# Patient Record
Sex: MALE | Race: WHITE | NOT HISPANIC OR LATINO | Employment: OTHER | URBAN - METROPOLITAN AREA
[De-identification: names, ages, dates, MRNs, and addresses within clinical notes are randomized per-mention and may not be internally consistent; named-entity substitution may affect disease eponyms.]

---

## 2017-07-05 RX ORDER — METOPROLOL TARTRATE 50 MG/1
TABLET, FILM COATED ORAL
Qty: 180 TABLET | Refills: 3 | Status: SHIPPED | OUTPATIENT
Start: 2017-07-05 | End: 2017-08-15

## 2017-08-09 ENCOUNTER — LAB (OUTPATIENT)
Dept: LAB | Facility: HOSPITAL | Age: 67
End: 2017-08-09
Attending: INTERNAL MEDICINE

## 2017-08-09 ENCOUNTER — TRANSCRIBE ORDERS (OUTPATIENT)
Dept: ADMINISTRATIVE | Facility: HOSPITAL | Age: 67
End: 2017-08-09

## 2017-08-09 ENCOUNTER — OFFICE VISIT (OUTPATIENT)
Dept: CARDIOLOGY | Facility: CLINIC | Age: 67
End: 2017-08-09

## 2017-08-09 VITALS
OXYGEN SATURATION: 100 % | SYSTOLIC BLOOD PRESSURE: 160 MMHG | WEIGHT: 223 LBS | HEART RATE: 71 BPM | BODY MASS INDEX: 30.2 KG/M2 | DIASTOLIC BLOOD PRESSURE: 92 MMHG | HEIGHT: 72 IN

## 2017-08-09 DIAGNOSIS — Z01.810 PRE-OPERATIVE CARDIOVASCULAR EXAMINATION: Primary | ICD-10-CM

## 2017-08-09 DIAGNOSIS — Z01.810 PRE-OPERATIVE CARDIOVASCULAR EXAMINATION: ICD-10-CM

## 2017-08-09 DIAGNOSIS — Z13.6 SCREENING FOR ISCHEMIC HEART DISEASE: ICD-10-CM

## 2017-08-09 DIAGNOSIS — Z95.5 HISTORY OF CORONARY ARTERY STENT PLACEMENT: ICD-10-CM

## 2017-08-09 DIAGNOSIS — I20.9 ISCHEMIC CHEST PAIN (HCC): ICD-10-CM

## 2017-08-09 DIAGNOSIS — I25.10 CORONARY ARTERY DISEASE INVOLVING NATIVE CORONARY ARTERY, ANGINA PRESENCE UNSPECIFIED, UNSPECIFIED WHETHER NATIVE OR TRANSPLANTED HEART: Primary | ICD-10-CM

## 2017-08-09 PROBLEM — E11.9 DIABETES: Status: ACTIVE | Noted: 2017-08-09

## 2017-08-09 PROBLEM — I10 ESSENTIAL (PRIMARY) HYPERTENSION: Status: ACTIVE | Noted: 2017-08-09

## 2017-08-09 PROBLEM — E78.5 HLD (HYPERLIPIDEMIA): Status: ACTIVE | Noted: 2017-08-09

## 2017-08-09 PROBLEM — I49.3 PREMATURE COMPLEX, VENTRICULAR: Status: ACTIVE | Noted: 2017-08-09

## 2017-08-09 PROBLEM — I20.0 ACCELERATING ANGINA (HCC): Status: ACTIVE | Noted: 2017-08-09

## 2017-08-09 LAB
ANION GAP SERPL CALCULATED.3IONS-SCNC: 12.9 MMOL/L
BASOPHILS # BLD AUTO: 0.14 10*3/MM3 (ref 0–0.2)
BASOPHILS NFR BLD AUTO: 1.9 % (ref 0–1.5)
BUN BLD-MCNC: 13 MG/DL (ref 8–23)
BUN/CREAT SERPL: 15.7 (ref 7–25)
CALCIUM SPEC-SCNC: 10.6 MG/DL (ref 8.6–10.5)
CHLORIDE SERPL-SCNC: 100 MMOL/L (ref 98–107)
CO2 SERPL-SCNC: 25.1 MMOL/L (ref 22–29)
CREAT BLD-MCNC: 0.83 MG/DL (ref 0.76–1.27)
DEPRECATED RDW RBC AUTO: 46.7 FL (ref 37–54)
EOSINOPHIL # BLD AUTO: 0.45 10*3/MM3 (ref 0–0.7)
EOSINOPHIL NFR BLD AUTO: 6.2 % (ref 0.3–6.2)
ERYTHROCYTE [DISTWIDTH] IN BLOOD BY AUTOMATED COUNT: 14.3 % (ref 11.5–14.5)
GFR SERPL CREATININE-BSD FRML MDRD: 93 ML/MIN/1.73
GLUCOSE BLD-MCNC: 261 MG/DL (ref 65–99)
HCT VFR BLD AUTO: 43.4 % (ref 40.4–52.2)
HGB BLD-MCNC: 14.1 G/DL (ref 13.7–17.6)
IMM GRANULOCYTES # BLD: 0.1 10*3/MM3 (ref 0–0.03)
IMM GRANULOCYTES NFR BLD: 1.4 % (ref 0–0.5)
LYMPHOCYTES # BLD AUTO: 1.91 10*3/MM3 (ref 0.9–4.8)
LYMPHOCYTES NFR BLD AUTO: 26.4 % (ref 19.6–45.3)
MCH RBC QN AUTO: 29.2 PG (ref 27–32.7)
MCHC RBC AUTO-ENTMCNC: 32.5 G/DL (ref 32.6–36.4)
MCV RBC AUTO: 89.9 FL (ref 79.8–96.2)
MONOCYTES # BLD AUTO: 0.76 10*3/MM3 (ref 0.2–1.2)
MONOCYTES NFR BLD AUTO: 10.5 % (ref 5–12)
NEUTROPHILS # BLD AUTO: 3.88 10*3/MM3 (ref 1.9–8.1)
NEUTROPHILS NFR BLD AUTO: 53.6 % (ref 42.7–76)
PLATELET # BLD AUTO: 247 10*3/MM3 (ref 140–500)
PMV BLD AUTO: 10.6 FL (ref 6–12)
POTASSIUM BLD-SCNC: 4.5 MMOL/L (ref 3.5–5.2)
RBC # BLD AUTO: 4.83 10*6/MM3 (ref 4.6–6)
SODIUM BLD-SCNC: 138 MMOL/L (ref 136–145)
WBC NRBC COR # BLD: 7.24 10*3/MM3 (ref 4.5–10.7)

## 2017-08-09 PROCEDURE — 93000 ELECTROCARDIOGRAM COMPLETE: CPT | Performed by: PHYSICIAN ASSISTANT

## 2017-08-09 PROCEDURE — 85025 COMPLETE CBC W/AUTO DIFF WBC: CPT

## 2017-08-09 PROCEDURE — 36415 COLL VENOUS BLD VENIPUNCTURE: CPT

## 2017-08-09 PROCEDURE — 99215 OFFICE O/P EST HI 40 MIN: CPT | Performed by: PHYSICIAN ASSISTANT

## 2017-08-09 PROCEDURE — 80048 BASIC METABOLIC PNL TOTAL CA: CPT

## 2017-08-09 RX ORDER — ISOSORBIDE MONONITRATE 30 MG/1
30 TABLET, EXTENDED RELEASE ORAL DAILY
Qty: 30 TABLET | Refills: 11 | Status: SHIPPED | OUTPATIENT
Start: 2017-08-09 | End: 2017-08-15

## 2017-08-09 NOTE — PROGRESS NOTES
Date of Office Visit: 2017  Encounter Provider: SHAINA Mays  Place of Service: [facility] CARDIOLOGY  Patient Name: Alex Geiger  :1950    Chief Complaint   Patient presents with   • Chest Pain     6 month follow up   :     HPI: Alex Geiger is a 66 y.o. male, new to me, who presents today for follow-up.  Old records have been obtained and reviewed by me.  He is a patient of Dr. Gill with a past medical history significant for hypertension, hyperlipidemia, PVCs, and coronary artery disease.  In 2014 he had unstable angina and was taken for cardiac catheterization.  This showed a normal left main, 30% proximal LAD, 90% mid circumflex, 70% proximal RCA, 80% mid RCA, and 40% JENNIFER.  He underwent successful drug-eluting stent placement to the circumflex and RCA.  His last echocardiogram was in 2014 and showed normal LV function with an EF of 52% and no significant valvular abnormalities.  He was last in our office to see Dr. Deutsch on 8/15/2016.  At that visit, he was doing well from a cardiac standpoint.  He has been intolerant to statins, but was asymptomatic and without complaints of angina or heart failure.  No changes were made to his medical regimen, and he is here today for yearly follow-up.   Over the past month he has been having chest pain.  It starts in the center of his chest and radiates to both of his arms.  It happens randomly, most of the time at rest.  He describes it as a heaviness.  It usually last about 2 or 3 minutes, and he rates it a 2 out of 10.  It associated with shortness of breath, dizziness, and weakness.  He did take a sublingual nitroglycerin for 1 episode of it, and this did help.  He is also noticed increasing fatigue for the past month as well.  On whether the episodes, the pain radiated to his jaw.  The last time he had a his stent placed, he had terrible jaw pain.  His last hemoglobin A1c was 8.0.  His last  cholesterol total was almost 300.  He has been intolerant to statins.      Past Medical History:   Diagnosis Date   • CAD (coronary artery disease)    • Diabetes mellitus    • Hyperlipidemia    • Hypertension    • Renal injury    • Unstable angina    • Ventricular ectopy    • VPC (ventricular premature complex)        Past Surgical History:   Procedure Laterality Date   • CARDIAC CATHETERIZATION     • CORONARY ANGIOPLASTY WITH STENT PLACEMENT     • KIDNEY STONE SURGERY         Social History     Social History   • Marital status:      Spouse name: N/A   • Number of children: N/A   • Years of education: N/A     Occupational History   • Not on file.     Social History Main Topics   • Smoking status: Former Smoker   • Smokeless tobacco: Not on file   • Alcohol use 0.6 oz/week     1 Shots of liquor per week      Comment: 2 drinks a year   • Drug use: No   • Sexual activity: Defer     Other Topics Concern   • Not on file     Social History Narrative       Family History   Problem Relation Age of Onset   • Diabetes Mother    • Alzheimer's disease Father    • Kidney disease Father    • Diabetes Father    • No Known Problems Maternal Grandmother    • No Known Problems Maternal Grandfather    • No Known Problems Paternal Grandmother    • Heart attack Paternal Grandfather    • Heart disease Paternal Grandfather    • Diabetes Paternal Grandfather        Review of Systems   Constitution: Positive for malaise/fatigue. Negative for chills, fever, weight gain and weight loss.   HENT: Negative for ear pain, headaches, hearing loss, nosebleeds and sore throat.    Eyes: Negative for double vision, pain and visual disturbance.   Cardiovascular: Positive for chest pain and dyspnea on exertion. Negative for irregular heartbeat, leg swelling, near-syncope, orthopnea, palpitations, paroxysmal nocturnal dyspnea and syncope.   Respiratory: Negative for cough, shortness of breath, sleep disturbances due to breathing, snoring and  "wheezing.    Endocrine: Negative for cold intolerance, heat intolerance and polyuria.   Skin: Negative for itching and rash.   Musculoskeletal: Negative for joint pain, joint swelling and myalgias.   Gastrointestinal: Negative for abdominal pain, diarrhea, melena, nausea and vomiting.   Genitourinary: Negative for frequency, hematuria and hesitancy.   Neurological: Negative for excessive daytime sleepiness, light-headedness, numbness, paresthesias and seizures.   Psychiatric/Behavioral: Negative for altered mental status and depression.   Allergic/Immunologic: Negative.    All other systems reviewed and are negative.      Allergies   Allergen Reactions   • Acetic Acid Anaphylaxis     \"has trouble breathing\"   • Oxycodone-Acetaminophen Itching   • Percocet [Oxycodone-Acetaminophen]    • Shellfish-Derived Products Hives and Itching   • Statins    • Adhesive Tape Rash   • Latex Rash         Current Outpatient Prescriptions:   •  aspirin 81 MG chewable tablet, Chew 81 mg daily., Disp: , Rfl:   •  diclofenac (VOLTAREN) 1 % gel gel, Apply pea sized amount or 1/2 inch to affected joint up to 4 times daily as needed for arthritis pain, Disp: 100 g, Rfl: 2  •  fexofenadine (ALLEGRA) 180 MG tablet, Take 180 mg by mouth daily., Disp: , Rfl:   •  fluconazole (DIFLUCAN) 100 MG tablet, Take 1 tablet by mouth Daily. For yeast infection, Disp: 7 tablet, Rfl: 3  •  flunisolide (NASALIDE) 25 MCG/ACT (0.025%) solution nasal spray, Inhale 2 sprays every 12 (twelve) hours., Disp: , Rfl:   •  irbesartan (AVAPRO) 150 MG tablet, Take 150 mg by mouth every night., Disp: , Rfl:   •  Magnesium 250 MG tablet, Take  by mouth., Disp: , Rfl:   •  metFORMIN (GLUCOPHAGE) 500 MG tablet, Take 500 mg by mouth 2 (two) times a day with meals., Disp: , Rfl:   •  metoprolol tartrate (LOPRESSOR) 50 MG tablet, TAKE (1) TABLET TWICE A DAY, Disp: 180 tablet, Rfl: 3  •  nitroglycerin (NITROSTAT) 0.4 MG SL tablet, Place 0.4 mg under the tongue every 5 (five) " "minutes as needed for chest pain. Take no more than 3 doses in 15 minutes., Disp: , Rfl:   •  sitaGLIPtin (JANUVIA) 100 MG tablet, Take 100 mg by mouth daily., Disp: , Rfl:   •  tamsulosin (FLOMAX) 0.4 MG capsule 24 hr capsule, Take 1 capsule by mouth every night., Disp: , Rfl:   •  Testosterone (FORTESTA) 10 MG/ACT (2%) gel, Place  on the skin., Disp: , Rfl:      Objective:     Vitals:    08/09/17 0930 08/09/17 0942   BP: 152/92 160/92   BP Location: Right arm Left arm   Pulse: 71    SpO2: 100%    Weight: 223 lb (101 kg)    Height: 72\" (182.9 cm)      Body mass index is 30.24 kg/(m^2).    PHYSICAL EXAM:    Physical Exam   Constitutional: He is oriented to person, place, and time. He appears well-developed and well-nourished. No distress.   HENT:   Head: Normocephalic and atraumatic.   Eyes: Pupils are equal, round, and reactive to light.   Neck: No JVD present. No thyromegaly present.   Cardiovascular: Normal rate, regular rhythm, normal heart sounds and intact distal pulses.    No murmur heard.  Pulmonary/Chest: Effort normal and breath sounds normal. No respiratory distress.   Abdominal: Soft. Bowel sounds are normal. He exhibits no distension. There is no splenomegaly or hepatomegaly. There is no tenderness.   Musculoskeletal: Normal range of motion. He exhibits no edema.   Neurological: He is alert and oriented to person, place, and time.   Skin: Skin is warm and dry. He is not diaphoretic. No erythema.   Psychiatric: He has a normal mood and affect. His behavior is normal. Judgment normal.         ECG 12 Lead  Date/Time: 8/9/2017 9:49 AM  Performed by: SADA HILTON.  Authorized by: SADA HILTON   Comparison: compared with previous ECG from 8/15/2016  Similar to previous ECG  Rhythm: sinus rhythm  BPM: 71  T depression: II, III and aVF  Q waves: II, III and aVF  Clinical impression: abnormal ECG  Comments: Indication: Coronary artery disease, status post stent placement.    When compared to the ECG on " 8/15/2016, there are new T-wave inversions in leads II and aVF.  There is deepening of the T-wave inversion in lead III, and new Q waves and aVF.              Assessment:       Diagnosis Plan   1. Coronary artery disease involving native coronary artery, angina presence unspecified, unspecified whether native or transplanted heart  ECG 12 Lead    Case Request Cath Lab: Coronary angiography   2. Ischemic chest pain  ECG 12 Lead    Case Request Cath Lab: Coronary angiography   3. History of coronary artery stent placement  ECG 12 Lead    Case Request Cath Lab: Coronary angiography     Orders Placed This Encounter   Procedures   • ECG 12 Lead     This order was created via procedure documentation          Plan:       1.  Coronary Artery Disease  Assessment  • There is a new diagnosis of stable angina in the past 12 months  • The patient is having symptoms consistent with unstable angina     Plan  • The patient is being referred to interventional cardiology    Subjective - Objective  • There has been a previous stent procedure using QUIANA  • Current antiplatelet therapy includes aspirin 81 mg  • This is a man who has a history of coronary artery disease and received multiple stents in 2014.  He has been intolerant to statins, and according to the patient his cholesterol total is close to 300.  He also has uncontrolled diabetes with a hemoglobin A1c of about 8.0 as of 2 or 3 months ago.  He is having unstable angina that has been occurring for the past month as well.  He states it feels similar to the way he felt prior to his stent placement in 2014.  I'm going to refer him for cardiac catheterization.  I did discuss with him that between now and when he has his cardiac catheterization, if he develops any chest pain that is worse or does not go away he needs to go to the emergency room.  He indicated that he understood.  I'm also going to start him on Imdur 30 mg daily.  Hopefully this will keep him chest pain free until  we can do his cardiac catheterization.  I did discuss this plan of care with Dr. Deutsch, who was in agreement.      As always, it has been a pleasure to participate in your patient's care.      Sincerely,         Eliana Shaw PA-C

## 2017-08-15 RX ORDER — METOPROLOL TARTRATE 50 MG/1
50 TABLET, FILM COATED ORAL 2 TIMES DAILY
COMMUNITY
End: 2018-07-05 | Stop reason: SDUPTHER

## 2017-08-15 RX ORDER — ISOSORBIDE MONONITRATE 30 MG/1
30 TABLET, EXTENDED RELEASE ORAL DAILY
COMMUNITY
End: 2017-08-16 | Stop reason: HOSPADM

## 2017-08-16 ENCOUNTER — HOSPITAL ENCOUNTER (OUTPATIENT)
Facility: HOSPITAL | Age: 67
Setting detail: HOSPITAL OUTPATIENT SURGERY
Discharge: HOME | End: 2017-08-16
Attending: INTERNAL MEDICINE | Admitting: INTERNAL MEDICINE

## 2017-08-16 VITALS
WEIGHT: 219 LBS | BODY MASS INDEX: 43 KG/M2 | TEMPERATURE: 97.8 F | SYSTOLIC BLOOD PRESSURE: 153 MMHG | HEIGHT: 60 IN | DIASTOLIC BLOOD PRESSURE: 74 MMHG | HEART RATE: 79 BPM | OXYGEN SATURATION: 94 % | RESPIRATION RATE: 16 BRPM

## 2017-08-16 DIAGNOSIS — Z95.5 HISTORY OF CORONARY ARTERY STENT PLACEMENT: ICD-10-CM

## 2017-08-16 DIAGNOSIS — I20.9 ISCHEMIC CHEST PAIN (HCC): ICD-10-CM

## 2017-08-16 DIAGNOSIS — I25.10 CORONARY ARTERY DISEASE INVOLVING NATIVE CORONARY ARTERY, ANGINA PRESENCE UNSPECIFIED, UNSPECIFIED WHETHER NATIVE OR TRANSPLANTED HEART: ICD-10-CM

## 2017-08-16 LAB
ACT BLD: 246 SECONDS (ref 82–152)
GLUCOSE BLDC GLUCOMTR-MCNC: 265 MG/DL (ref 70–130)

## 2017-08-16 PROCEDURE — 85347 COAGULATION TIME ACTIVATED: CPT

## 2017-08-16 PROCEDURE — C1887 CATHETER, GUIDING: HCPCS | Performed by: INTERNAL MEDICINE

## 2017-08-16 PROCEDURE — 99153 MOD SED SAME PHYS/QHP EA: CPT | Performed by: INTERNAL MEDICINE

## 2017-08-16 PROCEDURE — 25010000002 HEPARIN (PORCINE) PER 1000 UNITS: Performed by: INTERNAL MEDICINE

## 2017-08-16 PROCEDURE — C1894 INTRO/SHEATH, NON-LASER: HCPCS | Performed by: INTERNAL MEDICINE

## 2017-08-16 PROCEDURE — 93458 L HRT ARTERY/VENTRICLE ANGIO: CPT | Performed by: INTERNAL MEDICINE

## 2017-08-16 PROCEDURE — C1874 STENT, COATED/COV W/DEL SYS: HCPCS | Performed by: INTERNAL MEDICINE

## 2017-08-16 PROCEDURE — 25010000002 FENTANYL CITRATE (PF) 100 MCG/2ML SOLUTION: Performed by: INTERNAL MEDICINE

## 2017-08-16 PROCEDURE — C9600 PERC DRUG-EL COR STENT SING: HCPCS | Performed by: INTERNAL MEDICINE

## 2017-08-16 PROCEDURE — C1769 GUIDE WIRE: HCPCS | Performed by: INTERNAL MEDICINE

## 2017-08-16 PROCEDURE — 92928 PRQ TCAT PLMT NTRAC ST 1 LES: CPT | Performed by: INTERNAL MEDICINE

## 2017-08-16 PROCEDURE — 93005 ELECTROCARDIOGRAM TRACING: CPT | Performed by: INTERNAL MEDICINE

## 2017-08-16 PROCEDURE — C1725 CATH, TRANSLUMIN NON-LASER: HCPCS | Performed by: INTERNAL MEDICINE

## 2017-08-16 PROCEDURE — 99152 MOD SED SAME PHYS/QHP 5/>YRS: CPT | Performed by: INTERNAL MEDICINE

## 2017-08-16 PROCEDURE — 93010 ELECTROCARDIOGRAM REPORT: CPT | Performed by: INTERNAL MEDICINE

## 2017-08-16 PROCEDURE — 0 IOPAMIDOL PER 1 ML: Performed by: INTERNAL MEDICINE

## 2017-08-16 PROCEDURE — 82962 GLUCOSE BLOOD TEST: CPT

## 2017-08-16 PROCEDURE — 25010000002 MIDAZOLAM PER 1 MG: Performed by: INTERNAL MEDICINE

## 2017-08-16 PROCEDURE — 25010000002 BH (CUPID ONLY) ADENOSINE 6 MG/100ML MIXTURE: Performed by: INTERNAL MEDICINE

## 2017-08-16 DEVICE — XIENCE ALPINE EVEROLIMUS ELUTING CORONARY STENT SYSTEM 3.50 MM X 18 MM / RAPID-EXCHANGE
Type: IMPLANTABLE DEVICE | Status: FUNCTIONAL
Brand: XIENCE ALPINE

## 2017-08-16 RX ORDER — PRASUGREL 10 MG/1
10 TABLET, FILM COATED ORAL DAILY
Qty: 90 TABLET | Refills: 3 | Status: SHIPPED | OUTPATIENT
Start: 2017-08-16 | End: 2018-08-08 | Stop reason: SDUPTHER

## 2017-08-16 RX ORDER — HEPARIN SODIUM 1000 [USP'U]/ML
INJECTION, SOLUTION INTRAVENOUS; SUBCUTANEOUS AS NEEDED
Status: DISCONTINUED | OUTPATIENT
Start: 2017-08-16 | End: 2017-08-16 | Stop reason: HOSPADM

## 2017-08-16 RX ORDER — SODIUM CHLORIDE 9 MG/ML
100 INJECTION, SOLUTION INTRAVENOUS CONTINUOUS
Status: DISCONTINUED | OUTPATIENT
Start: 2017-08-16 | End: 2017-08-16 | Stop reason: HOSPADM

## 2017-08-16 RX ORDER — PRASUGREL 5 MG/1
TABLET, FILM COATED ORAL AS NEEDED
Status: DISCONTINUED | OUTPATIENT
Start: 2017-08-16 | End: 2017-08-16 | Stop reason: HOSPADM

## 2017-08-16 RX ORDER — SODIUM CHLORIDE 9 MG/ML
125 INJECTION, SOLUTION INTRAVENOUS CONTINUOUS
Status: DISCONTINUED | OUTPATIENT
Start: 2017-08-16 | End: 2017-08-16 | Stop reason: HOSPADM

## 2017-08-16 RX ORDER — LIDOCAINE HYDROCHLORIDE 10 MG/ML
0.1 INJECTION, SOLUTION EPIDURAL; INFILTRATION; INTRACAUDAL; PERINEURAL ONCE AS NEEDED
Status: DISCONTINUED | OUTPATIENT
Start: 2017-08-16 | End: 2017-08-16 | Stop reason: HOSPADM

## 2017-08-16 RX ORDER — SODIUM CHLORIDE 0.9 % (FLUSH) 0.9 %
1-10 SYRINGE (ML) INJECTION AS NEEDED
Status: DISCONTINUED | OUTPATIENT
Start: 2017-08-16 | End: 2017-08-16 | Stop reason: HOSPADM

## 2017-08-16 RX ORDER — VERAPAMIL HYDROCHLORIDE 2.5 MG/ML
INJECTION, SOLUTION INTRAVENOUS AS NEEDED
Status: DISCONTINUED | OUTPATIENT
Start: 2017-08-16 | End: 2017-08-16 | Stop reason: HOSPADM

## 2017-08-16 RX ORDER — PRASUGREL 10 MG/1
10 TABLET, FILM COATED ORAL DAILY
Status: DISCONTINUED | OUTPATIENT
Start: 2017-08-17 | End: 2017-08-16 | Stop reason: HOSPADM

## 2017-08-16 RX ORDER — TESTOSTERONE 12.5 MG/1.25G
25 GEL TOPICAL DAILY
COMMUNITY
End: 2021-03-26 | Stop reason: HOSPADM

## 2017-08-16 RX ORDER — FENTANYL CITRATE 50 UG/ML
INJECTION, SOLUTION INTRAMUSCULAR; INTRAVENOUS AS NEEDED
Status: DISCONTINUED | OUTPATIENT
Start: 2017-08-16 | End: 2017-08-16 | Stop reason: HOSPADM

## 2017-08-16 RX ORDER — MIDAZOLAM HYDROCHLORIDE 1 MG/ML
INJECTION INTRAMUSCULAR; INTRAVENOUS AS NEEDED
Status: DISCONTINUED | OUTPATIENT
Start: 2017-08-16 | End: 2017-08-16 | Stop reason: HOSPADM

## 2017-08-16 RX ORDER — METOPROLOL TARTRATE 5 MG/5ML
INJECTION INTRAVENOUS AS NEEDED
Status: DISCONTINUED | OUTPATIENT
Start: 2017-08-16 | End: 2017-08-16 | Stop reason: HOSPADM

## 2017-08-16 RX ORDER — ASPIRIN 325 MG
TABLET ORAL AS NEEDED
Status: DISCONTINUED | OUTPATIENT
Start: 2017-08-16 | End: 2017-08-16 | Stop reason: HOSPADM

## 2017-08-16 RX ORDER — LIDOCAINE HYDROCHLORIDE 20 MG/ML
INJECTION, SOLUTION INFILTRATION; PERINEURAL AS NEEDED
Status: DISCONTINUED | OUTPATIENT
Start: 2017-08-16 | End: 2017-08-16 | Stop reason: HOSPADM

## 2017-08-16 RX ORDER — NITROGLYCERIN 5 MG/ML
INJECTION, SOLUTION INTRAVENOUS AS NEEDED
Status: DISCONTINUED | OUTPATIENT
Start: 2017-08-16 | End: 2017-08-16 | Stop reason: HOSPADM

## 2017-08-16 RX ORDER — ASPIRIN 81 MG/1
81 TABLET, CHEWABLE ORAL DAILY
Status: DISCONTINUED | OUTPATIENT
Start: 2017-08-16 | End: 2017-08-16 | Stop reason: HOSPADM

## 2017-08-16 RX ADMIN — SODIUM CHLORIDE 125 ML/HR: 9 INJECTION, SOLUTION INTRAVENOUS at 07:52

## 2017-08-16 NOTE — PERIOPERATIVE NURSING NOTE
JARON GUTIERREZ AT BEDSIDE DISCUSSING PROCEDURE AND FOLLOW UP WITH PATIENT AND FAMILY.  VOICED UNDERSTANDING.

## 2017-08-16 NOTE — INTERVAL H&P NOTE
H&P reviewed. The patient was examined and there are no changes to the H&P. I have explained the risks and benefits of the procedure to the patient.  The patient understands and agrees to proceed

## 2017-08-16 NOTE — DISCHARGE INSTRUCTIONS
Moderate Conscious Sedation, Adult, Care After  Refer to this sheet in the next few weeks. These instructions provide you with information on caring for yourself after your procedure. Your health care provider may also give you more specific instructions. Your treatment has been planned according to current medical practices, but problems sometimes occur. Call your health care provider if you have any problems or questions after your procedure.  WHAT TO EXPECT AFTER THE PROCEDURE   After your procedure:  · You may feel sleepy, clumsy, and have poor balance for several hours.  · Vomiting may occur if you eat too soon after the procedure.  HOME CARE INSTRUCTIONS  · Do not participate in any activities where you could become injured for at least 24 hours. Do not:    Drive.    Swim.    Ride a bicycle.    Operate heavy machinery.    Cook.    Use power tools.    Climb ladders.    Work from a high place.  · Do not make important decisions or sign legal documents until you are improved.  · If you vomit, drink water, juice, or soup when you can drink without vomiting. Make sure you have little or no nausea before eating solid foods.  · Only take over-the-counter or prescription medicines for pain, discomfort, or fever as directed by your health care provider.  · Make sure you and your family fully understand everything about the medicines given to you, including what side effects may occur.  · You should not drink alcohol, take sleeping pills, or take medicines that cause drowsiness for at least 24 hours.  · If you smoke, do not smoke without supervision.  · If you are feeling better, you may resume normal activities 24 hours after you were sedated.  · Keep all appointments with your health care provider.  · You should have a responsible adult stay with you for the first 24 hours post procedure.  SEEK MEDICAL CARE IF:  · Your skin is pale or bluish in color.  · You continue to feel nauseous or vomit.  · Your pain is getting  worse and is not helped by medicine.  · You have bleeding or swelling.  · You are still sleepy or feeling clumsy after 24 hours.  SEEK IMMEDIATE MEDICAL CARE IF:  · You develop a rash.  · You have difficulty breathing.  · You develop any type of allergic problem.  · You have a fever.  MAKE SURE YOU:  · Understand these instructions.  · Will watch your condition.  · Will get help right away if you are not doing well or get worse.     This information is not intended to replace advice given to you by your health care provider. Make sure you discuss any questions you have with your health care provider.     Document Released: 10/08/2014 Document Revised: 01/08/2016 Document Reviewed: 10/08/2014  [employer] Patient Education ©2016 [employer].    [facility]  [address] [place] [address]    Coronary Angiogram with Stent (Radial Approach) After Care    Refer to this sheet in the next few weeks. These instructions provide you with information on caring for yourself after your procedure. Your health care provider may also give you more specific instructions. Your treatment has been planned according to current medical practices, but problems sometimes occur. Call your health care provider if you have any problems or questions after your procedure.       Home Care Instructions:  · You may shower the day after the procedure. Remove the bandage (dressing) and gently wash the site with plain soap and water. Gently pat the site dry. You may apply a band aid daily for 2 days if desired.    · Do not apply powder or lotion to the site.  · Do not submerge the affected site in water for 3 to 5 days or until the site is completely healed.   · Do not flex or bend the affected arm for 24 hours.  · Do not lift, push or pull anything over 10 pounds for 2 days after your procedure.  · Do not operate machinery or power tools for 24 hours.  · Inspect the site at least twice daily. You may notice some  bruising at the site and it may be tender for 1 to 2 weeks.     · Increase your fluid intake for the next 2 days.    · Keep arm elevated for 24 hours.  For the remainder of the day, keep your arm in the “Pledge of Allegiance” position when up and about.    · Limit your activity for the next 48 hours and avoid strenuous activity as directed by your physician.   · Cardiac Rehab may or may not be ordered.  Please consult with your physician  · You may drive 24 hours after the procedure unless otherwise instructed by your caregiver.  · A responsible adult should be with you for the first 24 hours after you arrive home.   · Do not make any important legal decisions or sign legal papers for 24 hours.    · Take medicines only as directed by your health care provider.  Blood thinners may be prescribed after your procedure to improve blood flow through the stent.    · Eat a heart-healthy diet. This should include plenty of fresh fruits and vegetables. Meat should be lean cuts. Avoid the following types of food:    ¨ Food that is high in salt.    ¨ Canned or highly processed food.    ¨ Food that is high in saturated fat or sugar.    ¨ Fried food.    · Make any other lifestyle changes recommended by your health care provider. This may include:    ¨ Not using any tobacco products including cigarettes, chewing tobacco, or electronic cigarettes.   ¨ Managing your weight.    ¨ Getting regular exercise.    ¨ Managing your blood pressure.    ¨ Limiting your alcohol intake.    ¨ Managing other health problems, such as diabetes.    · If you need an MRI after your heart stent was placed, be sure to tell the health care provider who orders the MRI that you have a heart stent.    · Keep all follow-up visits as directed by your health care provider.    ·   Call Your Doctor If:  · You have unusual pain at the radial/ulnar (wrist) site.  · You have redness, warmth, swelling, or pain at the radial/ulnar (wrist) site.  · You have drainage  (other than a small amount of blood on the dressing).  · `You have chills or a fever > 101.  · Your arm becomes pale or dark, cool, tingly, or numb.  · You develop chest pain, shortness of breath, feel faint or pass out.    · You have heavy bleeding from the site, hold pressure on the site for 20 minutes.  If the bleeding stops, apply a fresh bandage and call your cardiologist.  However, if you continue to have bleeding, call 911.        Make Sure You:   · Understand these instructions.  · Will watch your condition.  · Will get help right away if you are not doing well or get worse.

## 2017-08-16 NOTE — PLAN OF CARE
Problem: Patient Care Overview (Adult)  Goal: Plan of Care Review  Outcome: Outcome(s) achieved Date Met:  08/16/17 08/16/17 1534   Coping/Psychosocial Response Interventions   Plan Of Care Reviewed With patient;spouse   Patient Care Overview   Progress improving   Outcome Evaluation   Outcome Summary/Follow up Plan ready for discharge       Goal: Adult Individualization and Mutuality  Outcome: Outcome(s) achieved Date Met:  08/16/17  Goal: Discharge Needs Assessment  Outcome: Outcome(s) achieved Date Met:  08/16/17    Problem: Cardiac Catheterization with/without PCI (Adult)  Goal: Signs and Symptoms of Listed Potential Problems Will be Absent or Manageable (Cardiac Catheterization with/without PCI)  Outcome: Outcome(s) achieved Date Met:  08/16/17 08/16/17 1534   Cardiac Catheterization with/without PCI   Problems Assessed (Cardiac Catheterization) pain;cardiopulmonary complications;situational response;access site complications   Problems Present (Cardiac Catheterization) none

## 2017-08-16 NOTE — NURSING NOTE
"Pt ready for discharge.  Pt in bathroom.  Met with wife.  Explained benefits of outpatient cardiac rehab.  Closest program is at  [facility].  I provided that contact number for them.  Also provided education booklet, \"Understanding Cardiac Rehabilitation\". Wife says she's not sure he will agree to attend.  "

## 2017-08-30 ENCOUNTER — OFFICE VISIT (OUTPATIENT)
Dept: CARDIOLOGY | Facility: CLINIC | Age: 67
End: 2017-08-30

## 2017-08-30 VITALS
OXYGEN SATURATION: 97 % | HEIGHT: 72 IN | DIASTOLIC BLOOD PRESSURE: 88 MMHG | WEIGHT: 220 LBS | BODY MASS INDEX: 29.8 KG/M2 | SYSTOLIC BLOOD PRESSURE: 134 MMHG | HEART RATE: 76 BPM

## 2017-08-30 DIAGNOSIS — I25.10 CORONARY ARTERY DISEASE INVOLVING NATIVE CORONARY ARTERY OF NATIVE HEART WITHOUT ANGINA PECTORIS: Primary | ICD-10-CM

## 2017-08-30 DIAGNOSIS — Z95.5 HISTORY OF CORONARY ARTERY STENT PLACEMENT: ICD-10-CM

## 2017-08-30 PROCEDURE — 93000 ELECTROCARDIOGRAM COMPLETE: CPT | Performed by: PHYSICIAN ASSISTANT

## 2017-08-30 PROCEDURE — 99213 OFFICE O/P EST LOW 20 MIN: CPT | Performed by: PHYSICIAN ASSISTANT

## 2017-08-30 NOTE — PROGRESS NOTES
Date of Office Visit: 2017  Encounter Provider: SHAINA Mays  Place of Service: [facility] CARDIOLOGY  Patient Name: Alex Geiger  :1950    Chief Complaint   Patient presents with   • Coronary Artery Disease     1 week hospital follow up   :     HPI: Alex Geiger is a 67 y.o. male who presents today for Follow-up.  Old records have been obtained and reviewed by me.  He is a patient of Dr. Deutsch's with a past medical history significant for hypertension, hyperlipidemia, PVCs, and coronary artery disease.  In 2014 he had unstable angina and was taken for cardiac catheterization.  This showed a normal left main, 30% proximal LAD, 90% mid circumflex, 70% proximal RCA, 80% mid RCA, and 40% JENNIFER.  He underwent successful drug-eluting stent placement to the circumflex and RCA.  His last echocardiogram was in 2014 and showed normal LV function with an EF of 52% and no significant valvular abnormalities.    I saw him for a yearly appointment on 2017.  At that visit, he was having what I felt like was unstable angina.  I referred him for cardiac catheterization, which happened on 2017.  This showed moderately reduced global LV function with an EF in the range of 40%, normal left main, 90% mid LAD, 90% small second diagonal (not amenable to PCI), widely patent mid circumflex stent, 20% distal circumflex, widely patent proximal RCA stent, and 40-50% mid RCA stenosis.  He underwent successful drug-eluting stent placement to the mid LAD lesion.  He was discharged home the same day and is here today for follow-up.   Since his procedure he's been doing pretty well.  He still has some chest tightness and shortness of breath on exertion, as well as some fatigue.  However all 3 of these complaints are improving.  He no longer has the pain in his left arm like he used to.  He is very active and working full-time.  He states that he walks about 4 miles a  day at work and climbs 2 or 3 flights of stairs several times a day.  However he does not get exercise for the sake of exercise.  He does have a full gym and a treadmill and his basement.      Past Medical History:   Diagnosis Date   • CAD (coronary artery disease)    • Diabetes mellitus    • Hyperlipidemia    • Hypertension    • Renal injury    • Unstable angina    • Ventricular ectopy    • VPC (ventricular premature complex)        Past Surgical History:   Procedure Laterality Date   • CARDIAC CATHETERIZATION     • CARDIAC CATHETERIZATION N/A 8/16/2017    Procedure: Coronary angiography;  Surgeon: Rell Deutsch MD;  Location: [facility] CATH INVASIVE LOCATION;  Service:    • CARDIAC CATHETERIZATION N/A 8/16/2017    Procedure: Stent QUIANA coronary;  Surgeon: Rell Deutsch MD;  Location: [facility] CATH INVASIVE LOCATION;  Service:    • CARDIAC CATHETERIZATION N/A 8/16/2017    Procedure: Left Heart Cath;  Surgeon: Rell Deutsch MD;  Location: [facility] CATH INVASIVE LOCATION;  Service:    • CARDIAC CATHETERIZATION N/A 8/16/2017    Procedure: Left ventriculography;  Surgeon: Rell Deutsch MD;  Location: [facility] CATH INVASIVE LOCATION;  Service:    • CORONARY ANGIOPLASTY WITH STENT PLACEMENT     • KIDNEY STONE SURGERY     • PARATHYROIDECTOMY         Social History     Social History   • Marital status:      Spouse name: N/A   • Number of children: N/A   • Years of education: N/A     Occupational History   • Not on file.     Social History Main Topics   • Smoking status: Former Smoker   • Smokeless tobacco: Not on file   • Alcohol use 0.6 oz/week     1 Shots of liquor per week      Comment: 2 drinks a year   • Drug use: No   • Sexual activity: Defer     Other Topics Concern   • Not on file     Social History Narrative       Family History   Problem Relation Age of Onset   • Diabetes Mother    • Alzheimer's disease Father    • Kidney disease Father    • Diabetes Father    • No Known Problems Maternal Grandmother    •  "No Known Problems Maternal Grandfather    • No Known Problems Paternal Grandmother    • Heart attack Paternal Grandfather    • Heart disease Paternal Grandfather    • Diabetes Paternal Grandfather        Review of Systems   Constitution: Positive for malaise/fatigue. Negative for chills, fever, weight gain and weight loss.   HENT: Negative for ear pain, headaches, hearing loss, nosebleeds and sore throat.    Eyes: Negative for double vision, pain and visual disturbance.   Cardiovascular: Positive for chest pain and dyspnea on exertion. Negative for irregular heartbeat, leg swelling, near-syncope, orthopnea, palpitations, paroxysmal nocturnal dyspnea and syncope.   Respiratory: Negative for cough, shortness of breath, sleep disturbances due to breathing, snoring and wheezing.    Endocrine: Negative for cold intolerance, heat intolerance and polyuria.   Skin: Negative for itching and rash.   Musculoskeletal: Negative for joint pain, joint swelling and myalgias.   Gastrointestinal: Negative for abdominal pain, diarrhea, melena, nausea and vomiting.   Genitourinary: Negative for frequency, hematuria and hesitancy.   Neurological: Negative for excessive daytime sleepiness, light-headedness, numbness, paresthesias and seizures.   Psychiatric/Behavioral: Negative for altered mental status and depression.   Allergic/Immunologic: Negative.    All other systems reviewed and are negative.      Allergies   Allergen Reactions   • Acetic Acid Anaphylaxis     \"has trouble breathing\"   • Oxycodone-Acetaminophen Itching   • Percocet [Oxycodone-Acetaminophen]    • Shellfish-Derived Products Hives and Itching   • Statins    • Adhesive Tape Rash   • Latex Rash         Current Outpatient Prescriptions:   •  aspirin 81 MG chewable tablet, Chew 81 mg daily., Disp: , Rfl:   •  fexofenadine (ALLEGRA) 180 MG tablet, Take 180 mg by mouth daily., Disp: , Rfl:   •  flunisolide (NASALIDE) 25 MCG/ACT (0.025%) solution nasal spray, Inhale 2 sprays " "every 12 (twelve) hours., Disp: , Rfl:   •  irbesartan (AVAPRO) 150 MG tablet, Take 150 mg by mouth every night., Disp: , Rfl:   •  Magnesium 250 MG tablet, Take 1 tablet by mouth Daily., Disp: , Rfl:   •  metFORMIN (GLUCOPHAGE) 500 MG tablet, Take 2 tablets by mouth 2 (Two) Times a Day With Meals., Disp: , Rfl:   •  metoprolol tartrate (LOPRESSOR) 50 MG tablet, Take 50 mg by mouth 2 (Two) Times a Day., Disp: , Rfl:   •  nitroglycerin (NITROSTAT) 0.4 MG SL tablet, Place 0.4 mg under the tongue every 5 (five) minutes as needed for chest pain. Take no more than 3 doses in 15 minutes., Disp: , Rfl:   •  prasugrel (EFFIENT) 10 MG tablet, Take 1 tablet by mouth Daily for 30 days., Disp: 90 tablet, Rfl: 3  •  sitaGLIPtin (JANUVIA) 100 MG tablet, Take 100 mg by mouth daily., Disp: , Rfl:   •  tamsulosin (FLOMAX) 0.4 MG capsule 24 hr capsule, Take 1 capsule by mouth every night., Disp: , Rfl:   •  testosterone (ANDROGEL) 25 MG/2.5GM (1%) gel gel, Place 25 mg on the skin Daily., Disp: , Rfl:      Objective:     Vitals:    08/30/17 1032 08/30/17 1040   BP: 130/86 134/88   BP Location: Right arm Left arm   Pulse: 76    SpO2: 97%    Weight: 220 lb (99.8 kg)    Height: 72\" (182.9 cm)      Body mass index is 29.84 kg/(m^2).    PHYSICAL EXAM:    Physical Exam   Constitutional: He is oriented to person, place, and time. He appears well-developed and well-nourished. No distress.   HENT:   Head: Normocephalic and atraumatic.   Eyes: Pupils are equal, round, and reactive to light.   Neck: No JVD present. No thyromegaly present.   Cardiovascular: Normal rate, regular rhythm, normal heart sounds and intact distal pulses.    No murmur heard.  Right radial cath site well healed without erythema or echymosis, palpable proximal and distal pulses, good capillary refill   Pulmonary/Chest: Effort normal and breath sounds normal. No respiratory distress.   Abdominal: Soft. Bowel sounds are normal. He exhibits no distension. There is no " splenomegaly or hepatomegaly. There is no tenderness.   Musculoskeletal: Normal range of motion. He exhibits no edema.   Neurological: He is alert and oriented to person, place, and time.   Skin: Skin is warm and dry. He is not diaphoretic. No erythema.   Psychiatric: He has a normal mood and affect. His behavior is normal. Judgment normal.         ECG 12 Lead  Date/Time: 8/30/2017 10:47 AM  Performed by: SADA HILTON.  Authorized by: SADA HILTON   Comparison: compared with previous ECG from 8/16/2017  Rhythm: sinus rhythm  BPM: 76  T depression: II, III and aVF  Q waves: III  Clinical impression: abnormal ECG  Comments: Indication: Coronary artery disease, status post stent placement.              Assessment:       Diagnosis Plan   1. Coronary artery disease involving native coronary artery of native heart without angina pectoris  ECG 12 Lead   2. History of coronary artery stent placement  ECG 12 Lead     Orders Placed This Encounter   Procedures   • ECG 12 Lead     This order was created via procedure documentation          Plan:       1.  Coronary Artery Disease  Assessment  • The patient has CCS class I - angina only during strenuous or prolonged physical activity  • There is a new diagnosis of stable angina in the past 12 months  • The patient is having symptoms consistent with unstable angina     Plan  • Lifestyle modifications discussed include medication compliance and regular exercise    Subjective - Objective  • There has been a previous stent procedure using QUIANA  • Current antiplatelet therapy includes aspirin 81 mg and prasugrel 10 mg  • The patient qualifies for cardiac rehabilitation, but has not been referred for system reasons  • Overall he's doing well.  His symptoms are starting to improve.  He states that it took a long time after his last procedure for him to feel back to normal.  He did have a little bit of a reduced ejection fraction during his cardiac catheterization, and this  certainly could explain some of the fatigue and shortness of breath.  I think that at some point in the future we may want to check an echocardiogram, I will defer to Dr. Deutsch for that.  We did talk about cardiac rehabilitation, however there is no way that he can participate in cardiac rehabilitation with his current job.  I've encouraged him to start exercising at home on his treadmill, he indicated that he understood.  He will follow-up with Dr. Deutsch on 9/18/2017.      As always, it has been a pleasure to participate in your patient's care.      Sincerely,         Eliana Shaw PA-C

## 2017-09-18 ENCOUNTER — OFFICE VISIT (OUTPATIENT)
Dept: CARDIOLOGY | Facility: CLINIC | Age: 67
End: 2017-09-18

## 2017-09-18 VITALS
DIASTOLIC BLOOD PRESSURE: 92 MMHG | HEIGHT: 72 IN | SYSTOLIC BLOOD PRESSURE: 178 MMHG | HEART RATE: 55 BPM | WEIGHT: 223.4 LBS | BODY MASS INDEX: 30.26 KG/M2

## 2017-09-18 DIAGNOSIS — I25.10 CORONARY ARTERY DISEASE INVOLVING NATIVE CORONARY ARTERY OF NATIVE HEART WITHOUT ANGINA PECTORIS: ICD-10-CM

## 2017-09-18 DIAGNOSIS — Z95.5 HISTORY OF CORONARY ARTERY STENT PLACEMENT: ICD-10-CM

## 2017-09-18 DIAGNOSIS — I10 ESSENTIAL (PRIMARY) HYPERTENSION: ICD-10-CM

## 2017-09-18 DIAGNOSIS — E78.49 OTHER HYPERLIPIDEMIA: Primary | ICD-10-CM

## 2017-09-18 PROCEDURE — 93000 ELECTROCARDIOGRAM COMPLETE: CPT | Performed by: INTERNAL MEDICINE

## 2017-09-18 PROCEDURE — 99214 OFFICE O/P EST MOD 30 MIN: CPT | Performed by: INTERNAL MEDICINE

## 2017-09-18 RX ORDER — PRASUGREL 10 MG/1
10 TABLET, FILM COATED ORAL DAILY
COMMUNITY
End: 2018-09-25

## 2017-09-18 NOTE — PROGRESS NOTES
Date of Office Visit: 2017  Encounter Provider: Rell Deutsch MD  Place of Service: [facility] CARDIOLOGY  Patient Name: Alex Geiger  :1950  [id number]    Chief Complaint   Patient presents with   • Coronary Artery Disease   • Chest Pain   :     HPI: Alex Geiger is a 67 y.o. male  is here for follow-up we stented his mid LAD a month ago he had some 50% mid to distal RCA disease a little bit of 30% disease in his distal circumflex he had prior stents put in  to his mid RCA and his mid circumflex and those looked good his LV functions normal he says this time he's just not quite getting better as fast as he did the last time he gets lightheaded when he bends over and stands up he can do whatever he wants to do he's had a little bit of chest pressure occasionally    Past Medical History:   Diagnosis Date   • CAD (coronary artery disease)    • Diabetes mellitus    • Hyperlipidemia    • Hypertension    • Renal injury    • Unstable angina    • Ventricular ectopy    • VPC (ventricular premature complex)        Past Surgical History:   Procedure Laterality Date   • CARDIAC CATHETERIZATION     • CARDIAC CATHETERIZATION N/A 2017    Procedure: Coronary angiography;  Surgeon: Rell Deutsch MD;  Location: [facility] INVASIVE LOCATION;  Service:    • CARDIAC CATHETERIZATION N/A 2017    Procedure: Stent QUIANA coronary;  Surgeon: Rell Deutsch MD;  Location: [facility] CATH INVASIVE LOCATION;  Service:    • CARDIAC CATHETERIZATION N/A 2017    Procedure: Left Heart Cath;  Surgeon: Rell Deutsch MD;  Location: [facility] CATH INVASIVE LOCATION;  Service:    • CARDIAC CATHETERIZATION N/A 2017    Procedure: Left ventriculography;  Surgeon: Rell Deutsch MD;  Location: [facility] CATH INVASIVE LOCATION;  Service:    • CORONARY ANGIOPLASTY WITH STENT PLACEMENT     • KIDNEY STONE SURGERY     • PARATHYROIDECTOMY         Social History     Social History   • Marital  "status:      Spouse name: N/A   • Number of children: N/A   • Years of education: N/A     Occupational History   • Not on file.     Social History Main Topics   • Smoking status: Former Smoker   • Smokeless tobacco: Not on file   • Alcohol use 0.6 oz/week     1 Shots of liquor per week      Comment: 2 drinks a year   • Drug use: No   • Sexual activity: Defer     Other Topics Concern   • Not on file     Social History Narrative       Family History   Problem Relation Age of Onset   • Diabetes Mother    • Alzheimer's disease Father    • Kidney disease Father    • Diabetes Father    • No Known Problems Maternal Grandmother    • No Known Problems Maternal Grandfather    • No Known Problems Paternal Grandmother    • Heart attack Paternal Grandfather    • Heart disease Paternal Grandfather    • Diabetes Paternal Grandfather        Review of Systems   Constitution: Negative for decreased appetite, fever, malaise/fatigue and weight loss.   HENT: Negative for nosebleeds.    Eyes: Negative for double vision.   Cardiovascular: Negative for chest pain, claudication, cyanosis, dyspnea on exertion, irregular heartbeat, leg swelling, near-syncope, orthopnea, palpitations, paroxysmal nocturnal dyspnea and syncope.   Respiratory: Negative for cough, hemoptysis and shortness of breath.    Hematologic/Lymphatic: Negative for bleeding problem.   Skin: Negative for rash.   Musculoskeletal: Negative for falls and myalgias.   Gastrointestinal: Negative for hematochezia, jaundice, melena, nausea and vomiting.   Genitourinary: Negative for hematuria.   Neurological: Negative for dizziness and seizures.   Psychiatric/Behavioral: Negative for altered mental status and memory loss.       Allergies   Allergen Reactions   • Acetic Acid Anaphylaxis     \"has trouble breathing\"   • Oxycodone-Acetaminophen Itching   • Percocet [Oxycodone-Acetaminophen]    • Shellfish-Derived Products Hives and Itching   • Statins    • Adhesive Tape Rash   • " "Latex Rash         Current Outpatient Prescriptions:   •  aspirin 81 MG chewable tablet, Chew 81 mg daily., Disp: , Rfl:   •  fexofenadine (ALLEGRA) 180 MG tablet, Take 180 mg by mouth daily., Disp: , Rfl:   •  flunisolide (NASALIDE) 25 MCG/ACT (0.025%) solution nasal spray, Inhale 2 sprays every 12 (twelve) hours., Disp: , Rfl:   •  irbesartan (AVAPRO) 150 MG tablet, Take 150 mg by mouth every night., Disp: , Rfl:   •  Magnesium 250 MG tablet, Take 1 tablet by mouth Daily., Disp: , Rfl:   •  metFORMIN (GLUCOPHAGE) 500 MG tablet, Take 2 tablets by mouth 2 (Two) Times a Day With Meals., Disp: , Rfl:   •  metoprolol tartrate (LOPRESSOR) 50 MG tablet, Take 50 mg by mouth 2 (Two) Times a Day., Disp: , Rfl:   •  nitroglycerin (NITROSTAT) 0.4 MG SL tablet, Place 0.4 mg under the tongue every 5 (five) minutes as needed for chest pain. Take no more than 3 doses in 15 minutes., Disp: , Rfl:   •  prasugrel (EFFIENT) 10 MG tablet, Take 10 mg by mouth Daily., Disp: , Rfl:   •  sitaGLIPtin (JANUVIA) 100 MG tablet, Take 100 mg by mouth daily., Disp: , Rfl:   •  tamsulosin (FLOMAX) 0.4 MG capsule 24 hr capsule, Take 1 capsule by mouth every night., Disp: , Rfl:   •  testosterone (ANDROGEL) 25 MG/2.5GM (1%) gel gel, Place 25 mg on the skin Daily., Disp: , Rfl:      Objective:     Vitals:    09/18/17 1413   BP: 178/92   Pulse: 55   Weight: 223 lb 6.4 oz (101 kg)   Height: 72\" (182.9 cm)     Body mass index is 30.3 kg/(m^2).    Physical Exam   Constitutional: He is oriented to person, place, and time. He appears well-developed and well-nourished.   HENT:   Head: Normocephalic.   Eyes: No scleral icterus.   Neck: No JVD present. No thyromegaly present.   Cardiovascular: Normal rate, regular rhythm and normal heart sounds.  Exam reveals no gallop and no friction rub.    No murmur heard.  Pulmonary/Chest: Effort normal and breath sounds normal. He has no wheezes. He has no rales.   Abdominal: Soft. There is no hepatosplenomegaly. There " is no tenderness.   Musculoskeletal: Normal range of motion. He exhibits no edema.   Lymphadenopathy:     He has no cervical adenopathy.   Neurological: He is alert and oriented to person, place, and time.   Skin: Skin is warm and dry. No rash noted.   Psychiatric: He has a normal mood and affect.         ECG 12 Lead  Date/Time: 9/18/2017 3:24 PM  Performed by: STAN DEUTSCH  Authorized by: STAN DEUTSCH   Comparison: compared with previous ECG   Similar to previous ECG  Rhythm: sinus rhythm  Clinical impression: abnormal ECG  Comments: Inferior ST-T change no not any more pronounced             Assessment:       Diagnosis Plan   1. Other hyperlipidemia     2. Coronary artery disease involving native coronary artery of native heart without angina pectoris     3. History of coronary artery stent placement     4. Essential (primary) hypertension            Plan:       I think he's probably doing okay his got a moderate lesion in his RCA and I did not FFR its I think we would have a low threshold to go back and look at did offer him a stress test at some point but he doesn't want to do that at all so I think we see only gets along and I'll see him back in 6 months    As always, it has been a pleasure to participate in your patient's care.      Sincerely,       Stan Deutsch MD

## 2017-10-03 RX ORDER — NITROGLYCERIN 0.4 MG/1
TABLET SUBLINGUAL
Qty: 25 TABLET | Refills: 3 | Status: SHIPPED | OUTPATIENT
Start: 2017-10-03 | End: 2018-12-11 | Stop reason: SDUPTHER

## 2018-01-10 RX ORDER — METRONIDAZOLE 500 MG/1
500 TABLET ORAL 3 TIMES DAILY
Qty: 21 TABLET | Refills: 0 | Status: SHIPPED | OUTPATIENT
Start: 2018-01-10 | End: 2018-01-17

## 2018-03-21 ENCOUNTER — OFFICE VISIT (OUTPATIENT)
Dept: CARDIOLOGY | Facility: CLINIC | Age: 68
End: 2018-03-21

## 2018-03-21 VITALS
HEIGHT: 72 IN | SYSTOLIC BLOOD PRESSURE: 160 MMHG | OXYGEN SATURATION: 99 % | HEART RATE: 80 BPM | WEIGHT: 221 LBS | BODY MASS INDEX: 29.93 KG/M2 | DIASTOLIC BLOOD PRESSURE: 90 MMHG

## 2018-03-21 DIAGNOSIS — Z95.5 HISTORY OF CORONARY ARTERY STENT PLACEMENT: ICD-10-CM

## 2018-03-21 DIAGNOSIS — I25.10 CORONARY ARTERY DISEASE INVOLVING NATIVE CORONARY ARTERY OF NATIVE HEART WITHOUT ANGINA PECTORIS: Primary | ICD-10-CM

## 2018-03-21 PROCEDURE — 99213 OFFICE O/P EST LOW 20 MIN: CPT | Performed by: PHYSICIAN ASSISTANT

## 2018-03-21 PROCEDURE — 93000 ELECTROCARDIOGRAM COMPLETE: CPT | Performed by: PHYSICIAN ASSISTANT

## 2018-03-21 NOTE — PROGRESS NOTES
Date of Office Visit: 2018  Encounter Provider: SHAINA Mays  Place of Service: [facility] CARDIOLOGY  Patient Name: Alex Geiger  :1950    Chief Complaint   Patient presents with   • Coronary Artery Disease     6 month follow up   :     HPI: Alex Geiger is a 67 y.o. male who presents today for Follow-up.  Old records have been obtained and reviewed by me.  He is a patient of Dr. Deutsch's with a past medical history significant for hypertension, hyperlipidemia, PVCs, and coronary artery disease.  In 2014 he had unstable angina and was taken for cardiac catheterization.  This showed a normal left main, 30% proximal LAD, 90% mid circumflex, 70% proximal RCA, 80% mid RCA, and 40% JENNIFER.  He underwent successful drug-eluting stent placement to the circumflex and RCA.  His last echocardiogram was in 2014 and showed normal LV function with an EF of 52% and no significant valvular abnormalities.               I saw him for a yearly appointment on 2017.  At that visit, he was having what I felt like was unstable angina.  I referred him for cardiac catheterization, which happened on 2017.  This showed moderately reduced global LV function with an EF in the range of 40%, normal left main, 90% mid LAD, 90% small second diagonal (not amenable to PCI), widely patent mid circumflex stent, 20% distal circumflex, widely patent proximal RCA stent, and 40-50% mid RCA stenosis.  He underwent successful drug-eluting stent placement to the mid LAD lesion.   He was last in our office to see Dr. Deutsch on 2017 after his most recent cardiac catheterization.  At that visit, he did not feel like he was recovering as quickly as he had the first time around.  He was still having a little bit of chest pain.  Dr. Deutsch wanted to wait and see if things got better, however he did offer him a stress test and the patient declined.  He is here today for 6 month  follow-up.   Over the past 6 months she's been doing well.  He denies any chest pain, shortness of breath, palpitations, edema, dizziness, or syncope.  He eats a relatively healthy diet, however he is not exercising as much as he knows that he should.  He does climb stairs at work at least 10-15 times a day and can do so without difficulty.  He still works 4 days a week as a .  His blood pressure in the office today is a little elevated, however he states that he checks it at home and it is normally running in the 130s systolic.    Past Medical History:   Diagnosis Date   • CAD (coronary artery disease)    • Diabetes mellitus    • Hyperlipidemia    • Hypertension    • Renal injury    • Unstable angina    • Ventricular ectopy    • VPC (ventricular premature complex)        Past Surgical History:   Procedure Laterality Date   • CARDIAC CATHETERIZATION     • CARDIAC CATHETERIZATION N/A 8/16/2017    Procedure: Coronary angiography;  Surgeon: Rell Deutsch MD;  Location: [facility] INVASIVE LOCATION;  Service:    • CARDIAC CATHETERIZATION N/A 8/16/2017    Procedure: Stent QUIANA coronary;  Surgeon: Rell Deutsch MD;  Location: [facility] CATH INVASIVE LOCATION;  Service:    • CARDIAC CATHETERIZATION N/A 8/16/2017    Procedure: Left Heart Cath;  Surgeon: Rell Deutsch MD;  Location: [facility] CATH INVASIVE LOCATION;  Service:    • CARDIAC CATHETERIZATION N/A 8/16/2017    Procedure: Left ventriculography;  Surgeon: Rell Deutsch MD;  Location: [facility] CATH INVASIVE LOCATION;  Service:    • CORONARY ANGIOPLASTY WITH STENT PLACEMENT     • KIDNEY STONE SURGERY     • PARATHYROIDECTOMY         Social History     Social History   • Marital status:      Spouse name: N/A   • Number of children: N/A   • Years of education: N/A     Occupational History   • Not on file.     Social History Main Topics   • Smoking status: Former Smoker   • Smokeless tobacco: Never Used   • Alcohol use 0.6 oz/week     1 Shots of  "liquor per week      Comment: 2 drinks a year   • Drug use: No   • Sexual activity: Defer     Other Topics Concern   • Not on file     Social History Narrative   • No narrative on file       Family History   Problem Relation Age of Onset   • Diabetes Mother    • Alzheimer's disease Father    • Kidney disease Father    • Diabetes Father    • No Known Problems Maternal Grandmother    • No Known Problems Maternal Grandfather    • No Known Problems Paternal Grandmother    • Heart attack Paternal Grandfather    • Heart disease Paternal Grandfather    • Diabetes Paternal Grandfather        Review of Systems   Constitution: Negative for chills, fever and malaise/fatigue.   Cardiovascular: Negative for chest pain, dyspnea on exertion, leg swelling, near-syncope, orthopnea, palpitations, paroxysmal nocturnal dyspnea and syncope.   Respiratory: Negative for cough and shortness of breath.    Musculoskeletal: Negative for joint pain, joint swelling and myalgias.   Gastrointestinal: Negative for abdominal pain, diarrhea, melena, nausea and vomiting.   Genitourinary: Negative for frequency and hematuria.   Neurological: Negative for light-headedness, numbness, paresthesias and seizures.   Allergic/Immunologic: Negative.    All other systems reviewed and are negative.      Allergies   Allergen Reactions   • Acetic Acid Anaphylaxis     \"has trouble breathing\"   • Oxycodone-Acetaminophen Itching   • Percocet [Oxycodone-Acetaminophen]    • Shellfish-Derived Products Hives and Itching   • Statins    • Adhesive Tape Rash   • Latex Rash         Current Outpatient Prescriptions:   •  aspirin 81 MG chewable tablet, Chew 81 mg daily., Disp: , Rfl:   •  fexofenadine (ALLEGRA) 180 MG tablet, Take 180 mg by mouth daily., Disp: , Rfl:   •  flunisolide (NASALIDE) 25 MCG/ACT (0.025%) solution nasal spray, Inhale 2 sprays every 12 (twelve) hours., Disp: , Rfl:   •  irbesartan (AVAPRO) 150 MG tablet, Take 150 mg by mouth every night., Disp: , Rfl: " "  •  Magnesium 250 MG tablet, Take 1 tablet by mouth As Needed., Disp: , Rfl:   •  metFORMIN (GLUCOPHAGE) 500 MG tablet, Take 2 tablets by mouth 2 (Two) Times a Day With Meals., Disp: , Rfl:   •  metoprolol tartrate (LOPRESSOR) 50 MG tablet, Take 50 mg by mouth 2 (Two) Times a Day., Disp: , Rfl:   •  NITROSTAT 0.4 MG SL tablet, PLACE 1 TABLET UNDER THE TONGUE EVERY 5 MINUTES UP TO 3 DOSES AS NEEDED FOR CHEST PAIN, Disp: 25 tablet, Rfl: 3  •  prasugrel (EFFIENT) 10 MG tablet, Take 10 mg by mouth Daily., Disp: , Rfl:   •  sitaGLIPtin (JANUVIA) 100 MG tablet, Take 100 mg by mouth daily., Disp: , Rfl:   •  tamsulosin (FLOMAX) 0.4 MG capsule 24 hr capsule, Take 1 capsule by mouth every night., Disp: , Rfl:   •  testosterone (ANDROGEL) 25 MG/2.5GM (1%) gel gel, Place 25 mg on the skin Daily., Disp: , Rfl:       Objective:     Vitals:    03/21/18 1313 03/21/18 1327   BP: 160/88 160/90   BP Location: Right arm Left arm   Pulse: 80    SpO2: 99%    Weight: 100 kg (221 lb)    Height: 182.9 cm (72\")      Body mass index is 29.97 kg/m².    PHYSICAL EXAM:    Physical Exam   Constitutional: He is oriented to person, place, and time. He appears well-developed and well-nourished. No distress.   HENT:   Head: Normocephalic and atraumatic.   Eyes: Pupils are equal, round, and reactive to light.   Neck: No JVD present. No thyromegaly present.   Cardiovascular: Normal rate, regular rhythm, normal heart sounds and intact distal pulses.    No murmur heard.  Pulmonary/Chest: Effort normal and breath sounds normal. No respiratory distress.   Abdominal: Soft. Bowel sounds are normal. He exhibits no distension. There is no splenomegaly or hepatomegaly. There is no tenderness.   Musculoskeletal: Normal range of motion. He exhibits no edema.   Neurological: He is alert and oriented to person, place, and time.   Skin: Skin is warm and dry. He is not diaphoretic. No erythema.   Psychiatric: He has a normal mood and affect. His behavior is normal. " Judgment normal.         ECG 12 Lead  Date/Time: 3/21/2018 1:34 PM  Performed by: SADA HILTON.  Authorized by: SADA HILTON.   Comparison: compared with previous ECG from 9/18/2017  Similar to previous ECG  Rhythm: sinus rhythm  BPM: 80  T depression: II, III and aVF  T flattening: V5 and V6  Clinical impression: abnormal ECG  Comments: Indication: Coronary artery disease, status post stent placement.              Assessment:       Diagnosis Plan   1. Coronary artery disease involving native coronary artery of native heart without angina pectoris  ECG 12 Lead   2. History of coronary artery stent placement  ECG 12 Lead     Orders Placed This Encounter   Procedures   • ECG 12 Lead     This order was created via procedure documentation          Plan:       1.  Coronary Artery Disease  Assessment  • The patient has no angina  • There is a new diagnosis of stable angina in the past 12 months  • The patient is having symptoms consistent with unstable angina     Plan  • Lifestyle modifications discussed include adhering to a heart healthy diet, medication compliance, regular exercise and regular monitoring of cholesterol and blood pressure    Subjective - Objective  • There has been a previous stent procedure using QUIANA  • Current antiplatelet therapy includes aspirin 81 mg and prasugrel 10 mg  • Overall he's stable and doing well.  I think that probably in August we can discuss discontinuing the Effient.  I'm going to have him come back and see Dr. Deutsch in 6 months, which will be around the 1 year anniversary of his most recent stent.  I've asked him to try to get some more exercise.  He is a very regimented person who use to exercise 3 hours a day every day.  I've encouraged him just to try to get and a 45 minute walks 5 times a week.  He indicated that he understood.  I'm going to make any changes to his medical regimen, he will follow-up with Dr. Deutsch in 6 months or sooner if needed.      As always, it has  been a pleasure to participate in your patient's care.      Sincerely,         Eliana Shaw PA-C

## 2018-07-05 RX ORDER — METOPROLOL TARTRATE 50 MG/1
TABLET, FILM COATED ORAL
Qty: 180 TABLET | Refills: 0 | Status: SHIPPED | OUTPATIENT
Start: 2018-07-05 | End: 2018-10-08 | Stop reason: SDUPTHER

## 2018-07-14 RX ORDER — FLUCONAZOLE 100 MG/1
100 TABLET ORAL DAILY
Qty: 10 TABLET | Refills: 1 | Status: SHIPPED | OUTPATIENT
Start: 2018-07-14 | End: 2018-09-25

## 2018-07-25 ENCOUNTER — TELEPHONE (OUTPATIENT)
Dept: CARDIOLOGY | Facility: CLINIC | Age: 68
End: 2018-07-25

## 2018-07-25 NOTE — TELEPHONE ENCOUNTER
Please stop the Effient for 7 days beforehand if they can continue on the aspirin it would be nice he's had an acceptable risk for his proposed surgery

## 2018-07-25 NOTE — TELEPHONE ENCOUNTER
Dionne with Dr. Eduar Horowitz's office called for cardiac clearance. They are wanting to do bladder surgery on 8/8/18 and would like him to hold his Aspirin and Effient for 7 days prior to his procedure.  Dionne SUAZO#[phone]  F#[phone]    Thanks,  Ashley

## 2018-08-09 RX ORDER — PRASUGREL 10 MG/1
TABLET, FILM COATED ORAL
Qty: 90 TABLET | Refills: 0 | Status: SHIPPED | OUTPATIENT
Start: 2018-08-09 | End: 2018-09-25

## 2018-09-25 ENCOUNTER — OFFICE VISIT (OUTPATIENT)
Dept: CARDIOLOGY | Facility: CLINIC | Age: 68
End: 2018-09-25

## 2018-09-25 VITALS
SYSTOLIC BLOOD PRESSURE: 160 MMHG | HEIGHT: 72 IN | DIASTOLIC BLOOD PRESSURE: 98 MMHG | BODY MASS INDEX: 30.69 KG/M2 | HEART RATE: 85 BPM | WEIGHT: 226.6 LBS

## 2018-09-25 DIAGNOSIS — I25.10 CORONARY ARTERY DISEASE INVOLVING NATIVE CORONARY ARTERY OF NATIVE HEART WITHOUT ANGINA PECTORIS: Primary | ICD-10-CM

## 2018-09-25 DIAGNOSIS — I10 ESSENTIAL (PRIMARY) HYPERTENSION: ICD-10-CM

## 2018-09-25 DIAGNOSIS — Z95.5 STATUS POST INSERTION OF DRUG-ELUTING STENT INTO RIGHT CORONARY ARTERY FOR CORONARY ARTERY DISEASE: ICD-10-CM

## 2018-09-25 DIAGNOSIS — E78.49 OTHER HYPERLIPIDEMIA: ICD-10-CM

## 2018-09-25 PROCEDURE — 93000 ELECTROCARDIOGRAM COMPLETE: CPT | Performed by: INTERNAL MEDICINE

## 2018-09-25 PROCEDURE — 99214 OFFICE O/P EST MOD 30 MIN: CPT | Performed by: INTERNAL MEDICINE

## 2018-09-25 RX ORDER — IRBESARTAN 300 MG/1
300 TABLET ORAL NIGHTLY
Qty: 90 TABLET | Refills: 3 | COMMUNITY
Start: 2018-09-25 | End: 2021-03-26 | Stop reason: HOSPADM

## 2018-09-25 NOTE — PROGRESS NOTES
Date of Office Visit: 2017  Encounter Provider: Rell Deutsch MD  Place of Service: [facility] CARDIOLOGY  Patient Name: Alex Geiger  :1950  [id number]    Chief Complaint   Patient presents with   • Coronary Artery Disease   :     HPI: Alex Geiger is a 68 y.o. male  He is here for follow-up.  He had stents put in his mid-left anterior descending by me in .  At that time, he had 40% disease in his right coronary artery and 30% in his circumflex.  He has had prior drug-eluting stents to his right coronary artery and circumflex in .  He had normal left ventricular function.  He is here for follow-up.  He is doing well with no chest pain, shortness of breath, paroxysmal nocturnal dyspnea, orthopnea, or edema.  He had an episode of urosepsis and was pretty sick from that but now he is doing well.        Past Medical History:   Diagnosis Date   • CAD (coronary artery disease)    • Diabetes mellitus (CMS/[facility])    • Hyperlipidemia    • Hypertension    • Renal injury    • Unstable angina (CMS/[facility])    • Ventricular ectopy    • VPC (ventricular premature complex)        Past Surgical History:   Procedure Laterality Date   • CARDIAC CATHETERIZATION     • CARDIAC CATHETERIZATION N/A 2017    Procedure: Coronary angiography;  Surgeon: Rell Deutsch MD;  Location: [facility] INVASIVE LOCATION;  Service:    • CARDIAC CATHETERIZATION N/A 2017    Procedure: Stent QUIANA coronary;  Surgeon: Rell Deutsch MD;  Location: [facility] CATH INVASIVE LOCATION;  Service:    • CARDIAC CATHETERIZATION N/A 2017    Procedure: Left Heart Cath;  Surgeon: Rell Deutsch MD;  Location: [facility] CATH INVASIVE LOCATION;  Service:    • CARDIAC CATHETERIZATION N/A 2017    Procedure: Left ventriculography;  Surgeon: Rell Deutsch MD;  Location: [facility] INVASIVE LOCATION;  Service:    • CORONARY ANGIOPLASTY WITH STENT PLACEMENT     • KIDNEY STONE SURGERY     •  "PARATHYROIDECTOMY         Social History     Social History   • Marital status:      Spouse name: N/A   • Number of children: N/A   • Years of education: N/A     Occupational History   • Not on file.     Social History Main Topics   • Smoking status: Former Smoker   • Smokeless tobacco: Never Used   • Alcohol use 0.6 oz/week     1 Shots of liquor per week      Comment: 2 drinks a year   • Drug use: No   • Sexual activity: Defer     Other Topics Concern   • Not on file     Social History Narrative   • No narrative on file       Family History   Problem Relation Age of Onset   • Diabetes Mother    • Alzheimer's disease Father    • Kidney disease Father    • Diabetes Father    • No Known Problems Maternal Grandmother    • No Known Problems Maternal Grandfather    • No Known Problems Paternal Grandmother    • Heart attack Paternal Grandfather    • Heart disease Paternal Grandfather    • Diabetes Paternal Grandfather        Review of Systems   Constitution: Negative for decreased appetite, fever, malaise/fatigue and weight loss.   HENT: Negative for nosebleeds.    Eyes: Negative for double vision.   Cardiovascular: Negative for chest pain, claudication, cyanosis, dyspnea on exertion, irregular heartbeat, leg swelling, near-syncope, orthopnea, palpitations, paroxysmal nocturnal dyspnea and syncope.   Respiratory: Negative for cough, hemoptysis and shortness of breath.    Hematologic/Lymphatic: Negative for bleeding problem.   Skin: Negative for rash.   Musculoskeletal: Negative for falls and myalgias.   Gastrointestinal: Negative for hematochezia, jaundice, melena, nausea and vomiting.   Genitourinary: Negative for hematuria.   Neurological: Negative for dizziness and seizures.   Psychiatric/Behavioral: Negative for altered mental status and memory loss.       Allergies   Allergen Reactions   • Acetic Acid Anaphylaxis     \"has trouble breathing\"   • Oxycodone-Acetaminophen Itching   • Percocet " "[Oxycodone-Acetaminophen]    • Shellfish-Derived Products Hives and Itching   • Statins    • Adhesive Tape Rash   • Latex Rash         Current Outpatient Prescriptions:   •  aspirin 81 MG chewable tablet, Chew 81 mg daily., Disp: , Rfl:   •  fexofenadine (ALLEGRA) 180 MG tablet, Take 180 mg by mouth daily., Disp: , Rfl:   •  flunisolide (NASALIDE) 25 MCG/ACT (0.025%) solution nasal spray, Inhale 2 sprays every 12 (twelve) hours., Disp: , Rfl:   •  irbesartan (AVAPRO) 150 MG tablet, Take 150 mg by mouth every night., Disp: , Rfl:   •  metFORMIN (GLUCOPHAGE) 500 MG tablet, Take 2 tablets by mouth 2 (Two) Times a Day With Meals. (Patient taking differently: Take 1,000 mg by mouth Daily With Breakfast.), Disp: , Rfl:   •  metoprolol tartrate (LOPRESSOR) 50 MG tablet, TAKE (1) TABLET TWICE A DAY, Disp: 180 tablet, Rfl: 0  •  NITROSTAT 0.4 MG SL tablet, PLACE 1 TABLET UNDER THE TONGUE EVERY 5 MINUTES UP TO 3 DOSES AS NEEDED FOR CHEST PAIN, Disp: 25 tablet, Rfl: 3  •  prasugrel (EFFIENT) 10 MG tablet, Take 10 mg by mouth Daily., Disp: , Rfl:   •  tamsulosin (FLOMAX) 0.4 MG capsule 24 hr capsule, Take 1 capsule by mouth every night., Disp: , Rfl:   •  testosterone (ANDROGEL) 25 MG/2.5GM (1%) gel gel, Place 25 mg on the skin Daily., Disp: , Rfl:      Objective:     Vitals:    09/25/18 1333   BP: 160/98   Pulse: 85   Weight: 103 kg (226 lb 9.6 oz)   Height: 182.9 cm (72\")     Body mass index is 30.73 kg/m².    Physical Exam   Constitutional: He is oriented to person, place, and time. He appears well-developed and well-nourished.   HENT:   Head: Normocephalic.   Eyes: No scleral icterus.   Neck: No JVD present. No thyromegaly present.   Cardiovascular: Normal rate, regular rhythm and normal heart sounds.  Exam reveals no gallop and no friction rub.    No murmur heard.  Pulmonary/Chest: Effort normal and breath sounds normal. He has no wheezes. He has no rales.   Abdominal: Soft. There is no hepatosplenomegaly. There is no " tenderness.   Musculoskeletal: Normal range of motion. He exhibits no edema.   Lymphadenopathy:     He has no cervical adenopathy.   Neurological: He is alert and oriented to person, place, and time.   Skin: Skin is warm and dry. No rash noted.   Psychiatric: He has a normal mood and affect.         ECG 12 Lead  Date/Time: 9/25/2018 1:51 PM  Performed by: RELL DEUTSCH  Authorized by: RELL DEUTSCH   Comparison: compared with previous ECG   Similar to previous ECG  Rhythm: sinus rhythm  Clinical impression: abnormal ECG  Comments: Inf MI AI             Assessment:       Diagnosis Plan   1. Coronary artery disease involving native coronary artery of native heart without angina pectoris     2. Status post insertion of drug-eluting stent into right coronary artery for coronary artery disease     3. Essential (primary) hypertension     4. Other hyperlipidemia            Plan:       I think, at this point, he is doing well.  He can come off his Effient and stay on the baby aspirin.  His blood pressure is always a little high.  I would like to have him take the Avapro up to 300 mg a day.  He can take two of them until they are gone and then he can go to 300 mg.  I will have come and see SHAINA Staples in a year and see me in two years.    Coronary Artery Disease  Assessment  • The patient has no angina    Plan  • Lifestyle modifications discussed include adhering to a heart healthy diet, maintenance of a healthy weight, medication compliance, regular exercise and regular monitoring of cholesterol and blood pressure    Subjective - Objective  • There has been a previous stent procedure using QUIANA  • Current antiplatelet therapy includes aspirin 81 mg      As always, it has been a pleasure to participate in your patient's care.      Sincerely,       Rell Deutsch MD

## 2018-10-08 RX ORDER — METOPROLOL TARTRATE 50 MG/1
TABLET, FILM COATED ORAL
Qty: 180 TABLET | Refills: 0 | Status: SHIPPED | OUTPATIENT
Start: 2018-10-08 | End: 2019-01-10 | Stop reason: SDUPTHER

## 2018-12-11 RX ORDER — NITROGLYCERIN 0.4 MG/1
TABLET SUBLINGUAL
Qty: 25 TABLET | Refills: 3 | Status: SHIPPED | OUTPATIENT
Start: 2018-12-11 | End: 2020-04-20

## 2019-01-10 RX ORDER — METOPROLOL TARTRATE 50 MG/1
TABLET, FILM COATED ORAL
Qty: 180 TABLET | Refills: 2 | Status: SHIPPED | OUTPATIENT
Start: 2019-01-10 | End: 2019-10-18 | Stop reason: SDUPTHER

## 2019-07-22 ENCOUNTER — TELEPHONE (OUTPATIENT)
Dept: CARDIOLOGY | Facility: CLINIC | Age: 69
End: 2019-07-22

## 2019-07-22 NOTE — TELEPHONE ENCOUNTER
"07/22/19  2:16 PM  Alex Geiger  1950  Home Phone [phone]   Mobile [phone]       Alex Geiger is a patient of Dr Deutsch.  He is calling in this afternoon with c/o chest tightness, that is radiating to arm and jaw.  \"feels like it did when he was stented in the past\".  He said this has been going on for the last 3 days.    He has taken nitro and has gotten very little relief. He is currently out of town, I instructed him to go to the closest ER, he is insisting on trying to drive back to [place] and go to [facility] ER.  Stressed to him if his symptoms worsen in any way he should pull over all call 911.  He verbalized understanding.  Thanks  Shirley Pinto RN  Triage nurse    "

## 2019-07-24 ENCOUNTER — TRANSCRIBE ORDERS (OUTPATIENT)
Dept: CARDIOLOGY | Facility: CLINIC | Age: 69
End: 2019-07-24

## 2019-07-24 ENCOUNTER — OFFICE VISIT (OUTPATIENT)
Dept: CARDIOLOGY | Facility: CLINIC | Age: 69
End: 2019-07-24

## 2019-07-24 ENCOUNTER — LAB (OUTPATIENT)
Dept: LAB | Facility: HOSPITAL | Age: 69
End: 2019-07-24

## 2019-07-24 VITALS
WEIGHT: 229 LBS | DIASTOLIC BLOOD PRESSURE: 86 MMHG | HEART RATE: 93 BPM | HEIGHT: 72 IN | SYSTOLIC BLOOD PRESSURE: 132 MMHG | OXYGEN SATURATION: 93 % | BODY MASS INDEX: 31.02 KG/M2

## 2019-07-24 DIAGNOSIS — Z13.6 SCREENING FOR ISCHEMIC HEART DISEASE: ICD-10-CM

## 2019-07-24 DIAGNOSIS — I20.0 UNSTABLE ANGINA (HCC): ICD-10-CM

## 2019-07-24 DIAGNOSIS — I25.10 CORONARY ARTERY DISEASE INVOLVING NATIVE CORONARY ARTERY OF NATIVE HEART WITHOUT ANGINA PECTORIS: Primary | ICD-10-CM

## 2019-07-24 DIAGNOSIS — I25.10 DISEASE OF CARDIOVASCULAR SYSTEM: ICD-10-CM

## 2019-07-24 DIAGNOSIS — Z01.810 PRE-OPERATIVE CARDIOVASCULAR EXAMINATION: ICD-10-CM

## 2019-07-24 DIAGNOSIS — Z01.810 PRE-OPERATIVE CARDIOVASCULAR EXAMINATION: Primary | ICD-10-CM

## 2019-07-24 LAB
ANION GAP SERPL CALCULATED.3IONS-SCNC: 13.5 MMOL/L (ref 5–15)
BASOPHILS # BLD AUTO: 0.14 10*3/MM3 (ref 0–0.2)
BASOPHILS NFR BLD AUTO: 1.8 % (ref 0–1.5)
BUN BLD-MCNC: 13 MG/DL (ref 8–23)
BUN/CREAT SERPL: 14.4 (ref 7–25)
CALCIUM SPEC-SCNC: 10 MG/DL (ref 8.6–10.5)
CHLORIDE SERPL-SCNC: 98 MMOL/L (ref 98–107)
CO2 SERPL-SCNC: 23.5 MMOL/L (ref 22–29)
CREAT BLD-MCNC: 0.9 MG/DL (ref 0.76–1.27)
DEPRECATED RDW RBC AUTO: 41.7 FL (ref 37–54)
EOSINOPHIL # BLD AUTO: 0.25 10*3/MM3 (ref 0–0.4)
EOSINOPHIL NFR BLD AUTO: 3.2 % (ref 0.3–6.2)
ERYTHROCYTE [DISTWIDTH] IN BLOOD BY AUTOMATED COUNT: 13.2 % (ref 12.3–15.4)
GFR SERPL CREATININE-BSD FRML MDRD: 84 ML/MIN/1.73
GLUCOSE BLD-MCNC: 263 MG/DL (ref 65–99)
HCT VFR BLD AUTO: 47 % (ref 37.5–51)
HGB BLD-MCNC: 15.7 G/DL (ref 13–17.7)
IMM GRANULOCYTES # BLD AUTO: 0.15 10*3/MM3 (ref 0–0.05)
IMM GRANULOCYTES NFR BLD AUTO: 1.9 % (ref 0–0.5)
LYMPHOCYTES # BLD AUTO: 1.96 10*3/MM3 (ref 0.7–3.1)
LYMPHOCYTES NFR BLD AUTO: 24.7 % (ref 19.6–45.3)
MCH RBC QN AUTO: 29.4 PG (ref 26.6–33)
MCHC RBC AUTO-ENTMCNC: 33.4 G/DL (ref 31.5–35.7)
MCV RBC AUTO: 88 FL (ref 79–97)
MONOCYTES # BLD AUTO: 0.69 10*3/MM3 (ref 0.1–0.9)
MONOCYTES NFR BLD AUTO: 8.7 % (ref 5–12)
NEUTROPHILS # BLD AUTO: 4.73 10*3/MM3 (ref 1.7–7)
NEUTROPHILS NFR BLD AUTO: 59.7 % (ref 42.7–76)
NRBC BLD AUTO-RTO: 0 /100 WBC (ref 0–0.2)
PLATELET # BLD AUTO: 273 10*3/MM3 (ref 140–450)
PMV BLD AUTO: 10.6 FL (ref 6–12)
POTASSIUM BLD-SCNC: 4.8 MMOL/L (ref 3.5–5.2)
RBC # BLD AUTO: 5.34 10*6/MM3 (ref 4.14–5.8)
SODIUM BLD-SCNC: 135 MMOL/L (ref 136–145)
WBC NRBC COR # BLD: 7.92 10*3/MM3 (ref 3.4–10.8)

## 2019-07-24 PROCEDURE — 80048 BASIC METABOLIC PNL TOTAL CA: CPT

## 2019-07-24 PROCEDURE — 93000 ELECTROCARDIOGRAM COMPLETE: CPT | Performed by: PHYSICIAN ASSISTANT

## 2019-07-24 PROCEDURE — 85025 COMPLETE CBC W/AUTO DIFF WBC: CPT

## 2019-07-24 PROCEDURE — 36415 COLL VENOUS BLD VENIPUNCTURE: CPT

## 2019-07-24 PROCEDURE — 99215 OFFICE O/P EST HI 40 MIN: CPT | Performed by: PHYSICIAN ASSISTANT

## 2019-07-24 RX ORDER — ISOSORBIDE MONONITRATE 30 MG/1
30 TABLET, EXTENDED RELEASE ORAL DAILY
Qty: 30 TABLET | Refills: 11 | Status: SHIPPED | OUTPATIENT
Start: 2019-07-24 | End: 2019-07-25 | Stop reason: HOSPADM

## 2019-07-24 RX ORDER — INSULIN DEGLUDEC 200 U/ML
65 INJECTION, SOLUTION SUBCUTANEOUS DAILY
COMMUNITY
Start: 2019-07-05

## 2019-07-24 NOTE — PROGRESS NOTES
Date of Office Visit: 2019  Encounter Provider: SHAINA Heath  Place of Service: [facility] CARDIOLOGY  Patient Name: Alex Geiger  :1950    Chief Complaint   Patient presents with   • Coronary Artery Disease     1 year follow up   :     HPI: Alex Geiger is a 68 y.o. male who presents today for follow-up.  Old records have been obtained and reviewed by me.  He is a patient of Dr. Ibanez with a past cardiac history significant for coronary artery disease.  He has prior drug-eluting stents in his RCA and circumflex, this was in .  In 2017 he was having chest pain and I ordered a cardiac catheterization.  This showed moderately reduced global LV function with an EF of around 40%, normal left main, 90% mid LAD, small second diagonal with a 90% ostial lesion, widely patent circumflex stent with 20% distal disease, and a widely patent RCA stent with 40 to 50% mid RCA stenosis.  He underwent successful drug-eluting stent placement to the LAD lesion.  He was last in our office to see Dr. Deutsch on 2018.  At that visit he was doing well without complaints of angina or heart failure.  Dr. Deutsch recommended that he discontinue the Effient to stay on a baby aspirin.  His blood pressure was elevated and he increased his Avapro to 300 mg daily.  Recommendations were to follow-up in 1 year.  I am seeing him today sooner than that appointment with complaints of chest pain.   About a week ago he started having his classic angina.  It started in the center of the chest and felt like a tightness across the entire front of his chest.  It radiated down both arms and up into his neck.  It is the exact same pain that he had prior to his previous 2 interventions.  It was associated with significant shortness of breath.  He did not have any diaphoresis per se, but he did have a feeling of being extremely hot.  It was relieved after 3 sublingual nitroglycerin.  It  first started when he was walking to his office.  It is also happening at rest.  It does not happen at night but he does wake up short of breath sometimes at night.  Prior to the first onset of this about a week ago, he had been doing fine.  He also states that prior to his previous to interventions, he would also get pain in his teeth.  He has not had that kind of pain this time around, but he did feel a little bit of an aching sensation in his teeth a few days ago with 1 of these episodes.      Past Medical History:   Diagnosis Date   • CAD (coronary artery disease)    • Diabetes mellitus (CMS/[facility])    • Hyperlipidemia    • Hypertension    • Renal injury    • Unstable angina (CMS/[facility])    • Ventricular ectopy    • VPC (ventricular premature complex)        Past Surgical History:   Procedure Laterality Date   • CARDIAC CATHETERIZATION     • CARDIAC CATHETERIZATION N/A 8/16/2017    Procedure: Coronary angiography;  Surgeon: Rell Deutsch MD;  Location: [facility] CATH INVASIVE LOCATION;  Service:    • CARDIAC CATHETERIZATION N/A 8/16/2017    Procedure: Stent QUIANA coronary;  Surgeon: Rell Deutsch MD;  Location: [facility] CATH INVASIVE LOCATION;  Service:    • CARDIAC CATHETERIZATION N/A 8/16/2017    Procedure: Left Heart Cath;  Surgeon: Rell Deutsch MD;  Location: [facility] CATH INVASIVE LOCATION;  Service:    • CARDIAC CATHETERIZATION N/A 8/16/2017    Procedure: Left ventriculography;  Surgeon: Rell Deutsch MD;  Location: [facility] CATH INVASIVE LOCATION;  Service:    • CORONARY ANGIOPLASTY WITH STENT PLACEMENT     • KIDNEY STONE SURGERY     • PARATHYROIDECTOMY         Social History     Socioeconomic History   • Marital status:      Spouse name: Not on file   • Number of children: Not on file   • Years of education: Not on file   • Highest education level: Not on file   Tobacco Use   • Smoking status: Former Smoker   • Smokeless tobacco: Never Used   Substance and Sexual Activity   • Alcohol use: Yes      "Alcohol/week: 0.6 oz     Types: 1 Shots of liquor per week     Comment: 2 drinks a year   • Drug use: No   • Sexual activity: Defer       Family History   Problem Relation Age of Onset   • Diabetes Mother    • Alzheimer's disease Father    • Kidney disease Father    • Diabetes Father    • No Known Problems Maternal Grandmother    • No Known Problems Maternal Grandfather    • No Known Problems Paternal Grandmother    • Heart attack Paternal Grandfather    • Heart disease Paternal Grandfather    • Diabetes Paternal Grandfather        Review of Systems   Constitution: Negative for chills, fever and malaise/fatigue.   Cardiovascular: Positive for chest pain. Negative for dyspnea on exertion, leg swelling, near-syncope, orthopnea, palpitations, paroxysmal nocturnal dyspnea and syncope.   Respiratory: Negative for cough and shortness of breath.    Musculoskeletal: Negative for joint pain, joint swelling and myalgias.   Gastrointestinal: Negative for abdominal pain, diarrhea, melena, nausea and vomiting.   Genitourinary: Negative for frequency and hematuria.   Neurological: Negative for light-headedness, numbness, paresthesias and seizures.   Allergic/Immunologic: Negative.    All other systems reviewed and are negative.      Allergies   Allergen Reactions   • Acetic Acid Anaphylaxis     \"has trouble breathing\"   • Oxycodone-Acetaminophen Itching   • Percocet [Oxycodone-Acetaminophen]    • Shellfish-Derived Products Hives and Itching   • Statins    • Adhesive Tape Rash   • Latex Rash         Current Outpatient Medications:   •  aspirin 81 MG chewable tablet, Chew 81 mg daily., Disp: , Rfl:   •  fexofenadine (ALLEGRA) 180 MG tablet, Take 180 mg by mouth daily., Disp: , Rfl:   •  flunisolide (NASALIDE) 25 MCG/ACT (0.025%) solution nasal spray, Inhale 2 sprays every 12 (twelve) hours., Disp: , Rfl:   •  irbesartan (AVAPRO) 300 MG tablet, Take 1 tablet by mouth Every Night., Disp: 90 tablet, Rfl: 3  •  metFORMIN (GLUCOPHAGE) " "500 MG tablet, Take 2 tablets by mouth 2 (Two) Times a Day With Meals. (Patient taking differently: Take 1,000 mg by mouth Daily With Breakfast.), Disp: , Rfl:   •  metoprolol tartrate (LOPRESSOR) 50 MG tablet, TAKE (1) TABLET TWICE A DAY, Disp: 180 tablet, Rfl: 2  •  nitroglycerin (NITROSTAT) 0.4 MG SL tablet, PLACE 1 TABLET UNDER THE TONGUE EVERY 5 MINUTES UP TO 3 DOSES AS NEEDED FOR CHEST PAIN, Disp: 25 tablet, Rfl: 3  •  tamsulosin (FLOMAX) 0.4 MG capsule 24 hr capsule, Take 1 capsule by mouth every night., Disp: , Rfl:   •  testosterone (ANDROGEL) 25 MG/2.5GM (1%) gel gel, Place 25 mg on the skin Daily., Disp: , Rfl:   •  TRESIBA FLEXTOUCH 200 UNIT/ML solution pen-injector, Inject 46 Units as directed Daily., Disp: , Rfl:       Objective:     Vitals:    07/24/19 1409 07/24/19 1421   BP: 132/84 132/86   BP Location: Right arm Left arm   Pulse: 93    SpO2: 93%    Weight: 104 kg (229 lb)    Height: 182.9 cm (72\")      Body mass index is 31.06 kg/m².    PHYSICAL EXAM:    Physical Exam   Constitutional: He is oriented to person, place, and time. He appears well-developed and well-nourished. No distress.   HENT:   Head: Normocephalic and atraumatic.   Eyes: Pupils are equal, round, and reactive to light.   Neck: No JVD present. No thyromegaly present.   Cardiovascular: Normal rate, regular rhythm, normal heart sounds and intact distal pulses.   No murmur heard.  Pulmonary/Chest: Effort normal and breath sounds normal. No respiratory distress.   Abdominal: Soft. Bowel sounds are normal. He exhibits no distension. There is no splenomegaly or hepatomegaly. There is no tenderness.   Musculoskeletal: Normal range of motion. He exhibits no edema.   Neurological: He is alert and oriented to person, place, and time.   Skin: Skin is warm and dry. He is not diaphoretic. No erythema.   Psychiatric: He has a normal mood and affect. His behavior is normal. Judgment normal.         ECG 12 Lead  Date/Time: 7/24/2019 2:28 " PM  Performed by: Eliana Ayala PA  Authorized by: Eliana Ayala PA   Comparison: compared with previous ECG from 9/25/2018  Similar to previous ECG  Rhythm: sinus rhythm  BPM: 82    Clinical impression: normal ECG  Comments: Indication: Coronary disease              Assessment:       Diagnosis Plan   1. Coronary artery disease involving native coronary artery of native heart without angina pectoris  ECG 12 Lead     Orders Placed This Encounter   Procedures   • ECG 12 Lead     This order was created via procedure documentation          Plan:       He is having his classic angina symptoms.  The last time this happened I sent him for cardiac catheterization and he had a 90% LAD lesion.  This was 2 years ago.  At that time he also had a 40% RCA lesion that was not treated because it was felt to be insignificant.  I do worry about worsening disease in him.  Fortunately there are no abnormalities on his ECG today.  I am going to set him up for cardiac catheterization tomorrow with Dr. Deutsch.  I am also going to start him on isosorbide 30 mg and I have instructed him to go home,  the prescription, and taken isosorbide this evening.  I did advise him that if he takes a sublingual nitroglycerin with isosorbide, he could experience low blood pressure.  He indicated that he understood.  Also asked him to go to the emergency room if he had chest pain that was not relieved.  He indicated that he understood.      Coronary Artery Disease  Assessment  • The patient has CCS class III - angina with activities of daily living  • There is a new diagnosis of stable angina in the past 12 months  • The patient is having symptoms consistent with unstable angina     Plan  • The patient is being referred to interventional cardiology    Subjective - Objective  • There has been a previous stent procedure using QUIANA  • Current antiplatelet therapy includes aspirin 81 mg        As always, it has been a pleasure to participate in  your patient's care.      Sincerely,         Eliana Ayala PA-C

## 2019-07-25 ENCOUNTER — HOSPITAL ENCOUNTER (OUTPATIENT)
Facility: HOSPITAL | Age: 69
Setting detail: HOSPITAL OUTPATIENT SURGERY
Discharge: HOME | End: 2019-07-25
Attending: INTERNAL MEDICINE | Admitting: INTERNAL MEDICINE

## 2019-07-25 VITALS
BODY MASS INDEX: 30.48 KG/M2 | RESPIRATION RATE: 18 BRPM | HEART RATE: 88 BPM | HEIGHT: 72 IN | DIASTOLIC BLOOD PRESSURE: 68 MMHG | TEMPERATURE: 97.6 F | SYSTOLIC BLOOD PRESSURE: 142 MMHG | OXYGEN SATURATION: 95 % | WEIGHT: 225 LBS

## 2019-07-25 DIAGNOSIS — I25.10 CORONARY ARTERY DISEASE INVOLVING NATIVE CORONARY ARTERY OF NATIVE HEART WITHOUT ANGINA PECTORIS: ICD-10-CM

## 2019-07-25 DIAGNOSIS — I20.0 UNSTABLE ANGINA (HCC): ICD-10-CM

## 2019-07-25 LAB
ACT BLD: 252 SECONDS (ref 82–152)
GLUCOSE BLDC GLUCOMTR-MCNC: 240 MG/DL (ref 70–130)

## 2019-07-25 PROCEDURE — 92928 PRQ TCAT PLMT NTRAC ST 1 LES: CPT | Performed by: INTERNAL MEDICINE

## 2019-07-25 PROCEDURE — 25010000002 MIDAZOLAM PER 1 MG: Performed by: INTERNAL MEDICINE

## 2019-07-25 PROCEDURE — 99153 MOD SED SAME PHYS/QHP EA: CPT | Performed by: INTERNAL MEDICINE

## 2019-07-25 PROCEDURE — 99152 MOD SED SAME PHYS/QHP 5/>YRS: CPT | Performed by: INTERNAL MEDICINE

## 2019-07-25 PROCEDURE — C1769 GUIDE WIRE: HCPCS | Performed by: INTERNAL MEDICINE

## 2019-07-25 PROCEDURE — 25010000002 FENTANYL CITRATE (PF) 100 MCG/2ML SOLUTION: Performed by: INTERNAL MEDICINE

## 2019-07-25 PROCEDURE — C1894 INTRO/SHEATH, NON-LASER: HCPCS | Performed by: INTERNAL MEDICINE

## 2019-07-25 PROCEDURE — C1874 STENT, COATED/COV W/DEL SYS: HCPCS | Performed by: INTERNAL MEDICINE

## 2019-07-25 PROCEDURE — C1887 CATHETER, GUIDING: HCPCS | Performed by: INTERNAL MEDICINE

## 2019-07-25 PROCEDURE — C1725 CATH, TRANSLUMIN NON-LASER: HCPCS | Performed by: INTERNAL MEDICINE

## 2019-07-25 PROCEDURE — 25010000002 HEPARIN (PORCINE) PER 1000 UNITS: Performed by: INTERNAL MEDICINE

## 2019-07-25 PROCEDURE — 93458 L HRT ARTERY/VENTRICLE ANGIO: CPT | Performed by: INTERNAL MEDICINE

## 2019-07-25 PROCEDURE — 85347 COAGULATION TIME ACTIVATED: CPT

## 2019-07-25 PROCEDURE — 0 IOPAMIDOL PER 1 ML: Performed by: INTERNAL MEDICINE

## 2019-07-25 PROCEDURE — 82962 GLUCOSE BLOOD TEST: CPT

## 2019-07-25 PROCEDURE — C9600 PERC DRUG-EL COR STENT SING: HCPCS | Performed by: INTERNAL MEDICINE

## 2019-07-25 DEVICE — XIENCE SIERRA™ EVEROLIMUS ELUTING CORONARY STENT SYSTEM 2.75 MM X 18 MM / RAPID-EXCHANGE
Type: IMPLANTABLE DEVICE | Status: FUNCTIONAL
Brand: XIENCE SIERRA™

## 2019-07-25 RX ORDER — ACETAMINOPHEN 325 MG/1
650 TABLET ORAL EVERY 6 HOURS PRN
Status: DISCONTINUED | OUTPATIENT
Start: 2019-07-25 | End: 2019-07-25 | Stop reason: HOSPADM

## 2019-07-25 RX ORDER — SODIUM CHLORIDE 0.9 % (FLUSH) 0.9 %
3 SYRINGE (ML) INJECTION EVERY 12 HOURS SCHEDULED
Status: DISCONTINUED | OUTPATIENT
Start: 2019-07-25 | End: 2019-07-25 | Stop reason: HOSPADM

## 2019-07-25 RX ORDER — LIDOCAINE HYDROCHLORIDE 10 MG/ML
0.1 INJECTION, SOLUTION EPIDURAL; INFILTRATION; INTRACAUDAL; PERINEURAL ONCE AS NEEDED
Status: DISCONTINUED | OUTPATIENT
Start: 2019-07-25 | End: 2019-07-25 | Stop reason: HOSPADM

## 2019-07-25 RX ORDER — SODIUM CHLORIDE 0.9 % (FLUSH) 0.9 %
3-10 SYRINGE (ML) INJECTION AS NEEDED
Status: DISCONTINUED | OUTPATIENT
Start: 2019-07-25 | End: 2019-07-25 | Stop reason: HOSPADM

## 2019-07-25 RX ORDER — MIDAZOLAM HYDROCHLORIDE 1 MG/ML
INJECTION INTRAMUSCULAR; INTRAVENOUS AS NEEDED
Status: DISCONTINUED | OUTPATIENT
Start: 2019-07-25 | End: 2019-07-25 | Stop reason: HOSPADM

## 2019-07-25 RX ORDER — HEPARIN SODIUM 1000 [USP'U]/ML
INJECTION, SOLUTION INTRAVENOUS; SUBCUTANEOUS AS NEEDED
Status: DISCONTINUED | OUTPATIENT
Start: 2019-07-25 | End: 2019-07-25 | Stop reason: HOSPADM

## 2019-07-25 RX ORDER — METOPROLOL TARTRATE 50 MG/1
50 TABLET, FILM COATED ORAL ONCE
Status: COMPLETED | OUTPATIENT
Start: 2019-07-25 | End: 2019-07-25

## 2019-07-25 RX ORDER — CLOPIDOGREL BISULFATE 75 MG/1
75 TABLET ORAL DAILY
Qty: 90 TABLET | Refills: 3 | Status: SHIPPED | OUTPATIENT
Start: 2019-07-25 | End: 2020-07-16

## 2019-07-25 RX ORDER — ASPIRIN 81 MG/1
81 TABLET, CHEWABLE ORAL DAILY
Refills: 11
Start: 2019-07-25 | End: 2019-08-28

## 2019-07-25 RX ORDER — CLOPIDOGREL BISULFATE 75 MG/1
TABLET ORAL AS NEEDED
Status: DISCONTINUED | OUTPATIENT
Start: 2019-07-25 | End: 2019-07-25 | Stop reason: HOSPADM

## 2019-07-25 RX ORDER — HEPARIN SODIUM 1000 [USP'U]/ML
4000 INJECTION, SOLUTION INTRAVENOUS; SUBCUTANEOUS ONCE
Status: COMPLETED | OUTPATIENT
Start: 2019-07-25 | End: 2019-07-25

## 2019-07-25 RX ORDER — SODIUM CHLORIDE 9 MG/ML
100 INJECTION, SOLUTION INTRAVENOUS CONTINUOUS
Status: DISCONTINUED | OUTPATIENT
Start: 2019-07-25 | End: 2019-07-25 | Stop reason: HOSPADM

## 2019-07-25 RX ORDER — SODIUM CHLORIDE 9 MG/ML
125 INJECTION, SOLUTION INTRAVENOUS CONTINUOUS
Status: DISCONTINUED | OUTPATIENT
Start: 2019-07-25 | End: 2019-07-25 | Stop reason: HOSPADM

## 2019-07-25 RX ORDER — LIDOCAINE HYDROCHLORIDE 20 MG/ML
INJECTION, SOLUTION INFILTRATION; PERINEURAL AS NEEDED
Status: DISCONTINUED | OUTPATIENT
Start: 2019-07-25 | End: 2019-07-25 | Stop reason: HOSPADM

## 2019-07-25 RX ORDER — FENTANYL CITRATE 50 UG/ML
INJECTION, SOLUTION INTRAMUSCULAR; INTRAVENOUS AS NEEDED
Status: DISCONTINUED | OUTPATIENT
Start: 2019-07-25 | End: 2019-07-25 | Stop reason: HOSPADM

## 2019-07-25 RX ORDER — ASPIRIN 81 MG/1
TABLET, CHEWABLE ORAL AS NEEDED
Status: DISCONTINUED | OUTPATIENT
Start: 2019-07-25 | End: 2019-07-25 | Stop reason: HOSPADM

## 2019-07-25 RX ADMIN — METOPROLOL TARTRATE 50 MG: 50 TABLET, FILM COATED ORAL at 19:07

## 2019-07-25 RX ADMIN — ACETAMINOPHEN 650 MG: 325 TABLET, FILM COATED ORAL at 17:56

## 2019-07-25 RX ADMIN — HEPARIN SODIUM 4000 UNITS: 1000 INJECTION INTRAVENOUS; SUBCUTANEOUS at 14:40

## 2019-08-02 ENCOUNTER — OFFICE VISIT (OUTPATIENT)
Dept: CARDIOLOGY | Facility: CLINIC | Age: 69
End: 2019-08-02

## 2019-08-02 VITALS
BODY MASS INDEX: 31.1 KG/M2 | HEART RATE: 83 BPM | HEIGHT: 72 IN | SYSTOLIC BLOOD PRESSURE: 142 MMHG | WEIGHT: 229.6 LBS | DIASTOLIC BLOOD PRESSURE: 82 MMHG | OXYGEN SATURATION: 99 %

## 2019-08-02 DIAGNOSIS — I25.10 CORONARY ARTERY DISEASE INVOLVING NATIVE CORONARY ARTERY OF NATIVE HEART WITHOUT ANGINA PECTORIS: Primary | ICD-10-CM

## 2019-08-02 DIAGNOSIS — E78.49 OTHER HYPERLIPIDEMIA: ICD-10-CM

## 2019-08-02 DIAGNOSIS — Z95.5 STATUS POST INSERTION OF DRUG-ELUTING STENT INTO RIGHT CORONARY ARTERY FOR CORONARY ARTERY DISEASE: ICD-10-CM

## 2019-08-02 DIAGNOSIS — E11.59 TYPE 2 DIABETES MELLITUS WITH OTHER CIRCULATORY COMPLICATION, UNSPECIFIED WHETHER LONG TERM INSULIN USE (HCC): ICD-10-CM

## 2019-08-02 DIAGNOSIS — I10 ESSENTIAL (PRIMARY) HYPERTENSION: ICD-10-CM

## 2019-08-02 DIAGNOSIS — I20.0 ACCELERATING ANGINA (HCC): ICD-10-CM

## 2019-08-02 DIAGNOSIS — Z95.5 HISTORY OF CORONARY ARTERY STENT PLACEMENT: ICD-10-CM

## 2019-08-02 PROCEDURE — 93000 ELECTROCARDIOGRAM COMPLETE: CPT | Performed by: NURSE PRACTITIONER

## 2019-08-02 PROCEDURE — 99214 OFFICE O/P EST MOD 30 MIN: CPT | Performed by: NURSE PRACTITIONER

## 2019-08-02 NOTE — PROGRESS NOTES
Date of Office Visit: 2019  Encounter Provider: MATIAS Garcia  Place of Service: [facility] CARDIOLOGY  Patient Name: Alex Geiger  :1950      Subjective:     Chief Complaint:  Hospital follow-up for unstable angina and QUIANA to distal RCA    History of Present Illness:  Alex Geiger is a pleasant 68 y.o. male who is new to me.  Outside records have been obtained and reviewed by me.     This is a patient of Dr. Deutsch with a history of coronary artery disease status post drug-eluting stent placement to RCA and circumflex in , drug-eluting stent placement to mid LAD 2017, diabetes, hypertension, hyperlipidemia intolerant to statin therapy.    2017 he started having chest pain and cath was ordered this showed moderately reduced global LV function with an EF around 40%, normal left main, 90% mid LAD, small second diagonal with 90% ostial lesion, widely patent circumflex stent with 20% distal disease, widely patent RCA stent with 40 to 50% mid RCA stenosis.  He underwent successful drug-eluting stent placement to the LAD,    2018 he saw Dr. Deutsch in the office and was doing well.  It was recommended that he discontinue Effient and stay on baby aspirin.  His blood pressure was elevated so Avapro was increased to 300 mg daily.  He was advised to follow-up in a year.    2019 he presented for earlier follow-up for symptoms similar to his previous 2 inteventions and consistent with unstable angina.  He did not have any EKG changes.  Plan was to set him up for cardiac catheterization and Imdur was started.    2019 he went for cardiac catheterization and found to have normal left main, widely patent LAD stent, circumflex stent was normal, 10 to 20% restenosis mid RCA stent, 70 to 80% lesion of the distal RCA at the bifurcation which was treated with successful drug-eluting stent placement. LV gram showed very small area of mid  anterior hypokinesis with normal global LV systolic function.  Dual antiplatelet therapy was reinitiated with clopidogrel.       He is presenting today for hospital follow-up following PCI.  Reviewed office note from PCP Dr. Hudson 7/11/2019.  7/12/2019 labs showed normal CBC, normal TSH, normal creatinine and BUN electrolytes and LFTs, elevated glucose 204.  Globin A1c was uncontrolled at 10.1%.  Last lipid panel I see was April 2018 and showed total cholesterol 249, triglycerides 789, HDL 33, no LDL calculated.  At today's visit he reports he has some slight dizziness for a few minutes after he takes his Plavix but is otherwise not experience any side effects or significant bleeding issues aside from easy bruising.  He does admit to not following a good diet and has a lot of stress at work.  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  Denies any significant recurrence of chest pain and does have occasional shortness of breath when he wakes up if he lays in a certain position.  He does report that he snores but is very adamant that he does not want to talk about sleep apnea work-up and is very adamant that he does not have sleep apnea though he is never had a formal sleep study.  He does have occasional swelling in his ankles with prolonged driving but none current.  He denies any syncope, near syncope.  He does have some fatigue.  Blood pressure today is 142/82.  At home it typically runs in the 130s/60s.  Highest readings he typically will he gets are 140/70.  He reports cardiac rehab may be hard at his work schedule but he will contact them and see if something can be worked out.  He also states that at home but would be willing to start exercising 30 minutes a day on his treadmill at his home gym.  He is going to make an appointment soon with Dr. Hudson to discuss his diabetes management and possibly request referral to endocrinology.  He states that he is tried all statins in the past and  developed severe muscle aches and bone pain.  He was evaluated at the lipid center and had muscle testing and was told he should never take statin drugs again.  He would be willing to try an injectable therapy if the cost was not too expensive.        Past Medical History:   Diagnosis Date   • CAD (coronary artery disease)    • Diabetes mellitus (CMS/[facility])    • Hyperlipidemia    • Hypertension    • Renal injury    • Unstable angina (CMS/[facility])    • Ventricular ectopy    • VPC (ventricular premature complex)      Past Surgical History:   Procedure Laterality Date   • CARDIAC CATHETERIZATION     • CARDIAC CATHETERIZATION N/A 8/16/2017    Procedure: Coronary angiography;  Surgeon: Rell Deutsch MD;  Location: [facility] CATH INVASIVE LOCATION;  Service:    • CARDIAC CATHETERIZATION N/A 8/16/2017    Procedure: Stent QUIANA coronary;  Surgeon: Rell Deutsch MD;  Location: [facility] CATH INVASIVE LOCATION;  Service:    • CARDIAC CATHETERIZATION N/A 8/16/2017    Procedure: Left Heart Cath;  Surgeon: Rell Deutsch MD;  Location: [facility] CATH INVASIVE LOCATION;  Service:    • CARDIAC CATHETERIZATION N/A 8/16/2017    Procedure: Left ventriculography;  Surgeon: Rell Deutsch MD;  Location: [facility] CATH INVASIVE LOCATION;  Service:    • CARDIAC CATHETERIZATION N/A 7/25/2019    Procedure: Left Heart Cath;  Surgeon: Rell Deutsch MD;  Location: [facility] CATH INVASIVE LOCATION;  Service: Cardiology   • CARDIAC CATHETERIZATION N/A 7/25/2019    Procedure: Stent QUIANA coronary;  Surgeon: Rell Deutsch MD;  Location: [facility] CATH INVASIVE LOCATION;  Service: Cardiology   • CARDIAC CATHETERIZATION N/A 7/25/2019    Procedure: Left ventriculography;  Surgeon: Rell Deutsch MD;  Location: [facility] INVASIVE LOCATION;  Service: Cardiology   • CARDIAC CATHETERIZATION N/A 7/25/2019    Procedure: Coronary angiography;  Surgeon: Rell Deutsch MD;  Location: [facility] CATH INVASIVE LOCATION;  Service: Cardiology   • CORONARY ANGIOPLASTY WITH  STENT PLACEMENT     • KIDNEY STONE SURGERY     • PARATHYROIDECTOMY       Outpatient Medications Prior to Visit   Medication Sig Dispense Refill   • aspirin 81 MG chewable tablet Chew 81 mg daily.     • aspirin 81 MG chewable tablet Chew 1 tablet Daily.  11   • clopidogrel (PLAVIX) 75 MG tablet Take 1 tablet by mouth Daily. 90 tablet 3   • fexofenadine (ALLEGRA) 180 MG tablet Take 180 mg by mouth daily.     • flunisolide (NASALIDE) 25 MCG/ACT (0.025%) solution nasal spray Inhale 2 sprays every 12 (twelve) hours.     • irbesartan (AVAPRO) 300 MG tablet Take 1 tablet by mouth Every Night. 90 tablet 3   • metFORMIN (GLUCOPHAGE) 500 MG tablet Take 2 tablets by mouth 2 (Two) Times a Day With Meals. (Patient taking differently: Take 1,000 mg by mouth Daily With Breakfast.)     • metoprolol tartrate (LOPRESSOR) 50 MG tablet TAKE (1) TABLET TWICE A  tablet 2   • nitroglycerin (NITROSTAT) 0.4 MG SL tablet PLACE 1 TABLET UNDER THE TONGUE EVERY 5 MINUTES UP TO 3 DOSES AS NEEDED FOR CHEST PAIN 25 tablet 3   • tamsulosin (FLOMAX) 0.4 MG capsule 24 hr capsule Take 1 capsule by mouth every night.     • testosterone (ANDROGEL) 25 MG/2.5GM (1%) gel gel Place 25 mg on the skin Daily.     • TRESIBA FLEXTOUCH 200 UNIT/ML solution pen-injector Inject 46 Units as directed Daily.       No facility-administered medications prior to visit.        Allergies as of 08/02/2019 - Reviewed 08/02/2019   Allergen Reaction Noted   • Oxycodone-acetaminophen Itching 11/11/2014   • Percocet [oxycodone-acetaminophen]  08/12/2016   • Shellfish-derived products Hives and Itching 01/23/2015   • Statins  08/15/2016   • Adhesive tape Rash 01/23/2015   • Latex Rash 02/04/2016     Social History     Socioeconomic History   • Marital status:      Spouse name: Not on file   • Number of children: Not on file   • Years of education: Not on file   • Highest education level: Not on file   Tobacco Use   • Smoking status: Former Smoker     Packs/day: 1.00      Years: 25.00     Pack years: 25.00     Types: Cigarettes   • Smokeless tobacco: Never Used   Substance and Sexual Activity   • Alcohol use: Yes     Alcohol/week: 0.6 oz     Types: 1 Shots of liquor per week     Comment: 2 drinks a year   • Drug use: No   • Sexual activity: Defer     Family History   Problem Relation Age of Onset   • Diabetes Mother    • Alzheimer's disease Father    • Kidney disease Father    • Diabetes Father    • No Known Problems Maternal Grandmother    • No Known Problems Maternal Grandfather    • No Known Problems Paternal Grandmother    • Heart attack Paternal Grandfather    • Heart disease Paternal Grandfather    • Diabetes Paternal Grandfather      Review of Systems   Constitution: Positive for malaise/fatigue. Negative for chills, fever and weight gain.   HENT: Positive for hearing loss. Negative for ear pain, nosebleeds and sore throat.    Eyes: Positive for blurred vision. Negative for double vision, pain, vision loss in left eye and vision loss in right eye.   Cardiovascular: Positive for dyspnea on exertion and leg swelling. Negative for chest pain, claudication, irregular heartbeat, near-syncope, orthopnea, palpitations, paroxysmal nocturnal dyspnea and syncope.   Respiratory: Positive for shortness of breath and snoring. Negative for cough and wheezing.    Endocrine: Negative for cold intolerance and heat intolerance.   Hematologic/Lymphatic: Negative for bleeding problem.   Skin: Negative for color change, itching, rash and unusual hair distribution.   Musculoskeletal: Positive for joint pain and myalgias. Negative for joint swelling.   Gastrointestinal: Negative for abdominal pain, diarrhea, hematochezia, melena, nausea and vomiting.   Genitourinary: Negative for decreased libido, frequency, hematuria, hesitancy and incomplete emptying.   Neurological: Negative for excessive daytime sleepiness, dizziness, headaches, light-headedness, loss of balance, numbness, paresthesias and  "seizures.   Psychiatric/Behavioral: Negative for depression.          Objective:     Vitals:    08/02/19 1059   BP: 142/82   BP Location: Left arm   Patient Position: Sitting   Pulse: 66   Weight: 104 kg (229 lb 9.6 oz)   Height: 182.9 cm (72\")     Body mass index is 31.14 kg/m².    PHYSICAL EXAM:  Physical Exam   Constitutional: He is oriented to person, place, and time. He appears well-developed and well-nourished. No distress.   Obese   HENT:   Head: Normocephalic and atraumatic.   Eyes: Conjunctivae and EOM are normal.   Neck: No JVD present. Carotid bruit is not present.   Cardiovascular: Normal rate, regular rhythm, normal heart sounds and intact distal pulses.   No murmur heard.  Pulses:       Carotid pulses are 2+ on the right side, and 2+ on the left side.       Radial pulses are 2+ on the right side, and 2+ on the left side.        Dorsalis pedis pulses are 2+ on the right side, and 2+ on the left side.        Posterior tibial pulses are 2+ on the right side, and 2+ on the left side.   Right radial cath site well healed without erythema or ecchymosis, palpable proximal and distal pulses, good capillary refill, good motor function of hand, no thrill  detected, site is soft and non-tender with no hematoma, no sensory deficits noted.     Pulmonary/Chest: Effort normal and breath sounds normal. No accessory muscle usage. No tachypnea. No respiratory distress. He has no decreased breath sounds. He has no wheezes. He has no rhonchi. He has no rales.   Abdominal: Soft. Bowel sounds are normal. He exhibits no distension. There is no tenderness.   Musculoskeletal: Normal range of motion. He exhibits no edema.   Neurological: He is alert and oriented to person, place, and time.   Skin: Skin is warm, dry and intact. Bruising (mild scattered bruising along forearms) noted. He is not diaphoretic. No erythema.   Psychiatric: He has a normal mood and affect. His speech is normal and behavior is normal. Judgment and " thought content normal. Cognition and memory are normal.   Nursing note and vitals reviewed.        ECG 12 Lead  Date/Time: 8/2/2019 11:01 AM  Performed by: Liz Mao APRN  Authorized by: Liz Mao APRN   Comparison: compared with previous ECG from 7/24/2019  Rhythm: sinus rhythm  Rate: normal  BPM: 66  Q waves: III      Clinical impression: normal ECG  Comments: Indication: CAD s/p QUIANA            Assessment:       Diagnosis Plan   1. Coronary artery disease involving native coronary artery of native heart without angina pectoris     2. Accelerating angina (CMS/[facility])     3. Status post insertion of drug-eluting stent into right coronary artery for coronary artery disease     4. History of coronary artery stent placement     5. Type 2 diabetes mellitus with other circulatory complication, unspecified whether long term insulin use (CMS/[facility])     6. Other hyperlipidemia     7. Essential (primary) hypertension         Plan:     1.  Coronary Artery Disease  Assessment  • The patient has no angina    Plan  • Lifestyle modifications discussed include adhering to a heart healthy diet, avoidance of tobacco products, maintenance of a healthy weight, medication compliance, regular exercise and regular monitoring of cholesterol and blood pressure    Subjective - Objective  • There has been a previous stent procedure using QUIANA  on or around 7/25/2019 QUIANA to mid RCA, circumflex- 2014  MARI to LAD- 2017  QUIANA to distal RCA 7/25/19  • Current antiplatelet therapy includes aspirin 81 mg and clopidogrel 75 mg  • The patient qualifies for cardiac rehabilitation, and has been referred to cardiac rehab      Mild dizziness lasting for a few minutes after taking Plavix otherwise tolerating well.  He will talk with cardiac rehab to see if he can attend with his work schedule.  I highly recommended.  If he cannot I recommended that he exercise doing some brisk walking 30 minutes a day.     2. Hypertension: Blood pressure is  142/82.  Reports it is better controlled and that at home typically running in the 130s/60s.    3. Hyperlipidemia/hypertriglyceridemia: Reported intolerance to statins.  Last lipid panel I see was April 2018 and showed total cholesterol 249, triglycerides 789, HDL 33, no LDL calculated. Given his need for recurrent intervention for progressive coronary artery disease we should try to get him on some type of therapy.  He states he had recent lipid panel with HDL and LDL but I do not see these results from his PCP.  I suggest trying PCSK9 inhibitor and will place order.      4. Diabetes: Poorly controlled. Hgba1c 10.1% July 2019. Managed by PCP. He is going to try to get back in with his PCP Dr. Hudson to discuss further treatment and possible referral to endocrinology.  So admits to eating a poor diet which contributes.  I strongly urged that he cut back his carbohydrate intake and focus on heart healthy diet.     5.  Affected obstructive sleep apnea: He does have snoring and some fatigue.  He was very adamant that he did not want to discuss evaluation for sleep apnea further.    Patient needs much better risk factor modification.  I advised on the importance of good blood pressure, blood sugar and cholesterol control, as well as regular exercise, weight loss and avoidance of tobacco for cardiovascular disease risk factor modification.       Follow up with Dr. Deutsch on 8/28/19, unless otherwise needed sooner.  I advised the patient to contact our office with any questions or concerns.       It has been a pleasure to participate in this patient's care. Please feel free to contact me with any questions or concerns.     Liz Mao, MATIAS  08/02/2019            Your medication list           Accurate as of 8/2/19 11:26 AM. If you have any questions, ask your nurse or doctor.               CHANGE how you take these medications      Instructions Last Dose Given Next Dose Due   metFORMIN 500 MG tablet  Commonly known as:   GLUCOPHAGE  What changed:  when to take this      Take 2 tablets by mouth 2 (Two) Times a Day With Meals.          CONTINUE taking these medications      Instructions Last Dose Given Next Dose Due   aspirin 81 MG chewable tablet      Chew 81 mg daily.       aspirin 81 MG chewable tablet      Chew 1 tablet Daily.       clopidogrel 75 MG tablet  Commonly known as:  PLAVIX      Take 1 tablet by mouth Daily.       fexofenadine 180 MG tablet  Commonly known as:  ALLEGRA      Take 180 mg by mouth daily.       flunisolide 25 MCG/ACT (0.025%) solution nasal spray  Commonly known as:  NASALIDE      Inhale 2 sprays every 12 (twelve) hours.       irbesartan 300 MG tablet  Commonly known as:  AVAPRO      Take 1 tablet by mouth Every Night.       metoprolol tartrate 50 MG tablet  Commonly known as:  LOPRESSOR      TAKE (1) TABLET TWICE A DAY       nitroglycerin 0.4 MG SL tablet  Commonly known as:  NITROSTAT      PLACE 1 TABLET UNDER THE TONGUE EVERY 5 MINUTES UP TO 3 DOSES AS NEEDED FOR CHEST PAIN       tamsulosin 0.4 MG capsule 24 hr capsule  Commonly known as:  FLOMAX      Take 1 capsule by mouth every night.       testosterone 25 MG/2.5GM (1%) gel gel  Commonly known as:  ANDROGEL      Place 25 mg on the skin Daily.       TRESIBA FLEXTOUCH 200 UNIT/ML solution pen-injector  Generic drug:  Insulin Degludec      Inject 46 Units as directed Daily.              The above medication changes may not have been made by this provider.  Medication list was updated to reflect medications patient is currently taking including medication changes and discontinuations made by other healthcare providers.     Dictated utilizing Dragon Dictation System.

## 2019-08-12 ENCOUNTER — TELEPHONE (OUTPATIENT)
Dept: CARDIAC REHAB | Facility: HOSPITAL | Age: 69
End: 2019-08-12

## 2019-08-12 NOTE — TELEPHONE ENCOUNTER
"Called pt secondary to referral for cardiac rehab.  Pt says he works everyday out of town and has to drive to [place], [place].  He has a 4 hour round trip drive.  He doesn't think he would be able to attend our program secondary to job schedule.  He is currently walking 45 minutes daily and has \"a whole gym\" at home.    "

## 2019-08-28 ENCOUNTER — OFFICE VISIT (OUTPATIENT)
Dept: CARDIOLOGY | Facility: CLINIC | Age: 69
End: 2019-08-28

## 2019-08-28 VITALS
BODY MASS INDEX: 30.88 KG/M2 | HEART RATE: 80 BPM | DIASTOLIC BLOOD PRESSURE: 72 MMHG | WEIGHT: 228 LBS | SYSTOLIC BLOOD PRESSURE: 154 MMHG | HEIGHT: 72 IN

## 2019-08-28 DIAGNOSIS — I25.10 CORONARY ARTERY DISEASE INVOLVING NATIVE CORONARY ARTERY OF NATIVE HEART WITHOUT ANGINA PECTORIS: Primary | ICD-10-CM

## 2019-08-28 DIAGNOSIS — E78.49 OTHER HYPERLIPIDEMIA: ICD-10-CM

## 2019-08-28 DIAGNOSIS — I10 ESSENTIAL (PRIMARY) HYPERTENSION: ICD-10-CM

## 2019-08-28 PROCEDURE — 99214 OFFICE O/P EST MOD 30 MIN: CPT | Performed by: INTERNAL MEDICINE

## 2019-08-28 PROCEDURE — 93000 ELECTROCARDIOGRAM COMPLETE: CPT | Performed by: INTERNAL MEDICINE

## 2019-08-28 RX ORDER — FINASTERIDE 5 MG/1
5 TABLET, FILM COATED ORAL DAILY
COMMUNITY

## 2019-08-28 NOTE — PROGRESS NOTES
Date of Office Visit: 19  Encounter Provider: Rell Deutsch MD  Place of Service: [facility] CARDIOLOGY  Patient Name: Alex Geiger  :1950  [id number]    Chief Complaint   Patient presents with   • Coronary Artery Disease   :     HPI: Alex Geiger is a 69 y.o. male  He is here for follow-up.  He had stents put in his mid-left anterior descending by me in .  At that time, he had 40% disease in his right coronary artery and 30% in his circumflex.  He has had prior drug-eluting stents to his right coronary artery and circumflex in .  He had normal left ventricular function.  Last month he had worsening angina was recast now is developed severe disease in his distal RCA placed a stent and not feels 110% better he has been intolerant of statins in the past and developed rhabdomyolysis with it and had to stop them otherwise is doing well    Past Medical History:   Diagnosis Date   • CAD (coronary artery disease)    • Diabetes mellitus (CMS/[facility])    • Hyperlipidemia    • Hypertension    • Renal injury    • Unstable angina (CMS/[facility])    • Ventricular ectopy    • VPC (ventricular premature complex)        Past Surgical History:   Procedure Laterality Date   • CARDIAC CATHETERIZATION     • CARDIAC CATHETERIZATION N/A 2017    Procedure: Coronary angiography;  Surgeon: Rell Deutsch MD;  Location: [facility] INVASIVE LOCATION;  Service:    • CARDIAC CATHETERIZATION N/A 2017    Procedure: Stent QUIANA coronary;  Surgeon: Rell Deutsch MD;  Location: [facility] CATH INVASIVE LOCATION;  Service:    • CARDIAC CATHETERIZATION N/A 2017    Procedure: Left Heart Cath;  Surgeon: Rell Deutsch MD;  Location: [facility] CATH INVASIVE LOCATION;  Service:    • CARDIAC CATHETERIZATION N/A 2017    Procedure: Left ventriculography;  Surgeon: Rell Deutsch MD;  Location: [facility] CATH INVASIVE LOCATION;  Service:    • CARDIAC CATHETERIZATION N/A 2019    Procedure:  Left Heart Cath;  Surgeon: Rell Deutsch MD;  Location: [facility] CATH INVASIVE LOCATION;  Service: Cardiology   • CARDIAC CATHETERIZATION N/A 7/25/2019    Procedure: Stent QUIANA coronary;  Surgeon: Rell Deutsch MD;  [facility];  Service: Cardiology   • CARDIAC CATHETERIZATION N/A 7/25/2019    Procedure: Left ventriculography;  Surgeon: Rell Deutsch MD;  [facility];  Service: Cardiology   • CARDIAC CATHETERIZATION N/A 7/25/2019    Procedure: Coronary angiography;  Surgeon: Rell Deutsch MD;  [facility];  Service: Cardiology   • CORONARY ANGIOPLASTY WITH STENT PLACEMENT     • KIDNEY STONE SURGERY     • PARATHYROIDECTOMY         Social History     Socioeconomic History   • Marital status:      Spouse name: Not on file   • Number of children: Not on file   • Years of education: Not on file   • Highest education level: Not on file   Tobacco Use   • Smoking status: Former Smoker     Packs/day: 1.00     Years: 25.00     Pack years: 25.00     Types: Cigarettes   • Smokeless tobacco: Never Used   Substance and Sexual Activity   • Alcohol use: Yes     Alcohol/week: 0.6 oz     Types: 1 Shots of liquor per week     Comment: 2 drinks a year   • Drug use: No   • Sexual activity: Defer       Family History   Problem Relation Age of Onset   • Diabetes Mother    • Alzheimer's disease Father    • Kidney disease Father    • Diabetes Father    • No Known Problems Maternal Grandmother    • No Known Problems Maternal Grandfather    • No Known Problems Paternal Grandmother    • Heart attack Paternal Grandfather    • Heart disease Paternal Grandfather    • Diabetes Paternal Grandfather        Review of Systems   Constitution: Negative for decreased appetite, fever, malaise/fatigue and weight loss.   HENT: Negative for nosebleeds.    Eyes: Negative for double vision.   Cardiovascular: Negative for chest pain, claudication, cyanosis,  dyspnea on exertion, irregular heartbeat, leg swelling, near-syncope, orthopnea, palpitations, paroxysmal nocturnal dyspnea and syncope.   Respiratory: Negative for cough, hemoptysis and shortness of breath.    Hematologic/Lymphatic: Negative for bleeding problem.   Skin: Negative for rash.   Musculoskeletal: Negative for falls and myalgias.   Gastrointestinal: Negative for hematochezia, jaundice, melena, nausea and vomiting.   Genitourinary: Negative for hematuria.   Neurological: Negative for dizziness and seizures.   Psychiatric/Behavioral: Negative for altered mental status and memory loss.       Allergies   Allergen Reactions   • Oxycodone-Acetaminophen Itching   • Percocet [Oxycodone-Acetaminophen]    • Shellfish-Derived Products Hives and Itching   • Statins    • Adhesive Tape Rash   • Latex Rash         Current Outpatient Medications:   •  Alirocumab (PRALUENT) 75 MG/ML solution pen-injector, Inject 1 syringe under the skin into the appropriate area as directed Every 14 (Fourteen) Days., Disp: 2 pen, Rfl: 3  •  clopidogrel (PLAVIX) 75 MG tablet, Take 1 tablet by mouth Daily., Disp: 90 tablet, Rfl: 3  •  fexofenadine (ALLEGRA) 180 MG tablet, Take 180 mg by mouth daily., Disp: , Rfl:   •  finasteride (PROSCAR) 5 MG tablet, Take 5 mg by mouth Daily., Disp: , Rfl:   •  flunisolide (NASALIDE) 25 MCG/ACT (0.025%) solution nasal spray, Inhale 2 sprays every 12 (twelve) hours., Disp: , Rfl:   •  irbesartan (AVAPRO) 300 MG tablet, Take 1 tablet by mouth Every Night., Disp: 90 tablet, Rfl: 3  •  metFORMIN (GLUCOPHAGE) 500 MG tablet, Take 2 tablets by mouth 2 (Two) Times a Day With Meals. (Patient taking differently: Take 1,000 mg by mouth Daily With Breakfast.), Disp: , Rfl:   •  metoprolol tartrate (LOPRESSOR) 50 MG tablet, TAKE (1) TABLET TWICE A DAY, Disp: 180 tablet, Rfl: 2  •  nitroglycerin (NITROSTAT) 0.4 MG SL tablet, PLACE 1 TABLET UNDER THE TONGUE EVERY 5 MINUTES UP TO 3 DOSES AS NEEDED FOR CHEST PAIN, Disp:  "25 tablet, Rfl: 3  •  tamsulosin (FLOMAX) 0.4 MG capsule 24 hr capsule, Take 1 capsule by mouth every night., Disp: , Rfl:   •  testosterone (ANDROGEL) 25 MG/2.5GM (1%) gel gel, Place 25 mg on the skin Daily., Disp: , Rfl:   •  TRESIBA FLEXTOUCH 200 UNIT/ML solution pen-injector, Inject 46 Units as directed Daily., Disp: , Rfl:     Current Facility-Administered Medications:   •  Evolocumab solution prefilled syringe 140 mg, 140 mg, Subcutaneous, Q14 Days, Rell Deutsch MD     Objective:     Vitals:    08/28/19 1352   BP: 154/72   Pulse: 80   Weight: 103 kg (228 lb)   Height: 182.9 cm (72\")     Body mass index is 30.92 kg/m².    Physical Exam   Constitutional: He is oriented to person, place, and time. He appears well-developed and well-nourished.   HENT:   Head: Normocephalic.   Eyes: No scleral icterus.   Neck: No JVD present. No thyromegaly present.   Cardiovascular: Normal rate, regular rhythm and normal heart sounds. Exam reveals no gallop and no friction rub.   No murmur heard.  Pulmonary/Chest: Effort normal and breath sounds normal. He has no wheezes. He has no rales.   Abdominal: Soft. There is no hepatosplenomegaly. There is no tenderness.   Musculoskeletal: Normal range of motion. He exhibits no edema.   Lymphadenopathy:     He has no cervical adenopathy.   Neurological: He is alert and oriented to person, place, and time.   Skin: Skin is warm and dry. No rash noted.   Psychiatric: He has a normal mood and affect.         ECG 12 Lead  Date/Time: 8/28/2019 2:33 PM  Performed by: Rell Deutsch MD  Authorized by: Rell Deutsch MD   Comparison: compared with previous ECG   Similar to previous ECG  Rhythm: sinus rhythm  Ectopy: unifocal PVCs  Other findings: non-specific ST-T wave changes    Clinical impression: abnormal EKG             Assessment:       Diagnosis Plan   1. Coronary artery disease involving native coronary artery of native heart without angina pectoris  Evolocumab solution prefilled " syringe 140 mg   2. Essential (primary) hypertension  Evolocumab solution prefilled syringe 140 mg   3. Other hyperlipidemia  Evolocumab solution prefilled syringe 140 mg          Plan:       He is doing well but weak had a stop his coronary disease he just keeps marching on developing blockages we will try him on a PSK 9 inhibitor and see if he can get it approved and can take it.  I meant to have him stop his aspirin today and just keep him on long-term clopidogrel have him come back and see us in a year sooner if he has trouble    Coronary Artery Disease  Assessment  • The patient has no angina    Plan  • Lifestyle modifications discussed include adhering to a heart healthy diet, maintenance of a healthy weight, medication compliance, regular exercise and regular monitoring of cholesterol and blood pressure    Subjective - Objective  • There has been a previous stent procedure using QUIANA  • Current antiplatelet therapy includes aspirin 81 mg      As always, it has been a pleasure to participate in your patient's care.      Sincerely,       Rell Deutsch MD

## 2019-10-18 RX ORDER — METOPROLOL TARTRATE 50 MG/1
TABLET, FILM COATED ORAL
Qty: 180 TABLET | Refills: 0 | Status: SHIPPED | OUTPATIENT
Start: 2019-10-18 | End: 2020-01-21

## 2020-01-21 RX ORDER — METOPROLOL TARTRATE 50 MG/1
TABLET, FILM COATED ORAL
Qty: 180 TABLET | Refills: 2 | Status: SHIPPED | OUTPATIENT
Start: 2020-01-21 | End: 2020-10-15

## 2020-01-26 RX ORDER — EMOLLIENT COMBINATION NO.47
1 CREAM (GRAM) TOPICAL AS NEEDED
Qty: 180 G | Refills: 11 | Status: SHIPPED | OUTPATIENT
Start: 2020-01-26 | End: 2021-03-26 | Stop reason: HOSPADM

## 2020-04-20 RX ORDER — NITROGLYCERIN 0.4 MG/1
TABLET SUBLINGUAL
Qty: 25 TABLET | Refills: 0 | Status: SHIPPED | OUTPATIENT
Start: 2020-04-20 | End: 2021-03-26 | Stop reason: HOSPADM

## 2020-05-07 RX ORDER — FLUCONAZOLE 100 MG/1
100 TABLET ORAL DAILY
Qty: 7 TABLET | Refills: 1 | Status: ON HOLD | OUTPATIENT
Start: 2020-05-07 | End: 2021-03-15

## 2020-07-16 RX ORDER — CLOPIDOGREL BISULFATE 75 MG/1
TABLET ORAL
Qty: 90 TABLET | Refills: 2 | Status: SHIPPED | OUTPATIENT
Start: 2020-07-16 | End: 2021-04-07

## 2020-08-31 ENCOUNTER — OFFICE VISIT (OUTPATIENT)
Dept: CARDIOLOGY | Facility: CLINIC | Age: 70
End: 2020-08-31

## 2020-08-31 VITALS
DIASTOLIC BLOOD PRESSURE: 86 MMHG | BODY MASS INDEX: 28.74 KG/M2 | OXYGEN SATURATION: 98 % | HEIGHT: 72 IN | WEIGHT: 212.2 LBS | RESPIRATION RATE: 18 BRPM | SYSTOLIC BLOOD PRESSURE: 148 MMHG | HEART RATE: 78 BPM

## 2020-08-31 DIAGNOSIS — I10 ESSENTIAL (PRIMARY) HYPERTENSION: ICD-10-CM

## 2020-08-31 DIAGNOSIS — E78.49 OTHER HYPERLIPIDEMIA: ICD-10-CM

## 2020-08-31 DIAGNOSIS — I25.10 CORONARY ARTERY DISEASE INVOLVING NATIVE CORONARY ARTERY OF NATIVE HEART WITHOUT ANGINA PECTORIS: Primary | ICD-10-CM

## 2020-08-31 PROCEDURE — 93000 ELECTROCARDIOGRAM COMPLETE: CPT | Performed by: NURSE PRACTITIONER

## 2020-08-31 PROCEDURE — 99214 OFFICE O/P EST MOD 30 MIN: CPT | Performed by: NURSE PRACTITIONER

## 2020-08-31 NOTE — PROGRESS NOTES
Date of Office Visit: 2020  Encounter Provider: MATIAS Joe  Place of Service: [facility] CARDIOLOGY  Patient Name: Alex Geiger  :1950    Chief Complaint   Patient presents with   • Coronary Artery Disease     1 YR FOLLOW UP   :     HPI: Alex Geiger is a 70 y.o. male, new to me, who presents today for follow-up.  Old records have been obtained and reviewed by me.  He is a patient of Dr. Deutsch's with a past cardiac history significant for hypertension, hyperlipidemia, PVCs, and coronary artery disease.  In 2019, he presented with complaints consistent with angina.  He was referred for cardiac catheterization which revealed a 70 to 80% lesion in the distal RCA for which he underwent drug-eluting stent placement.  At that time,he had mild luminal irregularities in the proximal circumflex, a patent stent in the mid circumflex, a patent stent in the proximal LAD, and a normal left main.     He was last seen in the office by Dr. Deutsch on 2019 at which time he was doing well with no complaints of angina or heart failure.  Due to a statin intolerance, Dr. Deutsch started him on a PCSK9 inhibitor.  Additionally, he stopped his aspirin with plans to keep him on long-term Plavix.  He was advised to follow-up in 1 year.   Over the past year, he has been doing well.  He denies any chest pain, shortness of breath, palpitations, edema, dizziness, or syncope.  He denies any bleeding difficulties or melena.  He has made a lot of significant changes to his diet and has subsequently lost about 20 pounds.  He is a retired state .  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  However, he is still helping out some.  He stays pretty busy.  He never started the Praluent because he states he never received the prescription.  He reports that his cholesterol numbers were pretty good the last time they were checked.  His blood pressure  at home runs in the 110s pretty consistently.    Past Medical History:   Diagnosis Date   • CAD (coronary artery disease)    • Diabetes mellitus (CMS/[facility])    • Hyperlipidemia    • Hypertension    • Renal injury    • Unstable angina (CMS/[facility])    • Ventricular ectopy    • VPC (ventricular premature complex)        Past Surgical History:   Procedure Laterality Date   • CARDIAC CATHETERIZATION     • CARDIAC CATHETERIZATION N/A 8/16/2017    Procedure: Coronary angiography;  Surgeon: Rell Deutsch MD;  [facility];  Service:    • CARDIAC CATHETERIZATION N/A 8/16/2017    Procedure: Stent QUIANA coronary;  Surgeon: Rell Deutsch MD;  [facility];  Service:    • CARDIAC CATHETERIZATION N/A 8/16/2017    Procedure: Left Heart Cath;  Surgeon: Rell Deutsch MD;  [facility];  Service:    • CARDIAC CATHETERIZATION N/A 8/16/2017    Procedure: Left ventriculography;  Surgeon: Rell Deutsch MD;  Location: [facility] CATH INVASIVE LOCATION;  Service:    • CARDIAC CATHETERIZATION N/A 7/25/2019    Procedure: Left Heart Cath;  Surgeon: Rell Deutsch MD;  [facility];  Service: Cardiology   • CARDIAC CATHETERIZATION N/A 7/25/2019    Procedure: Stent QUIANA coronary;  Surgeon: Rell Deutsch MD;  [facility];  Service: Cardiology   • CARDIAC CATHETERIZATION N/A 7/25/2019    Procedure: Left ventriculography;  Surgeon: Rell Deutsch MD;  [facility];  Service: Cardiology   • CARDIAC CATHETERIZATION N/A 7/25/2019    Procedure: Coronary angiography;  Surgeon: Rell Deutsch MD;  Location: [facility] CATH INVASIVE LOCATION;  Service: Cardiology   • CORONARY ANGIOPLASTY WITH STENT PLACEMENT     • KIDNEY STONE SURGERY     • PARATHYROIDECTOMY         Social History     Socioeconomic History   • Marital status:      Spouse name: Not on file   • Number of children: Not on  file   • Years of education: Not on file   • Highest education level: Not on file   Tobacco Use   • Smoking status: Former Smoker     Packs/day: 1.00     Years: 25.00     Pack years: 25.00     Types: Cigarettes   • Smokeless tobacco: Never Used   • Tobacco comment: NO CAFFEINE USE   Substance and Sexual Activity   • Alcohol use: Yes     Alcohol/week: 1.0 standard drinks     Types: 1 Shots of liquor per week     Comment: 2 drinks a year   • Drug use: No   • Sexual activity: Defer       Family History   Problem Relation Age of Onset   • Diabetes Mother    • Alzheimer's disease Father    • Kidney disease Father    • Diabetes Father    • No Known Problems Maternal Grandmother    • No Known Problems Maternal Grandfather    • No Known Problems Paternal Grandmother    • Heart attack Paternal Grandfather    • Heart disease Paternal Grandfather    • Diabetes Paternal Grandfather        Review of Systems   Constitution: Negative for chills, fever and malaise/fatigue.   Cardiovascular: Negative for chest pain, dyspnea on exertion, leg swelling, near-syncope, orthopnea, palpitations, paroxysmal nocturnal dyspnea and syncope.   Respiratory: Negative for cough and shortness of breath.    Musculoskeletal: Negative for joint pain, joint swelling and myalgias.   Gastrointestinal: Negative for abdominal pain, diarrhea, melena, nausea and vomiting.   Genitourinary: Negative for frequency and hematuria.   Neurological: Negative for light-headedness, numbness, paresthesias and seizures.   Allergic/Immunologic: Negative.    All other systems reviewed and are negative.      Allergies   Allergen Reactions   • Oxycodone-Acetaminophen Itching   • Percocet [Oxycodone-Acetaminophen]    • Shellfish-Derived Products Hives and Itching   • Statins    • Adhesive Tape Rash   • Latex Rash         Current Outpatient Medications:   •  clopidogrel (PLAVIX) 75 MG tablet, TAKE (1) TABLET DAILY, Disp: 90 tablet, Rfl: 2  •  Dermatological Products, Misc.  "(NEOSALUS) cream topical cream, Apply 1 application topically to the appropriate area as directed As Needed (skin irritation)., Disp: 180 g, Rfl: 11  •  fexofenadine (ALLEGRA) 180 MG tablet, Take 180 mg by mouth daily., Disp: , Rfl:   •  finasteride (PROSCAR) 5 MG tablet, Take 5 mg by mouth Daily., Disp: , Rfl:   •  fluconazole (Diflucan) 100 MG tablet, Take 1 tablet by mouth Daily. For yeast, Disp: 7 tablet, Rfl: 1  •  flunisolide (NASALIDE) 25 MCG/ACT (0.025%) solution nasal spray, Inhale 2 sprays every 12 (twelve) hours., Disp: , Rfl:   •  irbesartan (AVAPRO) 300 MG tablet, Take 1 tablet by mouth Every Night., Disp: 90 tablet, Rfl: 3  •  metFORMIN (GLUCOPHAGE) 500 MG tablet, Take 2 tablets by mouth 2 (Two) Times a Day With Meals. (Patient taking differently: Take 1,000 mg by mouth Daily With Breakfast.), Disp: , Rfl:   •  metoprolol tartrate (LOPRESSOR) 50 MG tablet, TAKE (1) TABLET TWICE A DAY, Disp: 180 tablet, Rfl: 2  •  nitroglycerin (NITROSTAT) 0.4 MG SL tablet, DISSOLVE 1 TABLET UNDER THE TONGUE EVERY 5 MINUTES UP TO 3 DOSES AS NEEDED FOR CHEST PAIN, Disp: 25 tablet, Rfl: 0  •  tamsulosin (FLOMAX) 0.4 MG capsule 24 hr capsule, Take 1 capsule by mouth every night., Disp: , Rfl:   •  testosterone (ANDROGEL) 25 MG/2.5GM (1%) gel gel, Place 25 mg on the skin Daily., Disp: , Rfl:   •  TRESIBA FLEXTOUCH 200 UNIT/ML solution pen-injector, Inject 46 Units as directed Daily., Disp: , Rfl:   •  Alirocumab 75 MG/ML solution auto-injector, Inject 1 pen under the skin into the appropriate area as directed Every 14 (Fourteen) Days., Disp: 2 pen, Rfl: 11  No current facility-administered medications for this visit.       Objective:     Vitals:    08/31/20 1304 08/31/20 1306   BP: 144/86 148/86   BP Location: Right arm Left arm   Patient Position: Sitting Sitting   Pulse: 78    Resp: 18    SpO2: 98%    Weight: 96.3 kg (212 lb 3.2 oz)    Height: 182.9 cm (72\")      Body mass index is 28.78 kg/m².    PHYSICAL " EXAM:    Physical Exam   Constitutional: He is oriented to person, place, and time. He appears well-developed and well-nourished. No distress.   HENT:   Head: Normocephalic and atraumatic.   Eyes: Pupils are equal, round, and reactive to light.   Neck: No JVD present. No thyromegaly present.   Cardiovascular: Normal rate, regular rhythm, normal heart sounds and intact distal pulses.   No murmur heard.  Pulmonary/Chest: Effort normal and breath sounds normal. No respiratory distress.   Abdominal: Soft. Bowel sounds are normal. He exhibits no distension. There is no splenomegaly or hepatomegaly. There is no tenderness.   Musculoskeletal: Normal range of motion. He exhibits no edema.   Neurological: He is alert and oriented to person, place, and time.   Skin: Skin is warm and dry. He is not diaphoretic. No erythema.   Psychiatric: He has a normal mood and affect. His behavior is normal. Judgment normal.         ECG 12 Lead  Date/Time: 8/31/2020 1:13 PM  Performed by: Sigrid Ng APRN  Authorized by: Sigrid Ng APRN   Comparison: compared with previous ECG from 8/28/2019  Similar to previous ECG  Rhythm: sinus rhythm  Rate: normal  BPM: 69  T inversion: III    Clinical impression: normal ECG  Comments: Indication: CAD              Assessment:       Diagnosis Plan   1. Coronary artery disease involving native coronary artery of native heart without angina pectoris  ECG 12 Lead   2. Essential (primary) hypertension     3. Other hyperlipidemia       Orders Placed This Encounter   Procedures   • ECG 12 Lead     This order was created via procedure documentation          Plan:       1.  Coronary artery disease.  History of stenting to the mid circumflex, proximal LAD, and most recently the distal RCA (in July 2019).  He denies any symptoms of angina.  He is on good medical therapy.      2.  Hypertension. His blood pressure is reasonable today.  He states at home they are typically in the 110s systolic.   Continue current regimen.      3.  Hyperlipidemia.  He has a history of statin intolerance and was started on a PCSK9 inhibitor last year.  However, he never received this prescription and asked that she never started it.  I will request the most recent lab work from his PCP.  I am going to reorder Praluent.  I have explained to him the benefits and he agrees to proceed.  I encouraged him to call the office if he has not heard anything in the next 2 weeks or so.      I think he is stable and doing well.  He denies any symptoms of angina or heart failure.  He has good risk factor modification.  He will follow-up with Dr. Deutsch in 1 year or sooner if needed.      As always, it has been a pleasure to participate in your patient's care.      Sincerely,         MATIAS Hannah

## 2020-09-01 ENCOUNTER — TELEPHONE (OUTPATIENT)
Dept: CARDIOLOGY | Facility: CLINIC | Age: 70
End: 2020-09-01

## 2020-09-01 NOTE — TELEPHONE ENCOUNTER
----- Message from MATIAS Grove sent at 8/31/2020  1:28 PM EDT -----  Please request the most recent lab work from his PCP.  I am specifically looking for the lipid panel.

## 2020-10-15 RX ORDER — METOPROLOL TARTRATE 50 MG/1
TABLET, FILM COATED ORAL
Qty: 180 TABLET | Refills: 1 | Status: SHIPPED | OUTPATIENT
Start: 2020-10-15 | End: 2021-03-26 | Stop reason: HOSPADM

## 2021-03-15 ENCOUNTER — BULK ORDERING (OUTPATIENT)
Dept: CASE MANAGEMENT | Facility: OTHER | Age: 71
End: 2021-03-15

## 2021-03-15 ENCOUNTER — APPOINTMENT (OUTPATIENT)
Dept: GENERAL RADIOLOGY | Facility: HOSPITAL | Age: 71
End: 2021-03-15

## 2021-03-15 ENCOUNTER — HOSPITAL ENCOUNTER (INPATIENT)
Facility: HOSPITAL | Age: 71
LOS: 11 days | Discharge: HOME | End: 2021-03-26
Attending: EMERGENCY MEDICINE | Admitting: HOSPITALIST

## 2021-03-15 DIAGNOSIS — R09.02 HYPOXIA: ICD-10-CM

## 2021-03-15 DIAGNOSIS — U07.1 PNEUMONIA DUE TO COVID-19 VIRUS: Primary | ICD-10-CM

## 2021-03-15 DIAGNOSIS — Z23 IMMUNIZATION DUE: ICD-10-CM

## 2021-03-15 DIAGNOSIS — J12.82 PNEUMONIA DUE TO COVID-19 VIRUS: Primary | ICD-10-CM

## 2021-03-15 LAB
ALBUMIN SERPL-MCNC: 3.6 G/DL (ref 3.5–5.2)
ALBUMIN/GLOB SERPL: 1 G/DL
ALP SERPL-CCNC: 57 U/L (ref 39–117)
ALT SERPL W P-5'-P-CCNC: 17 U/L (ref 1–41)
ANION GAP SERPL CALCULATED.3IONS-SCNC: 12.2 MMOL/L (ref 5–15)
AST SERPL-CCNC: 28 U/L (ref 1–40)
B PARAPERT DNA SPEC QL NAA+PROBE: NOT DETECTED
B PERT DNA SPEC QL NAA+PROBE: NOT DETECTED
BASOPHILS # BLD AUTO: 0.01 10*3/MM3 (ref 0–0.2)
BASOPHILS NFR BLD AUTO: 0.1 % (ref 0–1.5)
BILIRUB SERPL-MCNC: 0.6 MG/DL (ref 0–1.2)
BUN SERPL-MCNC: 19 MG/DL (ref 8–23)
BUN/CREAT SERPL: 18.1 (ref 7–25)
C PNEUM DNA NPH QL NAA+NON-PROBE: NOT DETECTED
CALCIUM SPEC-SCNC: 9.6 MG/DL (ref 8.6–10.5)
CHLORIDE SERPL-SCNC: 99 MMOL/L (ref 98–107)
CO2 SERPL-SCNC: 24.8 MMOL/L (ref 22–29)
CREAT SERPL-MCNC: 1.05 MG/DL (ref 0.76–1.27)
D-LACTATE SERPL-SCNC: 1.3 MMOL/L (ref 0.5–2)
D-LACTATE SERPL-SCNC: 2.1 MMOL/L (ref 0.5–2)
DEPRECATED RDW RBC AUTO: 40.5 FL (ref 37–54)
EOSINOPHIL # BLD AUTO: 0 10*3/MM3 (ref 0–0.4)
EOSINOPHIL NFR BLD AUTO: 0 % (ref 0.3–6.2)
ERYTHROCYTE [DISTWIDTH] IN BLOOD BY AUTOMATED COUNT: 13 % (ref 12.3–15.4)
FLUAV SUBTYP SPEC NAA+PROBE: NOT DETECTED
FLUBV RNA ISLT QL NAA+PROBE: NOT DETECTED
GFR SERPL CREATININE-BSD FRML MDRD: 70 ML/MIN/1.73
GLOBULIN UR ELPH-MCNC: 3.6 GM/DL
GLUCOSE BLDC GLUCOMTR-MCNC: 252 MG/DL (ref 70–130)
GLUCOSE SERPL-MCNC: 238 MG/DL (ref 65–99)
HADV DNA SPEC NAA+PROBE: NOT DETECTED
HCOV 229E RNA SPEC QL NAA+PROBE: NOT DETECTED
HCOV HKU1 RNA SPEC QL NAA+PROBE: NOT DETECTED
HCOV NL63 RNA SPEC QL NAA+PROBE: NOT DETECTED
HCOV OC43 RNA SPEC QL NAA+PROBE: NOT DETECTED
HCT VFR BLD AUTO: 39.6 % (ref 37.5–51)
HGB BLD-MCNC: 13.5 G/DL (ref 13–17.7)
HMPV RNA NPH QL NAA+NON-PROBE: NOT DETECTED
HPIV1 RNA SPEC QL NAA+PROBE: NOT DETECTED
HPIV2 RNA SPEC QL NAA+PROBE: NOT DETECTED
HPIV3 RNA NPH QL NAA+PROBE: NOT DETECTED
HPIV4 P GENE NPH QL NAA+PROBE: NOT DETECTED
IMM GRANULOCYTES # BLD AUTO: 0.03 10*3/MM3 (ref 0–0.05)
IMM GRANULOCYTES NFR BLD AUTO: 0.4 % (ref 0–0.5)
LYMPHOCYTES # BLD AUTO: 0.61 10*3/MM3 (ref 0.7–3.1)
LYMPHOCYTES NFR BLD AUTO: 7.5 % (ref 19.6–45.3)
M PNEUMO IGG SER IA-ACNC: NOT DETECTED
MCH RBC QN AUTO: 29.7 PG (ref 26.6–33)
MCHC RBC AUTO-ENTMCNC: 34.1 G/DL (ref 31.5–35.7)
MCV RBC AUTO: 87.2 FL (ref 79–97)
MONOCYTES # BLD AUTO: 0.43 10*3/MM3 (ref 0.1–0.9)
MONOCYTES NFR BLD AUTO: 5.3 % (ref 5–12)
NEUTROPHILS NFR BLD AUTO: 7.09 10*3/MM3 (ref 1.7–7)
NEUTROPHILS NFR BLD AUTO: 86.7 % (ref 42.7–76)
NRBC BLD AUTO-RTO: 0 /100 WBC (ref 0–0.2)
PLATELET # BLD AUTO: 208 10*3/MM3 (ref 140–450)
PMV BLD AUTO: 10.9 FL (ref 6–12)
POTASSIUM SERPL-SCNC: 4.3 MMOL/L (ref 3.5–5.2)
PROCALCITONIN SERPL-MCNC: 0.89 NG/ML (ref 0–0.25)
PROT SERPL-MCNC: 7.2 G/DL (ref 6–8.5)
RBC # BLD AUTO: 4.54 10*6/MM3 (ref 4.14–5.8)
RHINOVIRUS RNA SPEC NAA+PROBE: NOT DETECTED
RSV RNA NPH QL NAA+NON-PROBE: NOT DETECTED
SARS-COV-2 RNA NPH QL NAA+NON-PROBE: DETECTED
SODIUM SERPL-SCNC: 136 MMOL/L (ref 136–145)
WBC # BLD AUTO: 8.17 10*3/MM3 (ref 3.4–10.8)

## 2021-03-15 PROCEDURE — 25010000002 DEXAMETHASONE SODIUM PHOSPHATE 10 MG/ML SOLUTION: Performed by: HOSPITALIST

## 2021-03-15 PROCEDURE — 25010000002 ONDANSETRON PER 1 MG: Performed by: NURSE PRACTITIONER

## 2021-03-15 PROCEDURE — 36415 COLL VENOUS BLD VENIPUNCTURE: CPT | Performed by: NURSE PRACTITIONER

## 2021-03-15 PROCEDURE — 25010000002 ENOXAPARIN PER 10 MG: Performed by: HOSPITALIST

## 2021-03-15 PROCEDURE — 25010000002 KETOROLAC TROMETHAMINE PER 15 MG: Performed by: NURSE PRACTITIONER

## 2021-03-15 PROCEDURE — 87040 BLOOD CULTURE FOR BACTERIA: CPT | Performed by: NURSE PRACTITIONER

## 2021-03-15 PROCEDURE — 0202U NFCT DS 22 TRGT SARS-COV-2: CPT | Performed by: NURSE PRACTITIONER

## 2021-03-15 PROCEDURE — G0378 HOSPITAL OBSERVATION PER HR: HCPCS

## 2021-03-15 PROCEDURE — 63710000001 INSULIN LISPRO (HUMAN) PER 5 UNITS: Performed by: HOSPITALIST

## 2021-03-15 PROCEDURE — 99285 EMERGENCY DEPT VISIT HI MDM: CPT

## 2021-03-15 PROCEDURE — 25010000002 CEFTRIAXONE PER 250 MG: Performed by: NURSE PRACTITIONER

## 2021-03-15 PROCEDURE — 71045 X-RAY EXAM CHEST 1 VIEW: CPT

## 2021-03-15 PROCEDURE — 25010000002 DEXAMETHASONE SODIUM PHOSPHATE 10 MG/ML SOLUTION: Performed by: NURSE PRACTITIONER

## 2021-03-15 PROCEDURE — 82962 GLUCOSE BLOOD TEST: CPT

## 2021-03-15 PROCEDURE — 84145 PROCALCITONIN (PCT): CPT | Performed by: NURSE PRACTITIONER

## 2021-03-15 PROCEDURE — 85025 COMPLETE CBC W/AUTO DIFF WBC: CPT | Performed by: NURSE PRACTITIONER

## 2021-03-15 PROCEDURE — 83605 ASSAY OF LACTIC ACID: CPT | Performed by: NURSE PRACTITIONER

## 2021-03-15 PROCEDURE — 80053 COMPREHEN METABOLIC PANEL: CPT | Performed by: NURSE PRACTITIONER

## 2021-03-15 RX ORDER — DEXTROSE MONOHYDRATE 25 G/50ML
25 INJECTION, SOLUTION INTRAVENOUS
Status: DISCONTINUED | OUTPATIENT
Start: 2021-03-15 | End: 2021-03-16

## 2021-03-15 RX ORDER — AZITHROMYCIN 250 MG/1
500 TABLET, FILM COATED ORAL ONCE
Status: COMPLETED | OUTPATIENT
Start: 2021-03-15 | End: 2021-03-15

## 2021-03-15 RX ORDER — NICOTINE POLACRILEX 4 MG
15 LOZENGE BUCCAL
Status: DISCONTINUED | OUTPATIENT
Start: 2021-03-15 | End: 2021-03-16

## 2021-03-15 RX ORDER — INSULIN LISPRO 100 [IU]/ML
0-7 INJECTION, SOLUTION INTRAVENOUS; SUBCUTANEOUS
Status: DISCONTINUED | OUTPATIENT
Start: 2021-03-15 | End: 2021-03-26 | Stop reason: HOSPADM

## 2021-03-15 RX ORDER — KETOROLAC TROMETHAMINE 15 MG/ML
10 INJECTION, SOLUTION INTRAMUSCULAR; INTRAVENOUS ONCE
Status: COMPLETED | OUTPATIENT
Start: 2021-03-15 | End: 2021-03-15

## 2021-03-15 RX ORDER — CEFTRIAXONE SODIUM 1 G/50ML
1 INJECTION, SOLUTION INTRAVENOUS ONCE
Status: COMPLETED | OUTPATIENT
Start: 2021-03-15 | End: 2021-03-15

## 2021-03-15 RX ORDER — ONDANSETRON 2 MG/ML
4 INJECTION INTRAMUSCULAR; INTRAVENOUS ONCE
Status: CANCELLED | OUTPATIENT
Start: 2021-03-15 | End: 2021-03-15

## 2021-03-15 RX ORDER — ONDANSETRON 2 MG/ML
4 INJECTION INTRAMUSCULAR; INTRAVENOUS ONCE
Status: COMPLETED | OUTPATIENT
Start: 2021-03-15 | End: 2021-03-15

## 2021-03-15 RX ORDER — DEXAMETHASONE SODIUM PHOSPHATE 10 MG/ML
6 INJECTION, SOLUTION INTRAMUSCULAR; INTRAVENOUS ONCE
Status: COMPLETED | OUTPATIENT
Start: 2021-03-15 | End: 2021-03-15

## 2021-03-15 RX ORDER — INSULIN LISPRO 100 [IU]/ML
0-7 INJECTION, SOLUTION INTRAVENOUS; SUBCUTANEOUS
Status: DISCONTINUED | OUTPATIENT
Start: 2021-03-16 | End: 2021-03-15

## 2021-03-15 RX ORDER — SODIUM CHLORIDE 0.9 % (FLUSH) 0.9 %
10 SYRINGE (ML) INJECTION AS NEEDED
Status: CANCELLED | OUTPATIENT
Start: 2021-03-15

## 2021-03-15 RX ORDER — KETOROLAC TROMETHAMINE 15 MG/ML
10 INJECTION, SOLUTION INTRAMUSCULAR; INTRAVENOUS ONCE
Status: CANCELLED | OUTPATIENT
Start: 2021-03-15 | End: 2021-03-20

## 2021-03-15 RX ORDER — DEXAMETHASONE SODIUM PHOSPHATE 10 MG/ML
6 INJECTION, SOLUTION INTRAMUSCULAR; INTRAVENOUS 2 TIMES DAILY
Status: DISCONTINUED | OUTPATIENT
Start: 2021-03-15 | End: 2021-03-22

## 2021-03-15 RX ADMIN — INSULIN LISPRO 4 UNITS: 100 INJECTION, SOLUTION INTRAVENOUS; SUBCUTANEOUS at 23:02

## 2021-03-15 RX ADMIN — DEXAMETHASONE SODIUM PHOSPHATE 6 MG: 10 INJECTION, SOLUTION INTRAMUSCULAR; INTRAVENOUS at 14:34

## 2021-03-15 RX ADMIN — ENOXAPARIN SODIUM 40 MG: 40 INJECTION SUBCUTANEOUS at 21:08

## 2021-03-15 RX ADMIN — CEFTRIAXONE SODIUM 1 G: 1 INJECTION, SOLUTION INTRAVENOUS at 17:42

## 2021-03-15 RX ADMIN — AZITHROMYCIN DIHYDRATE 500 MG: 250 TABLET, FILM COATED ORAL at 17:44

## 2021-03-15 RX ADMIN — ONDANSETRON 4 MG: 2 INJECTION INTRAMUSCULAR; INTRAVENOUS at 14:33

## 2021-03-15 RX ADMIN — DEXAMETHASONE SODIUM PHOSPHATE 6 MG: 10 INJECTION, SOLUTION INTRAMUSCULAR; INTRAVENOUS at 21:12

## 2021-03-15 RX ADMIN — SODIUM CHLORIDE 1000 ML: 9 INJECTION, SOLUTION INTRAVENOUS at 14:34

## 2021-03-15 RX ADMIN — KETOROLAC TROMETHAMINE 10 MG: 15 INJECTION, SOLUTION INTRAMUSCULAR; INTRAVENOUS at 14:34

## 2021-03-15 NOTE — CONSULTS
"CONSULT NOTE    Infectious Diseases - Roberto Fermin MD  [facility]       Patient Identification:  Name: Alex Geiger  Age: 70 y.o.  Sex: male  :  1950  MRN: [id number]             Date of Consultation: 3/15/2021      Primary Care Physician: Real Hudson MD                               Requesting Physician: Dr. Hayes  Reason for Consultation: COVID-19 infection    Impression: 70-year-old male with past medical history as noted below was exposed to COVID-19 at work and was tested positive on 3/9/2021.  He was asymptomatic at that time.  His wife was also tested and she was negative.  Earlier to affect his positive test despite having\" Covid\" symptoms.  Patient is very vague about his symptoms and upon pressing states that he got \"little bit of everything\".  He was having fatigue weakness nausea shortness of breath decreased appetite chills.  He eventually reached a point that he decided that he needed to seek help.  In the emergency room patient was found to have elevated procalcitonin and lactic acid of 2.1.  Chest x-ray showed mild bilateral opacities concerning for underlying COVID-19 he was noted to have room air oxygen saturation of 93%.  Blood cultures were drawn and patient was very appropriately empirically started on dexamethasone and antibiotic treatment.  His CRP and procalcitonin and lactic acid level were elevated  This presentation is consistent with:  1-systemic COVID-19 infection with  2-COVID-19 pneumonia with possible evolving hypoxia and possible  3-superimposed bacterial pneumonia  4-diabetes mellitus  5-hypertension  6-other diagnosis per primary team.    Recommendations/Discussions:  At this juncture I agree with your care plan consisting of dexamethasone and IV antibiotics with close monitoring of his need for oxygen supplementation.  If he shows increasing oxygen more than any other indicated while empirically treating him for superimposed secondary bacterial " "infection with 5 days of antibiotics.  Closely monitor renal function, liver function and inflammation markers.  Thank you Dr. Santiago for letting me be the part of your patient care please see above impression and recommendations      History of Present Illness:   70-year-old male with past medical history as noted below was exposed to COVID-19 at work and was tested positive on 3/9/2021.  He was asymptomatic at that time.  His wife was also tested and she was negative.  Earlier to affect his positive test despite having\" Covid\" symptoms.  Patient is very vague about his symptoms and upon pressing states that he got \"little bit of everything\".  He was having fatigue weakness nausea shortness of breath decreased appetite chills.  He eventually reached a point that he decided that he needed to seek help.  In the emergency room patient was found to have elevated procalcitonin and lactic acid of 2.1.  Chest x-ray showed mild bilateral opacities concerning for underlying COVID-19 he was noted to have room air oxygen saturation of 93%.  Blood cultures were drawn and patient was very appropriately empirically started on dexamethasone and antibiotic treatment.  His CRP and procalcitonin and lactic acid level were elevated      Past Medical History:  Past Medical History:   Diagnosis Date   • CAD (coronary artery disease)    • COVID-19    • Diabetes mellitus (CMS/HCC)    • Hyperlipidemia    • Hypertension    • Renal injury    • Unstable angina (CMS/HCC)    • Ventricular ectopy    • VPC (ventricular premature complex)      Past Surgical History:  Past Surgical History:   Procedure Laterality Date   • CARDIAC CATHETERIZATION     • CARDIAC CATHETERIZATION N/A 8/16/2017    Procedure: Coronary angiography;  Surgeon: Rell Deutsch MD;  Location: [facility] INVASIVE LOCATION;  Service:    • CARDIAC CATHETERIZATION N/A 8/16/2017    Procedure: Stent QUIANA coronary;  Surgeon: Rell Deutsch MD;  Location: [facility] INVASIVE " LOCATION;  Service:    • CARDIAC CATHETERIZATION N/A 8/16/2017    Procedure: Left Heart Cath;  Surgeon: Rell Deutsch MD;  Location:  [facility] CATH INVASIVE LOCATION;  Service:    • CARDIAC CATHETERIZATION N/A 8/16/2017    Procedure: Left ventriculography;  Surgeon: Rell Deutsch MD;  Location:  [facility] C[facility] INVASIVE LOCATION;  Service:    • CARDIAC CATHETERIZATION N/A 7/25/2019    Procedure: Left Heart Cath;  Surgeon: Rell Deutsch MD;  Location:  [facility] C[facility] INVAS[facility] LOC[facility];  Service: Cardiology   • CARDIAC CATHETERIZATION N/A 7/25/2019    Procedure: Stent QUIANA coronary;  Surgeon: Rell Deutsch MD;  Location:  [facility] C[facility]VAS[facility] LOC[facility];  Service: Cardiology   • CARDIAC CATHETERIZATION N/A 7/25/2019    Procedure: Left ventriculography;  Surgeon: Rell Deutsch MD;  Location:  [facility] C[facility]VAS[facility] LOC[facility];  Service: Cardiology   • CARDIAC CATHETERIZATION N/A 7/25/2019    Procedure: Coronary angiography;  Surgeon: Rell Deutsch MD;  [facility];  Service: Cardiology   • CORONARY ANGIOPLASTY WITH STENT PLACEMENT     • KIDNEY STONE SURGERY     • PARATHYROIDECTOMY        Home Meds:  Medications Prior to Admission   Medication Sig Dispense Refill Last Dose   • clopidogrel (PLAVIX) 75 MG tablet TAKE (1) TABLET DAILY 90 tablet 2 3/14/2021 at Unknown time   • Dermatological Products, Misc. (NEOSALUS) cream topical cream Apply 1 application topically to the appropriate area as directed As Needed (skin irritation). 180 g 11 3/14/2021 at Unknown time   • fexofenadine (ALLEGRA) 180 MG tablet Take 180 mg by mouth daily.   3/14/2021 at Unknown time   • finasteride (PROSCAR) 5 MG tablet Take 5 mg by mouth Daily.   3/14/2021 at Unknown time   • flunisolide (NASALIDE) 25 MCG/ACT (0.025%) solution nasal spray Inhale 2 sprays every 12 (twelve) hours.   3/14/2021 at Unknown time   • irbesartan (AVAPRO) 300 MG tablet Take 1 tablet by mouth Every Night. 90 tablet 3 3/14/2021 at Unknown time   •  metFORMIN (GLUCOPHAGE) 500 MG tablet Take 2 tablets by mouth 2 (Two) Times a Day With Meals.   3/14/2021 at Unknown time   • metoprolol tartrate (LOPRESSOR) 50 MG tablet TAKE (1) TABLET TWICE A  tablet 1 3/14/2021 at Unknown time   • tamsulosin (FLOMAX) 0.4 MG capsule 24 hr capsule Take 1 capsule by mouth every night.   3/14/2021   • testosterone (ANDROGEL) 25 MG/2.5GM (1%) gel gel Place 25 mg on the skin Daily.   3/14/2021 at Unknown time   • TRESIBA FLEXTOUCH 200 UNIT/ML solution pen-injector Inject 60 Units as directed Daily.   3/14/2021 at Unknown time   • Alirocumab 75 MG/ML solution auto-injector Inject 1 pen under the skin into the appropriate area as directed Every 14 (Fourteen) Days. 2 pen 11 Unknown at Unknown time   • nitroglycerin (NITROSTAT) 0.4 MG SL tablet DISSOLVE 1 TABLET UNDER THE TONGUE EVERY 5 MINUTES UP TO 3 DOSES AS NEEDED FOR CHEST PAIN 25 tablet 0 Unknown at Unknown time     Current Meds:     Current Facility-Administered Medications:   •  dexamethasone sodium phosphate injection 6 mg, 6 mg, Intravenous, BID, Kevin Hayes MD  •  dextrose (D50W) 25 g/ 50mL Intravenous Solution 25 g, 25 g, Intravenous, Q15 Min PRN, Kevin Hayes MD  •  dextrose (GLUTOSE) oral gel 15 g, 15 g, Oral, Q15 Min PRN, Kevin Hayes MD  •  enoxaparin (LOVENOX) syringe 40 mg, 40 mg, Subcutaneous, Q24H, Kevin Hayes MD  •  glucagon (human recombinant) (GLUCAGEN DIAGNOSTIC) injection 1 mg, 1 mg, Subcutaneous, Q15 Min PRN, Kevin Hayes MD  •  [START ON 3/16/2021] insulin lispro (ADMELOG) injection 0-7 Units, 0-7 Units, Subcutaneous, TID AC, Kevin Hayes MD  Allergies:  Allergies   Allergen Reactions   • Oxycodone-Acetaminophen Itching   • Percocet [Oxycodone-Acetaminophen]    • Shellfish-Derived Products Hives and Itching   • Statins    • Adhesive Tape Rash   • Latex Rash     Social History:   Social History     Tobacco Use   • Smoking status: Former Smoker     Packs/day: 1.00     Years: 25.00     Pack years:  "25.00     Types: Cigarettes   • Smokeless tobacco: Never Used   • Tobacco comment: NO CAFFEINE USE   Substance Use Topics   • Alcohol use: Yes     Alcohol/week: 1.0 standard drinks     Types: 1 Shots of liquor per week     Comment: 2 drinks a year      Family History:  Family History   Problem Relation Age of Onset   • Diabetes Mother    • Alzheimer's disease Father    • Kidney disease Father    • Diabetes Father    • No Known Problems Maternal Grandmother    • No Known Problems Maternal Grandfather    • No Known Problems Paternal Grandmother    • Heart attack Paternal Grandfather    • Heart disease Paternal Grandfather    • Diabetes Paternal Grandfather           Review of Systems  See history of present illness and past medical history.   Constitutional: Remarkable fever chills fatigue and weakness  Cardiovascular: Remarkable for shortness of breath but no chest pain  Respiratory: Remarkable for mild cough congestion dyspnea on exertion and fatigue  GI: Remarkable for nausea and decreased appetite  : Remarkable for no burning urination frequency or urgency  Musculoskeletal: Remarkable for no new joint aches and pains  Neurological: Remarkable for no loss of consciousness or continence.    Remainder of ROS is negative.      Vitals:   /66 (BP Location: Right arm, Patient Position: Sitting)   Pulse 67   Temp 98.2 °F (36.8 °C) (Oral)   Resp 18   Ht 182.9 cm (72\")   Wt 96.1 kg (211 lb 13.8 oz)   SpO2 94%   BMI 28.73 kg/m²   I/O: No intake or output data in the 24 hours ending 03/15/21 1952  Exam:  Patient is examined using the personal protective equipment as per guidelines from infection control for this particular patient as enacted.  Hand washing was performed before and after patient interaction.  General Appearance:   Awake interactive ill-appearing male who does not appear to be in any acute distress   Head:    Normocephalic, without obvious abnormality, atraumatic   Eyes:    PERRL, " conjunctivae/corneas clear, EOM's intact, both eyes   Ears:    Normal external ear canals, both ears   Nose:   Nares normal, septum midline, mucosa normal, no drainage    or sinus tenderness   Throat:   Lips, tongue, gums normal; oral mucosa pink and moist   Neck:   Supple, symmetrical, trachea midline, no adenopathy;     thyroid:  no enlargement/tenderness/nodules; no carotid    bruit or JVD   Back:     Symmetric, no curvature, ROM normal, no CVA tenderness   Lungs:    Decreased breath sounds bilaterally   Chest Wall:    No tenderness or deformity    Heart:    Regular rate and rhythm, S1 and S2 normal, no murmur, rub   or gallop   Abdomen:     Soft, non-tender, bowel sounds active all four quadrants,     no masses, no hepatomegaly, no splenomegaly   Extremities:   Extremities normal, atraumatic, no cyanosis or edema   Pulses:   Pulses palpable in all extremities; symmetric all extremities   Skin:   Skin color normal, Skin is warm and dry,  no rashes or palpable lesions   Neurologic:  Alert and oriented and grossly nonfocal examination       Data Review:    I reviewed the patient's new clinical results.  Results from last 7 days   Lab Units 03/15/21  1447   WBC 10*3/mm3 8.17   HEMOGLOBIN g/dL 13.5   PLATELETS 10*3/mm3 208     Results from last 7 days   Lab Units 03/15/21  1447   SODIUM mmol/L 136   POTASSIUM mmol/L 4.3   CHLORIDE mmol/L 99   CO2 mmol/L 24.8   BUN mg/dL 19   CREATININE mg/dL 1.05   CALCIUM mg/dL 9.6   GLUCOSE mg/dL 238*     No radiology results for the last 30 days.  Microbiology Results (last 10 days)     Procedure Component Value - Date/Time    Respiratory Panel PCR w/COVID-19(SARS-CoV-2) [facility] In-House, NP Swab in [facility], 3-4 HR TAT - Swab, Nasopharynx [[id number]]  (Abnormal) Collected: 03/15/21 1547    Lab Status: Final result Specimen: Swab from Nasopharynx Updated: 03/15/21 2029     ADENOVIRUS, PCR Not Detected     Coronavirus 229E Not Detected     Coronavirus HKU1 Not  Detected     Coronavirus NL63 Not Detected     Coronavirus OC43 Not Detected     COVID19 Detected     Human Metapneumovirus Not Detected     Human Rhinovirus/Enterovirus Not Detected     Influenza A PCR Not Detected     Influenza B PCR Not Detected     Parainfluenza Virus 1 Not Detected     Parainfluenza Virus 2 Not Detected     Parainfluenza Virus 3 Not Detected     Parainfluenza Virus 4 Not Detected     RSV, PCR Not Detected     Bordetella pertussis pcr Not Detected     Bordetella parapertussis PCR Not Detected     Chlamydophila pneumoniae PCR Not Detected     Mycoplasma pneumo by PCR Not Detected    Narrative:      Fact sheet for providers: https://docs.[employer]/wp-content/uploads/WMQ9763-3931-JY1.1-EUA-Provider-Fact-Sheet-3.pdf    Fact sheet for patients: https://docs.[employer]/wp-content/uploads/NXP8747-1402-ZS4.1-EUA-Patient-Fact-Sheet-1.pdf    Test performed by PCR.            Assessment:  Active Hospital Problems    Diagnosis  POA   • Pneumonia due to COVID-19 virus [U07.1, J12.82]  Yes      Resolved Hospital Problems   No resolved problems to display.         Plan:  See above  Roberto Jensen MD   3/15/2021  19:52 EDT    Much of this encounter note is an electronic transcription/translation of spoken language to printed text. The electronic translation of spoken language may permit erroneous, or at times, nonsensical words or phrases to be inadvertently transcribed; Although I have reviewed the note for such errors, some may still exist

## 2021-03-15 NOTE — ED PROVIDER NOTES
MD ATTESTATION NOTE    The NACHO and I have discussed this patient's history, physical exam, and treatment plan.  I have reviewed the documentation and personally had a face to face interaction with the patient. I affirm the documentation and agree with the treatment and plan.  The attached note describes my personal findings.      Alex Geiger is a 70 y.o. male who presents to the ED c/o shortness of breath, aches, chills, fever, vomiting, and diarrhea.  He states that on 3/3/2021 he received his first dose of COVID-19 vaccination.  However, few days later he tested positive for COVID-19.  He is here today because of worsening symptoms.      On exam:  Bibasilar crackles  Satting 94% on 2 L of nasal cannula oxygen  Regular rate and rhythm    Labs  Recent Results (from the past 24 hour(s))   Hepatic Function Panel    Collection Time: 03/16/21 12:15 PM    Specimen: Blood   Result Value Ref Range    Total Protein 6.6 6.0 - 8.5 g/dL    Albumin 3.20 (L) 3.50 - 5.20 g/dL    ALT (SGPT) 14 1 - 41 U/L    AST (SGOT) 23 1 - 40 U/L    Alkaline Phosphatase 48 39 - 117 U/L    Total Bilirubin 0.4 0.0 - 1.2 mg/dL    Bilirubin, Direct <0.2 0.0 - 0.3 mg/dL    Bilirubin, Indirect     Creatinine, Serum    Collection Time: 03/16/21 12:15 PM    Specimen: Blood   Result Value Ref Range    Creatinine 0.96 0.76 - 1.27 mg/dL    eGFR Non African Amer 77 >60 mL/min/1.73   Protime-INR    Collection Time: 03/16/21 12:15 PM    Specimen: Blood   Result Value Ref Range    Protime 15.5 (H) 11.7 - 14.2 Seconds    INR 1.25 (H) 0.90 - 1.10   D-dimer, Quantitative    Collection Time: 03/16/21 12:15 PM    Specimen: Blood   Result Value Ref Range    D-Dimer, Quantitative 0.93 (H) 0.00 - 0.49 MCGFEU/mL   POC Glucose Once    Collection Time: 03/16/21  4:22 PM    Specimen: Blood   Result Value Ref Range    Glucose 244 (H) 70 - 130 mg/dL   POC Glucose Once    Collection Time: 03/16/21  8:20 PM    Specimen: Blood   Result Value Ref Range    Glucose 161 (H)  70 - 130 mg/dL   Comprehensive Metabolic Panel    Collection Time: 03/17/21  4:37 AM    Specimen: Arm, Right; Blood   Result Value Ref Range    Glucose 151 (H) 65 - 99 mg/dL    BUN 29 (H) 8 - 23 mg/dL    Creatinine 1.01 0.76 - 1.27 mg/dL    Sodium 140 136 - 145 mmol/L    Potassium 4.6 3.5 - 5.2 mmol/L    Chloride 106 98 - 107 mmol/L    CO2 24.1 22.0 - 29.0 mmol/L    Calcium 9.1 8.6 - 10.5 mg/dL    Total Protein 6.4 6.0 - 8.5 g/dL    Albumin 3.30 (L) 3.50 - 5.20 g/dL    ALT (SGPT) 17 1 - 41 U/L    AST (SGOT) 26 1 - 40 U/L    Alkaline Phosphatase 48 39 - 117 U/L    Total Bilirubin 0.4 0.0 - 1.2 mg/dL    eGFR Non African Amer 73 >60 mL/min/1.73    Globulin 3.1 gm/dL    A/G Ratio 1.1 g/dL    BUN/Creatinine Ratio 28.7 (H) 7.0 - 25.0    Anion Gap 9.9 5.0 - 15.0 mmol/L   BNP    Collection Time: 03/17/21  4:37 AM    Specimen: Arm, Right; Blood   Result Value Ref Range    proBNP 645.2 0.0 - 900.0 pg/mL   TSH    Collection Time: 03/17/21  4:37 AM    Specimen: Arm, Right; Blood   Result Value Ref Range    TSH 0.206 (L) 0.270 - 4.200 uIU/mL   Hemoglobin A1c    Collection Time: 03/17/21  4:37 AM    Specimen: Arm, Right; Blood   Result Value Ref Range    Hemoglobin A1C 7.50 (H) 4.80 - 5.60 %   C-reactive Protein    Collection Time: 03/17/21  4:37 AM    Specimen: Arm, Right; Blood   Result Value Ref Range    C-Reactive Protein 12.37 (H) 0.00 - 0.50 mg/dL   Ferritin    Collection Time: 03/17/21  4:37 AM    Specimen: Arm, Right; Blood   Result Value Ref Range    Ferritin 1,212.00 (H) 30.00 - 400.00 ng/mL   CBC Auto Differential    Collection Time: 03/17/21  4:37 AM    Specimen: Arm, Right; Blood   Result Value Ref Range    WBC 9.83 3.40 - 10.80 10*3/mm3    RBC 4.06 (L) 4.14 - 5.80 10*6/mm3    Hemoglobin 11.8 (L) 13.0 - 17.7 g/dL    Hematocrit 34.3 (L) 37.5 - 51.0 %    MCV 84.5 79.0 - 97.0 fL    MCH 29.1 26.6 - 33.0 pg    MCHC 34.4 31.5 - 35.7 g/dL    RDW 13.0 12.3 - 15.4 %    RDW-SD 40.0 37.0 - 54.0 fl    MPV 11.2 6.0 - 12.0 fL     Platelets 253 140 - 450 10*3/mm3    Neutrophil % 86.8 (H) 42.7 - 76.0 %    Lymphocyte % 6.0 (L) 19.6 - 45.3 %    Monocyte % 5.9 5.0 - 12.0 %    Eosinophil % 0.0 (L) 0.3 - 6.2 %    Basophil % 0.1 0.0 - 1.5 %    Immature Grans % 1.2 (H) 0.0 - 0.5 %    Neutrophils, Absolute 8.53 (H) 1.70 - 7.00 10*3/mm3    Lymphocytes, Absolute 0.59 (L) 0.70 - 3.10 10*3/mm3    Monocytes, Absolute 0.58 0.10 - 0.90 10*3/mm3    Eosinophils, Absolute 0.00 0.00 - 0.40 10*3/mm3    Basophils, Absolute 0.01 0.00 - 0.20 10*3/mm3    Immature Grans, Absolute 0.12 (H) 0.00 - 0.05 10*3/mm3    nRBC 0.0 0.0 - 0.2 /100 WBC   Bilirubin, Direct    Collection Time: 03/17/21  4:37 AM    Specimen: Arm, Right; Blood   Result Value Ref Range    Bilirubin, Direct <0.2 0.0 - 0.3 mg/dL   POC Glucose Once    Collection Time: 03/17/21  6:25 AM    Specimen: Blood   Result Value Ref Range    Glucose 163 (H) 70 - 130 mg/dL   POC Glucose Once    Collection Time: 03/17/21 11:08 AM    Specimen: Blood   Result Value Ref Range    Glucose 279 (H) 70 - 130 mg/dL       Radiology  No Radiology Exams Resulted Within Past 24 Hours      Medical Decision Making:  ED Course as of Mar 17 1201   Mon Mar 15, 2021   1524 I viewed chest x-ray on PACS.  My interpretation is mild bilateral peripheral opacities which could be early Covid pneumonia.  Given this and the fact the patient was a little bit hypoxic when he got here and has not improved and has diabetes I will admit him to the hospital.    [EW]   1530 I discussed admission with patient and he agrees to stay in hospital.  Discussed with TATO Curiel who agrees to admit patient to hospital.     [EW]   1558 Procalcitonin(!): 0.89 [EW]   1558 I will start some pneumonia antibiotics.    Lactate(!!): 2.1 [EW]      ED Course User Index  [EW] Allie Mehta APRN           PPE: Both the patient and I wore a surgical mask throughout the entire patient encounter. I wore protective goggles.     Diagnosis  Final diagnoses:    Pneumonia due to COVID-19 virus   Hypoxia        Tushar Smith II, MD  03/17/21 [id number]

## 2021-03-15 NOTE — ED TRIAGE NOTES
DX with COVID 1 week ago, here today for worseining body aches and fatigue, unable to eat    Mask placed on patient in triage. Triage staff wore appropriate PPE during interaction with patient.

## 2021-03-15 NOTE — ED PROVIDER NOTES
EMERGENCY DEPARTMENT ENCOUNTER    Room Number:  15/15  Date seen:  3/15/2021  Time seen: 14:10 EDT  PCP: Real Hudson MD  Historian: Patient, EMS    HPI:  Chief complaint: Worsening of Covid symptoms  A complete HPI/ROS/PMH/PSH/SH/FH are unobtainable due to: Applicable  Context:Alex Geiger is a 70 y.o. male who presents to the ED with c/o mild shortness of breath, moderate body aches and chills and just generally not getting better after Covid diagnosis 1 week ago.  He has been taking some Tylenol at home with minimal relief.  He is made worse by being up and about in the house.  He denies any documented fever, chest pain, chest pressure or chest tightness.  He has no urinary symptoms or abdominal pain.  He denies vomiting or diarrhea.  States he and 3 other coworkers at work all turned up Covid positive and he was actually formally diagnosed last Monday.  He does not have any underlying pulmonary disease that he knows of but he does have diabetes and history of coronary artery disease.    Patient was placed in face mask in first look. Patient was wearing facemask when I entered the room and throughout our encounter. I wore full protective equipment throughout this patient encounter including a N95 face mask, eye shield and gloves. Hand hygiene/washing of hands was performed before donning protective equipment and after removal when leaving the room.    MEDICAL RECORD REVIEW    ALLERGIES  Oxycodone-acetaminophen, Percocet [oxycodone-acetaminophen], Shellfish-derived products, Statins, Adhesive tape, and Latex    PAST MEDICAL HISTORY  Active Ambulatory Problems     Diagnosis Date Noted   • Coronary artery disease involving native coronary artery 08/09/2017   • Status post insertion of drug-eluting stent into right coronary artery for coronary artery disease 08/09/2017   • Diabetes (CMS/HCC) 08/09/2017   • Essential (primary) hypertension 08/09/2017   • HLD (hyperlipidemia) 08/09/2017   • Accelerating angina  (CMS/[facility]) 08/09/2017   • Ureteral stone 01/23/2015   • Ureteral stone with hydronephrosis 02/06/2015   • Premature complex, ventricular 08/09/2017   • History of coronary artery stent placement 08/09/2017   • Ischemic chest pain (CMS/[facility]) 08/09/2017   • Coronary artery disease involving native coronary artery of native heart without angina pectoris 07/24/2019     Resolved Ambulatory Problems     Diagnosis Date Noted   • No Resolved Ambulatory Problems     Past Medical History:   Diagnosis Date   • CAD (coronary artery disease)    • COVID-19    • Diabetes mellitus (CMS/[facility])    • Hyperlipidemia    • Hypertension    • Renal injury    • Unstable angina (CMS/[facility])    • Ventricular ectopy    • VPC (ventricular premature complex)        PAST SURGICAL HISTORY  Past Surgical History:   Procedure Laterality Date   • CARDIAC CATHETERIZATION     • CARDIAC CATHETERIZATION N/A 8/16/2017    Procedure: Coronary angiography;  Surgeon: Rell Deutsch MD;  Location: [facility] CATH INVASIVE LOCATION;  Service:    • CARDIAC CATHETERIZATION N/A 8/16/2017    Procedure: Stent QUIANA coronary;  Surgeon: Rell Deutsch MD;  Location: [facility] CATH INVASIVE LOCATION;  Service:    • CARDIAC CATHETERIZATION N/A 8/16/2017    Procedure: Left Heart Cath;  Surgeon: Rell Deutsch MD;  Location: [facility] CATH INVASIVE LOCATION;  Service:    • CARDIAC CATHETERIZATION N/A 8/16/2017    Procedure: Left ventriculography;  Surgeon: Rell Deutsch MD;  Location: [facility] CATH INVASIVE LOCATION;  Service:    • CARDIAC CATHETERIZATION N/A 7/25/2019    Procedure: Left Heart Cath;  Surgeon: Rell Deutsch MD;  Location: [facility] CATH INVASIVE LOCATION;  Service: Cardiology   • CARDIAC CATHETERIZATION N/A 7/25/2019    Procedure: Stent QUINAA coronary;  Surgeon: Rell Deutsch MD;  Location: [facility] CATH INVASIVE LOCATION;  Service: Cardiology   • CARDIAC CATHETERIZATION N/A 7/25/2019    Procedure: Left ventriculography;  Surgeon: Rell Deutsch MD;  Location: [facility]  CATH INVASIVE LOCATION;  Service: Cardiology   • CARDIAC CATHETERIZATION N/A 7/25/2019    Procedure: Coronary angiography;  Surgeon: Rell Deutsch MD;  Location: [facility] CATH INVASIVE LOCATION;  Service: Cardiology   • CORONARY ANGIOPLASTY WITH STENT PLACEMENT     • KIDNEY STONE SURGERY     • PARATHYROIDECTOMY         FAMILY HISTORY  Family History   Problem Relation Age of Onset   • Diabetes Mother    • Alzheimer's disease Father    • Kidney disease Father    • Diabetes Father    • No Known Problems Maternal Grandmother    • No Known Problems Maternal Grandfather    • No Known Problems Paternal Grandmother    • Heart attack Paternal Grandfather    • Heart disease Paternal Grandfather    • Diabetes Paternal Grandfather        SOCIAL HISTORY  Social History     Socioeconomic History   • Marital status:      Spouse name: Not on file   • Number of children: Not on file   • Years of education: Not on file   • Highest education level: Not on file   Tobacco Use   • Smoking status: Former Smoker     Packs/day: 1.00     Years: 25.00     Pack years: 25.00     Types: Cigarettes   • Smokeless tobacco: Never Used   • Tobacco comment: NO CAFFEINE USE   Vaping Use   • Vaping Use: Never used   Substance and Sexual Activity   • Alcohol use: Yes     Alcohol/week: 1.0 standard drinks     Types: 1 Shots of liquor per week     Comment: 2 drinks a year   • Drug use: No   • Sexual activity: Defer       REVIEW OF SYSTEMS  Review of Systems    All systems reviewed and negative except for those discussed in HPI.     PHYSICAL EXAM    ED Triage Vitals [03/15/21 1405]   Temp Heart Rate Resp BP SpO2   98.9 °F (37.2 °C) 100 18 177/78 93 %      Temp src Heart Rate Source Patient Position BP Location FiO2 (%)   Tympanic -- -- -- --     Physical Exam    I have reviewed the triage vital signs and nursing notes.      GENERAL: not distressed, appears ill but not toxic  HENT: nares patent  EYES: no scleral icterus  NECK: no ROM  limitations  CV: regular rhythm, regular rate, no murmur, no rubs and no gallops   RESPIRATORY: Mild increased work of breathing, breath sounds are clear to auscultate bilaterally patient is able to speak in full sentences  ABDOMEN: soft, rounded, no focal tenderness  : deferred  MUSCULOSKELETAL: no deformity  NEURO: alert, moves all extremities, follows commands  SKIN: warm, dry    LAB RESULTS  Recent Results (from the past 24 hour(s))   Procalcitonin    Collection Time: 03/15/21  2:47 PM    Specimen: Blood   Result Value Ref Range    Procalcitonin 0.89 (H) 0.00 - 0.25 ng/mL   Lactic Acid, Plasma    Collection Time: 03/15/21  2:47 PM    Specimen: Blood   Result Value Ref Range    Lactate 2.1 (C) 0.5 - 2.0 mmol/L   Comprehensive Metabolic Panel    Collection Time: 03/15/21  2:47 PM    Specimen: Blood   Result Value Ref Range    Glucose 238 (H) 65 - 99 mg/dL    BUN 19 8 - 23 mg/dL    Creatinine 1.05 0.76 - 1.27 mg/dL    Sodium 136 136 - 145 mmol/L    Potassium 4.3 3.5 - 5.2 mmol/L    Chloride 99 98 - 107 mmol/L    CO2 24.8 22.0 - 29.0 mmol/L    Calcium 9.6 8.6 - 10.5 mg/dL    Total Protein 7.2 6.0 - 8.5 g/dL    Albumin 3.60 3.50 - 5.20 g/dL    ALT (SGPT) 17 1 - 41 U/L    AST (SGOT) 28 1 - 40 U/L    Alkaline Phosphatase 57 39 - 117 U/L    Total Bilirubin 0.6 0.0 - 1.2 mg/dL    eGFR Non African Amer 70 >60 mL/min/1.73    Globulin 3.6 gm/dL    A/G Ratio 1.0 g/dL    BUN/Creatinine Ratio 18.1 7.0 - 25.0    Anion Gap 12.2 5.0 - 15.0 mmol/L   CBC Auto Differential    Collection Time: 03/15/21  2:47 PM    Specimen: Blood   Result Value Ref Range    WBC 8.17 3.40 - 10.80 10*3/mm3    RBC 4.54 4.14 - 5.80 10*6/mm3    Hemoglobin 13.5 13.0 - 17.7 g/dL    Hematocrit 39.6 37.5 - 51.0 %    MCV 87.2 79.0 - 97.0 fL    MCH 29.7 26.6 - 33.0 pg    MCHC 34.1 31.5 - 35.7 g/dL    RDW 13.0 12.3 - 15.4 %    RDW-SD 40.5 37.0 - 54.0 fl    MPV 10.9 6.0 - 12.0 fL    Platelets 208 140 - 450 10*3/mm3    Neutrophil % 86.7 (H) 42.7 - 76.0 %     Lymphocyte % 7.5 (L) 19.6 - 45.3 %    Monocyte % 5.3 5.0 - 12.0 %    Eosinophil % 0.0 (L) 0.3 - 6.2 %    Basophil % 0.1 0.0 - 1.5 %    Immature Grans % 0.4 0.0 - 0.5 %    Neutrophils, Absolute 7.09 (H) 1.70 - 7.00 10*3/mm3    Lymphocytes, Absolute 0.61 (L) 0.70 - 3.10 10*3/mm3    Monocytes, Absolute 0.43 0.10 - 0.90 10*3/mm3    Eosinophils, Absolute 0.00 0.00 - 0.40 10*3/mm3    Basophils, Absolute 0.01 0.00 - 0.20 10*3/mm3    Immature Grans, Absolute 0.03 0.00 - 0.05 10*3/mm3    nRBC 0.0 0.0 - 0.2 /100 WBC         RADIOLOGY RESULTS  XR Chest 1 View               PROGRESS, DATA ANALYSIS, CONSULTS AND MEDICAL DECISION MAKING  All labs have been independently reviewed by me.  All radiology studies have been reviewed by me and discussed with radiologist dictating the report.  EKG's independently viewed and interpreted by me unless stated otherwise. Discussion below represents my analysis of pertinent findings related to patient's condition, differential diagnosis, treatment plan and final disposition.     ED Course as of Mar 15 1602   Mon Mar 15, 2021   1524 I viewed chest x-ray on PACS.  My interpretation is mild bilateral peripheral opacities which could be early Covid pneumonia.  Given this and the fact the patient was a little bit hypoxic when he got here and has not improved and has diabetes I will admit him to the hospital.    [EW]   1530 I discussed admission with patient and he agrees to stay in hospital.  Discussed with TATO Curiel who agrees to admit patient to hospital.     [EW]   1558 Procalcitonin(!): 0.89 [EW]   1558 I will start some pneumonia antibiotics.    Lactate(!!): 2.1 [EW]      ED Course User Index  [EW] Allie Mehta, APRN     DDX: persistent covid symptoms, Differential diagnosis includes but is not limited to CHF, acute coronary syndrome, pulmonary embolism, pneumothorax, pneumonia, asthma/COPD, deconditioning, anemia, anxiety.     MDM:Pt does have evidence of some covid typo pneumonia  "noted on imaging as well as elevated procalcitonin and Lactic acid.  I did start some respiratory pneumonia antibiotics.  He also received a dose of dexamethasone.  His feeling of shortness of breath was eased with supplemental oxygen.  He did not appear toxic ever.     Reviewed pt's history and workup with Dr. Smith.  After a bedside evaluation, Dr. Smith agrees with the plan of care.    Based on the patient's lab findings and presenting symptoms, the doctor and I feel it is appropriate to admit the patient for further management, evaluation, and treatment.  I have discussed this with the admitting team.  I have also discussed this with the patient/family.  They are in agreement with admission.          Disposition vitals:  /72   Pulse 85   Temp 98.9 °F (37.2 °C) (Tympanic)   Resp 18   Ht 182.9 cm (72\")   Wt 99.8 kg (220 lb)   SpO2 93%   BMI 29.84 kg/m²       DIAGNOSIS  Final diagnoses:   Pneumonia due to COVID-19 virus   Hypoxia       Admission     Allie Mehta, APRN  03/15/21 1603    "

## 2021-03-16 LAB
ALBUMIN SERPL-MCNC: 3.2 G/DL (ref 3.5–5.2)
ALP SERPL-CCNC: 48 U/L (ref 39–117)
ALT SERPL W P-5'-P-CCNC: 14 U/L (ref 1–41)
ANION GAP SERPL CALCULATED.3IONS-SCNC: 11.3 MMOL/L (ref 5–15)
AST SERPL-CCNC: 23 U/L (ref 1–40)
BILIRUB CONJ SERPL-MCNC: <0.2 MG/DL (ref 0–0.3)
BILIRUB INDIRECT SERPL-MCNC: ABNORMAL MG/DL
BILIRUB SERPL-MCNC: 0.4 MG/DL (ref 0–1.2)
BUN SERPL-MCNC: 24 MG/DL (ref 8–23)
BUN/CREAT SERPL: 27.6 (ref 7–25)
CALCIUM SPEC-SCNC: 9 MG/DL (ref 8.6–10.5)
CHLORIDE SERPL-SCNC: 103 MMOL/L (ref 98–107)
CO2 SERPL-SCNC: 22.7 MMOL/L (ref 22–29)
CREAT SERPL-MCNC: 0.87 MG/DL (ref 0.76–1.27)
CREAT SERPL-MCNC: 0.96 MG/DL (ref 0.76–1.27)
D DIMER PPP FEU-MCNC: 0.93 MCGFEU/ML (ref 0–0.49)
DEPRECATED RDW RBC AUTO: 39.6 FL (ref 37–54)
ERYTHROCYTE [DISTWIDTH] IN BLOOD BY AUTOMATED COUNT: 13 % (ref 12.3–15.4)
GFR SERPL CREATININE-BSD FRML MDRD: 77 ML/MIN/1.73
GFR SERPL CREATININE-BSD FRML MDRD: 87 ML/MIN/1.73
GLUCOSE BLDC GLUCOMTR-MCNC: 161 MG/DL (ref 70–130)
GLUCOSE BLDC GLUCOMTR-MCNC: 244 MG/DL (ref 70–130)
GLUCOSE BLDC GLUCOMTR-MCNC: 256 MG/DL (ref 70–130)
GLUCOSE BLDC GLUCOMTR-MCNC: 294 MG/DL (ref 70–130)
GLUCOSE SERPL-MCNC: 236 MG/DL (ref 65–99)
HCT VFR BLD AUTO: 34.7 % (ref 37.5–51)
HGB BLD-MCNC: 12.3 G/DL (ref 13–17.7)
INR PPP: 1.25 (ref 0.9–1.1)
MCH RBC QN AUTO: 30.1 PG (ref 26.6–33)
MCHC RBC AUTO-ENTMCNC: 35.4 G/DL (ref 31.5–35.7)
MCV RBC AUTO: 85 FL (ref 79–97)
PLATELET # BLD AUTO: 216 10*3/MM3 (ref 140–450)
PMV BLD AUTO: 11.1 FL (ref 6–12)
POTASSIUM SERPL-SCNC: 4.6 MMOL/L (ref 3.5–5.2)
PROT SERPL-MCNC: 6.6 G/DL (ref 6–8.5)
PROTHROMBIN TIME: 15.5 SECONDS (ref 11.7–14.2)
RBC # BLD AUTO: 4.08 10*6/MM3 (ref 4.14–5.8)
SODIUM SERPL-SCNC: 137 MMOL/L (ref 136–145)
WBC # BLD AUTO: 10.37 10*3/MM3 (ref 3.4–10.8)

## 2021-03-16 PROCEDURE — 25010000002 ENOXAPARIN PER 10 MG: Performed by: INTERNAL MEDICINE

## 2021-03-16 PROCEDURE — 82962 GLUCOSE BLOOD TEST: CPT

## 2021-03-16 PROCEDURE — 94799 UNLISTED PULMONARY SVC/PX: CPT

## 2021-03-16 PROCEDURE — 82565 ASSAY OF CREATININE: CPT | Performed by: INTERNAL MEDICINE

## 2021-03-16 PROCEDURE — XW033E5 INTRODUCTION OF REMDESIVIR ANTI-INFECTIVE INTO PERIPHERAL VEIN, PERCUTANEOUS APPROACH, NEW TECHNOLOGY GROUP 5: ICD-10-PCS | Performed by: HOSPITALIST

## 2021-03-16 PROCEDURE — 85610 PROTHROMBIN TIME: CPT | Performed by: INTERNAL MEDICINE

## 2021-03-16 PROCEDURE — 25010000002 DEXAMETHASONE SODIUM PHOSPHATE 10 MG/ML SOLUTION: Performed by: HOSPITALIST

## 2021-03-16 PROCEDURE — 63710000001 INSULIN GLARGINE PER 5 UNITS: Performed by: HOSPITALIST

## 2021-03-16 PROCEDURE — 80048 BASIC METABOLIC PNL TOTAL CA: CPT | Performed by: HOSPITALIST

## 2021-03-16 PROCEDURE — 80076 HEPATIC FUNCTION PANEL: CPT | Performed by: INTERNAL MEDICINE

## 2021-03-16 PROCEDURE — 63710000001 INSULIN LISPRO (HUMAN) PER 5 UNITS: Performed by: HOSPITALIST

## 2021-03-16 PROCEDURE — 85379 FIBRIN DEGRADATION QUANT: CPT | Performed by: HOSPITALIST

## 2021-03-16 PROCEDURE — 85027 COMPLETE CBC AUTOMATED: CPT | Performed by: HOSPITALIST

## 2021-03-16 RX ORDER — FAMOTIDINE 20 MG/1
20 TABLET, FILM COATED ORAL 2 TIMES DAILY
Status: DISCONTINUED | OUTPATIENT
Start: 2021-03-16 | End: 2021-03-26 | Stop reason: HOSPADM

## 2021-03-16 RX ORDER — METOPROLOL TARTRATE 50 MG/1
50 TABLET, FILM COATED ORAL EVERY 12 HOURS SCHEDULED
Status: DISCONTINUED | OUTPATIENT
Start: 2021-03-16 | End: 2021-03-18

## 2021-03-16 RX ORDER — LOSARTAN POTASSIUM 100 MG/1
100 TABLET ORAL
Status: DISCONTINUED | OUTPATIENT
Start: 2021-03-16 | End: 2021-03-26 | Stop reason: HOSPADM

## 2021-03-16 RX ORDER — BUDESONIDE AND FORMOTEROL FUMARATE DIHYDRATE 160; 4.5 UG/1; UG/1
2 AEROSOL RESPIRATORY (INHALATION)
Status: DISCONTINUED | OUTPATIENT
Start: 2021-03-16 | End: 2021-03-17

## 2021-03-16 RX ORDER — ONDANSETRON 2 MG/ML
4 INJECTION INTRAMUSCULAR; INTRAVENOUS EVERY 6 HOURS PRN
Status: DISCONTINUED | OUTPATIENT
Start: 2021-03-16 | End: 2021-03-26 | Stop reason: HOSPADM

## 2021-03-16 RX ORDER — INSULIN LISPRO 100 [IU]/ML
10 INJECTION, SOLUTION INTRAVENOUS; SUBCUTANEOUS
Status: DISCONTINUED | OUTPATIENT
Start: 2021-03-16 | End: 2021-03-17

## 2021-03-16 RX ORDER — DEXAMETHASONE SODIUM PHOSPHATE 10 MG/ML
6 INJECTION, SOLUTION INTRAMUSCULAR; INTRAVENOUS ONCE
Status: DISCONTINUED | OUTPATIENT
Start: 2021-03-16 | End: 2021-03-16

## 2021-03-16 RX ORDER — INSULIN LISPRO 100 [IU]/ML
19 INJECTION, SOLUTION INTRAVENOUS; SUBCUTANEOUS
Status: DISCONTINUED | OUTPATIENT
Start: 2021-03-16 | End: 2021-03-16

## 2021-03-16 RX ORDER — INSULIN GLARGINE 100 [IU]/ML
48 INJECTION, SOLUTION SUBCUTANEOUS EVERY 24 HOURS
Status: DISCONTINUED | OUTPATIENT
Start: 2021-03-16 | End: 2021-03-17

## 2021-03-16 RX ORDER — ZINC SULFATE 50(220)MG
220 CAPSULE ORAL DAILY
Status: DISCONTINUED | OUTPATIENT
Start: 2021-03-16 | End: 2021-03-18

## 2021-03-16 RX ORDER — MELATONIN
1000 DAILY
Status: DISCONTINUED | OUTPATIENT
Start: 2021-03-16 | End: 2021-03-26 | Stop reason: HOSPADM

## 2021-03-16 RX ORDER — FLUCONAZOLE 200 MG/1
200 TABLET ORAL ONCE
Status: COMPLETED | OUTPATIENT
Start: 2021-03-16 | End: 2021-03-16

## 2021-03-16 RX ORDER — TAMSULOSIN HYDROCHLORIDE 0.4 MG/1
0.4 CAPSULE ORAL NIGHTLY
Status: DISCONTINUED | OUTPATIENT
Start: 2021-03-16 | End: 2021-03-26 | Stop reason: HOSPADM

## 2021-03-16 RX ORDER — DOXYCYCLINE 100 MG/1
100 CAPSULE ORAL EVERY 12 HOURS SCHEDULED
Status: DISCONTINUED | OUTPATIENT
Start: 2021-03-16 | End: 2021-03-17

## 2021-03-16 RX ORDER — FLUCONAZOLE 100 MG/1
100 TABLET ORAL EVERY 24 HOURS
Status: DISCONTINUED | OUTPATIENT
Start: 2021-03-17 | End: 2021-03-26 | Stop reason: HOSPADM

## 2021-03-16 RX ORDER — ASCORBIC ACID 500 MG
500 TABLET ORAL DAILY
Status: DISCONTINUED | OUTPATIENT
Start: 2021-03-16 | End: 2021-03-18

## 2021-03-16 RX ORDER — FLUTICASONE PROPIONATE 50 MCG
1 SPRAY, SUSPENSION (ML) NASAL DAILY
Status: DISCONTINUED | OUTPATIENT
Start: 2021-03-16 | End: 2021-03-16

## 2021-03-16 RX ORDER — ALBUTEROL SULFATE 2.5 MG/3ML
2.5 SOLUTION RESPIRATORY (INHALATION) EVERY 6 HOURS PRN
Status: DISCONTINUED | OUTPATIENT
Start: 2021-03-16 | End: 2021-03-19

## 2021-03-16 RX ORDER — CEFTRIAXONE SODIUM 1 G/50ML
1 INJECTION, SOLUTION INTRAVENOUS ONCE
Status: DISCONTINUED | OUTPATIENT
Start: 2021-03-16 | End: 2021-03-16

## 2021-03-16 RX ORDER — KETOROLAC TROMETHAMINE 15 MG/ML
10 INJECTION, SOLUTION INTRAMUSCULAR; INTRAVENOUS ONCE
Status: DISCONTINUED | OUTPATIENT
Start: 2021-03-16 | End: 2021-03-16

## 2021-03-16 RX ORDER — AZITHROMYCIN 250 MG/1
500 TABLET, FILM COATED ORAL ONCE
Status: DISCONTINUED | OUTPATIENT
Start: 2021-03-16 | End: 2021-03-16

## 2021-03-16 RX ORDER — CETIRIZINE HYDROCHLORIDE 10 MG/1
10 TABLET ORAL DAILY
Status: DISCONTINUED | OUTPATIENT
Start: 2021-03-16 | End: 2021-03-20

## 2021-03-16 RX ORDER — GUAIFENESIN 600 MG/1
600 TABLET, EXTENDED RELEASE ORAL EVERY 12 HOURS SCHEDULED
Status: DISCONTINUED | OUTPATIENT
Start: 2021-03-16 | End: 2021-03-20

## 2021-03-16 RX ORDER — CLOPIDOGREL BISULFATE 75 MG/1
75 TABLET ORAL DAILY
Status: DISCONTINUED | OUTPATIENT
Start: 2021-03-16 | End: 2021-03-26 | Stop reason: HOSPADM

## 2021-03-16 RX ORDER — FINASTERIDE 5 MG/1
5 TABLET, FILM COATED ORAL DAILY
Status: DISCONTINUED | OUTPATIENT
Start: 2021-03-16 | End: 2021-03-26 | Stop reason: HOSPADM

## 2021-03-16 RX ADMIN — INSULIN GLARGINE 48 UNITS: 100 INJECTION, SOLUTION SUBCUTANEOUS at 12:24

## 2021-03-16 RX ADMIN — INSULIN LISPRO 4 UNITS: 100 INJECTION, SOLUTION INTRAVENOUS; SUBCUTANEOUS at 08:40

## 2021-03-16 RX ADMIN — OXYCODONE HYDROCHLORIDE AND ACETAMINOPHEN 500 MG: 500 TABLET ORAL at 15:16

## 2021-03-16 RX ADMIN — TAMSULOSIN HYDROCHLORIDE 0.4 MG: 0.4 CAPSULE ORAL at 20:52

## 2021-03-16 RX ADMIN — METOPROLOL TARTRATE 50 MG: 50 TABLET, FILM COATED ORAL at 15:29

## 2021-03-16 RX ADMIN — CLOPIDOGREL 75 MG: 75 TABLET, FILM COATED ORAL at 15:16

## 2021-03-16 RX ADMIN — DEXAMETHASONE SODIUM PHOSPHATE 6 MG: 10 INJECTION, SOLUTION INTRAMUSCULAR; INTRAVENOUS at 20:52

## 2021-03-16 RX ADMIN — INSULIN LISPRO 10 UNITS: 100 INJECTION, SOLUTION INTRAVENOUS; SUBCUTANEOUS at 17:20

## 2021-03-16 RX ADMIN — LOSARTAN POTASSIUM 100 MG: 100 TABLET, FILM COATED ORAL at 15:15

## 2021-03-16 RX ADMIN — FINASTERIDE 5 MG: 5 TABLET, FILM COATED ORAL at 15:29

## 2021-03-16 RX ADMIN — DOXYCYCLINE 100 MG: 100 CAPSULE ORAL at 20:52

## 2021-03-16 RX ADMIN — INSULIN LISPRO 4 UNITS: 100 INJECTION, SOLUTION INTRAVENOUS; SUBCUTANEOUS at 12:24

## 2021-03-16 RX ADMIN — DEXAMETHASONE SODIUM PHOSPHATE 6 MG: 10 INJECTION, SOLUTION INTRAMUSCULAR; INTRAVENOUS at 08:40

## 2021-03-16 RX ADMIN — BUDESONIDE AND FORMOTEROL FUMARATE DIHYDRATE 2 PUFF: 160; 4.5 AEROSOL RESPIRATORY (INHALATION) at 20:56

## 2021-03-16 RX ADMIN — ENOXAPARIN SODIUM 40 MG: 40 INJECTION SUBCUTANEOUS at 20:53

## 2021-03-16 RX ADMIN — GUAIFENESIN 600 MG: 600 TABLET, EXTENDED RELEASE ORAL at 15:15

## 2021-03-16 RX ADMIN — Medication 1000 UNITS: at 15:15

## 2021-03-16 RX ADMIN — METOPROLOL TARTRATE 50 MG: 50 TABLET, FILM COATED ORAL at 21:02

## 2021-03-16 RX ADMIN — DOXYCYCLINE 100 MG: 100 CAPSULE ORAL at 12:24

## 2021-03-16 RX ADMIN — REMDESIVIR 200 MG: 100 INJECTION, POWDER, LYOPHILIZED, FOR SOLUTION INTRAVENOUS at 15:18

## 2021-03-16 RX ADMIN — GUAIFENESIN 600 MG: 600 TABLET, EXTENDED RELEASE ORAL at 20:53

## 2021-03-16 RX ADMIN — ZINC SULFATE 220 MG (50 MG) CAPSULE 220 MG: CAPSULE at 15:29

## 2021-03-16 RX ADMIN — FLUCONAZOLE 200 MG: 200 TABLET ORAL at 20:53

## 2021-03-16 RX ADMIN — FAMOTIDINE 20 MG: 20 TABLET, FILM COATED ORAL at 20:52

## 2021-03-16 RX ADMIN — INSULIN LISPRO 2 UNITS: 100 INJECTION, SOLUTION INTRAVENOUS; SUBCUTANEOUS at 20:53

## 2021-03-16 RX ADMIN — INSULIN LISPRO 3 UNITS: 100 INJECTION, SOLUTION INTRAVENOUS; SUBCUTANEOUS at 17:20

## 2021-03-16 RX ADMIN — CETIRIZINE HYDROCHLORIDE ALLERGY 10 MG: 10 TABLET ORAL at 15:16

## 2021-03-16 NOTE — CONSULTS
Patient Care Team:  Real Hudson MD as PCP - General      Subjective     Patient is a 70 y.o. male.  Asked to see regarding respiratory failure.  This gentleman was admitted yesterday after presenting to the emergency department with shortness of breath body aches and chills not recovering from Covid he was diagnosed on 3 9/21.  He has a number of chronic medical problems principal of which include coronary artery disease with prior drug-eluting stent to the RCA type 2 diabetes, hypertension hyperlipidemia has a 25-pack-year smoking history.  When he presented yesterday his oxygen saturation 93% on room air but they have continued to fall and he is had to be titrated to 7 L high flow nasal cannula now to keep his saturations above 89%.  At presentation his procalcitonin was minimally elevated 0.89 with a normal creatinine his D-dimer was minimally elevated 0.93 he had a mildly elevated lactate of 2.1 which improved to 1.3.  Infectious disease saw him yesterday and he is on remdesivir, Decadron 6 mg twice daily and doxycycline.  Patient pretty much confirms the above he had 3 other coworkers that were infected as well.  He has had some muscle aches chills fevers and started getting short of breath he is not had a whole lot of cough and no sputum no chest pain or pressure no lower extremity pain or swelling.  He has no history of blood clots or bleeding problems no ulcers.  He is not aware of any lung disease while he did smoke 40-pack-year history he quit 25 years ago or more.  Has never been told he has lung problems he does not get short of breath with normal activities.  His biggest complaint is his mouth is very sore it hurts to swallow.  He says he thinks he has thrush he says he has had it once or twice before he says when he takes antibiotics or something it just comes up.    Review of Systems:    Headaches or visual changes recently no palpitations chest pain no polyuria polydipsia heat or cold  intolerance no real arthritis to speak.  No dysuria hematuria urgency or frequency recently no change in bowel habits no melena hematochezia no bad heartburn or indigestion.  He is not claustrophobic he thinks he could wear a mask well    History  Past Medical History:   Diagnosis Date   • CAD (coronary artery disease)    • COVID-19    • Diabetes mellitus (CMS/Formerly [facility])    • Hyperlipidemia    • Hypertension    • Renal injury    • Unstable angina (CMS/Formerly [facility])    • Ventricular ectopy    • VPC (ventricular premature complex)      Past Surgical History:   Procedure Laterality Date   • CARDIAC CATHETERIZATION     • CARDIAC CATHETERIZATION N/A 8/16/2017    Procedure: Coronary angiography;  Surgeon: Rell Deutsch MD;  [facility];  Service:    • CARDIAC CATHETERIZATION N/A 8/16/2017    Procedure: Stent QUIANA coronary;  Surgeon: Rell Deutsch MD;  [facility];  Service:    • CARDIAC CATHETERIZATION N/A 8/16/2017    Procedure: Left Heart Cath;  Surgeon: Rell Deutsch MD;  Location: [facility] CATH INVASIVE LOCATION;  Service:    • CARDIAC CATHETERIZATION N/A 8/16/2017    Procedure: Left ventriculography;  Surgeon: Rell Deutsch MD;  [facility];  Service:    • CARDIAC CATHETERIZATION N/A 7/25/2019    Procedure: Left Heart Cath;  Surgeon: Rell Deutsch MD;  [facility];  Service: Cardiology   • CARDIAC CATHETERIZATION N/A 7/25/2019    Procedure: Stent QUIANA coronary;  Surgeon: Rell Deutsch MD;  Location: [facility] CATH INVASIVE LOCATION;  Service: Cardiology   • CARDIAC CATHETERIZATION N/A 7/25/2019    Procedure: Left ventriculography;  Surgeon: Rell Deutsch MD;  Location: [facility] CATH INVASIVE LOCATION;  Service: Cardiology   • CARDIAC CATHETERIZATION N/A 7/25/2019    Procedure: Coronary angiography;  Surgeon: Rell Deutsch MD;  Location: [facility] CATH INVASIVE LOCATION;  Service: Cardiology   • CORONARY  ANGIOPLASTY WITH STENT PLACEMENT     • KIDNEY STONE SURGERY     • PARATHYROIDECTOMY       Social History     Socioeconomic History   • Marital status:      Spouse name: Not on file   • Number of children: Not on file   • Years of education: Not on file   • Highest education level: Not on file   Tobacco Use   • Smoking status: Former Smoker     Packs/day: 1.00     Years: 25.00     Pack years: 25.00     Types: Cigarettes   • Smokeless tobacco: Never Used   • Tobacco comment: NO CAFFEINE USE   Vaping Use   • Vaping Use: Never used   Substance and Sexual Activity   • Alcohol use: Yes     Alcohol/week: 1.0 standard drinks     Types: 1 Shots of liquor per week     Comment: 2 drinks a year   • Drug use: No   • Sexual activity: Defer     Family History   Problem Relation Age of Onset   • Diabetes Mother    • Alzheimer's disease Father    • Kidney disease Father    • Diabetes Father    • No Known Problems Maternal Grandmother    • No Known Problems Maternal Grandfather    • No Known Problems Paternal Grandmother    • Heart attack Paternal Grandfather    • Heart disease Paternal Grandfather    • Diabetes Paternal Grandfather          Allergies:  Oxycodone-acetaminophen, Percocet [oxycodone-acetaminophen], Shellfish-derived products, Statins, Adhesive tape, and Latex    Medications:  Prior to Admission medications    Medication Sig Start Date End Date Taking? Authorizing Provider   clopidogrel (PLAVIX) 75 MG tablet TAKE (1) TABLET DAILY 7/16/20  Yes Rell Deutsch MD   Dermatological Products, Misc. (NEOSALUS) cream topical cream Apply 1 application topically to the appropriate area as directed As Needed (skin irritation). 1/26/20  Yes Indio Coronado Jr., MD   fexofenadine (ALLEGRA) 180 MG tablet Take 180 mg by mouth daily.   Yes ProviderAdrian MD   finasteride (PROSCAR) 5 MG tablet Take 5 mg by mouth Daily.   Yes Adrian Willis MD   flunisolide (NASALIDE) 25 MCG/ACT (0.025%) solution nasal spray Inhale 2  sprays every 12 (twelve) hours.   Yes Adrian Willis MD   irbesartan (AVAPRO) 300 MG tablet Take 1 tablet by mouth Every Night. 9/25/18  Yes Rell Deutsch MD   metFORMIN (GLUCOPHAGE) 500 MG tablet Take 2 tablets by mouth 2 (Two) Times a Day With Meals. 8/18/17  Yes Rell Deutsch MD   metoprolol tartrate (LOPRESSOR) 50 MG tablet TAKE (1) TABLET TWICE A DAY 10/15/20  Yes Sigrid Ng APRN   tamsulosin (FLOMAX) 0.4 MG capsule 24 hr capsule Take 1 capsule by mouth every night.   Yes Adrian Willis MD   testosterone (ANDROGEL) 25 MG/2.5GM (1%) gel gel Place 25 mg on the skin Daily.   Yes Adrian Willis MD   TRESIBA FLEXTOUCH 200 UNIT/ML solution pen-injector Inject 60 Units as directed Daily. 7/5/19  Yes Adrian Willis MD   Alirocumab 75 MG/ML solution auto-injector Inject 1 pen under the skin into the appropriate area as directed Every 14 (Fourteen) Days. 8/31/20   Sigrid Ng APRN   nitroglycerin (NITROSTAT) 0.4 MG SL tablet DISSOLVE 1 TABLET UNDER THE TONGUE EVERY 5 MINUTES UP TO 3 DOSES AS NEEDED FOR CHEST PAIN 4/20/20   Rell Deutsch MD     vitamin C, 500 mg, Oral, Daily  budesonide-formoterol, 2 puff, Inhalation, BID - RT  cetirizine, 10 mg, Oral, Daily  cholecalciferol, 1,000 Units, Oral, Daily  clopidogrel, 75 mg, Oral, Daily  dexamethasone, 6 mg, Intravenous, BID  doxycycline, 100 mg, Oral, Q12H  enoxaparin, 40 mg, Subcutaneous, Q24H  famotidine, 20 mg, Oral, BID  finasteride, 5 mg, Oral, Daily  guaiFENesin, 600 mg, Oral, Q12H  insulin glargine, 48 Units, Subcutaneous, Q24H  insulin lispro, 0-7 Units, Subcutaneous, 4x Daily With Meals & Nightly  insulin lispro, 10 Units, Subcutaneous, TID With Meals  losartan, 100 mg, Oral, Q24H  metoprolol tartrate, 50 mg, Oral, Q12H  [START ON 3/17/2021] remdesivir, 100 mg, Intravenous, Q24H  tamsulosin, 0.4 mg, Oral, Nightly  zinc sulfate, 220 mg, Oral, Daily      Pharmacy Consult - Remdesivir,         Objective     Vital  "Signs  Vital Sign Min/Max for last 24 hours  Temp  Min: 97.3 °F (36.3 °C)  Max: 99.7 °F (37.6 °C)   BP  Min: 135/69  Max: 162/74   Pulse  Min: 63  Max: 75   Resp  Min: 14  Max: 20   SpO2  Min: 89 %  Max: 93 %   Flow (L/min)  Min: 2  Max: 7   No data recorded       Intake/Output Summary (Last 24 hours) at 3/16/2021 1857  Last data filed at 3/16/2021 1843  Gross per 24 hour   Intake 360 ml   Output 700 ml   Net -340 ml     I/O this shift:  In: 360 [P.O.:360]  Out: 700 [Urine:700]  Last Weight and Admission Weight        03/15/21  1847   Weight: 96.1 kg (211 lb 13.8 oz)     Flowsheet Rows      First Filed Value   Admission Height  182.9 cm (72\") Documented at 03/15/2021 1447   Admission Weight  99.8 kg (220 lb) Documented at 03/15/2021 1447          Body mass index is 28.73 kg/m².           Physical Exam:  General Appearance: Well-developed older white male he is resting in bed on 7 L nasal cannula O2 oxygen saturation 91 to 93% he does not look in acute distress  Eyes: Conjunctiva are clear and anicteric pupils are equal  ENT: Oral mucous membranes are little dry he has white patches with erythematous base all over his soft palate and uvula tongue has some coating the side of his tongue has coating his buccal mucosa has some white patches even with erythematous base exam looks like thrush.  Nasal septum midline  Neck: No adenopathy trachea midline no visible jugular venous distention  Lungs: He has crackles in the right mid and lower lung field the left is clear no dullness on percussion chest expansion is symmetric and he is not using accessory muscles at rest  Cardiac: Regular rate rhythm no murmur no gallop  Abdomen: Soft nontender no palpable hepatosplenomegaly or masses  : Not examined  Musculoskeletal: Grossly normal there is no calf tenderness or palpable cords  Skin: No jaundice no petechiae skin is warm and dry  Neuro: He is alert oriented pleasant cooperative following commands moving all extremities " grossly intact  Extremities/P Vascular: No clubbing no cyanosis no edema palpable radial and dorsalis pedis pulses bilaterally  MSE: He can tell he is really scared      Labs:  Results from last 7 days   Lab Units 03/16/21  1215 03/16/21  0811 03/15/21  1447   GLUCOSE mg/dL  --  236* 238*   SODIUM mmol/L  --  137 136   POTASSIUM mmol/L  --  4.6 4.3   CO2 mmol/L  --  22.7 24.8   CHLORIDE mmol/L  --  103 99   ANION GAP mmol/L  --  11.3 12.2   CREATININE mg/dL 0.96 0.87 1.05   BUN mg/dL  --  24* 19   BUN / CREAT RATIO   --  27.6* 18.1   CALCIUM mg/dL  --  9.0 9.6   EGFR IF NONAFRICN AM mL/min/1.73 77 87 70   ALK PHOS U/L 48  --  57   TOTAL PROTEIN g/dL 6.6  --  7.2   ALT (SGPT) U/L 14  --  17   AST (SGOT) U/L 23  --  28   BILIRUBIN mg/dL 0.4  --  0.6   ALBUMIN g/dL 3.20*  --  3.60   GLOBULIN gm/dL  --   --  3.6     Estimated Creatinine Clearance: 86.1 mL/min (by C-G formula based on SCr of 0.96 mg/dL).      Results from last 7 days   Lab Units 03/16/21  0811 03/15/21  1447   WBC 10*3/mm3 10.37 8.17   RBC 10*6/mm3 4.08* 4.54   HEMOGLOBIN g/dL 12.3* 13.5   HEMATOCRIT % 34.7* 39.6   MCV fL 85.0 87.2   MCH pg 30.1 29.7   MCHC g/dL 35.4 34.1   RDW % 13.0 13.0   RDW-SD fl 39.6 40.5   MPV fL 11.1 10.9   PLATELETS 10*3/mm3 216 208   NEUTROPHIL % %  --  86.7*   LYMPHOCYTE % %  --  7.5*   MONOCYTES % %  --  5.3   EOSINOPHIL % %  --  0.0*   BASOPHIL % %  --  0.1   IMM GRAN % %  --  0.4   NEUTROS ABS 10*3/mm3  --  7.09*   LYMPHS ABS 10*3/mm3  --  0.61*   MONOS ABS 10*3/mm3  --  0.43   EOS ABS 10*3/mm3  --  0.00   BASOS ABS 10*3/mm3  --  0.01   IMMATURE GRANS (ABS) 10*3/mm3  --  0.03   NRBC /100 WBC  --  0.0                     Results from last 7 days   Lab Units 03/15/21  1903 03/15/21  1447   LACTATE mmol/L 1.3 2.1*   PROCALCITONIN ng/mL  --  0.89*     Results from last 7 days   Lab Units 03/16/21  1215   INR  1.25*     Microbiology Results (last 10 days)     Procedure Component Value - Date/Time    Respiratory Panel PCR  w/COVID-19(SARS-CoV-2) [facility] In-House, NP Swab in UTM/VTM, 3-4 HR TAT - Swab, Nasopharynx [[id number]]  (Abnormal) Collected: 03/15/21 1547    Lab Status: Final result Specimen: Swab from Nasopharynx Updated: 03/15/21 0110     ADENOVIRUS, PCR Not Detected     Coronavirus 229E Not Detected     Coronavirus HKU1 Not Detected     Coronavirus NL63 Not Detected     Coronavirus OC43 Not Detected     COVID19 Detected     Human Metapneumovirus Not Detected     Human Rhinovirus/Enterovirus Not Detected     Influenza A PCR Not Detected     Influenza B PCR Not Detected     Parainfluenza Virus 1 Not Detected     Parainfluenza Virus 2 Not Detected     Parainfluenza Virus 3 Not Detected     Parainfluenza Virus 4 Not Detected     RSV, PCR Not Detected     Bordetella pertussis pcr Not Detected     Bordetella parapertussis PCR Not Detected     Chlamydophila pneumoniae PCR Not Detected     Mycoplasma pneumo by PCR Not Detected    Narrative:      Fact sheet for providers: https://docs.[employer]/wp-content/uploads/NXZ6827-2838-ZN4.1-EUA-Provider-Fact-Sheet-3.pdf    Fact sheet for patients: https://docs.[employer]/wp-content/uploads/ENY7851-7553-VT0.1-EUA-Patient-Fact-Sheet-1.pdf    Test performed by PCR.    Blood Culture - Blood, Arm, Left [[id number]] Collected: 03/15/21 1447    Lab Status: Preliminary result Specimen: Blood from Arm, Left Updated: 03/16/21 1500     Blood Culture No growth at 24 hours    Blood Culture - Blood, Arm, Right [[id number]] Collected: 03/15/21 1447    Lab Status: Preliminary result Specimen: Blood from Arm, Right Updated: 03/16/21 1500     Blood Culture No growth at 24 hours            Diagnostics:  XR Chest 1 View    Result Date: 3/15/2021  ONE VIEW PORTABLE CHEST  HISTORY: Shortness of breath and cough. Possible Covid 19 pneumonia.  FINDINGS: The lungs are moderately expanded with some vague haziness extending into the periphery of the left lung and at the right base highly  suspicious for early changes of Covid 19 pneumonia and continued follow-up evaluation is recommended. The heart is slightly enlarged.  This report was finalized on 3/15/2021 4:20 PM by Dr. Malcom Perez M.D.      Results for orders placed in visit on 04/08/14    SCANNED - ECHOCARDIOGRAM      Chest x-ray reviewed and there are hazy infiltrates in the left lung more prominent laterally and there is a fairly dense area of consolidation in the right base.    Assessment/Plan     1. COVID-19 infection and pneumonia he is on remdesivir and steroids question Actemra we will ask to get a CRP and would want infectious disease to weigh in on this.  2. Probable secondary bacterial pneumonia I agree with infectious disease that right base is more dense than most at least earlier Covid infections we see.  I would not be surprised if that is not the superimposed bacterial infiltrate.  Will defer antibiotics to infectious disease.  3. Acute hypoxemic respiratory failure he is worsening he is really on appropriate therapies at this point in time its support.  I think it is likely the pneumonia is he does have a minimally elevated D-dimer we will have to watch this but certainly his infiltrates are enough to explain his hypoxia.  Might consider increasing to high-dose DVT prophylaxis milligrams Lovenox every 12 hours.  The literature is all over the place on this but certainly a hypercoagulable state with his D-dimer minimally elevated I think he is at higher risk.  4. Thrush he really has quite a case he says he has this in the past several times he is on antibiotics and high-dose steroids and he is going to be on these for a while I think given the severity and his treatment probably go to treat him aggressively with Diflucan right off the start.  5. Diabetes mellitus type 2 recommend fairly tight glucose control trying to keep his blood sugars under 180 steroids are exacerbating  6. Coronary artery  disease  7. Hypertension  8. Hyperlipidemia      Antonio Serrano MD  03/16/21  18:57 EDT    Time:

## 2021-03-16 NOTE — PLAN OF CARE
Goal Outcome Evaluation:  Plan of Care Reviewed With: patient     Outcome Summary: Patient transfered from the ED tonight with positive Covid test, increased SOA, patient states that he had the 1st Covid vaccine on the 3/3/21, became increasingly weak and SOA over the last few days, also states that he has had no apetite for the last 2weeks.  Problem: Adult Inpatient Plan of Care  Goal: Absence of Hospital-Acquired Illness or Injury  Intervention: Prevent Skin Injury  Recent Flowsheet Documentation  Taken 3/15/2021 2121 by Sue Sher, RN  Body Position: position changed independently     Problem: Adult Inpatient Plan of Care  Goal: Readiness for Transition of Care  Intervention: Mutually Develop Transition Plan  Recent Flowsheet Documentation  Taken 3/15/2021 2309 by Sue Sher, RN  Equipment Currently Used at Home: none     Problem: Fall Injury Risk  Goal: Absence of Fall and Fall-Related Injury  Outcome: Ongoing, Not Progressing

## 2021-03-16 NOTE — PROGRESS NOTES
[facility]  Clinical Pharmacy Department     Remdesivir Review Note  Alex Geiger is a 70 y.o. male with confirmed COVID-19 infection on day 1 of hospitalization.     Consulting Provider:  Dr. Jensen  Date of Confirmed SARS-CoV-2: 3/15  Date of Symptom Onset: Presented to ED 3/15 with fatigue, weakness, nausea, shortness of breath, chills, and decreased appetite  Planned Duration of Therapy: 5 days  Other Antimicrobials: Azithromycin and Ceftriaxone x1 on 3/15, Doxycycline 100mg po q12h on order  Hydroxychloroquine or chloroquine prior to arrival: no    Allergies  Allergies as of 03/15/2021 - Reviewed 03/15/2021   Allergen Reaction Noted    Oxycodone-acetaminophen Itching 11/11/2014    Percocet [oxycodone-acetaminophen]  08/12/2016    Shellfish-derived products Hives and Itching 01/23/2015    Statins  08/15/2016    Adhesive tape Rash 01/23/2015    Latex Rash 02/04/2016     Microbiology:  Microbiology Results (last 10 days)       Procedure Component Value - Date/Time    Respiratory Panel PCR w/COVID-19(SARS-CoV-2) [facility] In-House, NP Swab in UTM/VTM, 3-4 HR TAT - Swab, Nasopharynx [[id number]]  (Abnormal) Collected: 03/15/21 1547    Lab Status: Final result Specimen: Swab from Nasopharynx Updated: 03/15/21 1740     ADENOVIRUS, PCR Not Detected     Coronavirus 229E Not Detected     Coronavirus HKU1 Not Detected     Coronavirus NL63 Not Detected     Coronavirus OC43 Not Detected     COVID19 Detected     Human Metapneumovirus Not Detected     Human Rhinovirus/Enterovirus Not Detected     Influenza A PCR Not Detected     Influenza B PCR Not Detected     Parainfluenza Virus 1 Not Detected     Parainfluenza Virus 2 Not Detected     Parainfluenza Virus 3 Not Detected     Parainfluenza Virus 4 Not Detected     RSV, PCR Not Detected     Bordetella pertussis pcr Not Detected     Bordetella parapertussis PCR Not Detected     Chlamydophila pneumoniae PCR Not Detected     Mycoplasma pneumo by PCR Not  Detected    Narrative:      Fact sheet for providers: https://docs.[employer]/wp-content/uploads/WXJ2094-0471-LO9.1-EUA-Provider-Fact-Sheet-3.pdf    Fact sheet for patients: https://docs.[employer]/wp-content/uploads/QGZ5885-4782-YQ8.1-EUA-Patient-Fact-Sheet-1.pdf    Test performed by PCR.          Radiology/Imaging:  3/15 CXR:  FINDINGS: The lungs are moderately expanded with some vague haziness   extending into the periphery of the left lung and at the right base   highly suspicious for early changes of Covid 19 pneumonia and continued   follow-up evaluation is recommended. The heart is slightly enlarged.     Vitals/Labs/I&O  Vital Signs (last 2 days)    Date/Time  Temp  Pulse  Resp  BP  Device (Oxygen Therapy)  Flow (L/min)  SpO2   03/16/21 0929  --  --  --  --  --  6  --   03/16/21 0806  99.7 (37.6)  75  14  141/71  --  --  89Abnormal    03/16/21 0303  97.3 (36.3)  63  16  144/68  nasal cannula  3  --   03/15/21 2318  98.1 (36.7)  70  18  135/69  nasal cannula  2  93   03/15/21 2121  --  --  --  --  nasal cannula  3  --   03/15/21 2100  --  68  20  162/74  nasal cannula  3  93   03/15/21 1847  98.2 (36.8)  --  18  135/66  nasal cannula  2  --   03/15/21 1800  --  67  --  149/73  --  --  94   03/15/21 1730  --  64  --  142/71  --  --  94   03/15/21 1700  --  71  --  146/72  --  --  94   03/15/21 1630  --  73  --  141/72  --  --  94   03/15/21 1600  --  80  --  145/72  --  --  93   03/15/21 1545  --  85  --  --  --  --  93   03/15/21 1530  --  92  --  137/72  --  --  93   03/15/21 1521  --  102  18  134/67  --  --  93   03/15/21 1405  98.9 (37.2)  100  18  177/78  room air  --  93     Results from last 7 days   Lab Units 03/16/21  0811 03/15/21  1447   WBC 10*3/mm3 10.37 8.17     Results from last 7 days   Lab Units 03/15/21  1447   PROCALCITONIN ng/mL 0.89*     Results from last 7 days   Lab Units 03/15/21  1447   AST (SGOT) U/L 28      Results from last 7 days   Lab Units 03/15/21  1447   ALT (SGPT) U/L 17    3/16 PT/INR and Hepatic Function Panel on order    1/1/2015 [facility] at [facility] had AST 23 and ALT 31    Estimated Creatinine Clearance: 95 mL/min (by C-G formula based on SCr of 0.87 mg/dL).  Results from last 7 days   Lab Units 03/16/21  0811 03/15/21  1447   BUN mg/dL 24* 19   CREATININE mg/dL 0.87 1.05     Intake & Output (last 3 days)       None          Assessment/Plan:  Patient is hospitalized with confirmed, severe COVID-19 infection and started on remdesivir 200 mg IV once followed by 100 mg IV daily for 4 days (5 day total duration). All inclusions, exclusions, and monitoring requirements listed below have been reviewed.    Patient is hospitalized with confirmed COVID-19 infection  Patient is requiring supplemental oxygen to maintain oxygen saturations of ? 94%   Baseline and daily LFTs and SCr have been ordered prior to remdesivir initiation  ALT is not ? 10 times the upper limit of normal per 3/15 afternoon's labs  Patient is not on concomitant hydroxychloroquine or chloroquine       Thank you for involving pharmacy in this patient's care. Please contact pharmacy with any questions or concerns.                           Marco Rasheed, PharmD  Clinical Pharmacist  03/16/21 11:50 EDT

## 2021-03-16 NOTE — PLAN OF CARE
Goal Outcome Evaluation:  Plan of Care Reviewed With: patient     No c/o pain or n/v this shift. Patient o2 increased from 6l hi flow to 7 liter high flow, patient staying between 88-92%. Encouraged TCDB throughout shift. Patient up ad milan. Tolerating FR well, enjoys ice chips. VSS

## 2021-03-16 NOTE — PROGRESS NOTES
"  Infectious Diseases Progress Note    Roberto Jensen MD     [facility]  [place]: 0 days  Patient Identification:  Name: Alex Geiger  Age: 70 y.o.  Sex: male  :  1950  MRN: [id number]         Primary Care Physician: Real Hudson MD            Subjective: Feeling somewhat better but required more oxygen support last night and currently on 6 L nasal cannula.  Interval History: See consultation note.    Objective:    Scheduled Meds:dexamethasone, 6 mg, Intravenous, BID  enoxaparin, 40 mg, Subcutaneous, Q24H  insulin glargine, 48 Units, Subcutaneous, Q24H  insulin lispro, 0-7 Units, Subcutaneous, 4x Daily With Meals & Nightly  remdesivir, 200 mg, Intravenous, Q24H   Followed by  [START ON 3/17/2021] remdesivir, 100 mg, Intravenous, Q24H      Continuous Infusions:Pharmacy Consult - Remdesivir,         Vital signs in last 24 hours:  Temp:  [97.3 °F (36.3 °C)-99.7 °F (37.6 °C)] 99.7 °F (37.6 °C)  Heart Rate:  [] 75  Resp:  [14-20] 14  BP: (134-177)/(66-78) 141/71    Intake/Output:  No intake or output data in the 24 hours ending 21 1148    Exam:  /71 (BP Location: Right arm, Patient Position: Lying)   Pulse 75   Temp 99.7 °F (37.6 °C) (Oral)   Resp 14   Ht 182.9 cm (72\")   Wt 96.1 kg (211 lb 13.8 oz)   SpO2 (!) 89%   BMI 28.73 kg/m²   Patient is examined using the personal protective equipment as per guidelines from infection control for this particular patient as enacted.  Hand washing was performed before and after patient interaction.  General Appearance:    Alert, cooperative, no distress, AAOx3                          Head:    Normocephalic, without obvious abnormality, atraumatic                           Eyes:    PERRL, conjunctivae/corneas clear, EOM's intact, both eyes                         Throat:   Lips, tongue, gums normal; oral mucosa pink and moist                           Neck:   Supple, symmetrical, trachea midline, no JVD                         Lungs:    " Clear to auscultation bilaterally, respirations unlabored                 Chest Wall:    No tenderness or deformity                          Heart:    Regular rate and rhythm, S1 and S2 normal, no murmur, no rub                                         or gallop                  Abdomen:     Soft, non-tender, bowel sounds active, no masses, no                                                        organomegaly                  Extremities:   Extremities normal, atraumatic, no cyanosis or edema                        Pulses:   Pulses palpable in all extremities                            Skin:   Skin is warm and dry,  no rashes or palpable lesions                  Neurologic:   CNII-XII intact, motor strength grossly intact, sensation grossly                                         intact to light touch, no focal deficits noted       Data Review:    I reviewed the patient's new clinical results.  Results from last 7 days   Lab Units 03/16/21  0811 03/15/21  1447   WBC 10*3/mm3 10.37 8.17   HEMOGLOBIN g/dL 12.3* 13.5   PLATELETS 10*3/mm3 216 208     Results from last 7 days   Lab Units 03/16/21  0811 03/15/21  1447   SODIUM mmol/L 137 136   POTASSIUM mmol/L 4.6 4.3   CHLORIDE mmol/L 103 99   CO2 mmol/L 22.7 24.8   BUN mg/dL 24* 19   CREATININE mg/dL 0.87 1.05   CALCIUM mg/dL 9.0 9.6   GLUCOSE mg/dL 236* 238*       Microbiology Results (last 10 days)     Procedure Component Value - Date/Time    Respiratory Panel PCR w/COVID-19(SARS-CoV-2) LA/ANAT/AMIRA/PAD/COR/MAD/VIOLETA In-House, NP Swab in UTM/VTM, 3-4 HR TAT - Swab, Nasopharynx [[id number]]  (Abnormal) Collected: 03/15/21 3707    Lab Status: Final result Specimen: Swab from Nasopharynx Updated: 03/15/21 3444     ADENOVIRUS, PCR Not Detected     Coronavirus 229E Not Detected     Coronavirus HKU1 Not Detected     Coronavirus NL63 Not Detected     Coronavirus OC43 Not Detected     COVID19 Detected     Human Metapneumovirus Not Detected     Human Rhinovirus/Enterovirus Not  Detected     Influenza A PCR Not Detected     Influenza B PCR Not Detected     Parainfluenza Virus 1 Not Detected     Parainfluenza Virus 2 Not Detected     Parainfluenza Virus 3 Not Detected     Parainfluenza Virus 4 Not Detected     RSV, PCR Not Detected     Bordetella pertussis pcr Not Detected     Bordetella parapertussis PCR Not Detected     Chlamydophila pneumoniae PCR Not Detected     Mycoplasma pneumo by PCR Not Detected    Narrative:      Fact sheet for providers: https://docs.eVoter/wp-content/uploads/WCZ0663-1445-TU9.1-EUA-Provider-Fact-Sheet-3.pdf    Fact sheet for patients: https://docs.eVoter/wp-content/uploads/ICK1128-3930-SQ2.1-EUA-Patient-Fact-Sheet-1.pdf    Test performed by PCR.          Assessment:    Pneumonia due to COVID-19 virus  1-systemic COVID-19 infection with  2-COVID-19 pneumonia with possible evolving hypoxia and possible  3-superimposed bacterial pneumonia  4-diabetes mellitus  5-hypertension  6-other diagnosis per primary team.     Recommendations/Discussions:  · Continue with dexamethasone and given his oxygen requirement at remdesivir.  · Continue doxycycline for 5 to 7 days for concomitant bacterial pneumonia given the elevated lactic acid level and procalcitonin level that he had at the time of presentation.  · Management of underlying diabetes hypertension and other medical issues per primary team.  Roberto Jensen MD  3/16/2021  11:48 EDT    Much of this encounter note is an electronic transcription/translation of spoken language to printed text. The electronic translation of spoken language may permit erroneous, or at times, nonsensical words or phrases to be inadvertently transcribed; Although I have reviewed the note for such errors, some may still exist

## 2021-03-16 NOTE — NURSING NOTE
Sat 85 on 2l nc pt went to [facility] 10 min ago. Enc deep breathing sat only in to 86. o2 inc to 4l sat inc to 87 o2 inc to 5l remains 87-88 %. O2 changed to 6l high flow sat 91%

## 2021-03-17 LAB
ALBUMIN SERPL-MCNC: 3.3 G/DL (ref 3.5–5.2)
ALBUMIN/GLOB SERPL: 1.1 G/DL
ALP SERPL-CCNC: 48 U/L (ref 39–117)
ALT SERPL W P-5'-P-CCNC: 17 U/L (ref 1–41)
ANION GAP SERPL CALCULATED.3IONS-SCNC: 9.9 MMOL/L (ref 5–15)
AST SERPL-CCNC: 26 U/L (ref 1–40)
BASOPHILS # BLD AUTO: 0.01 10*3/MM3 (ref 0–0.2)
BASOPHILS NFR BLD AUTO: 0.1 % (ref 0–1.5)
BILIRUB CONJ SERPL-MCNC: <0.2 MG/DL (ref 0–0.3)
BILIRUB SERPL-MCNC: 0.4 MG/DL (ref 0–1.2)
BUN SERPL-MCNC: 29 MG/DL (ref 8–23)
BUN/CREAT SERPL: 28.7 (ref 7–25)
CALCIUM SPEC-SCNC: 9.1 MG/DL (ref 8.6–10.5)
CHLORIDE SERPL-SCNC: 106 MMOL/L (ref 98–107)
CO2 SERPL-SCNC: 24.1 MMOL/L (ref 22–29)
CREAT SERPL-MCNC: 1.01 MG/DL (ref 0.76–1.27)
CRP SERPL-MCNC: 12.37 MG/DL (ref 0–0.5)
DEPRECATED RDW RBC AUTO: 40 FL (ref 37–54)
EOSINOPHIL # BLD AUTO: 0 10*3/MM3 (ref 0–0.4)
EOSINOPHIL NFR BLD AUTO: 0 % (ref 0.3–6.2)
ERYTHROCYTE [DISTWIDTH] IN BLOOD BY AUTOMATED COUNT: 13 % (ref 12.3–15.4)
FERRITIN SERPL-MCNC: 1212 NG/ML (ref 30–400)
GFR SERPL CREATININE-BSD FRML MDRD: 73 ML/MIN/1.73
GLOBULIN UR ELPH-MCNC: 3.1 GM/DL
GLUCOSE BLDC GLUCOMTR-MCNC: 163 MG/DL (ref 70–130)
GLUCOSE BLDC GLUCOMTR-MCNC: 224 MG/DL (ref 70–130)
GLUCOSE BLDC GLUCOMTR-MCNC: 279 MG/DL (ref 70–130)
GLUCOSE BLDC GLUCOMTR-MCNC: 322 MG/DL (ref 70–130)
GLUCOSE SERPL-MCNC: 151 MG/DL (ref 65–99)
HBA1C MFR BLD: 7.5 % (ref 4.8–5.6)
HCT VFR BLD AUTO: 34.3 % (ref 37.5–51)
HGB BLD-MCNC: 11.8 G/DL (ref 13–17.7)
IMM GRANULOCYTES # BLD AUTO: 0.12 10*3/MM3 (ref 0–0.05)
IMM GRANULOCYTES NFR BLD AUTO: 1.2 % (ref 0–0.5)
LYMPHOCYTES # BLD AUTO: 0.59 10*3/MM3 (ref 0.7–3.1)
LYMPHOCYTES NFR BLD AUTO: 6 % (ref 19.6–45.3)
MCH RBC QN AUTO: 29.1 PG (ref 26.6–33)
MCHC RBC AUTO-ENTMCNC: 34.4 G/DL (ref 31.5–35.7)
MCV RBC AUTO: 84.5 FL (ref 79–97)
MONOCYTES # BLD AUTO: 0.58 10*3/MM3 (ref 0.1–0.9)
MONOCYTES NFR BLD AUTO: 5.9 % (ref 5–12)
NEUTROPHILS NFR BLD AUTO: 8.53 10*3/MM3 (ref 1.7–7)
NEUTROPHILS NFR BLD AUTO: 86.8 % (ref 42.7–76)
NRBC BLD AUTO-RTO: 0 /100 WBC (ref 0–0.2)
NT-PROBNP SERPL-MCNC: 645.2 PG/ML (ref 0–900)
PLATELET # BLD AUTO: 253 10*3/MM3 (ref 140–450)
PMV BLD AUTO: 11.2 FL (ref 6–12)
POTASSIUM SERPL-SCNC: 4.6 MMOL/L (ref 3.5–5.2)
PROT SERPL-MCNC: 6.4 G/DL (ref 6–8.5)
RBC # BLD AUTO: 4.06 10*6/MM3 (ref 4.14–5.8)
SODIUM SERPL-SCNC: 140 MMOL/L (ref 136–145)
T4 FREE SERPL-MCNC: 1.24 NG/DL (ref 0.93–1.7)
TSH SERPL DL<=0.05 MIU/L-ACNC: 0.21 UIU/ML (ref 0.27–4.2)
WBC # BLD AUTO: 9.83 10*3/MM3 (ref 3.4–10.8)

## 2021-03-17 PROCEDURE — 82728 ASSAY OF FERRITIN: CPT | Performed by: HOSPITALIST

## 2021-03-17 PROCEDURE — 85025 COMPLETE CBC W/AUTO DIFF WBC: CPT | Performed by: HOSPITALIST

## 2021-03-17 PROCEDURE — 25010000002 TOCILIZUMAB 200 MG/10ML SOLUTION 10 ML VIAL: Performed by: INTERNAL MEDICINE

## 2021-03-17 PROCEDURE — 84443 ASSAY THYROID STIM HORMONE: CPT | Performed by: HOSPITALIST

## 2021-03-17 PROCEDURE — 84439 ASSAY OF FREE THYROXINE: CPT | Performed by: HOSPITALIST

## 2021-03-17 PROCEDURE — 94799 UNLISTED PULMONARY SVC/PX: CPT

## 2021-03-17 PROCEDURE — 80053 COMPREHEN METABOLIC PANEL: CPT | Performed by: HOSPITALIST

## 2021-03-17 PROCEDURE — 25010000002 TOCILIZUMAB 400 MG/20ML SOLUTION 20 ML VIAL: Performed by: INTERNAL MEDICINE

## 2021-03-17 PROCEDURE — 82962 GLUCOSE BLOOD TEST: CPT

## 2021-03-17 PROCEDURE — 25010000002 TOCILIZUMAB 80 MG/4ML SOLUTION 4 ML VIAL: Performed by: INTERNAL MEDICINE

## 2021-03-17 PROCEDURE — XW033H5 INTRODUCTION OF TOCILIZUMAB INTO PERIPHERAL VEIN, PERCUTANEOUS APPROACH, NEW TECHNOLOGY GROUP 5: ICD-10-PCS | Performed by: HOSPITALIST

## 2021-03-17 PROCEDURE — 25010000002 ENOXAPARIN PER 10 MG: Performed by: INTERNAL MEDICINE

## 2021-03-17 PROCEDURE — 83880 ASSAY OF NATRIURETIC PEPTIDE: CPT | Performed by: HOSPITALIST

## 2021-03-17 PROCEDURE — 82248 BILIRUBIN DIRECT: CPT | Performed by: INTERNAL MEDICINE

## 2021-03-17 PROCEDURE — 25010000002 DEXAMETHASONE SODIUM PHOSPHATE 10 MG/ML SOLUTION: Performed by: HOSPITALIST

## 2021-03-17 PROCEDURE — 63710000001 INSULIN LISPRO (HUMAN) PER 5 UNITS: Performed by: HOSPITALIST

## 2021-03-17 PROCEDURE — 86140 C-REACTIVE PROTEIN: CPT | Performed by: HOSPITALIST

## 2021-03-17 PROCEDURE — 63710000001 INSULIN GLARGINE PER 5 UNITS: Performed by: HOSPITALIST

## 2021-03-17 PROCEDURE — 83036 HEMOGLOBIN GLYCOSYLATED A1C: CPT | Performed by: HOSPITALIST

## 2021-03-17 RX ORDER — INSULIN LISPRO 100 [IU]/ML
12 INJECTION, SOLUTION INTRAVENOUS; SUBCUTANEOUS
Status: DISCONTINUED | OUTPATIENT
Start: 2021-03-17 | End: 2021-03-18

## 2021-03-17 RX ORDER — INSULIN GLARGINE 100 [IU]/ML
55 INJECTION, SOLUTION SUBCUTANEOUS EVERY 24 HOURS
Status: DISCONTINUED | OUTPATIENT
Start: 2021-03-18 | End: 2021-03-19

## 2021-03-17 RX ADMIN — ENOXAPARIN SODIUM 40 MG: 40 INJECTION SUBCUTANEOUS at 08:54

## 2021-03-17 RX ADMIN — FLUCONAZOLE 100 MG: 100 TABLET ORAL at 08:55

## 2021-03-17 RX ADMIN — INSULIN LISPRO 10 UNITS: 100 INJECTION, SOLUTION INTRAVENOUS; SUBCUTANEOUS at 08:55

## 2021-03-17 RX ADMIN — CLOPIDOGREL 75 MG: 75 TABLET, FILM COATED ORAL at 08:55

## 2021-03-17 RX ADMIN — METOPROLOL TARTRATE 50 MG: 50 TABLET, FILM COATED ORAL at 11:46

## 2021-03-17 RX ADMIN — Medication 1000 UNITS: at 08:55

## 2021-03-17 RX ADMIN — ENOXAPARIN SODIUM 40 MG: 40 INJECTION SUBCUTANEOUS at 20:35

## 2021-03-17 RX ADMIN — BUDESONIDE AND FORMOTEROL FUMARATE DIHYDRATE 2 PUFF: 160; 4.5 AEROSOL RESPIRATORY (INHALATION) at 08:02

## 2021-03-17 RX ADMIN — ZINC SULFATE 220 MG (50 MG) CAPSULE 220 MG: CAPSULE at 08:54

## 2021-03-17 RX ADMIN — INSULIN LISPRO 12 UNITS: 100 INJECTION, SOLUTION INTRAVENOUS; SUBCUTANEOUS at 17:02

## 2021-03-17 RX ADMIN — OXYCODONE HYDROCHLORIDE AND ACETAMINOPHEN 500 MG: 500 TABLET ORAL at 08:55

## 2021-03-17 RX ADMIN — DEXAMETHASONE SODIUM PHOSPHATE 6 MG: 10 INJECTION, SOLUTION INTRAMUSCULAR; INTRAVENOUS at 20:37

## 2021-03-17 RX ADMIN — INSULIN LISPRO 10 UNITS: 100 INJECTION, SOLUTION INTRAVENOUS; SUBCUTANEOUS at 11:53

## 2021-03-17 RX ADMIN — METOPROLOL TARTRATE 50 MG: 50 TABLET, FILM COATED ORAL at 20:36

## 2021-03-17 RX ADMIN — INSULIN LISPRO 5 UNITS: 100 INJECTION, SOLUTION INTRAVENOUS; SUBCUTANEOUS at 20:36

## 2021-03-17 RX ADMIN — GUAIFENESIN 600 MG: 600 TABLET, EXTENDED RELEASE ORAL at 08:55

## 2021-03-17 RX ADMIN — TAMSULOSIN HYDROCHLORIDE 0.4 MG: 0.4 CAPSULE ORAL at 20:35

## 2021-03-17 RX ADMIN — INSULIN GLARGINE 48 UNITS: 100 INJECTION, SOLUTION SUBCUTANEOUS at 11:55

## 2021-03-17 RX ADMIN — CETIRIZINE HYDROCHLORIDE ALLERGY 10 MG: 10 TABLET ORAL at 08:55

## 2021-03-17 RX ADMIN — REMDESIVIR 100 MG: 100 INJECTION, POWDER, LYOPHILIZED, FOR SOLUTION INTRAVENOUS at 13:49

## 2021-03-17 RX ADMIN — LOSARTAN POTASSIUM 100 MG: 100 TABLET, FILM COATED ORAL at 08:55

## 2021-03-17 RX ADMIN — INSULIN LISPRO 3 UNITS: 100 INJECTION, SOLUTION INTRAVENOUS; SUBCUTANEOUS at 17:03

## 2021-03-17 RX ADMIN — DOXYCYCLINE 100 MG: 100 CAPSULE ORAL at 08:55

## 2021-03-17 RX ADMIN — INSULIN LISPRO 4 UNITS: 100 INJECTION, SOLUTION INTRAVENOUS; SUBCUTANEOUS at 11:54

## 2021-03-17 RX ADMIN — FINASTERIDE 5 MG: 5 TABLET, FILM COATED ORAL at 08:55

## 2021-03-17 RX ADMIN — DEXAMETHASONE SODIUM PHOSPHATE 6 MG: 10 INJECTION, SOLUTION INTRAMUSCULAR; INTRAVENOUS at 08:54

## 2021-03-17 RX ADMIN — FAMOTIDINE 20 MG: 20 TABLET, FILM COATED ORAL at 08:55

## 2021-03-17 RX ADMIN — GUAIFENESIN 600 MG: 600 TABLET, EXTENDED RELEASE ORAL at 20:35

## 2021-03-17 RX ADMIN — FAMOTIDINE 20 MG: 20 TABLET, FILM COATED ORAL at 20:35

## 2021-03-17 RX ADMIN — TOCILIZUMAB 760 MG: 20 INJECTION, SOLUTION, CONCENTRATE INTRAVENOUS at 11:46

## 2021-03-17 NOTE — PROGRESS NOTES
Discharge Planning Assessment  [facility]     Patient Name: Alex Geiger  MRN: [id number]  Today's Date: 3/17/2021    Admit Date: 3/15/2021    Discharge Needs Assessment     Row Name 03/17/21 2353       Living Environment    Lives With  spouse    Current Living Arrangements  home/apartment/condo    Quality of Family Relationships  involved       Resource/Environmental Concerns    Resource/Environmental Concerns  none       Transition Planning    Patient/Family Anticipates Transition to  home with family    Transportation Anticipated  family or friend will provide       Discharge Needs Assessment    Readmission Within the Last 30 Days  no previous admission in last 30 days    Equipment Currently Used at Home  none    Concerns to be Addressed  adjustment to diagnosis/illness    Equipment Needed After Discharge  other (see comments) TBD, possible O2 needs at dc        Discharge Plan     Row Name 03/17/21 4420       Plan    Plan Comments  Face sheet reviewed. Pt lives w/ his spouse, he is normally IADL and does not use any DME. PCP is Dr. Hudson and preferred pharmacy is [email or web address] on [address]. Current O2 requirements are OptiFlow with 60 L and 85% FiO2. DC needs are TBD, CCP will follow progress.    Row Name 03/17/21 7232       Plan    Plan  tbd        Continued Care and Services - Admitted Since 3/15/2021    Coordination has not been started for this encounter.         Demographic Summary    No documentation.       Functional Status     Row Name 03/17/21 6665       Functional Status    Usual Activity Tolerance  good       Functional Status, IADL    Medications  independent    Meal Preparation  independent    Housekeeping  independent    Laundry  independent    Shopping  independent       Mental Status Summary    Recent Changes in Mental Status/Cognitive Functioning  unable to assess        Psychosocial    No documentation.       Abuse/Neglect    No documentation.       Legal    No documentation.       Substance  Abuse    No documentation.       Patient Forms    No documentation.           Dominique Meeks RN

## 2021-03-17 NOTE — PROGRESS NOTES
"Daily progress note    Chief complaint  Doing same  No new complaints  Still with cough and shortness of breath  No more fever chills  Eating better    History of present illness  70-year-old white male with history of coronary artery disease hypertension hyperlipidemia and diabetes mellitus presented to [facility] emergency room with shortness of breath body aches fever chills and nonproductive cough for last 1 week.  Patient was diagnosed with Covid about a week ago.  Patient denies any chest pain abdominal pain nausea vomiting diarrhea.  Patient work-up in ER revealed COVID-19 pneumonia with acute hypoxic respiratory failure admit for management.    REVIEW OF SYSTEMS   All systems reviewed and negative except for those discussed in HPI.     PHYSICAL EXAM   Blood pressure 149/67, pulse 62, temperature 99 °F (37.2 °C), temperature source Oral, resp. rate 20, height 182.9 cm (72\"), weight 96.1 kg (211 lb 13.8 oz), SpO2 90 %.    GENERAL: not distressed, appears ill but not toxic   HENT: nares patent   EYES: no scleral icterus   NECK: no ROM limitations   CV: regular rhythm, regular rate, no murmur, no rubs and no gallops   RESPIRATORY: Mild increased work of breathing, breath sounds are clear to auscultate bilaterally patient is able to speak in full sentences   ABDOMEN: soft, rounded, no focal tenderness bowel sounds positive  MUSCULOSKELETAL: no deformity   NEURO: alert, moves all extremities, follows commands   SKIN: warm, dry     LAB RESULTS   Lab Results (last 24 hours)     Procedure Component Value Units Date/Time    POC Glucose Once [[id number]]  (Abnormal) Collected: 03/17/21 1108    Specimen: Blood Updated: 03/17/21 1110     Glucose 279 mg/dL     Hemoglobin A1c [[id number]]  (Abnormal) Collected: 03/17/21 0437    Specimen: Blood from Arm, Right Updated: 03/17/21 0642     Hemoglobin A1C 7.50 %     Narrative:      Hemoglobin A1C Ranges:    Increased Risk for Diabetes  5.7% to 6.4%  Diabetes            "          >= 6.5%  Diabetic Goal                < 7.0%    POC Glucose Once [[id number]]  (Abnormal) Collected: 03/17/21 0625    Specimen: Blood Updated: 03/17/21 0627     Glucose 163 mg/dL     TSH [[id number]]  (Abnormal) Collected: 03/17/21 0437    Specimen: Blood from Arm, Right Updated: 03/17/21 0625     TSH 0.206 uIU/mL     Ferritin [[id number]]  (Abnormal) Collected: 03/17/21 0437    Specimen: Blood from Arm, Right Updated: 03/17/21 0625     Ferritin 1,212.00 ng/mL     Narrative:      Results may be falsely decreased if patient taking Biotin.      BNP [[id number]]  (Normal) Collected: 03/17/21 0437    Specimen: Blood from Arm, Right Updated: 03/17/21 0625     proBNP 645.2 pg/mL     Narrative:      Among patients with dyspnea, NT-proBNP is highly sensitive for the detection of acute congestive heart failure. In addition NT-proBNP of <300 pg/ml effectively rules out acute congestive heart failure with 99% negative predictive value.    Results may be falsely decreased if patient taking Biotin.      Comprehensive Metabolic Panel [[id number]]  (Abnormal) Collected: 03/17/21 0437    Specimen: Blood from Arm, Right Updated: 03/17/21 0620     Glucose 151 mg/dL      BUN 29 mg/dL      Creatinine 1.01 mg/dL      Sodium 140 mmol/L      Potassium 4.6 mmol/L      Chloride 106 mmol/L      CO2 24.1 mmol/L      Calcium 9.1 mg/dL      Total Protein 6.4 g/dL      Albumin 3.30 g/dL      ALT (SGPT) 17 U/L      AST (SGOT) 26 U/L      Alkaline Phosphatase 48 U/L      Total Bilirubin 0.4 mg/dL      eGFR Non African Amer 73 mL/min/1.73      Globulin 3.1 gm/dL      A/G Ratio 1.1 g/dL      BUN/Creatinine Ratio 28.7     Anion Gap 9.9 mmol/L     Narrative:      GFR Normal >60  Chronic Kidney Disease <60  Kidney Failure <15      Bilirubin, Direct [[id number]]  (Normal) Collected: 03/17/21 0437    Specimen: Blood from Arm, Right Updated: 03/17/21 0620     Bilirubin, Direct <0.2 mg/dL     C-reactive Protein [[id number]]  (Abnormal) Collected:  03/17/21 0437    Specimen: Blood from Arm, Right Updated: 03/17/21 0620     C-Reactive Protein 12.37 mg/dL     CBC & Differential [[id number]]  (Abnormal) Collected: 03/17/21 0437    Specimen: Blood from Arm, Right Updated: 03/17/21 0552    Narrative:      The following orders were created for panel order CBC & Differential.  Procedure                               Abnormality         Status                     ---------                               -----------         ------                     CBC Auto Differential[[id number]]        Abnormal            Final result                 Please view results for these tests on the individual orders.    CBC Auto Differential [[id number]]  (Abnormal) Collected: 03/17/21 0437    Specimen: Blood from Arm, Right Updated: 03/17/21 0552     WBC 9.83 10*3/mm3      RBC 4.06 10*6/mm3      Hemoglobin 11.8 g/dL      Hematocrit 34.3 %      MCV 84.5 fL      MCH 29.1 pg      MCHC 34.4 g/dL      RDW 13.0 %      RDW-SD 40.0 fl      MPV 11.2 fL      Platelets 253 10*3/mm3      Neutrophil % 86.8 %      Lymphocyte % 6.0 %      Monocyte % 5.9 %      Eosinophil % 0.0 %      Basophil % 0.1 %      Immature Grans % 1.2 %      Neutrophils, Absolute 8.53 10*3/mm3      Lymphocytes, Absolute 0.59 10*3/mm3      Monocytes, Absolute 0.58 10*3/mm3      Eosinophils, Absolute 0.00 10*3/mm3      Basophils, Absolute 0.01 10*3/mm3      Immature Grans, Absolute 0.12 10*3/mm3      nRBC 0.0 /100 WBC     POC Glucose Once [[id number]]  (Abnormal) Collected: 03/16/21 2020    Specimen: Blood Updated: 03/16/21 2022     Glucose 161 mg/dL            Study Result    Narrative & Impression   ONE VIEW PORTABLE CHEST     HISTORY: Shortness of breath and cough. Possible Covid 19 pneumonia.     FINDINGS: The lungs are moderately expanded with some vague haziness  extending into the periphery of the left lung and at the right base  highly suspicious for early changes of Covid 19 pneumonia and continued  follow-up evaluation is  recommended. The heart is slightly enlarged.          Current Facility-Administered Medications:   •  albuterol (PROVENTIL) nebulizer solution 0.083% 2.5 mg/3mL, 2.5 mg, Nebulization, Q6H PRN, Kevin Hayes MD  •  ascorbic acid (VITAMIN C) tablet 500 mg, 500 mg, Oral, Daily, Kevin Hayes MD, 500 mg at 03/17/21 0855  •  cetirizine (zyrTEC) tablet 10 mg, 10 mg, Oral, Daily, Kevin Hayes MD, 10 mg at 03/17/21 0855  •  cholecalciferol (VITAMIN D3) tablet 1,000 Units, 1,000 Units, Oral, Daily, Kevin Hayes MD, 1,000 Units at 03/17/21 0855  •  clopidogrel (PLAVIX) tablet 75 mg, 75 mg, Oral, Daily, Kevin Hayes MD, 75 mg at 03/17/21 0855  •  dexamethasone sodium phosphate injection 6 mg, 6 mg, Intravenous, BID, Kevin Hayes MD, 6 mg at 03/17/21 0854  •  enoxaparin (LOVENOX) syringe 40 mg, 40 mg, Subcutaneous, Q12H, Antonio Serrano MD, 40 mg at 03/17/21 0854  •  famotidine (PEPCID) tablet 20 mg, 20 mg, Oral, BID, Kevin Hayes MD, 20 mg at 03/17/21 0855  •  finasteride (PROSCAR) tablet 5 mg, 5 mg, Oral, Daily, Kevin Hayes MD, 5 mg at 03/17/21 0855  •  fluconazole (DIFLUCAN) tablet 100 mg, 100 mg, Oral, Q24H, Antonio Serrano MD, 100 mg at 03/17/21 0855  •  guaiFENesin (MUCINEX) 12 hr tablet 600 mg, 600 mg, Oral, Q12H, Kevin Hayes MD, 600 mg at 03/17/21 0855  •  insulin glargine (LANTUS, SEMGLEE) injection 48 Units, 48 Units, Subcutaneous, Q24H, Kevin Hayes MD, 48 Units at 03/17/21 1155  •  insulin lispro (ADMELOG) injection 0-7 Units, 0-7 Units, Subcutaneous, 4x Daily With Meals & Nightly, Kevin Hayes MD, 4 Units at 03/17/21 1154  •  insulin lispro (ADMELOG) injection 10 Units, 10 Units, Subcutaneous, TID With Meals, Kevin Hayes MD, 10 Units at 03/17/21 1153  •  losartan (COZAAR) tablet 100 mg, 100 mg, Oral, Q24H, Kevin Hayes MD, 100 mg at 03/17/21 0855  •  metoprolol tartrate (LOPRESSOR) tablet 50 mg, 50 mg, Oral, Q12H, Kevin Hayes MD, 50 mg at 03/17/21 1146  •  ondansetron (ZOFRAN) injection 4  mg, 4 mg, Intravenous, Q6H PRN, Honey Hayes MD  •  Pharmacy Consult - Remdesivir, , Does not apply, Continuous PRN, Roberto Jensen MD  •  [COMPLETED] remdesivir 200 mg in sodium chloride 0.9 % 290 mL IVPB (powder vial), 200 mg, Intravenous, Q24H, 200 mg at 03/16/21 1518 **FOLLOWED BY** remdesivir 100 mg in sodium chloride 0.9 % 270 mL IVPB (powder vial), 100 mg, Intravenous, Q24H, Roberto Jensen MD, 100 mg at 03/17/21 1349  •  tamsulosin (FLOMAX) 24 hr capsule 0.4 mg, 0.4 mg, Oral, Nightly, Honey Hayes MD, 0.4 mg at 03/16/21 2052  •  zinc sulfate (ZINCATE) capsule 220 mg, 220 mg, Oral, Daily, Honey Hayes MD, 220 mg at 03/17/21 0854     ASSESSMENT  COVID-19 pneumonia  Acute hypoxic respiratory failure  Diabetes mellitus  Hypertension  Hyperlipidemia  Coronary artery disease  BPH  Gastroesophageal reflux disease    PLAN  cpm  Supplement oxygen nebulizer  Decadron  Remdesivir  Actemra  Infectious disease consult appreciated   Pulmonary to follow patient  Adjust home medications  Stress ulcer DVT prophylaxis  Supportive care  Discussed with nursing staff  Follow closely further recommendation according to hospital course    HONEY HAYES MD

## 2021-03-17 NOTE — PROGRESS NOTES
Pharmacy Consult: Tocilizumab Dosing  Alex Geiger is a 70. M currently hospitalized for COVID-19 pneumonia. Pharmacy has been consulted by ID physician, Dr. Jensen, to dose tocilizumab for COVID-19.    Relevant Concomitant Medications:  Remdesivir 20 mg x 1 then 100 mg daily x 4 days  Dexamethasone 6 mg IV BID  Fluconazole 200 mg x then 100 mg daily for oral thrush    Patient meets the following include criteria:  Hospitalized with confirmed COVID-19 infection (positive test 3/15/21)  Receiving high-flow NC, NIVM, or IMV for less than 48 h with rapidly progressive illness despite corticosteroids for >24h (dexamethasone started 3/15 pt progressed from 2L NC to 60 L/min heated high-flow NC, FiO2 85%) over night  CRP >/= 7.5 (CRP is 12.37 on 3/17)    Patient does not meet the following exclusion criteria:  High concern for or presence of bacterial or fungal coinfection  -I did discuss this with Dr. Jensen, he has stopped the doxycycline as he does not have a strong concern for bacterial pneumonia. Patient is currently being treated with fluconazole for oral thrush which the patient reports he commonly gets with antibiotics and steroids. Since this is not an invasive infection but rather topical potentially worsened by COVID-19 treatment (steroids), we agree the benefits of tocilizumab likely outweigh the risk (further immune suppression leading to systemic infection) since the patient is being adequately treated with systemic fluconazole.  Patient is not receiving baricitinib or other immunomodulators (other than corticosteroids)  Patient is not proceeding with palliative care.    Plan:  Proceed with tocilizumab 760 mg (8 mg/kg, rounded to nearest vial size) x 1 dose which will be ordered by pharmacy. Monitoring clinical status and LFTs.    Thank you for this consult. Please contact pharmacy with any questions or concerns.    Alana Aguilar, PharmD, BCPS  Clinical Pharmacist, Infectious Diseases

## 2021-03-17 NOTE — PLAN OF CARE
Goal Outcome Evaluation:  Plan of Care Reviewed With: patient     Outcome Summary: Dr. Marie called informed of patient status order for opti-torito to be started per RT will continue to monitor patient condition.

## 2021-03-17 NOTE — PROGRESS NOTES
formerly [facility] INPATIENT PROGRESS NOTE         [address]    3/17/2021      PATIENT IDENTIFICATION:  Name: Alex Geiger ADMIT: 3/15/2021   : 1950  PCP: Real Hudson MD    MRN: [id number] LOS: 1 days   AGE/SEX: 70 y.o. male  ROOM: [facility]                     LOS 1    Reason for visit: Respiratory failure with COVID-19 pneumonia      SUBJECTIVE:      On OptiFlow with 60 L and 83% FiO2.  Saturations 94% at time of visit.  He complains of some nasal burning.  Cough, chest pain.  Diminished breath sounds but no wheezing or rhonchi.  I am seeing the patient for the first time today.  All patient problems are new to me.      Objective   OBJECTIVE:    Vital Sign Min/Max for last 24 hours  Temp  Min: 98.2 °F (36.8 °C)  Max: 101 °F (38.3 °C)   BP  Min: 137/67  Max: 158/67   Pulse  Min: 52  Max: 88   Resp  Min: 16  Max: 20   SpO2  Min: 89 %  Max: 93 %   No data recorded   No data recorded                         Body mass index is 28.73 kg/m².    Intake/Output Summary (Last 24 hours) at 3/17/2021 1350  Last data filed at 3/17/2021 1300  Gross per 24 hour   Intake 720 ml   Output 1575 ml   Net -855 ml         Exam:  GEN:  No distress, appears stated age  EYES:   PERRL, anicteric sclera  ENT:    External ears/nose normal, OP clear  NECK:  No adenopathy, midline trachea  LUNGS: Normal chest on inspection, palpation and auscultation  CV:  Normal S1S2, without murmur  ABD:  Non tender, non distended, no hepatosplenomegaly, +BS  EXT:  No edema, cyanosis or clubbing    Assessment     Scheduled meds:  vitamin C, 500 mg, Oral, Daily  budesonide-formoterol, 2 puff, Inhalation, BID - RT  cetirizine, 10 mg, Oral, Daily  cholecalciferol, 1,000 Units, Oral, Daily  clopidogrel, 75 mg, Oral, Daily  dexamethasone, 6 mg, Intravenous, BID  enoxaparin, 40 mg, Subcutaneous, Q12H  famotidine, 20 mg, Oral, BID  finasteride, 5 mg, Oral, Daily  fluconazole, 100 mg, Oral, Q24H  guaiFENesin, 600 mg, Oral, Q12H  insulin  glargine, 48 Units, Subcutaneous, Q24H  insulin lispro, 0-7 Units, Subcutaneous, 4x Daily With Meals & Nightly  insulin lispro, 10 Units, Subcutaneous, TID With Meals  losartan, 100 mg, Oral, Q24H  metoprolol tartrate, 50 mg, Oral, Q12H  remdesivir, 100 mg, Intravenous, Q24H  tamsulosin, 0.4 mg, Oral, Nightly  zinc sulfate, 220 mg, Oral, Daily      IV meds:                      Pharmacy Consult - Remdesivir,       Data Review:  Results from last 7 days   Lab Units 03/17/21  0437 03/16/21  1215 03/16/21  0811 03/15/21  1447   SODIUM mmol/L 140  --  137 136   POTASSIUM mmol/L 4.6  --  4.6 4.3   CHLORIDE mmol/L 106  --  103 99   CO2 mmol/L 24.1  --  22.7 24.8   BUN mg/dL 29*  --  24* 19   CREATININE mg/dL 1.01 0.96 0.87 1.05   GLUCOSE mg/dL 151*  --  236* 238*   CALCIUM mg/dL 9.1  --  9.0 9.6         Estimated Creatinine Clearance: 81.8 mL/min (by C-G formula based on SCr of 1.01 mg/dL).  Results from last 7 days   Lab Units 03/17/21 0437 03/16/21  0811 03/15/21  1447   WBC 10*3/mm3 9.83 10.37 8.17   HEMOGLOBIN g/dL 11.8* 12.3* 13.5   PLATELETS 10*3/mm3 253 216 208     Results from last 7 days   Lab Units 03/16/21  1215   INR  1.25*     Results from last 7 days   Lab Units 03/17/21  0437 03/16/21  1215 03/15/21  1447   ALT (SGPT) U/L 17 14 17   AST (SGOT) U/L 26 23 28         Results from last 7 days   Lab Units 03/15/21  1903 03/15/21  1447   PROCALCITONIN ng/mL  --  0.89*   LACTATE mmol/L 1.3 2.1*     COVID LABS:  Results From Last 14 Days   Lab Units 03/17/21  0437 03/16/21  1215 03/15/21  1903 03/15/21  1447   PROBNP pg/mL 645.2  --   --   --    CRP mg/dL 12.37*  --   --   --    D DIMER QUANT MCGFEU/mL  --  0.93*  --   --    FERRITIN ng/mL 1,212.00*  --   --   --    LACTATE mmol/L  --   --  1.3 2.1*   PROCALCITONIN ng/mL  --   --   --  0.89*   PROTIME Seconds  --  15.5*  --   --    INR   --  1.25*  --   --            Chest x-ray 3/15/2021 viewed shows bilateral airspace disease consistent with COVID-19  pneumonia.      Microbiology reviewed    )        Active Hospital Problems    Diagnosis  POA   • Pneumonia due to COVID-19 virus [U07.1, J12.82]  Yes      Resolved Hospital Problems   No resolved problems to display.         ASSESSMENT:  COVID-19 pneumonia  Acute hypoxemic respiratory failure secondary to above  Type 2 diabetes  Thrush  CAD  Hypertension  Hyperlipidemia        PLAN:    Treating COVID-19 pneumonia with remdesivir, Actemra and steroids.  Wean oxygen as able.  Requiring OptiFlow with 60 L and 83% FiO2 at time of visit.  Following inflammatory labs.  Control glucose.  Control blood pressure.  Patient is not having any wheezing and unclear why he is on Symbicort.  This will likely increase risk for thrush and will discontinue.      Hung Quintanilla MD  Pulmonary and Critical Care Medicine  [facility], [facility]  3/17/2021    13:50 EDT

## 2021-03-17 NOTE — PROGRESS NOTES
"  Infectious Diseases Progress Note    Roberto Jensen MD     [facility]  Los: 1 day  Patient Identification:  Name: Alex Geiger  Age: 70 y.o.  Sex: male  :  1950  MRN: [id number]         Primary Care Physician: Real Hudson MD            Subjective: feels ok but continue to require more oxygen supplementation    Interval History: See consultation note.    Objective:    Scheduled Meds:vitamin C, 500 mg, Oral, Daily  budesonide-formoterol, 2 puff, Inhalation, BID - RT  cetirizine, 10 mg, Oral, Daily  cholecalciferol, 1,000 Units, Oral, Daily  clopidogrel, 75 mg, Oral, Daily  dexamethasone, 6 mg, Intravenous, BID  doxycycline, 100 mg, Oral, Q12H  enoxaparin, 40 mg, Subcutaneous, Q12H  famotidine, 20 mg, Oral, BID  finasteride, 5 mg, Oral, Daily  fluconazole, 100 mg, Oral, Q24H  guaiFENesin, 600 mg, Oral, Q12H  insulin glargine, 48 Units, Subcutaneous, Q24H  insulin lispro, 0-7 Units, Subcutaneous, 4x Daily With Meals & Nightly  insulin lispro, 10 Units, Subcutaneous, TID With Meals  losartan, 100 mg, Oral, Q24H  metoprolol tartrate, 50 mg, Oral, Q12H  remdesivir, 100 mg, Intravenous, Q24H  tamsulosin, 0.4 mg, Oral, Nightly  zinc sulfate, 220 mg, Oral, Daily      Continuous Infusions:Pharmacy Consult - Remdesivir,         Vital signs in last 24 hours:  Temp:  [98.2 °F (36.8 °C)-101 °F (38.3 °C)] 99 °F (37.2 °C)  Heart Rate:  [52-88] 62  Resp:  [16-20] 20  BP: (137-158)/(67-68) 149/67    Intake/Output:    Intake/Output Summary (Last 24 hours) at 3/17/2021 0958  Last data filed at 3/17/2021 0813  Gross per 24 hour   Intake 360 ml   Output 1325 ml   Net -965 ml       Exam:  /67 (BP Location: Right arm, Patient Position: Lying)   Pulse 62   Temp 99 °F (37.2 °C) (Oral)   Resp 20   Ht 182.9 cm (72\")   Wt 96.1 kg (211 lb 13.8 oz)   SpO2 90%   BMI 28.73 kg/m²   Patient is examined using the personal protective equipment as per guidelines from infection control for this particular patient " as enacted.  Hand washing was performed before and after patient interaction.  General Appearance:    Alert, cooperative, no distress, AAOx3                          Head:    Normocephalic, without obvious abnormality, atraumatic                           Eyes:    PERRL, conjunctivae/corneas clear, EOM's intact, both eyes                         Throat:   Lips, tongue, gums normal; oral mucosa pink and moist                           Neck:   Supple, symmetrical, trachea midline, no JVD                         Lungs:    Clear to auscultation bilaterally, respirations unlabored                 Chest Wall:    No tenderness or deformity                          Heart:    Regular rate and rhythm, S1 and S2 normal                  Abdomen:     Soft, non-tender, bowel sounds active                 Extremities:   Extremities normal, atraumatic, no cyanosis or edema                        Pulses:   Pulses palpable in all extremities                            Skin:   Skin is warm and dry,  no rashes or palpable lesions                  Neurologic:   CNII-XII intact, motor strength grossly intact     Data Review:    I reviewed the patient's new clinical results.  Results from last 7 days   Lab Units 03/17/21  0437 03/16/21  0811 03/15/21  1447   WBC 10*3/mm3 9.83 10.37 8.17   HEMOGLOBIN g/dL 11.8* 12.3* 13.5   PLATELETS 10*3/mm3 253 216 208     Results from last 7 days   Lab Units 03/17/21  0437 03/16/21  1215 03/16/21  0811 03/15/21  1447   SODIUM mmol/L 140  --  137 136   POTASSIUM mmol/L 4.6  --  4.6 4.3   CHLORIDE mmol/L 106  --  103 99   CO2 mmol/L 24.1  --  22.7 24.8   BUN mg/dL 29*  --  24* 19   CREATININE mg/dL 1.01 0.96 0.87 1.05   CALCIUM mg/dL 9.1  --  9.0 9.6   GLUCOSE mg/dL 151*  --  236* 238*       Microbiology Results (last 10 days)     Procedure Component Value - Date/Time    Respiratory Panel PCR w/COVID-19(SARS-CoV-2) LA/ANAT/AMIRA/PAD/COR/MAD/VIOLETA In-House, NP Swab in UTM/VTM, 3-4 HR TAT - Swab, Nasopharynx  [[id number]]  (Abnormal) Collected: 03/15/21 1547    Lab Status: Final result Specimen: Swab from Nasopharynx Updated: 03/15/21 1749     ADENOVIRUS, PCR Not Detected     Coronavirus 229E Not Detected     Coronavirus HKU1 Not Detected     Coronavirus NL63 Not Detected     Coronavirus OC43 Not Detected     COVID19 Detected     Human Metapneumovirus Not Detected     Human Rhinovirus/Enterovirus Not Detected     Influenza A PCR Not Detected     Influenza B PCR Not Detected     Parainfluenza Virus 1 Not Detected     Parainfluenza Virus 2 Not Detected     Parainfluenza Virus 3 Not Detected     Parainfluenza Virus 4 Not Detected     RSV, PCR Not Detected     Bordetella pertussis pcr Not Detected     Bordetella parapertussis PCR Not Detected     Chlamydophila pneumoniae PCR Not Detected     Mycoplasma pneumo by PCR Not Detected    Narrative:      Fact sheet for providers: https://docs.[employer]/wp-content/uploads/BHC2961-7086-WV6.1-EUA-Provider-Fact-Sheet-3.pdf    Fact sheet for patients: https://docs.[employer]/wp-content/uploads/JUF3629-4550-CL3.1-EUA-Patient-Fact-Sheet-1.pdf    Test performed by PCR.    Blood Culture - Blood, Arm, Left [[id number]] Collected: 03/15/21 1447    Lab Status: Preliminary result Specimen: Blood from Arm, Left Updated: 03/16/21 1500     Blood Culture No growth at 24 hours    Blood Culture - Blood, Arm, Right [[id number]] Collected: 03/15/21 1447    Lab Status: Preliminary result Specimen: Blood from Arm, Right Updated: 03/16/21 1500     Blood Culture No growth at 24 hours          Assessment:    Pneumonia due to COVID-19 virus  1-systemic COVID-19 infection with  2-COVID-19 pneumonia with possible evolving hypoxia and possible  3-superimposed bacterial pneumonia  4-diabetes mellitus  5-hypertension  6-other diagnosis per primary team.  7-oral thrush     Recommendations/Discussions:  · Continue with dexamethasone and given his oxygen requirement at remdesivir.  · Based on recently updated  guidelines from [facility] it appears that patient may be a candidate for a dose of Tocilizumab -8 mg/kg of actual body weight x1 dose while continuing remdesivir and dexamethasone.  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  · Management of underlying diabetes hypertension and other medical issues per primary team.  Roberto Jensen MD  3/17/2021  09:58 EDT    Much of this encounter note is an electronic transcription/translation of spoken language to printed text. The electronic translation of spoken language may permit erroneous, or at times, nonsensical words or phrases to be inadvertently transcribed; Although I have reviewed the note for such errors, some may still exist

## 2021-03-18 LAB
ALBUMIN SERPL-MCNC: 3.1 G/DL (ref 3.5–5.2)
ALBUMIN/GLOB SERPL: 0.9 G/DL
ALP SERPL-CCNC: 49 U/L (ref 39–117)
ALT SERPL W P-5'-P-CCNC: 17 U/L (ref 1–41)
ANION GAP SERPL CALCULATED.3IONS-SCNC: 11.2 MMOL/L (ref 5–15)
AST SERPL-CCNC: 21 U/L (ref 1–40)
BASOPHILS # BLD AUTO: 0.02 10*3/MM3 (ref 0–0.2)
BASOPHILS NFR BLD AUTO: 0.2 % (ref 0–1.5)
BILIRUB CONJ SERPL-MCNC: <0.2 MG/DL (ref 0–0.3)
BILIRUB SERPL-MCNC: 0.4 MG/DL (ref 0–1.2)
BUN SERPL-MCNC: 24 MG/DL (ref 8–23)
BUN/CREAT SERPL: 31.2 (ref 7–25)
CALCIUM SPEC-SCNC: 9.7 MG/DL (ref 8.6–10.5)
CHLORIDE SERPL-SCNC: 106 MMOL/L (ref 98–107)
CO2 SERPL-SCNC: 24.8 MMOL/L (ref 22–29)
CREAT SERPL-MCNC: 0.77 MG/DL (ref 0.76–1.27)
CRP SERPL-MCNC: 6.55 MG/DL (ref 0–0.5)
DEPRECATED RDW RBC AUTO: 40.4 FL (ref 37–54)
EOSINOPHIL # BLD AUTO: 0 10*3/MM3 (ref 0–0.4)
EOSINOPHIL NFR BLD AUTO: 0 % (ref 0.3–6.2)
ERYTHROCYTE [DISTWIDTH] IN BLOOD BY AUTOMATED COUNT: 13.1 % (ref 12.3–15.4)
GFR SERPL CREATININE-BSD FRML MDRD: 100 ML/MIN/1.73
GLOBULIN UR ELPH-MCNC: 3.6 GM/DL
GLUCOSE BLDC GLUCOMTR-MCNC: 197 MG/DL (ref 70–130)
GLUCOSE BLDC GLUCOMTR-MCNC: 234 MG/DL (ref 70–130)
GLUCOSE BLDC GLUCOMTR-MCNC: 302 MG/DL (ref 70–130)
GLUCOSE BLDC GLUCOMTR-MCNC: 318 MG/DL (ref 70–130)
GLUCOSE SERPL-MCNC: 207 MG/DL (ref 65–99)
HCT VFR BLD AUTO: 37.9 % (ref 37.5–51)
HGB BLD-MCNC: 12.8 G/DL (ref 13–17.7)
IMM GRANULOCYTES # BLD AUTO: 0.2 10*3/MM3 (ref 0–0.05)
IMM GRANULOCYTES NFR BLD AUTO: 2.3 % (ref 0–0.5)
LYMPHOCYTES # BLD AUTO: 0.76 10*3/MM3 (ref 0.7–3.1)
LYMPHOCYTES NFR BLD AUTO: 8.7 % (ref 19.6–45.3)
MCH RBC QN AUTO: 28.9 PG (ref 26.6–33)
MCHC RBC AUTO-ENTMCNC: 33.8 G/DL (ref 31.5–35.7)
MCV RBC AUTO: 85.6 FL (ref 79–97)
MONOCYTES # BLD AUTO: 0.6 10*3/MM3 (ref 0.1–0.9)
MONOCYTES NFR BLD AUTO: 6.9 % (ref 5–12)
NEUTROPHILS NFR BLD AUTO: 7.13 10*3/MM3 (ref 1.7–7)
NEUTROPHILS NFR BLD AUTO: 81.9 % (ref 42.7–76)
NRBC BLD AUTO-RTO: 0 /100 WBC (ref 0–0.2)
PLATELET # BLD AUTO: 286 10*3/MM3 (ref 140–450)
PMV BLD AUTO: 10.6 FL (ref 6–12)
POTASSIUM SERPL-SCNC: 4.5 MMOL/L (ref 3.5–5.2)
PROT SERPL-MCNC: 6.7 G/DL (ref 6–8.5)
QT INTERVAL: 503 MS
RBC # BLD AUTO: 4.43 10*6/MM3 (ref 4.14–5.8)
SODIUM SERPL-SCNC: 142 MMOL/L (ref 136–145)
WBC # BLD AUTO: 8.71 10*3/MM3 (ref 3.4–10.8)

## 2021-03-18 PROCEDURE — 82962 GLUCOSE BLOOD TEST: CPT

## 2021-03-18 PROCEDURE — 99232 SBSQ HOSP IP/OBS MODERATE 35: CPT | Performed by: INTERNAL MEDICINE

## 2021-03-18 PROCEDURE — 93005 ELECTROCARDIOGRAM TRACING: CPT | Performed by: HOSPITALIST

## 2021-03-18 PROCEDURE — 85025 COMPLETE CBC W/AUTO DIFF WBC: CPT | Performed by: HOSPITALIST

## 2021-03-18 PROCEDURE — 63710000001 INSULIN GLARGINE PER 5 UNITS: Performed by: HOSPITALIST

## 2021-03-18 PROCEDURE — 80053 COMPREHEN METABOLIC PANEL: CPT | Performed by: HOSPITALIST

## 2021-03-18 PROCEDURE — 86140 C-REACTIVE PROTEIN: CPT | Performed by: HOSPITALIST

## 2021-03-18 PROCEDURE — 94799 UNLISTED PULMONARY SVC/PX: CPT

## 2021-03-18 PROCEDURE — 63710000001 INSULIN LISPRO (HUMAN) PER 5 UNITS: Performed by: HOSPITALIST

## 2021-03-18 PROCEDURE — 25010000002 ENOXAPARIN PER 10 MG: Performed by: INTERNAL MEDICINE

## 2021-03-18 PROCEDURE — 82248 BILIRUBIN DIRECT: CPT | Performed by: INTERNAL MEDICINE

## 2021-03-18 PROCEDURE — 93010 ELECTROCARDIOGRAM REPORT: CPT | Performed by: INTERNAL MEDICINE

## 2021-03-18 PROCEDURE — 25010000002 DEXAMETHASONE SODIUM PHOSPHATE 10 MG/ML SOLUTION: Performed by: HOSPITALIST

## 2021-03-18 PROCEDURE — 94760 N-INVAS EAR/PLS OXIMETRY 1: CPT

## 2021-03-18 RX ORDER — INSULIN LISPRO 100 [IU]/ML
16 INJECTION, SOLUTION INTRAVENOUS; SUBCUTANEOUS
Status: DISCONTINUED | OUTPATIENT
Start: 2021-03-18 | End: 2021-03-19

## 2021-03-18 RX ADMIN — LOSARTAN POTASSIUM 100 MG: 100 TABLET, FILM COATED ORAL at 08:01

## 2021-03-18 RX ADMIN — CETIRIZINE HYDROCHLORIDE ALLERGY 10 MG: 10 TABLET ORAL at 08:01

## 2021-03-18 RX ADMIN — INSULIN LISPRO 3 UNITS: 100 INJECTION, SOLUTION INTRAVENOUS; SUBCUTANEOUS at 17:05

## 2021-03-18 RX ADMIN — GUAIFENESIN 600 MG: 600 TABLET, EXTENDED RELEASE ORAL at 20:57

## 2021-03-18 RX ADMIN — FLUCONAZOLE 100 MG: 100 TABLET ORAL at 08:02

## 2021-03-18 RX ADMIN — INSULIN LISPRO 12 UNITS: 100 INJECTION, SOLUTION INTRAVENOUS; SUBCUTANEOUS at 11:48

## 2021-03-18 RX ADMIN — FINASTERIDE 5 MG: 5 TABLET, FILM COATED ORAL at 08:02

## 2021-03-18 RX ADMIN — ENOXAPARIN SODIUM 40 MG: 40 INJECTION SUBCUTANEOUS at 20:55

## 2021-03-18 RX ADMIN — METOPROLOL TARTRATE 25 MG: 25 TABLET, FILM COATED ORAL at 20:55

## 2021-03-18 RX ADMIN — FAMOTIDINE 20 MG: 20 TABLET, FILM COATED ORAL at 20:55

## 2021-03-18 RX ADMIN — ENOXAPARIN SODIUM 40 MG: 40 INJECTION SUBCUTANEOUS at 08:00

## 2021-03-18 RX ADMIN — DEXAMETHASONE SODIUM PHOSPHATE 6 MG: 10 INJECTION, SOLUTION INTRAMUSCULAR; INTRAVENOUS at 20:55

## 2021-03-18 RX ADMIN — INSULIN LISPRO 12 UNITS: 100 INJECTION, SOLUTION INTRAVENOUS; SUBCUTANEOUS at 07:59

## 2021-03-18 RX ADMIN — REMDESIVIR 100 MG: 100 INJECTION, POWDER, LYOPHILIZED, FOR SOLUTION INTRAVENOUS at 12:00

## 2021-03-18 RX ADMIN — INSULIN LISPRO 5 UNITS: 100 INJECTION, SOLUTION INTRAVENOUS; SUBCUTANEOUS at 20:55

## 2021-03-18 RX ADMIN — INSULIN LISPRO 2 UNITS: 100 INJECTION, SOLUTION INTRAVENOUS; SUBCUTANEOUS at 07:59

## 2021-03-18 RX ADMIN — FAMOTIDINE 20 MG: 20 TABLET, FILM COATED ORAL at 08:00

## 2021-03-18 RX ADMIN — ZINC SULFATE 220 MG (50 MG) CAPSULE 220 MG: CAPSULE at 08:01

## 2021-03-18 RX ADMIN — DEXAMETHASONE SODIUM PHOSPHATE 6 MG: 10 INJECTION, SOLUTION INTRAMUSCULAR; INTRAVENOUS at 08:00

## 2021-03-18 RX ADMIN — INSULIN LISPRO 16 UNITS: 100 INJECTION, SOLUTION INTRAVENOUS; SUBCUTANEOUS at 17:06

## 2021-03-18 RX ADMIN — INSULIN LISPRO 5 UNITS: 100 INJECTION, SOLUTION INTRAVENOUS; SUBCUTANEOUS at 11:47

## 2021-03-18 RX ADMIN — CLOPIDOGREL 75 MG: 75 TABLET, FILM COATED ORAL at 08:01

## 2021-03-18 RX ADMIN — TAMSULOSIN HYDROCHLORIDE 0.4 MG: 0.4 CAPSULE ORAL at 20:57

## 2021-03-18 RX ADMIN — INSULIN GLARGINE 55 UNITS: 100 INJECTION, SOLUTION SUBCUTANEOUS at 11:48

## 2021-03-18 RX ADMIN — Medication 1000 UNITS: at 08:01

## 2021-03-18 NOTE — PROGRESS NOTES
[facility] INPATIENT PROGRESS NOTE         [address]    3/18/2021      PATIENT IDENTIFICATION:  Name: Alex Geiger ADMIT: 3/15/2021   : 1950  PCP: Real Hudson MD    MRN: [id number] LOS: 2 days   AGE/SEX: 70 y.o. male  ROOM: [facility]                     LOS 2    Reason for visit: Respiratory failure with COVID-19 pneumonia      SUBJECTIVE:      Resting comfortably.  Denies worsening shortness of breath the liters and 88% FiO2 to maintain sats 91%.      Objective   OBJECTIVE:    Vital Sign Min/Max for last 24 hours  Temp  Min: 97 °F (36.1 °C)  Max: 98.3 °F (36.8 °C)   BP  Min: 155/72  Max: 168/74   Pulse  Min: 43  Max: 62   Resp  Min: 16  Max: 20   SpO2  Min: 90 %  Max: 96 %   No data recorded   No data recorded                         Body mass index is 28.73 kg/m².    Intake/Output Summary (Last 24 hours) at 3/18/2021 1013  Last data filed at 3/18/2021 0757  Gross per 24 hour   Intake 600 ml   Output 1350 ml   Net -750 ml         Exam:  GEN:  No distress, appears stated age  EYES:   PERRL, anicteric sclera  ENT:    External ears/nose normal, OP clear  NECK:  No adenopathy, midline trachea  LUNGS: Normal chest on inspection, palpation and auscultation  CV:  Normal S1S2, without murmur  ABD:  Non tender, non distended, no hepatosplenomegaly, +BS  EXT:  No edema, cyanosis or clubbing    Assessment     Scheduled meds:  vitamin C, 500 mg, Oral, Daily  cetirizine, 10 mg, Oral, Daily  cholecalciferol, 1,000 Units, Oral, Daily  clopidogrel, 75 mg, Oral, Daily  dexamethasone, 6 mg, Intravenous, BID  enoxaparin, 40 mg, Subcutaneous, Q12H  famotidine, 20 mg, Oral, BID  finasteride, 5 mg, Oral, Daily  fluconazole, 100 mg, Oral, Q24H  guaiFENesin, 600 mg, Oral, Q12H  insulin glargine, 55 Units, Subcutaneous, Q24H  insulin lispro, 0-7 Units, Subcutaneous, 4x Daily With Meals & Nightly  insulin lispro, 12 Units, Subcutaneous, TID With Meals  losartan, 100 mg, Oral, Q24H  metoprolol tartrate, 25 mg,  Oral, Q12H  remdesivir, 100 mg, Intravenous, Q24H  tamsulosin, 0.4 mg, Oral, Nightly  zinc sulfate, 220 mg, Oral, Daily      IV meds:                      Pharmacy Consult - Remdesivir,       Data Review:  Results from last 7 days   Lab Units 03/18/21  0721 03/17/21 0437 03/16/21  1215 03/16/21  0811 03/15/21  1447   SODIUM mmol/L 142 140  --  137 136   POTASSIUM mmol/L 4.5 4.6  --  4.6 4.3   CHLORIDE mmol/L 106 106  --  103 99   CO2 mmol/L 24.8 24.1  --  22.7 24.8   BUN mg/dL 24* 29*  --  24* 19   CREATININE mg/dL 0.77 1.01 0.96 0.87 1.05   GLUCOSE mg/dL 207* 151*  --  236* 238*   CALCIUM mg/dL 9.7 9.1  --  9.0 9.6         Estimated Creatinine Clearance: 103.3 mL/min (by C-G formula based on SCr of 0.77 mg/dL).  Results from last 7 days   Lab Units 03/18/21  0721 03/17/21 0437 03/16/21  0811 03/15/21  1447   WBC 10*3/mm3 8.71 9.83 10.37 8.17   HEMOGLOBIN g/dL 12.8* 11.8* 12.3* 13.5   PLATELETS 10*3/mm3 286 253 216 208     Results from last 7 days   Lab Units 03/16/21  1215   INR  1.25*     Results from last 7 days   Lab Units 03/18/21  0721 03/17/21 0437 03/16/21  1215 03/15/21  1447   ALT (SGPT) U/L 17 17 14 17   AST (SGOT) U/L 21 26 23 28         Results from last 7 days   Lab Units 03/15/21  1903 03/15/21  1447   PROCALCITONIN ng/mL  --  0.89*   LACTATE mmol/L 1.3 2.1*     COVID LABS:  Results From Last 14 Days   Lab Units 03/18/21  0721 03/17/21  0437 03/16/21  1215 03/15/21  1903 03/15/21  1447   PROBNP pg/mL  --  645.2  --   --   --    CRP mg/dL 6.55* 12.37*  --   --   --    D DIMER QUANT MCGFEU/mL  --   --  0.93*  --   --    FERRITIN ng/mL  --  1,212.00*  --   --   --    LACTATE mmol/L  --   --   --  1.3 2.1*   PROCALCITONIN ng/mL  --   --   --   --  0.89*   PROTIME Seconds  --   --  15.5*  --   --    INR   --   --  1.25*  --   --            Chest x-ray 3/15/2021 viewed shows bilateral airspace disease consistent with COVID-19 pneumonia.      Microbiology reviewed    )        Active Hospital Problems     Diagnosis  POA   • Pneumonia due to COVID-19 virus [U07.1, J12.82]  Yes      Resolved Hospital Problems   No resolved problems to display.         ASSESSMENT:  COVID-19 pneumonia  Acute hypoxemic respiratory failure secondary to above  Type 2 diabetes  Thrush  CAD  Hypertension  Hyperlipidemia        PLAN:    Treating COVID-19 pneumonia with continued IV remdesivir, Actemra IV x1 and steroids.  Wean oxygen as able.  Requiring OptiFlow with 60 L and 83% FiO2 at time of visit.  Repeat chest x-ray tomorrow for follow-up and monitoring.  Following inflammatory labs.  Control glucose.  Control blood pressure.        Hung Quintanilla MD  Pulmonary and Critical Care Medicine  [facility], [facility]  3/18/2021    10:13 EDT

## 2021-03-18 NOTE — PROGRESS NOTES
"  Infectious Diseases Progress Note    Roberto Jensen MD     [facility]  Los: 2 days  Patient Identification:  Name: Alex Geiger  Age: 70 y.o.  Sex: male  :  1950  MRN: [id number]         Primary Care Physician: Real Hudson MD            Subjective: Overall feels slightly better but not worse.  Had no issues with Actemra administration on 3/17/2021.  Interval History: See consultation note.    Objective:    Scheduled Meds:vitamin C, 500 mg, Oral, Daily  cetirizine, 10 mg, Oral, Daily  cholecalciferol, 1,000 Units, Oral, Daily  clopidogrel, 75 mg, Oral, Daily  dexamethasone, 6 mg, Intravenous, BID  enoxaparin, 40 mg, Subcutaneous, Q12H  famotidine, 20 mg, Oral, BID  finasteride, 5 mg, Oral, Daily  fluconazole, 100 mg, Oral, Q24H  guaiFENesin, 600 mg, Oral, Q12H  insulin glargine, 55 Units, Subcutaneous, Q24H  insulin lispro, 0-7 Units, Subcutaneous, 4x Daily With Meals & Nightly  insulin lispro, 12 Units, Subcutaneous, TID With Meals  losartan, 100 mg, Oral, Q24H  metoprolol tartrate, 25 mg, Oral, Q12H  remdesivir, 100 mg, Intravenous, Q24H  tamsulosin, 0.4 mg, Oral, Nightly  zinc sulfate, 220 mg, Oral, Daily      Continuous Infusions:Pharmacy Consult - Remdesivir,         Vital signs in last 24 hours:  Temp:  [97 °F (36.1 °C)-98.3 °F (36.8 °C)] 97.4 °F (36.3 °C)  Heart Rate:  [43-62] 52  Resp:  [16-20] 20  BP: (155-168)/(72-79) 168/74    Intake/Output:    Intake/Output Summary (Last 24 hours) at 3/18/2021 1121  Last data filed at 3/18/2021 0757  Gross per 24 hour   Intake 600 ml   Output 1350 ml   Net -750 ml       Exam:  /74 (BP Location: Right arm, Patient Position: Lying)   Pulse 52   Temp 97.4 °F (36.3 °C) (Oral)   Resp 20   Ht 182.9 cm (72\")   Wt 96.1 kg (211 lb 13.8 oz)   SpO2 91%   BMI 28.73 kg/m²   Patient is examined using the personal protective equipment as per guidelines from infection control for this particular patient as enacted.  Hand washing was performed " before and after patient interaction.  General Appearance:    Alert, cooperative, no distress, AAOx3                          Head:    Normocephalic, without obvious abnormality, atraumatic                           Eyes:    PERRL, conjunctivae/corneas clear, EOM's intact, both eyes                         Throat:   Lips, tongue, gums normal; oral mucosa pink and moist                           Neck:   Supple, symmetrical, trachea midline, no JVD                         Lungs:    Clear to auscultation bilaterally, respirations unlabored                 Chest Wall:    No tenderness or deformity                          Heart:    Regular rate and rhythm, S1 and S2 normal                  Abdomen:     Soft, non-tender, bowel sounds active                 Extremities:   Extremities normal, atraumatic, no cyanosis or edema                        Pulses:   Pulses palpable in all extremities                            Skin:   Skin is warm and dry,  no rashes or palpable lesions                  Neurologic:   CNII-XII intact, motor strength grossly intact     Data Review:    I reviewed the patient's new clinical results.  Results from last 7 days   Lab Units 03/18/21  0721 03/17/21  0437 03/16/21  0811 03/15/21  1447   WBC 10*3/mm3 8.71 9.83 10.37 8.17   HEMOGLOBIN g/dL 12.8* 11.8* 12.3* 13.5   PLATELETS 10*3/mm3 286 253 216 208     Results from last 7 days   Lab Units 03/18/21  0721 03/17/21  0437 03/16/21  1215 03/16/21  0811 03/15/21  1447   SODIUM mmol/L 142 140  --  137 136   POTASSIUM mmol/L 4.5 4.6  --  4.6 4.3   CHLORIDE mmol/L 106 106  --  103 99   CO2 mmol/L 24.8 24.1  --  22.7 24.8   BUN mg/dL 24* 29*  --  24* 19   CREATININE mg/dL 0.77 1.01 0.96 0.87 1.05   CALCIUM mg/dL 9.7 9.1  --  9.0 9.6   GLUCOSE mg/dL 207* 151*  --  236* 238*       Microbiology Results (last 10 days)     Procedure Component Value - Date/Time    Respiratory Panel PCR w/COVID-19(SARS-CoV-2) LA/ANAT/AMIRA/PAD/COR/MAD/VIOLETA In-House, NP Swab in  UTM/VTM, 3-4 HR TAT - Swab, Nasopharynx [[id number]]  (Abnormal) Collected: 03/15/21 1547    Lab Status: Final result Specimen: Swab from Nasopharynx Updated: 03/15/21 1749     ADENOVIRUS, PCR Not Detected     Coronavirus 229E Not Detected     Coronavirus HKU1 Not Detected     Coronavirus NL63 Not Detected     Coronavirus OC43 Not Detected     COVID19 Detected     Human Metapneumovirus Not Detected     Human Rhinovirus/Enterovirus Not Detected     Influenza A PCR Not Detected     Influenza B PCR Not Detected     Parainfluenza Virus 1 Not Detected     Parainfluenza Virus 2 Not Detected     Parainfluenza Virus 3 Not Detected     Parainfluenza Virus 4 Not Detected     RSV, PCR Not Detected     Bordetella pertussis pcr Not Detected     Bordetella parapertussis PCR Not Detected     Chlamydophila pneumoniae PCR Not Detected     Mycoplasma pneumo by PCR Not Detected    Narrative:      Fact sheet for providers: https://docs.[employer]/wp-content/uploads/JDH0280-7884-VT9.1-EUA-Provider-Fact-Sheet-3.pdf    Fact sheet for patients: https://docs.[employer]/wp-content/uploads/LDF0654-8451-IM3.1-EUA-Patient-Fact-Sheet-1.pdf    Test performed by PCR.    Blood Culture - Blood, Arm, Left [[id number]] Collected: 03/15/21 1447    Lab Status: Preliminary result Specimen: Blood from Arm, Left Updated: 03/17/21 1500     Blood Culture No growth at 2 days    Blood Culture - Blood, Arm, Right [[id number]] Collected: 03/15/21 1447    Lab Status: Preliminary result Specimen: Blood from Arm, Right Updated: 03/17/21 1500     Blood Culture No growth at 2 days          Assessment:    Pneumonia due to COVID-19 virus  1-systemic COVID-19 infection with  2-COVID-19 pneumonia with possible evolving hypoxia and possible  3-superimposed bacterial pneumonia  4-diabetes mellitus  5-hypertension  6-other diagnosis per primary team.  7-oral thrush     Recommendations/Discussions:  · Continue with dexamethasone and given his oxygen requirement at  remdesivir.  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  · Management of underlying diabetes hypertension and other medical issues per primary team.  Roberto Jensen MD  3/18/2021  11:21 EDT    Much of this encounter note is an electronic transcription/translation of spoken language to printed text. The electronic translation of spoken language may permit erroneous, or at times, nonsensical words or phrases to be inadvertently transcribed; Although I have reviewed the note for such errors, some may still exist

## 2021-03-18 NOTE — PLAN OF CARE
Goal Outcome Evaluation:  Plan of Care Reviewed With: patient     Outcome Summary: Patient 02 sats flucuate at times remains, opti-flow 60/85 tonight, heartrate dropped down into the low 40's and at times in the 30's patient denies any discomfort with low heartrate but  called and cardiology consult ordered to see patient this am, at he is very diaphoretic and clammy but denies any nausea are discomfort, stat EKG done as well will continue to monitor patient closely.  Problem: Adult Inpatient Plan of Care  Goal: Absence of Hospital-Acquired Illness or Injury  Intervention: Identify and Manage Fall Risk  Recent Flowsheet Documentation  Taken 3/18/2021 0600 by Sue Sher RN  Safety Promotion/Fall Prevention:   activity supervised   safety round/check completed  Taken 3/18/2021 0407 by Sue Sher RN  Safety Promotion/Fall Prevention:   activity supervised   safety round/check completed  Taken 3/18/2021 0200 by Sue Sher RN  Safety Promotion/Fall Prevention:   activity supervised   safety round/check completed  Taken 3/18/2021 0030 by Sue Sher RN  Safety Promotion/Fall Prevention: safety round/check completed  Taken 3/17/2021 2200 by Sue Sher RN  Safety Promotion/Fall Prevention:   activity supervised   safety round/check completed  Taken 3/17/2021 2040 by Sue Sher RN  Safety Promotion/Fall Prevention: safety round/check completed     Problem: Adult Inpatient Plan of Care  Goal: Absence of Hospital-Acquired Illness or Injury  Intervention: Identify and Manage Fall Risk  Recent Flowsheet Documentation  Taken 3/18/2021 0600 by Sue Sher RN  Safety Promotion/Fall Prevention:   activity supervised   safety round/check completed  Taken 3/18/2021 0407 by Sue Sher RN  Safety Promotion/Fall Prevention:   activity supervised   safety round/check completed  Taken 3/18/2021 0200 by Sue Sher RN  Safety Promotion/Fall Prevention:   activity  supervised   safety round/check completed  Taken 3/18/2021 0030 by Sue Sher RN  Safety Promotion/Fall Prevention: safety round/check completed  Taken 3/17/2021 2200 by Sue Sher RN  Safety Promotion/Fall Prevention:   activity supervised   safety round/check completed  Taken 3/17/2021 2040 by Sue Sher RN  Safety Promotion/Fall Prevention: safety round/check completed

## 2021-03-18 NOTE — CONSULTS
Patient Name: Alex Geiger  Age/Sex: 70 y.o. male  : 1950  MRN: [id number]    Date of Admission: 3/15/2021  Date of Encounter Visit: 21  Encounter Provider: Xavier Peck MD  Place of Service: [facility] CARDIOLOGY      Referring Provider: Kevin Hayes MD  Patient Care Team:  Real Hudson MD as PCP - General    Subjective:   Consulted for: Bradycardia     Chief Complaint: SOA    History of Present Illness:  Alex Geiger is a 70 y.o. male, retired - normally followed by Dr. Deutsch with a history of HTN, HLD, PVCs, and CAD s/p stenting. His last echocardiogram was in  where his EF was 52%. His most recent cardiac catheterization was in 2019 when he was having angina, he was found to have a 70 to 80% lesion in the distal RCA where he underwent successful QUIANA. He also had a normal left main, mild luminal irregularities in the proximal circumflex, patent stent in the mid circumflex, & a patent stent in the proximal LAD. He was to remain on ASA/plavix for a year. He is now only on long term plavix. He is intolerant to statins and had been started on a PSK 9 inhibitor. His last office visit was in 2020 with Sigrid SALCEDO where he had been doing well and had made changes to his diet and reported losing 20 lbs. Blood pressures were well controlled.     He came into the ER yesterday for body aches, chills, and shortness of breath. He had been diagnosed with covid19 on 3/9/21. D-dimer was elevated at 0.93.  Procal was minimally elevated at 0.89 with lactic acid of 2.1. chest x ray found covid19 pneumonia. He was started on supplemental oxygen and infectious disease has been consulted where he was started on a regimen of remdesivir, Decadron, doxycycline, & Actemra. He also reported thrush in his mouth.    We have been consulted for bradycardia with rates in the 40s when he is at rest. Per nursing- asymptomatic.  Patient  previously not having problems with bradycardia.      Previous Testing-  Cardiac catheterization on 7/25/19-  FINDINGS:     LEFT VENTRICULOGRAPHY: The LV pressure was 116/10.  There was normal global LV systolic function there is a very small area of mid anterior hypokinesis.  There was no mitral insufficiency or gradient across the aortic valve on pullback.     CORONARY ANGIOGRAPHY:  Left main: Normal  Left anterior descending: Normal proximally there is large stent in the proximal LAD with its widely patent and normal, normal mid LAD normal distally diagonals are free of obstructive disease  Ramus intermedius:Not present  Circumflex: Mild 10% luminal irregularities in the proximal circumflex the mid circumflex is a large stent which is normal  RCA: Is a dominant vessel.  Normal proximally luminal irregularities 10 to 20% in the mid stent in the RCA distally there is a 70 to 80% lesion right at the bifurcation the bifurcation vessels both look normal    SUMMARY: Ongoing progression of coronary artery disease in a gentleman with diabetes and intolerance to statins now with unstable angina.  Successful drug-eluting stenting to the distal RCA     EBL:                     Minimal  Specimens:         None     PCI Segment:                   Distal RCA  Pre-stenosis:                    80  Post-stenosis                   0  Lesion Type                      B2  ARYAN Flow Pre                   3  ARYAN Flow Post   3  Dissection                        none        RECOMMENDATIONS: Routine PCI care I see him as a alan that will take dual antiplatelet therapy for 1 month and then drop the aspirin and stay on long-term clopidogrel but I also think we should look at starting him on a PSK 9 inhibitor     Cardiac catheterization on 8/16/17-  FINDINGS:     LEFT VENTRICULOGRAPHY: The LV pressure was 124/6 .  There was moderately reduced global LV function the EF is about 40% there is a fairly large area of mid anterior to apical  hypokinesis.  There was no mitral insufficiency or gradient across the aortic valve on pullback.     CORONARY ANGIOGRAPHY:  Left main: Normal  Left anterior descending: Luminal irregularities in the proximal LAD of 10% then actually if you go frame by frame there is about a 90% mid LAD there is a very small second diagonal in that lesion that has a 90% lesion at the origin but that's not amenable to PCI the remainder of the mid LAD and distal LAD are normal   Ramus intermedius:Not present  Circumflex: Very mild luminal irregularities in the proximal circumflex and then proximal OM1 there is a stent in the mid circumflex that's widely patent and looks normal beyond that in the distal circumflex is some 20% disease  RCA: Is a dominant vessel.  The right coronary there is a stent in the proximal right coronary that's widely patent and looks normal in the mid RCA is about 40-50% disease    SUMMARY: Successful direct stenting of the mid LAD     EBL:                   Minimal  Specimens:        None     PCI Segment:                 Mid LAD  Pre-stenosis:                  90  Post-stenosis                  0  Lesion Type                    A  ARYAN Flow Pre                 3  ARYAN Flow Post  3  Dissection                       none        RECOMMENDATIONS: Routine PCI care think is an excellent candidate for same day discharge follow-up with Eliana in a week see me in a month we'll get him in cardiac rehabilitation [facility] for a year     Holter monitor on 4/17/15-  SUMMARY OF CLINICAL FINDINGS: The patient wore this Holter monitor on 04/17/2015 for 23  heart rate and 36 minutes. Minimum heart rate 54 beats per minute, maximum 124 beats per  minute, average 78 beats per minute. The computer recorded 23,136 extra ventricular  beats, 3 extra supraventricular beats were recorded. As we review the rhythm strips, we  see that the predominant rhythm was sinus but there was frequent ventricular ectopy.   There is up to 1,000 extra  beat an hour. There were 20 runs of 3 beats of ventricular  tachycardia recorded. Nothing longer than that.      IMPRESSION: Abnormal Holter monitor. Moderate to large amount of ventricular ectopy.    Echocardiogram on 4/8/14-      Past Medical History:  Past Medical History:   Diagnosis Date   • CAD (coronary artery disease)    • COVID-19    • Diabetes mellitus (CMS/[facility])    • Hyperlipidemia    • Hypertension    • Renal injury    • Unstable angina (CMS/[facility])    • Ventricular ectopy    • VPC (ventricular premature complex)        Past Surgical History:   Procedure Laterality Date   • CARDIAC CATHETERIZATION     • CARDIAC CATHETERIZATION N/A 8/16/2017    Procedure: Coronary angiography;  Surgeon: Rell Deutsch MD;  Location: [facility] CATH INVASIVE LOCATION;  Service:    • CARDIAC CATHETERIZATION N/A 8/16/2017    Procedure: Stent QUIANA coronary;  Surgeon: Rell Deutsch MD;  Location: [facility] CATH INVASIVE LOCATION;  Service:    • CARDIAC CATHETERIZATION N/A 8/16/2017    Procedure: Left Heart Cath;  Surgeon: Rell Deutsch MD;  Location: [facility] CATH INVASIVE LOCATION;  Service:    • CARDIAC CATHETERIZATION N/A 8/16/2017    Procedure: Left ventriculography;  Surgeon: Rell Deutsch MD;  Location: [facility] CATH INVASIVE LOCATION;  Service:    • CARDIAC CATHETERIZATION N/A 7/25/2019    Procedure: Left Heart Cath;  Surgeon: Rell Deutsch MD;  Location: [facility] CATH INVASIVE LOCATION;  Service: Cardiology   • CARDIAC CATHETERIZATION N/A 7/25/2019    Procedure: Stent QUIANA coronary;  Surgeon: Rell Deutsch MD;  Location: [facility] CATH INVASIVE LOCATION;  Service: Cardiology   • CARDIAC CATHETERIZATION N/A 7/25/2019    Procedure: Left ventriculography;  Surgeon: Rell Deutsch MD;  Location: [facility] CATH INVASIVE LOCATION;  Service: Cardiology   • CARDIAC CATHETERIZATION N/A 7/25/2019    Procedure: Coronary angiography;  Surgeon: Rell Deutsch MD;  Location: [facility] CATH INVASIVE LOCATION;  Service: Cardiology   • CORONARY  ANGIOPLASTY WITH STENT PLACEMENT     • KIDNEY STONE SURGERY     • PARATHYROIDECTOMY         Home Medications:   Medications Prior to Admission   Medication Sig Dispense Refill Last Dose   • clopidogrel (PLAVIX) 75 MG tablet TAKE (1) TABLET DAILY 90 tablet 2 3/14/2021 at Unknown time   • Dermatological Products, Misc. (NEOSALUS) cream topical cream Apply 1 application topically to the appropriate area as directed As Needed (skin irritation). 180 g 11 3/14/2021 at Unknown time   • fexofenadine (ALLEGRA) 180 MG tablet Take 180 mg by mouth daily.   3/14/2021 at Unknown time   • finasteride (PROSCAR) 5 MG tablet Take 5 mg by mouth Daily.   3/14/2021 at Unknown time   • flunisolide (NASALIDE) 25 MCG/ACT (0.025%) solution nasal spray Inhale 2 sprays every 12 (twelve) hours.   3/14/2021 at Unknown time   • irbesartan (AVAPRO) 300 MG tablet Take 1 tablet by mouth Every Night. 90 tablet 3 3/14/2021 at Unknown time   • metFORMIN (GLUCOPHAGE) 500 MG tablet Take 2 tablets by mouth 2 (Two) Times a Day With Meals.   3/14/2021 at Unknown time   • metoprolol tartrate (LOPRESSOR) 50 MG tablet TAKE (1) TABLET TWICE A  tablet 1 3/14/2021 at Unknown time   • tamsulosin (FLOMAX) 0.4 MG capsule 24 hr capsule Take 1 capsule by mouth every night.   3/14/2021   • testosterone (ANDROGEL) 25 MG/2.5GM (1%) gel gel Place 25 mg on the skin Daily.   3/14/2021 at Unknown time   • TRESIBA FLEXTOUCH 200 UNIT/ML solution pen-injector Inject 60 Units as directed Daily.   3/14/2021 at Unknown time   • Alirocumab 75 MG/ML solution auto-injector Inject 1 pen under the skin into the appropriate area as directed Every 14 (Fourteen) Days. 2 pen 11 Unknown at Unknown time   • nitroglycerin (NITROSTAT) 0.4 MG SL tablet DISSOLVE 1 TABLET UNDER THE TONGUE EVERY 5 MINUTES UP TO 3 DOSES AS NEEDED FOR CHEST PAIN 25 tablet 0 Unknown at Unknown time       Allergies:  Allergies   Allergen Reactions   • Oxycodone-Acetaminophen Itching   • Percocet  [Oxycodone-Acetaminophen]    • Shellfish-Derived Products Hives and Itching   • Statins    • Adhesive Tape Rash   • Latex Rash       Past Social History:  Social History     Socioeconomic History   • Marital status:      Spouse name: Not on file   • Number of children: Not on file   • Years of education: Not on file   • Highest education level: Not on file   Tobacco Use   • Smoking status: Former Smoker     Packs/day: 1.00     Years: 25.00     Pack years: 25.00     Types: Cigarettes   • Smokeless tobacco: Never Used   • Tobacco comment: NO CAFFEINE USE   Vaping Use   • Vaping Use: Never used   Substance and Sexual Activity   • Alcohol use: Yes     Alcohol/week: 1.0 standard drinks     Types: 1 Shots of liquor per week     Comment: 2 drinks a year   • Drug use: No   • Sexual activity: Defer        Past Family History:  Family History   Problem Relation Age of Onset   • Diabetes Mother    • Alzheimer's disease Father    • Kidney disease Father    • Diabetes Father    • No Known Problems Maternal Grandmother    • No Known Problems Maternal Grandfather    • No Known Problems Paternal Grandmother    • Heart attack Paternal Grandfather    • Heart disease Paternal Grandfather    • Diabetes Paternal Grandfather        Review of Systems: All systems reviewed. Pertinent positives identified in HPI. All other systems are negative.     REVIEW OF SYSTEMS:   CONSTITUTIONAL: No weight loss, fever, chills, weakness or fatigue.   HEENT: Eyes: No visual loss, blurred vision, double vision or yellow sclerae. Ears, Nose, Throat: No hearing loss, sneezing, congestion, runny nose or sore throat.   SKIN: No rash or itching.     RESPIRATORY: No shortness of breath, hemoptysis, cough or sputum.   GASTROINTESTINAL: No anorexia, nausea, vomiting or diarrhea. No abdominal pain, bright red blood per rectum or melena.  GENITOURINARY: No burning on urination, hematuria or increased frequency.  NEUROLOGICAL: No headache, dizziness, syncope,  paralysis, ataxia, numbness or tingling in the extremities. No change in bowel or bladder control.   MUSCULOSKELETAL: No muscle, back pain, joint pain or stiffness.   HEMATOLOGIC: No anemia, bleeding or bruising.   LYMPHATICS: No enlarged nodes. No history of splenectomy.   PSYCHIATRIC: No history of depression, anxiety, hallucinations.   ENDOCRINOLOGIC: No reports of sweating, cold or heat intolerance. No polyuria or polydipsia.       Objective:     Objective:  Temp:  [97 °F (36.1 °C)-98.3 °F (36.8 °C)] 97.1 °F (36.2 °C)  Heart Rate:  [43-62] 49  Resp:  [16-20] 20  BP: (155-168)/(72-79) 168/74    Intake/Output Summary (Last 24 hours) at 3/18/2021 0924  Last data filed at 3/18/2021 0552  Gross per 24 hour   Intake 360 ml   Output 1350 ml   Net -990 ml     Body mass index is 28.73 kg/m².      03/15/21  1447 03/15/21  1847   Weight: 99.8 kg (220 lb) 96.1 kg (211 lb 13.8 oz)           Physical Exam:   Vitals reviewed.   Constitutional:       Appearance: Well-developed.   Eyes:      Pupils: Pupils are equal, round, and reactive to light.   HENT:      Head: Normocephalic.   Neck:      Thyroid: No thyromegaly.      Vascular: No carotid bruit, JVD or JVR.   Pulmonary:      Breath sounds: Rhonchi present.   Cardiovascular:      Bradycardia present. Regular rhythm. Normal S1. Normal S2.      Murmurs: There is no murmur.      No gallop.   Pulses:     Intact distal pulses.   Skin:     General: Skin is warm and dry.      Findings: No erythema.   Neurological:      Mental Status: Alert and oriented to person, place, and time.           Lab Review:     Results from last 7 days   Lab Units 03/18/21  0721 03/17/21  0437 03/16/21  1215 03/16/21  0811   SODIUM mmol/L 142 140  --  137   POTASSIUM mmol/L 4.5 4.6  --  4.6   CHLORIDE mmol/L 106 106  --  103   CO2 mmol/L 24.8 24.1  --  22.7   BUN mg/dL 24* 29*  --  24*   CREATININE mg/dL 0.77 1.01 0.96 0.87   GLUCOSE mg/dL 207* 151*  --  236*   CALCIUM mg/dL 9.7 9.1  --  9.0            Results from last 7 days   Lab Units 03/18/21  0721   WBC 10*3/mm3 8.71   HEMOGLOBIN g/dL 12.8*   HEMATOCRIT % 37.9   PLATELETS 10*3/mm3 286     Results from last 7 days   Lab Units 03/16/21  1215   INR  1.25*                 Results from last 7 days   Lab Units 03/17/21  0437   PROBNP pg/mL 645.2         Results from last 7 days   Lab Units 03/17/21  0437   TSH uIU/mL 0.206*       Imaging:      Imaging Results (Most Recent)     Procedure Component Value Units Date/Time    XR Chest 1 View [[id number]] Collected: 03/15/21 1522     Updated: 03/15/21 1623    Narrative:      ONE VIEW PORTABLE CHEST     HISTORY: Shortness of breath and cough. Possible Covid 19 pneumonia.     FINDINGS: The lungs are moderately expanded with some vague haziness  extending into the periphery of the left lung and at the right base  highly suspicious for early changes of Covid 19 pneumonia and continued  follow-up evaluation is recommended. The heart is slightly enlarged.     This report was finalized on 3/15/2021 4:20 PM by Dr. Malcom Perez M.D.             EKG: 3/18/21-          Baseline: 8/31/20-      I personally viewed and interpreted the patient's EKG/Telemetry data.    Assessment:   Assessment/Plan     1.  COVID-19 infection  2.  Coronary artery disease: No angina  3.  Sinus bradycardia: May be secondary to illness and/or drug interaction.  We will plan on decreasing his metoprolol and continuing to observe  4.  Hypertension: May need to adjust medications if he is on lower dose of metoprolol.      Portions of the above history entered by Monica Shelley RN have been validated by myself    Thank you for allowing me to participate in the care of Alex Geiger. Feel free to contact me directly with any further questions or concerns.    Xavier Peck MD  [facility]  03/18/21  09:24 EDT

## 2021-03-18 NOTE — PLAN OF CARE
"Goal Outcome Evaluation:         VSS, remains on OptiFlow 60L at 85% and is tolerating well, SOB noted with exertion patient states \"no worse than it has been.\"  Independent standby assist at most, A&Ox4, appetite returning per patient, episodes of bradycardia noted Cardiology aware and lowered metoprolol dosage.  Nursing will continue to monitor.    "

## 2021-03-18 NOTE — PROGRESS NOTES
"Daily progress note    Chief complaint  Doing same  No new complaints  Still with cough and shortness of breath    History of present illness  70-year-old white male with history of coronary artery disease hypertension hyperlipidemia and diabetes mellitus presented to [facility] emergency room with shortness of breath body aches fever chills and nonproductive cough for last 1 week.  Patient was diagnosed with Covid about a week ago.  Patient denies any chest pain abdominal pain nausea vomiting diarrhea.  Patient work-up in ER revealed COVID-19 pneumonia with acute hypoxic respiratory failure admit for management.    REVIEW OF SYSTEMS   Unremarkable except for shortness of breath and cough    PHYSICAL EXAM   Blood pressure 171/71, pulse 60, temperature 97.4 °F (36.3 °C), temperature source Oral, resp. rate 20, height 182.9 cm (72\"), weight 96.1 kg (211 lb 13.8 oz), SpO2 91 %.    GENERAL: not distressed, appears ill but not toxic   HENT: nares patent   EYES: no scleral icterus   NECK: no ROM limitations   CV: regular rhythm, regular rate, no murmur, no rubs and no gallops   RESPIRATORY: Mild increased work of breathing, breath sounds are clear to auscultate bilaterally patient is able to speak in full sentences   ABDOMEN: soft, rounded, no focal tenderness bowel sounds positive  MUSCULOSKELETAL: no deformity   NEURO: alert, moves all extremities, follows commands   SKIN: warm, dry     LAB RESULTS   Lab Results (last 24 hours)     Procedure Component Value Units Date/Time    POC Glucose Once [[id number]]  (Abnormal) Collected: 03/18/21 1113    Specimen: Blood Updated: 03/18/21 1116     Glucose 302 mg/dL     Comprehensive Metabolic Panel [[id number]]  (Abnormal) Collected: 03/18/21 0721    Specimen: Blood Updated: 03/18/21 0819     Glucose 207 mg/dL      BUN 24 mg/dL      Creatinine 0.77 mg/dL      Sodium 142 mmol/L      Potassium 4.5 mmol/L      Chloride 106 mmol/L      CO2 24.8 mmol/L      Calcium 9.7 mg/dL  "     Total Protein 6.7 g/dL      Albumin 3.10 g/dL      ALT (SGPT) 17 U/L      AST (SGOT) 21 U/L      Alkaline Phosphatase 49 U/L      Total Bilirubin 0.4 mg/dL      eGFR Non African Amer 100 mL/min/1.73      Globulin 3.6 gm/dL      A/G Ratio 0.9 g/dL      BUN/Creatinine Ratio 31.2     Anion Gap 11.2 mmol/L     Narrative:      GFR Normal >60  Chronic Kidney Disease <60  Kidney Failure <15      Bilirubin, Direct [[id number]]  (Normal) Collected: 03/18/21 0721    Specimen: Blood Updated: 03/18/21 0819     Bilirubin, Direct <0.2 mg/dL     C-reactive Protein [[id number]]  (Abnormal) Collected: 03/18/21 0721    Specimen: Blood Updated: 03/18/21 0819     C-Reactive Protein 6.55 mg/dL     CBC & Differential [[id number]]  (Abnormal) Collected: 03/18/21 0721    Specimen: Blood Updated: 03/18/21 0754    Narrative:      The following orders were created for panel order CBC & Differential.  Procedure                               Abnormality         Status                     ---------                               -----------         ------                     CBC Auto Differential[[id number]]        Abnormal            Final result                 Please view results for these tests on the individual orders.    CBC Auto Differential [[id number]]  (Abnormal) Collected: 03/18/21 0721    Specimen: Blood Updated: 03/18/21 0754     WBC 8.71 10*3/mm3      RBC 4.43 10*6/mm3      Hemoglobin 12.8 g/dL      Hematocrit 37.9 %      MCV 85.6 fL      MCH 28.9 pg      MCHC 33.8 g/dL      RDW 13.1 %      RDW-SD 40.4 fl      MPV 10.6 fL      Platelets 286 10*3/mm3      Neutrophil % 81.9 %      Lymphocyte % 8.7 %      Monocyte % 6.9 %      Eosinophil % 0.0 %      Basophil % 0.2 %      Immature Grans % 2.3 %      Neutrophils, Absolute 7.13 10*3/mm3      Lymphocytes, Absolute 0.76 10*3/mm3      Monocytes, Absolute 0.60 10*3/mm3      Eosinophils, Absolute 0.00 10*3/mm3      Basophils, Absolute 0.02 10*3/mm3      Immature Grans, Absolute 0.20 10*3/mm3       nRBC 0.0 /100 WBC     POC Glucose Once [[id number]]  (Abnormal) Collected: 03/18/21 0551    Specimen: Blood Updated: 03/18/21 0557     Glucose 197 mg/dL     POC Glucose Once [[id number]]  (Abnormal) Collected: 03/17/21 1943    Specimen: Blood Updated: 03/17/21 1952     Glucose 322 mg/dL            Study Result    Narrative & Impression   ONE VIEW PORTABLE CHEST     HISTORY: Shortness of breath and cough. Possible Covid 19 pneumonia.     FINDINGS: The lungs are moderately expanded with some vague haziness  extending into the periphery of the left lung and at the right base  highly suspicious for early changes of Covid 19 pneumonia and continued  follow-up evaluation is recommended. The heart is slightly enlarged.          Current Facility-Administered Medications:   •  albuterol (PROVENTIL) nebulizer solution 0.083% 2.5 mg/3mL, 2.5 mg, Nebulization, Q6H PRN, Kevin Hayes MD  •  ascorbic acid (VITAMIN C) tablet 500 mg, 500 mg, Oral, Daily, Kevin Hayes MD, 500 mg at 03/17/21 0855  •  cetirizine (zyrTEC) tablet 10 mg, 10 mg, Oral, Daily, Kevin Hayes MD, 10 mg at 03/18/21 0801  •  cholecalciferol (VITAMIN D3) tablet 1,000 Units, 1,000 Units, Oral, Daily, Kevin Hayes MD, 1,000 Units at 03/18/21 0801  •  clopidogrel (PLAVIX) tablet 75 mg, 75 mg, Oral, Daily, Kevin Hayes MD, 75 mg at 03/18/21 0801  •  dexamethasone sodium phosphate injection 6 mg, 6 mg, Intravenous, BID, Kevin Hayes MD, 6 mg at 03/18/21 0800  •  enoxaparin (LOVENOX) syringe 40 mg, 40 mg, Subcutaneous, Q12H, Antonio Serrano MD, 40 mg at 03/18/21 0800  •  famotidine (PEPCID) tablet 20 mg, 20 mg, Oral, BID, Kevin Hayes MD, 20 mg at 03/18/21 0800  •  finasteride (PROSCAR) tablet 5 mg, 5 mg, Oral, Daily, Kevin Hayes MD, 5 mg at 03/18/21 0802  •  fluconazole (DIFLUCAN) tablet 100 mg, 100 mg, Oral, Q24H, Antonio Serrano MD, 100 mg at 03/18/21 0802  •  guaiFENesin (MUCINEX) 12 hr tablet 600 mg, 600 mg, Oral, Q12H, Kevin Hayes MD, 600 mg  at 03/17/21 2035  •  insulin glargine (LANTUS, SEMGLEE) injection 55 Units, 55 Units, Subcutaneous, Q24H, Honey Hayes MD, 55 Units at 03/18/21 1148  •  insulin lispro (ADMELOG) injection 0-7 Units, 0-7 Units, Subcutaneous, 4x Daily With Meals & Nightly, Honey Hayes MD, 5 Units at 03/18/21 1147  •  insulin lispro (ADMELOG) injection 12 Units, 12 Units, Subcutaneous, TID With Meals, Honey Hayes MD, 12 Units at 03/18/21 1148  •  losartan (COZAAR) tablet 100 mg, 100 mg, Oral, Q24H, Honey Hayes MD, 100 mg at 03/18/21 0801  •  metoprolol tartrate (LOPRESSOR) tablet 25 mg, 25 mg, Oral, Q12H, Xavier Peck MD  •  ondansetron (ZOFRAN) injection 4 mg, 4 mg, Intravenous, Q6H PRN, Honey Hayes MD  •  Pharmacy Consult - Remdesivir, , Does not apply, Continuous PRN, Roberto Jensen MD  •  [COMPLETED] remdesivir 200 mg in sodium chloride 0.9 % 290 mL IVPB (powder vial), 200 mg, Intravenous, Q24H, 200 mg at 03/16/21 1518 **FOLLOWED BY** remdesivir 100 mg in sodium chloride 0.9 % 270 mL IVPB (powder vial), 100 mg, Intravenous, Q24H, Roberto Jensen MD, 100 mg at 03/18/21 1200  •  tamsulosin (FLOMAX) 24 hr capsule 0.4 mg, 0.4 mg, Oral, Nightly, Honey Hayes MD, 0.4 mg at 03/17/21 2035  •  zinc sulfate (ZINCATE) capsule 220 mg, 220 mg, Oral, Daily, Honey Hayes MD, 220 mg at 03/18/21 0801     ASSESSMENT  COVID-19 pneumonia  Acute hypoxic respiratory failure   Sinus bradycardia  Diabetes mellitus  Hypertension  Hyperlipidemia  Coronary artery disease  BPH  Gastroesophageal reflux disease    PLAN  CPM  Supplement oxygen nebulizer  Decadron  Remdesivir  Actemra  Adjust beta-blocker  Infectious disease consult appreciated   Pulmonary and cardiology to follow patient  Adjust home medications  Stress ulcer DVT prophylaxis  Supportive care  Discussed with nursing staff  Follow closely further recommendation according to hospital course    HONEY HAYES MD

## 2021-03-19 ENCOUNTER — APPOINTMENT (OUTPATIENT)
Dept: GENERAL RADIOLOGY | Facility: HOSPITAL | Age: 71
End: 2021-03-19

## 2021-03-19 LAB
ALBUMIN SERPL-MCNC: 3.2 G/DL (ref 3.5–5.2)
ALBUMIN/GLOB SERPL: 1.1 G/DL
ALP SERPL-CCNC: 44 U/L (ref 39–117)
ALT SERPL W P-5'-P-CCNC: 23 U/L (ref 1–41)
ANION GAP SERPL CALCULATED.3IONS-SCNC: 8.8 MMOL/L (ref 5–15)
AST SERPL-CCNC: 25 U/L (ref 1–40)
BASOPHILS # BLD AUTO: 0.03 10*3/MM3 (ref 0–0.2)
BASOPHILS NFR BLD AUTO: 0.3 % (ref 0–1.5)
BILIRUB CONJ SERPL-MCNC: <0.2 MG/DL (ref 0–0.3)
BILIRUB SERPL-MCNC: 0.5 MG/DL (ref 0–1.2)
BUN SERPL-MCNC: 22 MG/DL (ref 8–23)
BUN/CREAT SERPL: 29.3 (ref 7–25)
CALCIUM SPEC-SCNC: 9.1 MG/DL (ref 8.6–10.5)
CHLORIDE SERPL-SCNC: 105 MMOL/L (ref 98–107)
CO2 SERPL-SCNC: 27.2 MMOL/L (ref 22–29)
CREAT SERPL-MCNC: 0.75 MG/DL (ref 0.76–1.27)
CRP SERPL-MCNC: 3.07 MG/DL (ref 0–0.5)
DEPRECATED RDW RBC AUTO: 39.9 FL (ref 37–54)
EOSINOPHIL # BLD AUTO: 0 10*3/MM3 (ref 0–0.4)
EOSINOPHIL NFR BLD AUTO: 0 % (ref 0.3–6.2)
ERYTHROCYTE [DISTWIDTH] IN BLOOD BY AUTOMATED COUNT: 13.1 % (ref 12.3–15.4)
GFR SERPL CREATININE-BSD FRML MDRD: 103 ML/MIN/1.73
GLOBULIN UR ELPH-MCNC: 2.8 GM/DL
GLUCOSE BLDC GLUCOMTR-MCNC: 148 MG/DL (ref 70–130)
GLUCOSE BLDC GLUCOMTR-MCNC: 201 MG/DL (ref 70–130)
GLUCOSE BLDC GLUCOMTR-MCNC: 210 MG/DL (ref 70–130)
GLUCOSE BLDC GLUCOMTR-MCNC: 312 MG/DL (ref 70–130)
GLUCOSE SERPL-MCNC: 155 MG/DL (ref 65–99)
HCT VFR BLD AUTO: 39.4 % (ref 37.5–51)
HGB BLD-MCNC: 13.4 G/DL (ref 13–17.7)
IMM GRANULOCYTES # BLD AUTO: 0.46 10*3/MM3 (ref 0–0.05)
IMM GRANULOCYTES NFR BLD AUTO: 4.4 % (ref 0–0.5)
LYMPHOCYTES # BLD AUTO: 0.79 10*3/MM3 (ref 0.7–3.1)
LYMPHOCYTES NFR BLD AUTO: 7.6 % (ref 19.6–45.3)
MCH RBC QN AUTO: 28.7 PG (ref 26.6–33)
MCHC RBC AUTO-ENTMCNC: 34 G/DL (ref 31.5–35.7)
MCV RBC AUTO: 84.4 FL (ref 79–97)
MONOCYTES # BLD AUTO: 0.61 10*3/MM3 (ref 0.1–0.9)
MONOCYTES NFR BLD AUTO: 5.9 % (ref 5–12)
NEUTROPHILS NFR BLD AUTO: 8.48 10*3/MM3 (ref 1.7–7)
NEUTROPHILS NFR BLD AUTO: 81.8 % (ref 42.7–76)
NRBC BLD AUTO-RTO: 0.1 /100 WBC (ref 0–0.2)
PLATELET # BLD AUTO: 337 10*3/MM3 (ref 140–450)
PMV BLD AUTO: 10.3 FL (ref 6–12)
POTASSIUM SERPL-SCNC: 4.4 MMOL/L (ref 3.5–5.2)
PROT SERPL-MCNC: 6 G/DL (ref 6–8.5)
RBC # BLD AUTO: 4.67 10*6/MM3 (ref 4.14–5.8)
SODIUM SERPL-SCNC: 141 MMOL/L (ref 136–145)
WBC # BLD AUTO: 10.37 10*3/MM3 (ref 3.4–10.8)

## 2021-03-19 PROCEDURE — 82962 GLUCOSE BLOOD TEST: CPT

## 2021-03-19 PROCEDURE — 25010000002 DEXAMETHASONE SODIUM PHOSPHATE 10 MG/ML SOLUTION: Performed by: HOSPITALIST

## 2021-03-19 PROCEDURE — 71045 X-RAY EXAM CHEST 1 VIEW: CPT

## 2021-03-19 PROCEDURE — 82248 BILIRUBIN DIRECT: CPT | Performed by: INTERNAL MEDICINE

## 2021-03-19 PROCEDURE — 94760 N-INVAS EAR/PLS OXIMETRY 1: CPT

## 2021-03-19 PROCEDURE — 85025 COMPLETE CBC W/AUTO DIFF WBC: CPT | Performed by: HOSPITALIST

## 2021-03-19 PROCEDURE — 25010000002 ENOXAPARIN PER 10 MG: Performed by: INTERNAL MEDICINE

## 2021-03-19 PROCEDURE — 99232 SBSQ HOSP IP/OBS MODERATE 35: CPT | Performed by: INTERNAL MEDICINE

## 2021-03-19 PROCEDURE — 63710000001 INSULIN GLARGINE PER 5 UNITS: Performed by: HOSPITALIST

## 2021-03-19 PROCEDURE — 94799 UNLISTED PULMONARY SVC/PX: CPT

## 2021-03-19 PROCEDURE — 86140 C-REACTIVE PROTEIN: CPT | Performed by: HOSPITALIST

## 2021-03-19 PROCEDURE — 80053 COMPREHEN METABOLIC PANEL: CPT | Performed by: HOSPITALIST

## 2021-03-19 PROCEDURE — 63710000001 INSULIN LISPRO (HUMAN) PER 5 UNITS: Performed by: HOSPITALIST

## 2021-03-19 RX ORDER — INSULIN GLARGINE 100 [IU]/ML
57 INJECTION, SOLUTION SUBCUTANEOUS EVERY 24 HOURS
Status: DISCONTINUED | OUTPATIENT
Start: 2021-03-20 | End: 2021-03-20

## 2021-03-19 RX ORDER — INSULIN LISPRO 100 [IU]/ML
19 INJECTION, SOLUTION INTRAVENOUS; SUBCUTANEOUS
Status: DISCONTINUED | OUTPATIENT
Start: 2021-03-19 | End: 2021-03-20

## 2021-03-19 RX ORDER — AMLODIPINE BESYLATE 5 MG/1
5 TABLET ORAL
Status: DISCONTINUED | OUTPATIENT
Start: 2021-03-19 | End: 2021-03-20

## 2021-03-19 RX ADMIN — INSULIN LISPRO 5 UNITS: 100 INJECTION, SOLUTION INTRAVENOUS; SUBCUTANEOUS at 21:45

## 2021-03-19 RX ADMIN — LOSARTAN POTASSIUM 100 MG: 100 TABLET, FILM COATED ORAL at 08:11

## 2021-03-19 RX ADMIN — Medication 1000 UNITS: at 08:11

## 2021-03-19 RX ADMIN — ENOXAPARIN SODIUM 40 MG: 40 INJECTION SUBCUTANEOUS at 20:28

## 2021-03-19 RX ADMIN — CETIRIZINE HYDROCHLORIDE ALLERGY 10 MG: 10 TABLET ORAL at 08:11

## 2021-03-19 RX ADMIN — INSULIN LISPRO 3 UNITS: 100 INJECTION, SOLUTION INTRAVENOUS; SUBCUTANEOUS at 17:06

## 2021-03-19 RX ADMIN — INSULIN LISPRO 16 UNITS: 100 INJECTION, SOLUTION INTRAVENOUS; SUBCUTANEOUS at 12:14

## 2021-03-19 RX ADMIN — INSULIN LISPRO 19 UNITS: 100 INJECTION, SOLUTION INTRAVENOUS; SUBCUTANEOUS at 17:06

## 2021-03-19 RX ADMIN — DEXAMETHASONE SODIUM PHOSPHATE 6 MG: 10 INJECTION, SOLUTION INTRAMUSCULAR; INTRAVENOUS at 20:29

## 2021-03-19 RX ADMIN — FAMOTIDINE 20 MG: 20 TABLET, FILM COATED ORAL at 20:29

## 2021-03-19 RX ADMIN — INSULIN GLARGINE 55 UNITS: 100 INJECTION, SOLUTION SUBCUTANEOUS at 12:14

## 2021-03-19 RX ADMIN — CLOPIDOGREL 75 MG: 75 TABLET, FILM COATED ORAL at 08:11

## 2021-03-19 RX ADMIN — DEXAMETHASONE SODIUM PHOSPHATE 6 MG: 10 INJECTION, SOLUTION INTRAMUSCULAR; INTRAVENOUS at 08:11

## 2021-03-19 RX ADMIN — ENOXAPARIN SODIUM 40 MG: 40 INJECTION SUBCUTANEOUS at 08:10

## 2021-03-19 RX ADMIN — INSULIN LISPRO 16 UNITS: 100 INJECTION, SOLUTION INTRAVENOUS; SUBCUTANEOUS at 08:08

## 2021-03-19 RX ADMIN — TAMSULOSIN HYDROCHLORIDE 0.4 MG: 0.4 CAPSULE ORAL at 20:29

## 2021-03-19 RX ADMIN — GUAIFENESIN 600 MG: 600 TABLET, EXTENDED RELEASE ORAL at 20:29

## 2021-03-19 RX ADMIN — INSULIN LISPRO 3 UNITS: 100 INJECTION, SOLUTION INTRAVENOUS; SUBCUTANEOUS at 12:14

## 2021-03-19 RX ADMIN — FLUCONAZOLE 100 MG: 100 TABLET ORAL at 08:10

## 2021-03-19 RX ADMIN — GUAIFENESIN 600 MG: 600 TABLET, EXTENDED RELEASE ORAL at 08:11

## 2021-03-19 RX ADMIN — FAMOTIDINE 20 MG: 20 TABLET, FILM COATED ORAL at 08:11

## 2021-03-19 RX ADMIN — FINASTERIDE 5 MG: 5 TABLET, FILM COATED ORAL at 08:11

## 2021-03-19 RX ADMIN — REMDESIVIR 100 MG: 100 INJECTION, POWDER, LYOPHILIZED, FOR SOLUTION INTRAVENOUS at 13:08

## 2021-03-19 RX ADMIN — AMLODIPINE BESYLATE 5 MG: 5 TABLET ORAL at 13:05

## 2021-03-19 NOTE — PROGRESS NOTES
[facility] INPATIENT PROGRESS NOTE         [address]    3/19/2021      PATIENT IDENTIFICATION:  Name: Alex Geiger ADMIT: 3/15/2021   : 1950  PCP: Real Hudson MD    MRN: [id number] LOS: 3 days   AGE/SEX: 70 y.o. male  ROOM: [facility]                     LOS 3    Reason for visit: Respiratory failure with COVID-19 pneumonia      SUBJECTIVE:      Resting comfortably.  Denies worsening shortness of breath on optiflow with 60 liters and 70% FiO2 to maintain sats 91%.      Objective   OBJECTIVE:    Vital Sign Min/Max for last 24 hours  Temp  Min: 96.5 °F (35.8 °C)  Max: 97.4 °F (36.3 °C)   BP  Min: 152/73  Max: 173/85   Pulse  Min: 35  Max: 63   Resp  Min: 18  Max: 20   SpO2  Min: 90 %  Max: 97 %   No data recorded   No data recorded                         Body mass index is 28.73 kg/m².    Intake/Output Summary (Last 24 hours) at 3/19/2021 0656  Last data filed at 3/19/2021 0600  Gross per 24 hour   Intake 480 ml   Output 1850 ml   Net -1370 ml         Exam:  GEN:  No distress, appears stated age  EYES:   PERRL, anicteric sclera  ENT:    External ears/nose normal, OP clear  NECK:  No adenopathy, midline trachea  LUNGS: Normal chest on inspection, palpation and auscultation  CV:  Normal S1S2, without murmur  ABD:  Non tender, non distended, no hepatosplenomegaly, +BS  EXT:  No edema, cyanosis or clubbing    Assessment     Scheduled meds:  cetirizine, 10 mg, Oral, Daily  cholecalciferol, 1,000 Units, Oral, Daily  clopidogrel, 75 mg, Oral, Daily  dexamethasone, 6 mg, Intravenous, BID  enoxaparin, 40 mg, Subcutaneous, Q12H  famotidine, 20 mg, Oral, BID  finasteride, 5 mg, Oral, Daily  fluconazole, 100 mg, Oral, Q24H  guaiFENesin, 600 mg, Oral, Q12H  insulin glargine, 55 Units, Subcutaneous, Q24H  insulin lispro, 0-7 Units, Subcutaneous, 4x Daily With Meals & Nightly  insulin lispro, 16 Units, Subcutaneous, TID With Meals  losartan, 100 mg, Oral, Q24H  metoprolol tartrate, 25 mg, Oral,  Q12H  remdesivir, 100 mg, Intravenous, Q24H  tamsulosin, 0.4 mg, Oral, Nightly      IV meds:                      Pharmacy Consult - Remdesivir,       Data Review:  Results from last 7 days   Lab Units 03/18/21  0721 03/17/21  0437 03/16/21  1215 03/16/21  0811 03/15/21  1447   SODIUM mmol/L 142 140  --  137 136   POTASSIUM mmol/L 4.5 4.6  --  4.6 4.3   CHLORIDE mmol/L 106 106  --  103 99   CO2 mmol/L 24.8 24.1  --  22.7 24.8   BUN mg/dL 24* 29*  --  24* 19   CREATININE mg/dL 0.77 1.01 0.96 0.87 1.05   GLUCOSE mg/dL 207* 151*  --  236* 238*   CALCIUM mg/dL 9.7 9.1  --  9.0 9.6         Estimated Creatinine Clearance: 103.3 mL/min (by C-G formula based on SCr of 0.77 mg/dL).  Results from last 7 days   Lab Units 03/19/21  0626 03/18/21  0721 03/17/21  0437 03/16/21  0811 03/15/21  1447   WBC 10*3/mm3 10.37 8.71 9.83 10.37 8.17   HEMOGLOBIN g/dL 13.4 12.8* 11.8* 12.3* 13.5   PLATELETS 10*3/mm3 337 286 253 216 208     Results from last 7 days   Lab Units 03/16/21  1215   INR  1.25*     Results from last 7 days   Lab Units 03/18/21  0721 03/17/21  0437 03/16/21  1215 03/15/21  1447   ALT (SGPT) U/L 17 17 14 17   AST (SGOT) U/L 21 26 23 28         Results from last 7 days   Lab Units 03/15/21  1903 03/15/21  1447   PROCALCITONIN ng/mL  --  0.89*   LACTATE mmol/L 1.3 2.1*     COVID LABS:  Results From Last 14 Days   Lab Units 03/18/21  0721 03/17/21  0437 03/16/21  1215 03/15/21  1903 03/15/21  1447   PROBNP pg/mL  --  645.2  --   --   --    CRP mg/dL 6.55* 12.37*  --   --   --    D DIMER QUANT MCGFEU/mL  --   --  0.93*  --   --    FERRITIN ng/mL  --  1,212.00*  --   --   --    LACTATE mmol/L  --   --   --  1.3 2.1*   PROCALCITONIN ng/mL  --   --   --   --  0.89*   PROTIME Seconds  --   --  15.5*  --   --    INR   --   --  1.25*  --   --            Chest x-ray 3/15/2021 viewed shows bilateral airspace disease consistent with COVID-19 pneumonia.      Microbiology reviewed    )        Active Hospital Problems    Diagnosis   POA   • Pneumonia due to COVID-19 virus [U07.1, J12.82]  Yes      Resolved Hospital Problems   No resolved problems to display.         ASSESSMENT:  COVID-19 pneumonia  Acute hypoxemic respiratory failure secondary to above  Type 2 diabetes  Thrush  CAD  Hypertension  Hyperlipidemia        PLAN:    Treating COVID-19 pneumonia with continued IV remdesivir, Actemra IV x1 and steroids.  Wean oxygen as able.  Requiring OptiFlow with 60 L and 83% FiO2 at time of visit.  Repeat chest x-ray  for follow-up and monitoring.  Following inflammatory labs.  Control glucose.  Control blood pressure.        Hung Quintanilla MD  Pulmonary and Critical Care Medicine  [facility], [facility]  3/19/2021    06:56 EDT

## 2021-03-19 NOTE — PROGRESS NOTES
"Daily progress note    Chief complaint  Doing little better  No new complaints  Still with cough and shortness of breath    History of present illness  70-year-old white male with history of coronary artery disease hypertension hyperlipidemia and diabetes mellitus presented to [facility] emergency room with shortness of breath body aches fever chills and nonproductive cough for last 1 week.  Patient was diagnosed with Covid about a week ago.  Patient denies any chest pain abdominal pain nausea vomiting diarrhea.  Patient work-up in ER revealed COVID-19 pneumonia with acute hypoxic respiratory failure admit for management.    REVIEW OF SYSTEMS   Unremarkable except for shortness of breath and cough    PHYSICAL EXAM   Blood pressure 167/76, pulse 56, temperature 97.3 °F (36.3 °C), temperature source Oral, resp. rate 16, height 182.9 cm (72\"), weight 96.1 kg (211 lb 13.8 oz), SpO2 90 %.    GENERAL: not distressed, appears ill but not toxic   HENT: nares patent   EYES: no scleral icterus   NECK: no ROM limitations   CV: regular rhythm, regular rate, no murmur, no rubs and no gallops   RESPIRATORY: Mild increased work of breathing, breath sounds are clear to auscultate bilaterally patient is able to speak in full sentences   ABDOMEN: soft, rounded, no focal tenderness bowel sounds positive  MUSCULOSKELETAL: no deformity   NEURO: alert, moves all extremities, follows commands   SKIN: warm, dry     LAB RESULTS   Lab Results (last 24 hours)     Procedure Component Value Units Date/Time    POC Glucose Once [[id number]]  (Abnormal) Collected: 03/19/21 1133    Specimen: Blood Updated: 03/19/21 1158     Glucose 201 mg/dL     Comprehensive Metabolic Panel [[id number]]  (Abnormal) Collected: 03/19/21 0626    Specimen: Blood Updated: 03/19/21 0710     Glucose 155 mg/dL      BUN 22 mg/dL      Creatinine 0.75 mg/dL      Sodium 141 mmol/L      Potassium 4.4 mmol/L      Chloride 105 mmol/L      CO2 27.2 mmol/L      Calcium " 9.1 mg/dL      Total Protein 6.0 g/dL      Albumin 3.20 g/dL      ALT (SGPT) 23 U/L      AST (SGOT) 25 U/L      Alkaline Phosphatase 44 U/L      Total Bilirubin 0.5 mg/dL      eGFR Non African Amer 103 mL/min/1.73      Globulin 2.8 gm/dL      A/G Ratio 1.1 g/dL      BUN/Creatinine Ratio 29.3     Anion Gap 8.8 mmol/L     Narrative:      GFR Normal >60  Chronic Kidney Disease <60  Kidney Failure <15      Bilirubin, Direct [[id number]]  (Normal) Collected: 03/19/21 0626    Specimen: Blood Updated: 03/19/21 0710     Bilirubin, Direct <0.2 mg/dL     C-reactive Protein [[id number]]  (Abnormal) Collected: 03/19/21 0626    Specimen: Blood Updated: 03/19/21 0710     C-Reactive Protein 3.07 mg/dL     CBC & Differential [[id number]]  (Abnormal) Collected: 03/19/21 0626    Specimen: Blood Updated: 03/19/21 0645    Narrative:      The following orders were created for panel order CBC & Differential.  Procedure                               Abnormality         Status                     ---------                               -----------         ------                     CBC Auto Differential[[id number]]        Abnormal            Final result                 Please view results for these tests on the individual orders.    CBC Auto Differential [[id number]]  (Abnormal) Collected: 03/19/21 0626    Specimen: Blood Updated: 03/19/21 0645     WBC 10.37 10*3/mm3      RBC 4.67 10*6/mm3      Hemoglobin 13.4 g/dL      Hematocrit 39.4 %      MCV 84.4 fL      MCH 28.7 pg      MCHC 34.0 g/dL      RDW 13.1 %      RDW-SD 39.9 fl      MPV 10.3 fL      Platelets 337 10*3/mm3      Neutrophil % 81.8 %      Lymphocyte % 7.6 %      Monocyte % 5.9 %      Eosinophil % 0.0 %      Basophil % 0.3 %      Immature Grans % 4.4 %      Neutrophils, Absolute 8.48 10*3/mm3      Lymphocytes, Absolute 0.79 10*3/mm3      Monocytes, Absolute 0.61 10*3/mm3      Eosinophils, Absolute 0.00 10*3/mm3      Basophils, Absolute 0.03 10*3/mm3      Immature Grans, Absolute 0.46  10*3/mm3      nRBC 0.1 /100 WBC     POC Glucose Once [[id number]]  (Abnormal) Collected: 03/19/21 0615    Specimen: Blood Updated: 03/19/21 0617     Glucose 148 mg/dL     POC Glucose Once [[id number]]  (Abnormal) Collected: 03/18/21 1957    Specimen: Blood Updated: 03/18/21 2013     Glucose 318 mg/dL            Study Result    Narrative & Impression   ONE VIEW PORTABLE CHEST     HISTORY: Shortness of breath and cough. Possible Covid 19 pneumonia.     FINDINGS: The lungs are moderately expanded with some vague haziness  extending into the periphery of the left lung and at the right base  highly suspicious for early changes of Covid 19 pneumonia and continued  follow-up evaluation is recommended. The heart is slightly enlarged.          Current Facility-Administered Medications:   •  albuterol (PROVENTIL) nebulizer solution 0.083% 2.5 mg/3mL, 2.5 mg, Nebulization, Q6H PRN, Kevin Hayes MD  •  amLODIPine (NORVASC) tablet 5 mg, 5 mg, Oral, Q24H, Judy Clemens MD, 5 mg at 03/19/21 1305  •  cetirizine (zyrTEC) tablet 10 mg, 10 mg, Oral, Daily, Kevin Hayes MD, 10 mg at 03/19/21 0811  •  cholecalciferol (VITAMIN D3) tablet 1,000 Units, 1,000 Units, Oral, Daily, Kevin Hayes MD, 1,000 Units at 03/19/21 0811  •  clopidogrel (PLAVIX) tablet 75 mg, 75 mg, Oral, Daily, Kevin Hayes MD, 75 mg at 03/19/21 0811  •  dexamethasone sodium phosphate injection 6 mg, 6 mg, Intravenous, BID, Kevin Hayes MD, 6 mg at 03/19/21 0811  •  enoxaparin (LOVENOX) syringe 40 mg, 40 mg, Subcutaneous, Q12H, Antonio Serrano MD, 40 mg at 03/19/21 0810  •  famotidine (PEPCID) tablet 20 mg, 20 mg, Oral, BID, Kevin Hayes MD, 20 mg at 03/19/21 0811  •  finasteride (PROSCAR) tablet 5 mg, 5 mg, Oral, Daily, Kevin Hayes MD, 5 mg at 03/19/21 0811  •  fluconazole (DIFLUCAN) tablet 100 mg, 100 mg, Oral, Q24H, Antonio Serrano MD, 100 mg at 03/19/21 0810  •  guaiFENesin (MUCINEX) 12 hr tablet 600 mg, 600 mg, Oral, Q12H, Kevin Hayes MD, 600  mg at 03/19/21 0811  •  insulin glargine (LANTUS, SEMGLEE) injection 55 Units, 55 Units, Subcutaneous, Q24H, Honey Hayes MD, 55 Units at 03/19/21 1214  •  insulin lispro (ADMELOG) injection 0-7 Units, 0-7 Units, Subcutaneous, 4x Daily With Meals & Nightly, Honey aHyes MD, 3 Units at 03/19/21 1214  •  insulin lispro (ADMELOG) injection 16 Units, 16 Units, Subcutaneous, TID With Meals, Honey Hayes MD, 16 Units at 03/19/21 1214  •  losartan (COZAAR) tablet 100 mg, 100 mg, Oral, Q24H, Honey Hayes MD, 100 mg at 03/19/21 0811  •  ondansetron (ZOFRAN) injection 4 mg, 4 mg, Intravenous, Q6H PRN, Honey Hayes MD  •  Pharmacy Consult - Remdesivir, , Does not apply, Continuous PRN, Roberto Jensen MD  •  [COMPLETED] remdesivir 200 mg in sodium chloride 0.9 % 290 mL IVPB (powder vial), 200 mg, Intravenous, Q24H, 200 mg at 03/16/21 1518 **FOLLOWED BY** remdesivir 100 mg in sodium chloride 0.9 % 270 mL IVPB (powder vial), 100 mg, Intravenous, Q24H, Roberto Jensen MD, 100 mg at 03/19/21 1308  •  tamsulosin (FLOMAX) 24 hr capsule 0.4 mg, 0.4 mg, Oral, Nightly, Honey Hayes MD, 0.4 mg at 03/18/21 2057     ASSESSMENT  COVID-19 pneumonia  Acute hypoxic respiratory failure   Sinus bradycardia  Diabetes mellitus  Hypertension  Hyperlipidemia  Coronary artery disease  BPH  Gastroesophageal reflux disease    PLAN  CPM  Supplement oxygen and titrate  Decadron  Remdesivir  Actemra  Continue beta-blocker  Infectious disease consult appreciated   Pulmonary and cardiology to follow patient  Adjust home medications  Stress ulcer DVT prophylaxis  Supportive care  Discussed with nursing staff  Follow closely further recommendation according to hospital course    HONEY HAYES MD

## 2021-03-19 NOTE — PROGRESS NOTES
[facility] Follow Up    Chief Complaint: Follow up bradycardia    Interval History: Continues to have intermittent bradycardia primarily at night.  Early this morning he had 3 minutes of wide-complex rhythm with a rate in the 30s to 40s.  He was asleep during this episode.  This morning while awake his heart rates are in the 70s to 80s.  He denies any lightheadedness or dizziness.  He denies any significant shortness of breath.  Denies any chest pain.    Objective:     Objective:  Temp:  [96.5 °F (35.8 °C)-97.4 °F (36.3 °C)] 97.3 °F (36.3 °C)  Heart Rate:  [35-63] 46  Resp:  [16-20] 16  BP: (143-173)/(71-85) 143/77     Intake/Output Summary (Last 24 hours) at 3/19/2021 0823  Last data filed at 3/19/2021 0600  Gross per 24 hour   Intake 240 ml   Output 1850 ml   Net -1610 ml     Body mass index is 28.73 kg/m².      03/15/21  1447 03/15/21  1847   Weight: 99.8 kg (220 lb) 96.1 kg (211 lb 13.8 oz)     Weight change:       Physical Exam:   General : Alert, cooperative, in no acute distress.  Neuro: Alert,cooperative and oriented.  Lungs: CTAB. Normal respiratory effort and rate.  CV: Regular rate and rhythm, normal S1 and S2, no murmurs, gallops or rubs.  ABD: Soft, nontender, nondistended. Positive bowel sounds.  Extr: No edema or cyanosis, moves all extremities.    Lab Review:   Results from last 7 days   Lab Units 03/19/21  0626 03/18/21  0721   SODIUM mmol/L 141 142   POTASSIUM mmol/L 4.4 4.5   CHLORIDE mmol/L 105 106   CO2 mmol/L 27.2 24.8   BUN mg/dL 22 24*   CREATININE mg/dL 0.75* 0.77   GLUCOSE mg/dL 155* 207*   CALCIUM mg/dL 9.1 9.7   AST (SGOT) U/L 25 21   ALT (SGPT) U/L 23 17         Results from last 7 days   Lab Units 03/19/21  0626 03/18/21  0721   WBC 10*3/mm3 10.37 8.71   HEMOGLOBIN g/dL 13.4 12.8*   HEMATOCRIT % 39.4 37.9   PLATELETS 10*3/mm3 337 286     Results from last 7 days   Lab Units 03/16/21  1215   INR  1.25*               Invalid input(s): LDLCALC  Results from last 7  days   Lab Units 03/17/21  0437   PROBNP pg/mL 645.2     Results from last 7 days   Lab Units 03/17/21  0437   TSH uIU/mL 0.206*     I reviewed the patient's new clinical results.  I personally viewed and interpreted the patient's EKG  Current Medications:   Scheduled Meds:cetirizine, 10 mg, Oral, Daily  cholecalciferol, 1,000 Units, Oral, Daily  clopidogrel, 75 mg, Oral, Daily  dexamethasone, 6 mg, Intravenous, BID  enoxaparin, 40 mg, Subcutaneous, Q12H  famotidine, 20 mg, Oral, BID  finasteride, 5 mg, Oral, Daily  fluconazole, 100 mg, Oral, Q24H  guaiFENesin, 600 mg, Oral, Q12H  insulin glargine, 55 Units, Subcutaneous, Q24H  insulin lispro, 0-7 Units, Subcutaneous, 4x Daily With Meals & Nightly  insulin lispro, 16 Units, Subcutaneous, TID With Meals  losartan, 100 mg, Oral, Q24H  metoprolol tartrate, 25 mg, Oral, Q12H  remdesivir, 100 mg, Intravenous, Q24H  tamsulosin, 0.4 mg, Oral, Nightly      Continuous Infusions:Pharmacy Consult - Remdesivir,         Allergies:  Allergies   Allergen Reactions   • Oxycodone-Acetaminophen Itching   • Percocet [Oxycodone-Acetaminophen]    • Shellfish-Derived Products Hives and Itching   • Statins    • Adhesive Tape Rash   • Latex Rash       Assessment/Plan:     1. COVID-19 pneumonia.  On dexamethasone and remdesivir.  2. Acute hypoxic respiratory failure.  Remains on OptiFlow 60 L with a 70% FiO2 concentration.  3. Sinus bradycardia.  Metoprolol dosage decreased yesterday.  Still with episodes of bradycardia and wide-complex escape rhythm concerning for infranodal disease with block.  Episodes largely occur while asleep and even when awake he appears to be asymptomatic with bradycardia.  4. Coronary artery disease  5. Hypertension    -We will discontinue metoprolol tartrate for now.  -Continue losartan.  -We will start him on amlodipine 5 mg daily for his hypertension.  The patient reports that he has had multiple issues with various antihypertensive medications in the past  although I do not see any documentation of what agents have been tried previously.    Judy Clemens MD  03/19/21  08:23 EDT

## 2021-03-19 NOTE — PROGRESS NOTES
"  Infectious Diseases Progress Note    Roberto Jensen MD     [facility]  Los: 3 days  Patient Identification:  Name: Alex Geiger  Age: 70 y.o.  Sex: male  :  1950  MRN: [id number]         Primary Care Physician: Real Hudson MD            Subjective: Compared to symptoms that brought him to the emergency room and then subsequently hospitalized- he feels a lot better and almost 90% improved.  Still having issues with breathing but claims that his breathing is better.  Interval History: See consultation note.    Objective:    Scheduled Meds:cetirizine, 10 mg, Oral, Daily  cholecalciferol, 1,000 Units, Oral, Daily  clopidogrel, 75 mg, Oral, Daily  dexamethasone, 6 mg, Intravenous, BID  enoxaparin, 40 mg, Subcutaneous, Q12H  famotidine, 20 mg, Oral, BID  finasteride, 5 mg, Oral, Daily  fluconazole, 100 mg, Oral, Q24H  guaiFENesin, 600 mg, Oral, Q12H  insulin glargine, 55 Units, Subcutaneous, Q24H  insulin lispro, 0-7 Units, Subcutaneous, 4x Daily With Meals & Nightly  insulin lispro, 16 Units, Subcutaneous, TID With Meals  losartan, 100 mg, Oral, Q24H  metoprolol tartrate, 25 mg, Oral, Q12H  remdesivir, 100 mg, Intravenous, Q24H  tamsulosin, 0.4 mg, Oral, Nightly      Continuous Infusions:Pharmacy Consult - Remdesivir,         Vital signs in last 24 hours:  Temp:  [96.5 °F (35.8 °C)-97.4 °F (36.3 °C)] 97.3 °F (36.3 °C)  Heart Rate:  [35-63] 46  Resp:  [16-20] 16  BP: (143-173)/(71-85) 143/77    Intake/Output:    Intake/Output Summary (Last 24 hours) at 3/19/2021 0833  Last data filed at 3/19/2021 0600  Gross per 24 hour   Intake 240 ml   Output 1850 ml   Net -1610 ml       Exam:  /77 (BP Location: Right arm, Patient Position: Sitting)   Pulse (!) 46   Temp 97.3 °F (36.3 °C) (Oral)   Resp 16   Ht 182.9 cm (72\")   Wt 96.1 kg (211 lb 13.8 oz)   SpO2 92%   BMI 28.73 kg/m²   Patient is examined using the personal protective equipment as per guidelines from infection control for this " particular patient as enacted.  Hand washing was performed before and after patient interaction.  General Appearance:    Alert, cooperative, no distress, AAOx3                          Head:    Normocephalic, without obvious abnormality, atraumatic                           Eyes:    PERRL, conjunctivae/corneas clear, EOM's intact, both eyes                         Throat:   Lips, tongue, gums normal; oral mucosa pink and moist                           Neck:   Supple, symmetrical, trachea midline, no JVD                         Lungs:    Clear to auscultation bilaterally, respirations unlabored                 Chest Wall:    No tenderness or deformity                          Heart:    Regular rate and rhythm, S1 and S2 normal                  Abdomen:     Soft, non-tender, bowel sounds active                 Extremities:   Extremities normal, atraumatic, no cyanosis or edema                        Pulses:   Pulses palpable in all extremities                            Skin:   Skin is warm and dry,  no rashes or palpable lesions                  Neurologic:   CNII-XII intact, motor strength grossly intact     Data Review:    I reviewed the patient's new clinical results.  Results from last 7 days   Lab Units 03/19/21  0626 03/18/21  0721 03/17/21  0437 03/16/21  0811 03/15/21  1447   WBC 10*3/mm3 10.37 8.71 9.83 10.37 8.17   HEMOGLOBIN g/dL 13.4 12.8* 11.8* 12.3* 13.5   PLATELETS 10*3/mm3 337 286 253 216 208     Results from last 7 days   Lab Units 03/19/21  0626 03/18/21  0721 03/17/21  0437 03/16/21  1215 03/16/21  0811 03/15/21  1447   SODIUM mmol/L 141 142 140  --  137 136   POTASSIUM mmol/L 4.4 4.5 4.6  --  4.6 4.3   CHLORIDE mmol/L 105 106 106  --  103 99   CO2 mmol/L 27.2 24.8 24.1  --  22.7 24.8   BUN mg/dL 22 24* 29*  --  24* 19   CREATININE mg/dL 0.75* 0.77 1.01 0.96 0.87 1.05   CALCIUM mg/dL 9.1 9.7 9.1  --  9.0 9.6   GLUCOSE mg/dL 155* 207* 151*  --  236* 238*       Microbiology Results (last 10 days)      Procedure Component Value - Date/Time    Respiratory Panel PCR w/COVID-19(SARS-CoV-2) LA/ANAT/[facility]IRA/PAD/COR/M[facility]LE[facility] In-House, NP Swab in UTM/VTM, 3-4 HR TAT - Swab, Nasopharynx [[id number]]  (Abnormal) Collected: 03/15/21 1547    Lab Status: Final result Specimen: Swab from Nasopharynx Updated: 03/15/21 4992     ADENOVIRUS, PCR Not Detected     Coronavirus 229E Not Detected     Coronavirus HKU1 Not Detected     Coronavirus NL63 Not Detected     Coronavirus OC43 Not Detected     COVID19 Detected     Human Metapneumovirus Not Detected     Human Rhinovirus/Enterovirus Not Detected     Influenza A PCR Not Detected     Influenza B PCR Not Detected     Parainfluenza Virus 1 Not Detected     Parainfluenza Virus 2 Not Detected     Parainfluenza Virus 3 Not Detected     Parainfluenza Virus 4 Not Detected     RSV, PCR Not Detected     Bordetella pertussis pcr Not Detected     Bordetella parapertussis PCR Not Detected     Chlamydophila pneumoniae PCR Not Detected     Mycoplasma pneumo by PCR Not Detected    Narrative:      Fact sheet for providers: https://docs.[employer]/wp-content/uploads/UXO0195-3712-UZ0.1-EUA-Provider-Fact-Sheet-3.pdf    Fact sheet for patients: https://docs.[employer]/wp-content/uploads/UYM8085-8972-GZ0.1-EUA-Patient-Fact-Sheet-1.pdf    Test performed by PCR.    Blood Culture - Blood, Arm, Left [[id number]] Collected: 03/15/21 1447    Lab Status: Preliminary result Specimen: Blood from Arm, Left Updated: 03/18/21 1500     Blood Culture No growth at 3 days    Blood Culture - Blood, Arm, Right [[id number]] Collected: 03/15/21 1447    Lab Status: Preliminary result Specimen: Blood from Arm, Right Updated: 03/18/21 1500     Blood Culture No growth at 3 days          Assessment: Overall seems to have improved with decreasing oxygen requirement.    Pneumonia due to COVID-19 virus  1-systemic COVID-19 infection with  2-COVID-19 pneumonia with possible evolving hypoxia and possible  3-superimposed bacterial  pneumonia  4-diabetes mellitus  5-hypertension  6-other diagnosis per primary team.  7-oral thrush     Recommendations/Discussions:  · Continue with dexamethasone and given his oxygen requirement at remdesivir.  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  · Management of underlying diabetes hypertension and other medical issues per primary team.  Roberto Jensen MD  3/19/2021  08:33 EDT    Much of this encounter note is an electronic transcription/translation of spoken language to printed text. The electronic translation of spoken language may permit erroneous, or at times, nonsensical words or phrases to be inadvertently transcribed; Although I have reviewed the note for such errors, some may still exist

## 2021-03-19 NOTE — PLAN OF CARE
Goal Outcome Evaluation:         VSS, remains on OptiFlow 60L at 70%, A&Ox4, standby assistance at most, patient states SOB less with activity today than previously, Metoprolol stopped and Norvasc began today, nursing will continue to monitor.      Dr Castanon aware of irregular rhythm patient experienced this shift.  Monitor Tech, RN, and MD reviewed at monitor and cardiologist on was notified, no new orders received as patient was asymptomatic with each occurrence.

## 2021-03-19 NOTE — PLAN OF CARE
Goal Outcome Evaluation:     Progress: no change   HR in the 35-40s while sleeping, asymptomatic. No c/o pain. Voiding per urinal. will continue to monitor and follow MD orders throughout my shift.

## 2021-03-20 LAB
ALBUMIN SERPL-MCNC: 2.7 G/DL (ref 3.5–5.2)
ALBUMIN/GLOB SERPL: 0.8 G/DL
ALP SERPL-CCNC: 49 U/L (ref 39–117)
ALT SERPL W P-5'-P-CCNC: 34 U/L (ref 1–41)
ANION GAP SERPL CALCULATED.3IONS-SCNC: 9.2 MMOL/L (ref 5–15)
ANISOCYTOSIS BLD QL: ABNORMAL
AST SERPL-CCNC: 27 U/L (ref 1–40)
BACTERIA SPEC AEROBE CULT: NORMAL
BACTERIA SPEC AEROBE CULT: NORMAL
BILIRUB CONJ SERPL-MCNC: <0.2 MG/DL (ref 0–0.3)
BILIRUB SERPL-MCNC: 0.4 MG/DL (ref 0–1.2)
BUN SERPL-MCNC: 17 MG/DL (ref 8–23)
BUN/CREAT SERPL: 28.3 (ref 7–25)
BURR CELLS BLD QL SMEAR: ABNORMAL
CALCIUM SPEC-SCNC: 9 MG/DL (ref 8.6–10.5)
CHLORIDE SERPL-SCNC: 104 MMOL/L (ref 98–107)
CO2 SERPL-SCNC: 24.8 MMOL/L (ref 22–29)
CREAT SERPL-MCNC: 0.6 MG/DL (ref 0.76–1.27)
CRP SERPL-MCNC: 1.61 MG/DL (ref 0–0.5)
DACRYOCYTES BLD QL SMEAR: ABNORMAL
DEPRECATED RDW RBC AUTO: 40.5 FL (ref 37–54)
ERYTHROCYTE [DISTWIDTH] IN BLOOD BY AUTOMATED COUNT: 13 % (ref 12.3–15.4)
GFR SERPL CREATININE-BSD FRML MDRD: 133 ML/MIN/1.73
GLOBULIN UR ELPH-MCNC: 3.5 GM/DL
GLUCOSE BLDC GLUCOMTR-MCNC: 169 MG/DL (ref 70–130)
GLUCOSE BLDC GLUCOMTR-MCNC: 198 MG/DL (ref 70–130)
GLUCOSE BLDC GLUCOMTR-MCNC: 216 MG/DL (ref 70–130)
GLUCOSE BLDC GLUCOMTR-MCNC: 218 MG/DL (ref 70–130)
GLUCOSE SERPL-MCNC: 191 MG/DL (ref 65–99)
HCT VFR BLD AUTO: 40.8 % (ref 37.5–51)
HGB BLD-MCNC: 13.9 G/DL (ref 13–17.7)
LYMPHOCYTES # BLD MANUAL: 0.6 10*3/MM3 (ref 0.7–3.1)
LYMPHOCYTES NFR BLD MANUAL: 5.4 % (ref 19.6–45.3)
LYMPHOCYTES NFR BLD MANUAL: 5.4 % (ref 5–12)
MCH RBC QN AUTO: 29.5 PG (ref 26.6–33)
MCHC RBC AUTO-ENTMCNC: 34.1 G/DL (ref 31.5–35.7)
MCV RBC AUTO: 86.6 FL (ref 79–97)
MICROCYTES BLD QL: ABNORMAL
MONOCYTES # BLD AUTO: 0.6 10*3/MM3 (ref 0.1–0.9)
NEUTROPHILS # BLD AUTO: 9.95 10*3/MM3 (ref 1.7–7)
NEUTROPHILS NFR BLD MANUAL: 89.2 % (ref 42.7–76)
PLAT MORPH BLD: NORMAL
PLATELET # BLD AUTO: 355 10*3/MM3 (ref 140–450)
PMV BLD AUTO: 10.4 FL (ref 6–12)
POIKILOCYTOSIS BLD QL SMEAR: ABNORMAL
POTASSIUM SERPL-SCNC: 4.3 MMOL/L (ref 3.5–5.2)
PROT SERPL-MCNC: 6.2 G/DL (ref 6–8.5)
RBC # BLD AUTO: 4.71 10*6/MM3 (ref 4.14–5.8)
SODIUM SERPL-SCNC: 138 MMOL/L (ref 136–145)
WBC # BLD AUTO: 11.15 10*3/MM3 (ref 3.4–10.8)
WBC MORPH BLD: NORMAL

## 2021-03-20 PROCEDURE — 63710000001 INSULIN LISPRO (HUMAN) PER 5 UNITS: Performed by: HOSPITALIST

## 2021-03-20 PROCEDURE — 85007 BL SMEAR W/DIFF WBC COUNT: CPT | Performed by: HOSPITALIST

## 2021-03-20 PROCEDURE — 94760 N-INVAS EAR/PLS OXIMETRY 1: CPT

## 2021-03-20 PROCEDURE — 80053 COMPREHEN METABOLIC PANEL: CPT | Performed by: HOSPITALIST

## 2021-03-20 PROCEDURE — 86140 C-REACTIVE PROTEIN: CPT | Performed by: HOSPITALIST

## 2021-03-20 PROCEDURE — 99232 SBSQ HOSP IP/OBS MODERATE 35: CPT | Performed by: INTERNAL MEDICINE

## 2021-03-20 PROCEDURE — 63710000001 INSULIN GLARGINE PER 5 UNITS: Performed by: HOSPITALIST

## 2021-03-20 PROCEDURE — 94799 UNLISTED PULMONARY SVC/PX: CPT

## 2021-03-20 PROCEDURE — 82962 GLUCOSE BLOOD TEST: CPT

## 2021-03-20 PROCEDURE — 25010000002 ENOXAPARIN PER 10 MG: Performed by: INTERNAL MEDICINE

## 2021-03-20 PROCEDURE — 85025 COMPLETE CBC W/AUTO DIFF WBC: CPT | Performed by: HOSPITALIST

## 2021-03-20 PROCEDURE — 25010000002 DEXAMETHASONE SODIUM PHOSPHATE 10 MG/ML SOLUTION: Performed by: HOSPITALIST

## 2021-03-20 PROCEDURE — 82248 BILIRUBIN DIRECT: CPT | Performed by: INTERNAL MEDICINE

## 2021-03-20 RX ORDER — INSULIN LISPRO 100 [IU]/ML
20 INJECTION, SOLUTION INTRAVENOUS; SUBCUTANEOUS
Status: DISCONTINUED | OUTPATIENT
Start: 2021-03-20 | End: 2021-03-22

## 2021-03-20 RX ORDER — INSULIN GLARGINE 100 [IU]/ML
60 INJECTION, SOLUTION SUBCUTANEOUS EVERY 24 HOURS
Status: DISCONTINUED | OUTPATIENT
Start: 2021-03-21 | End: 2021-03-22

## 2021-03-20 RX ORDER — AMLODIPINE BESYLATE 10 MG/1
10 TABLET ORAL
Status: DISCONTINUED | OUTPATIENT
Start: 2021-03-21 | End: 2021-03-26 | Stop reason: HOSPADM

## 2021-03-20 RX ORDER — GUAIFENESIN 600 MG/1
1200 TABLET, EXTENDED RELEASE ORAL EVERY 12 HOURS SCHEDULED
Status: DISCONTINUED | OUTPATIENT
Start: 2021-03-20 | End: 2021-03-26 | Stop reason: HOSPADM

## 2021-03-20 RX ADMIN — INSULIN GLARGINE 57 UNITS: 100 INJECTION, SOLUTION SUBCUTANEOUS at 12:15

## 2021-03-20 RX ADMIN — INSULIN LISPRO 2 UNITS: 100 INJECTION, SOLUTION INTRAVENOUS; SUBCUTANEOUS at 09:13

## 2021-03-20 RX ADMIN — INSULIN LISPRO 3 UNITS: 100 INJECTION, SOLUTION INTRAVENOUS; SUBCUTANEOUS at 18:41

## 2021-03-20 RX ADMIN — FLUCONAZOLE 100 MG: 100 TABLET ORAL at 09:12

## 2021-03-20 RX ADMIN — FAMOTIDINE 20 MG: 20 TABLET, FILM COATED ORAL at 09:12

## 2021-03-20 RX ADMIN — FAMOTIDINE 20 MG: 20 TABLET, FILM COATED ORAL at 20:30

## 2021-03-20 RX ADMIN — INSULIN LISPRO 2 UNITS: 100 INJECTION, SOLUTION INTRAVENOUS; SUBCUTANEOUS at 20:30

## 2021-03-20 RX ADMIN — Medication 1000 UNITS: at 09:12

## 2021-03-20 RX ADMIN — ENOXAPARIN SODIUM 40 MG: 40 INJECTION SUBCUTANEOUS at 20:30

## 2021-03-20 RX ADMIN — GUAIFENESIN 1200 MG: 600 TABLET, EXTENDED RELEASE ORAL at 20:30

## 2021-03-20 RX ADMIN — DEXAMETHASONE SODIUM PHOSPHATE 6 MG: 10 INJECTION, SOLUTION INTRAMUSCULAR; INTRAVENOUS at 09:13

## 2021-03-20 RX ADMIN — REMDESIVIR 100 MG: 100 INJECTION, POWDER, LYOPHILIZED, FOR SOLUTION INTRAVENOUS at 12:41

## 2021-03-20 RX ADMIN — CLOPIDOGREL 75 MG: 75 TABLET, FILM COATED ORAL at 09:12

## 2021-03-20 RX ADMIN — CETIRIZINE HYDROCHLORIDE ALLERGY 10 MG: 10 TABLET ORAL at 09:12

## 2021-03-20 RX ADMIN — GUAIFENESIN 600 MG: 600 TABLET, EXTENDED RELEASE ORAL at 09:12

## 2021-03-20 RX ADMIN — TAMSULOSIN HYDROCHLORIDE 0.4 MG: 0.4 CAPSULE ORAL at 20:30

## 2021-03-20 RX ADMIN — INSULIN LISPRO 19 UNITS: 100 INJECTION, SOLUTION INTRAVENOUS; SUBCUTANEOUS at 09:13

## 2021-03-20 RX ADMIN — DEXAMETHASONE SODIUM PHOSPHATE 6 MG: 10 INJECTION, SOLUTION INTRAMUSCULAR; INTRAVENOUS at 20:30

## 2021-03-20 RX ADMIN — FINASTERIDE 5 MG: 5 TABLET, FILM COATED ORAL at 09:12

## 2021-03-20 RX ADMIN — AMLODIPINE BESYLATE 5 MG: 5 TABLET ORAL at 09:12

## 2021-03-20 RX ADMIN — LOSARTAN POTASSIUM 100 MG: 100 TABLET, FILM COATED ORAL at 09:12

## 2021-03-20 RX ADMIN — INSULIN LISPRO 19 UNITS: 100 INJECTION, SOLUTION INTRAVENOUS; SUBCUTANEOUS at 12:17

## 2021-03-20 RX ADMIN — INSULIN LISPRO 3 UNITS: 100 INJECTION, SOLUTION INTRAVENOUS; SUBCUTANEOUS at 12:17

## 2021-03-20 RX ADMIN — INSULIN LISPRO 20 UNITS: 100 INJECTION, SOLUTION INTRAVENOUS; SUBCUTANEOUS at 18:43

## 2021-03-20 RX ADMIN — ENOXAPARIN SODIUM 40 MG: 40 INJECTION SUBCUTANEOUS at 09:12

## 2021-03-20 NOTE — PLAN OF CARE
Goal Outcome Evaluation:  Plan of Care Reviewed With: patient  Progress: no change  Outcome Summary: Pt complaint with care. Oxygen slightly drops during activity with a quick recover time. Some slight drops in O2 saturation levels during sleep. VSS with no acute changes at this time.

## 2021-03-20 NOTE — PROGRESS NOTES
[facility] INPATIENT PROGRESS NOTE         [address]    3/20/2021      PATIENT IDENTIFICATION:  Name: Alex Geiger ADMIT: 3/15/2021   : 1950  PCP: Real Hudson MD    MRN: [id number] LOS: 4 days   AGE/SEX: 70 y.o. male  ROOM: [facility]                     LOS 4    Reason for visit: Respiratory failure with COVID-19 pneumonia      SUBJECTIVE:      Says that he feels he is breathing a little bit better today.  Oxygen requirement starting to come down.  On 60 L and percent FiO2 via OptiFlow this morning with saturations 96%.  Diminished breath sounds on auscultation but no wheezing or rhonchi.      Objective   OBJECTIVE:    Vital Sign Min/Max for last 24 hours  Temp  Min: 96 °F (35.6 °C)  Max: 97.9 °F (36.6 °C)   BP  Min: 132/86  Max: 167/76   Pulse  Min: 56  Max: 91   Resp  Min: 16  Max: 18   SpO2  Min: 89 %  Max: 97 %   No data recorded   No data recorded                         Body mass index is 28.73 kg/m².    Intake/Output Summary (Last 24 hours) at 3/20/2021 1139  Last data filed at 3/20/2021 0757  Gross per 24 hour   Intake 398 ml   Output 1575 ml   Net -1177 ml         Exam:  GEN:  No distress, appears stated age  EYES:   PERRL, anicteric sclera  ENT:    External ears/nose normal, OP clear  NECK:  No adenopathy, midline trachea  LUNGS: Normal chest on inspection, palpation and auscultation  CV:  Normal S1S2, without murmur  ABD:  Non tender, non distended, no hepatosplenomegaly, +BS  EXT:  No edema, cyanosis or clubbing    Assessment     Scheduled meds:  amLODIPine, 5 mg, Oral, Q24H  cetirizine, 10 mg, Oral, Daily  cholecalciferol, 1,000 Units, Oral, Daily  clopidogrel, 75 mg, Oral, Daily  dexamethasone, 6 mg, Intravenous, BID  enoxaparin, 40 mg, Subcutaneous, Q12H  famotidine, 20 mg, Oral, BID  finasteride, 5 mg, Oral, Daily  fluconazole, 100 mg, Oral, Q24H  guaiFENesin, 600 mg, Oral, Q12H  insulin glargine, 57 Units, Subcutaneous, Q24H  insulin lispro, 0-7 Units, Subcutaneous,  4x Daily With Meals & Nightly  insulin lispro, 19 Units, Subcutaneous, TID With Meals  losartan, 100 mg, Oral, Q24H  remdesivir, 100 mg, Intravenous, Q24H  tamsulosin, 0.4 mg, Oral, Nightly      IV meds:                      Pharmacy Consult - Remdesivir,       Data Review:  Results from last 7 days   Lab Units 03/20/21  0538 03/19/21  0626 03/18/21  0721 03/17/21  0437 03/16/21  1215 03/16/21  0811   SODIUM mmol/L 138 141 142 140  --  137   POTASSIUM mmol/L 4.3 4.4 4.5 4.6  --  4.6   CHLORIDE mmol/L 104 105 106 106  --  103   CO2 mmol/L 24.8 27.2 24.8 24.1  --  22.7   BUN mg/dL 17 22 24* 29*  --  24*   CREATININE mg/dL 0.60* 0.75* 0.77 1.01 0.96 0.87   GLUCOSE mg/dL 191* 155* 207* 151*  --  236*   CALCIUM mg/dL 9.0 9.1 9.7 9.1  --  9.0         Estimated Creatinine Clearance: 103.3 mL/min (A) (by C-G formula based on SCr of 0.6 mg/dL (L)).  Results from last 7 days   Lab Units 03/20/21  0538 03/19/21  0626 03/18/21  0721 03/17/21  0437 03/16/21  0811   WBC 10*3/mm3 11.15* 10.37 8.71 9.83 10.37   HEMOGLOBIN g/dL 13.9 13.4 12.8* 11.8* 12.3*   PLATELETS 10*3/mm3 355 337 286 253 216     Results from last 7 days   Lab Units 03/16/21  1215   INR  1.25*     Results from last 7 days   Lab Units 03/20/21  0538 03/19/21  0626 03/18/21  0721 03/17/21  0437 03/16/21  1215   ALT (SGPT) U/L 34 23 17 17 14   AST (SGOT) U/L 27 25 21 26 23         Results from last 7 days   Lab Units 03/15/21  1903 03/15/21  1447   PROCALCITONIN ng/mL  --  0.89*   LACTATE mmol/L 1.3 2.1*     COVID LABS:  Results From Last 14 Days   Lab Units 03/20/21  0538 03/19/21  0626 03/18/21  0721 03/17/21  0437 03/16/21  1215 03/15/21  1903 03/15/21  1447   PROBNP pg/mL  --   --   --  645.2  --   --   --    CRP mg/dL 1.61* 3.07* 6.55* 12.37*  --   --   --    D DIMER QUANT MCGFEU/mL  --   --   --   --  0.93*  --   --    FERRITIN ng/mL  --   --   --  1,212.00*  --   --   --    LACTATE mmol/L  --   --   --   --   --  1.3 2.1*   PROCALCITONIN ng/mL  --   --   --    --   --   --  0.89*   PROTIME Seconds  --   --   --   --  15.5*  --   --    INR   --   --   --   --  1.25*  --   --            Chest x-ray 3/19/2021 viewed shows bilateral airspace disease consistent with COVID-19 pneumonia.  Worse compared to previous.            Microbiology reviewed    )        Active Hospital Problems    Diagnosis  POA   • Pneumonia due to COVID-19 virus [U07.1, J12.82]  Yes      Resolved Hospital Problems   No resolved problems to display.         ASSESSMENT:  COVID-19 pneumonia  Acute hypoxemic respiratory failure secondary to above  Type 2 diabetes  Thrush  CAD  Hypertension  Hyperlipidemia        PLAN:    Treating COVID-19 pneumonia with continued IV remdesivir, Actemra IV x1 and steroids.  Wean oxygen as able.  Requiring OptiFlow with 60 L and 83% FiO2 at time of visit.  Repeat chest x-ray  for follow-up and monitoring.  Following inflammatory labs.  Control glucose.  Control blood pressure.        Hung Quintanilla MD  Pulmonary and Critical Care Medicine  [facility], [facility]  3/20/2021    11:39 EDT

## 2021-03-20 NOTE — PROGRESS NOTES
[facility] Follow Up    Chief Complaint: Follow up bradycardia    Interval History: Continues to have intermittent bradycardia primarily at night.  Early this morning he had 3 minutes of wide-complex rhythm with a rate in the 30s to 40s.  He was asleep during this episode.  This morning while awake his heart rates are in the 70s to 80s.  He denies any lightheadedness or dizziness.  He denies any significant shortness of breath.  Denies any chest pain.    Objective:     Objective:  Temp:  [96 °F (35.6 °C)-96.9 °F (36.1 °C)] 96.7 °F (35.9 °C)  Heart Rate:  [57-75] 75  Resp:  [16-18] 16  BP: (132-153)/(84-86) 132/86     Intake/Output Summary (Last 24 hours) at 3/20/2021 1555  Last data filed at 3/20/2021 0757  Gross per 24 hour   Intake 478 ml   Output 1325 ml   Net -847 ml     Body mass index is 28.73 kg/m².      03/15/21  1447 03/15/21  1847   Weight: 99.8 kg (220 lb) 96.1 kg (211 lb 13.8 oz)     Weight change:       Physical Exam:   General : Alert, cooperative, in no acute distress.  Neuro: Alert,cooperative and oriented.  Lungs: CTAB. Normal respiratory effort and rate.  CV: Regular rate and rhythm, normal S1 and S2, no murmurs, gallops or rubs.  ABD: Soft, nontender, nondistended. Positive bowel sounds.  Extr: No edema or cyanosis, moves all extremities.    Lab Review:   Results from last 7 days   Lab Units 03/20/21  0538 03/19/21  0626   SODIUM mmol/L 138 141   POTASSIUM mmol/L 4.3 4.4   CHLORIDE mmol/L 104 105   CO2 mmol/L 24.8 27.2   BUN mg/dL 17 22   CREATININE mg/dL 0.60* 0.75*   GLUCOSE mg/dL 191* 155*   CALCIUM mg/dL 9.0 9.1   AST (SGOT) U/L 27 25   ALT (SGPT) U/L 34 23         Results from last 7 days   Lab Units 03/20/21  0538 03/19/21  0626   WBC 10*3/mm3 11.15* 10.37   HEMOGLOBIN g/dL 13.9 13.4   HEMATOCRIT % 40.8 39.4   PLATELETS 10*3/mm3 355 337     Results from last 7 days   Lab Units 03/16/21  1215   INR  1.25*               Invalid input(s): LDLCALC  Results from last 7 days    Lab Units 03/17/21  0437   PROBNP pg/mL 645.2     Results from last 7 days   Lab Units 03/17/21  0437   TSH uIU/mL 0.206*     I reviewed the patient's new clinical results.  I personally viewed and interpreted the patient's EKG  Current Medications:   Scheduled Meds:[START ON 3/21/2021] amLODIPine, 10 mg, Oral, Q24H  cholecalciferol, 1,000 Units, Oral, Daily  clopidogrel, 75 mg, Oral, Daily  dexamethasone, 6 mg, Intravenous, BID  enoxaparin, 40 mg, Subcutaneous, Q12H  famotidine, 20 mg, Oral, BID  finasteride, 5 mg, Oral, Daily  fluconazole, 100 mg, Oral, Q24H  guaiFENesin, 1,200 mg, Oral, Q12H  [START ON 3/21/2021] insulin glargine, 60 Units, Subcutaneous, Q24H  insulin lispro, 0-7 Units, Subcutaneous, 4x Daily With Meals & Nightly  insulin lispro, 20 Units, Subcutaneous, TID With Meals  losartan, 100 mg, Oral, Q24H  tamsulosin, 0.4 mg, Oral, Nightly      Continuous Infusions:Pharmacy Consult - Remdesivir,         Allergies:  Allergies   Allergen Reactions   • Oxycodone-Acetaminophen Itching   • Percocet [Oxycodone-Acetaminophen]    • Shellfish-Derived Products Hives and Itching   • Statins    • Adhesive Tape Rash   • Latex Rash       Assessment/Plan:     1. COVID-19 pneumonia.  On dexamethasone and remdesivir.  2. Acute hypoxic respiratory failure.  Remains on OptiFlow 60 L with a 70% FiO2 concentration.  3. Sinus bradycardia.  Metoprolol dosage decreased yesterday.  Still with episodes of bradycardia and wide-complex escape rhythm concerning for infranodal disease with block.  Episodes largely occur while asleep and even when awake he appears to be asymptomatic with bradycardia.  4. Coronary artery disease  5. Hypertension    Cardiovascular remains stable no significant arrhythmias continue conservative management    Todd Lux MD  03/20/21  15:55 EDT

## 2021-03-20 NOTE — PROGRESS NOTES
"Daily progress note    Chief complaint  Doing same  No new complaints  Still with cough and shortness of breath    History of present illness  70-year-old white male with history of coronary artery disease hypertension hyperlipidemia and diabetes mellitus presented to [facility], [place], operated by [facility] emergency room with shortness of breath body aches fever chills and nonproductive cough for last 1 week.  Patient was diagnosed with Covid about a week ago.  Patient denies any chest pain abdominal pain nausea vomiting diarrhea.  Patient work-up in ER revealed COVID-19 pneumonia with acute hypoxic respiratory failure admit for management.    REVIEW OF SYSTEMS   Unremarkable except for shortness of breath    PHYSICAL EXAM   Blood pressure 132/86, pulse 75, temperature 96.7 °F (35.9 °C), temperature source Oral, resp. rate 16, height 182.9 cm (72\"), weight 96.1 kg (211 lb 13.8 oz), SpO2 (!) 89 %.    GENERAL: not distressed, appears ill but not toxic   HENT: nares patent   EYES: no scleral icterus   NECK: no ROM limitations   CV: regular rhythm, regular rate, no murmur, no rubs and no gallops   RESPIRATORY: Mild increased work of breathing, breath sounds are clear to auscultate bilaterally patient is able to speak in full sentences   ABDOMEN: soft, rounded, no focal tenderness bowel sounds positive  MUSCULOSKELETAL: no deformity   NEURO: alert, moves all extremities, follows commands   SKIN: warm, dry     LAB RESULTS   Lab Results (last 24 hours)     Procedure Component Value Units Date/Time    POC Glucose Once [[id number]]  (Abnormal) Collected: 03/20/21 1105    Specimen: Blood Updated: 03/20/21 1107     Glucose 216 mg/dL     Comprehensive Metabolic Panel [[id number]]  (Abnormal) Collected: 03/20/21 0538    Specimen: Blood Updated: 03/20/21 0733     Glucose 191 mg/dL      BUN 17 mg/dL      Creatinine 0.60 mg/dL      Sodium 138 mmol/L      Potassium 4.3 mmol/L      Chloride 104 mmol/L      CO2 24.8 mmol/L      Calcium 9.0 mg/dL      " Total Protein 6.2 g/dL      Albumin 2.70 g/dL      ALT (SGPT) 34 U/L      AST (SGOT) 27 U/L      Alkaline Phosphatase 49 U/L      Total Bilirubin 0.4 mg/dL      eGFR Non African Amer 133 mL/min/1.73      Globulin 3.5 gm/dL      A/G Ratio 0.8 g/dL      BUN/Creatinine Ratio 28.3     Anion Gap 9.2 mmol/L     Narrative:      GFR Normal >60  Chronic Kidney Disease <60  Kidney Failure <15      Bilirubin, Direct [[id number]]  (Normal) Collected: 03/20/21 0538    Specimen: Blood Updated: 03/20/21 0733     Bilirubin, Direct <0.2 mg/dL     C-reactive Protein [[id number]]  (Abnormal) Collected: 03/20/21 0538    Specimen: Blood Updated: 03/20/21 0733     C-Reactive Protein 1.61 mg/dL     Manual Differential [[id number]]  (Abnormal) Collected: 03/20/21 0538    Specimen: Blood Updated: 03/20/21 0733     Neutrophil % 89.2 %      Lymphocyte % 5.4 %      Monocyte % 5.4 %      Neutrophils Absolute 9.95 10*3/mm3      Lymphocytes Absolute 0.60 10*3/mm3      Monocytes Absolute 0.60 10*3/mm3      Anisocytosis Mod/2+     Napoleon Cells Mod/2+     Dacrocytes Slight/1+     Microcytes Mod/2+     Poikilocytes Large/3+     WBC Morphology Normal     Platelet Morphology Normal    CBC & Differential [[id number]]  (Abnormal) Collected: 03/20/21 0538    Specimen: Blood Updated: 03/20/21 0733    Narrative:      The following orders were created for panel order CBC & Differential.  Procedure                               Abnormality         Status                     ---------                               -----------         ------                     CBC Auto Differential[[id number]]        Abnormal            Final result                 Please view results for these tests on the individual orders.    CBC Auto Differential [[id number]]  (Abnormal) Collected: 03/20/21 0538    Specimen: Blood Updated: 03/20/21 0733     WBC 11.15 10*3/mm3      RBC 4.71 10*6/mm3      Hemoglobin 13.9 g/dL      Hematocrit 40.8 %      MCV 86.6 fL      MCH 29.5 pg      MCHC 34.1  g/dL      RDW 13.0 %      RDW-SD 40.5 fl      MPV 10.4 fL      Platelets 355 10*3/mm3     POC Glucose Once [[id number]]  (Abnormal) Collected: 03/20/21 0604    Specimen: Blood Updated: 03/20/21 0606     Glucose 169 mg/dL     POC Glucose Once [[id number]]  (Abnormal) Collected: 03/19/21 2023    Specimen: Blood Updated: 03/19/21 2025     Glucose 312 mg/dL            Study Result    Narrative & Impression   ONE VIEW PORTABLE CHEST     HISTORY: Shortness of breath and cough. Possible Covid 19 pneumonia.     FINDINGS: The lungs are moderately expanded with some vague haziness  extending into the periphery of the left lung and at the right base  highly suspicious for early changes of Covid 19 pneumonia and continued  follow-up evaluation is recommended. The heart is slightly enlarged.          Current Facility-Administered Medications:   •  amLODIPine (NORVASC) tablet 5 mg, 5 mg, Oral, Q24H, Judy Clemens MD, 5 mg at 03/20/21 0912  •  cetirizine (zyrTEC) tablet 10 mg, 10 mg, Oral, Daily, Kevin Hayes MD, 10 mg at 03/20/21 0912  •  cholecalciferol (VITAMIN D3) tablet 1,000 Units, 1,000 Units, Oral, Daily, Kevin Hayes MD, 1,000 Units at 03/20/21 0912  •  clopidogrel (PLAVIX) tablet 75 mg, 75 mg, Oral, Daily, Kevin Hayes MD, 75 mg at 03/20/21 0912  •  dexamethasone sodium phosphate injection 6 mg, 6 mg, Intravenous, BID, Kevin Hayes MD, 6 mg at 03/20/21 0913  •  enoxaparin (LOVENOX) syringe 40 mg, 40 mg, Subcutaneous, Q12H, Antonio Serrano MD, 40 mg at 03/20/21 0912  •  famotidine (PEPCID) tablet 20 mg, 20 mg, Oral, BID, Kevin Hayes MD, 20 mg at 03/20/21 0912  •  finasteride (PROSCAR) tablet 5 mg, 5 mg, Oral, Daily, Kevin Hayes MD, 5 mg at 03/20/21 0912  •  fluconazole (DIFLUCAN) tablet 100 mg, 100 mg, Oral, Q24H, Antonio Serrano MD, 100 mg at 03/20/21 0912  •  guaiFENesin (MUCINEX) 12 hr tablet 600 mg, 600 mg, Oral, Q12H, Kevin Hayes MD, 600 mg at 03/20/21 0912  •  insulin glargine (LANTUS, SEMGLEE)  injection 57 Units, 57 Units, Subcutaneous, Q24H, Honey Hayes MD, 57 Units at 03/20/21 1215  •  insulin lispro (ADMELOG) injection 0-7 Units, 0-7 Units, Subcutaneous, 4x Daily With Meals & Nightly, Honey Hayes MD, 3 Units at 03/20/21 1217  •  insulin lispro (ADMELOG) injection 19 Units, 19 Units, Subcutaneous, TID With Meals, Honey Hayes MD, 19 Units at 03/20/21 1217  •  losartan (COZAAR) tablet 100 mg, 100 mg, Oral, Q24H, Honey Hayes MD, 100 mg at 03/20/21 0912  •  ondansetron (ZOFRAN) injection 4 mg, 4 mg, Intravenous, Q6H PRN, Honey Hayes MD  •  Pharmacy Consult - Remdesivir, , Does not apply, Continuous PRN, MikeyRoberto MD  •  tamsulosin (FLOMAX) 24 hr capsule 0.4 mg, 0.4 mg, Oral, Nightly, Honey Hayes MD, 0.4 mg at 03/19/21 2029     ASSESSMENT  COVID-19 pneumonia  Acute hypoxic respiratory failure   Sinus bradycardia  Diabetes mellitus  Hypertension  Hyperlipidemia  Coronary artery disease  BPH  Gastroesophageal reflux disease    PLAN  CPM  Supplement oxygen and titrate  Decadron  Remdesivir  Actemra completed  Off beta-blocker  Infectious disease consult appreciated   Pulmonary and cardiology to follow patient  Adjust home medications  Stress ulcer DVT prophylaxis  Supportive care  Discussed with nursing staff  Follow closely further recommendation according to hospital course    HONEY HAYES MD

## 2021-03-20 NOTE — PROGRESS NOTES
"  Infectious Diseases Progress Note    Roberto Jensen MD     [facility]  Los: 4 days  Patient Identification:  Name: Alex Geiger  Age: 70 y.o.  Sex: male  :  1950  MRN: [id number]         Primary Care Physician: Real Hudson MD            Subjective: Continues to feel well subjectively.  Thinks his breathing is somewhat improved.  Interval History: See consultation note.    Objective:    Scheduled Meds:amLODIPine, 5 mg, Oral, Q24H  cetirizine, 10 mg, Oral, Daily  cholecalciferol, 1,000 Units, Oral, Daily  clopidogrel, 75 mg, Oral, Daily  dexamethasone, 6 mg, Intravenous, BID  enoxaparin, 40 mg, Subcutaneous, Q12H  famotidine, 20 mg, Oral, BID  finasteride, 5 mg, Oral, Daily  fluconazole, 100 mg, Oral, Q24H  guaiFENesin, 600 mg, Oral, Q12H  insulin glargine, 57 Units, Subcutaneous, Q24H  insulin lispro, 0-7 Units, Subcutaneous, 4x Daily With Meals & Nightly  insulin lispro, 19 Units, Subcutaneous, TID With Meals  losartan, 100 mg, Oral, Q24H  remdesivir, 100 mg, Intravenous, Q24H  tamsulosin, 0.4 mg, Oral, Nightly      Continuous Infusions:Pharmacy Consult - Remdesivir,         Vital signs in last 24 hours:  Temp:  [96 °F (35.6 °C)-97.9 °F (36.6 °C)] 96.7 °F (35.9 °C)  Heart Rate:  [52-91] 75  Resp:  [16-18] 16  BP: (132-167)/(76-86) 132/86    Intake/Output:    Intake/Output Summary (Last 24 hours) at 3/20/2021 0957  Last data filed at 3/20/2021 0757  Gross per 24 hour   Intake 1398 ml   Output 1925 ml   Net -527 ml       Exam:  /86 (BP Location: Right arm, Patient Position: Lying)   Pulse 75   Temp 96.7 °F (35.9 °C) (Oral)   Resp 16   Ht 182.9 cm (72\")   Wt 96.1 kg (211 lb 13.8 oz)   SpO2 (!) 89%   BMI 28.73 kg/m²   Patient is examined using the personal protective equipment as per guidelines from infection control for this particular patient as enacted.  Hand washing was performed before and after patient interaction.  General Appearance:    Alert, cooperative, no distress, " AAOx3                          Head:    Normocephalic, without obvious abnormality, atraumatic                           Eyes:    PERRL, conjunctivae/corneas clear, EOM's intact, both eyes                         Throat:   Lips, tongue, gums normal; oral mucosa pink and moist                           Neck:   Supple, symmetrical, trachea midline, no JVD                         Lungs:    Clear to auscultation bilaterally, respirations unlabored                 Chest Wall:    No tenderness or deformity                          Heart:    Regular rate and rhythm, S1 and S2 normal                  Abdomen:     Soft, non-tender, bowel sounds active                 Extremities:   Extremities normal, atraumatic, no cyanosis or edema                        Pulses:   Pulses palpable in all extremities                            Skin:   Skin is warm and dry,  no rashes or palpable lesions                  Neurologic:   CNII-XII intact, motor strength grossly intact     Data Review:    I reviewed the patient's new clinical results.  Results from last 7 days   Lab Units 03/20/21  0538 03/19/21  0626 03/18/21  0721 03/17/21  0437 03/16/21  0811 03/15/21  1447   WBC 10*3/mm3 11.15* 10.37 8.71 9.83 10.37 8.17   HEMOGLOBIN g/dL 13.9 13.4 12.8* 11.8* 12.3* 13.5   PLATELETS 10*3/mm3 355 337 286 253 216 208     Results from last 7 days   Lab Units 03/20/21  0538 03/19/21  0626 03/18/21  0721 03/17/21  0437 03/16/21  1215 03/16/21  0811 03/15/21  1447   SODIUM mmol/L 138 141 142 140  --  137 136   POTASSIUM mmol/L 4.3 4.4 4.5 4.6  --  4.6 4.3   CHLORIDE mmol/L 104 105 106 106  --  103 99   CO2 mmol/L 24.8 27.2 24.8 24.1  --  22.7 24.8   BUN mg/dL 17 22 24* 29*  --  24* 19   CREATININE mg/dL 0.60* 0.75* 0.77 1.01 0.96 0.87 1.05   CALCIUM mg/dL 9.0 9.1 9.7 9.1  --  9.0 9.6   GLUCOSE mg/dL 191* 155* 207* 151*  --  236* 238*       Microbiology Results (last 10 days)     Procedure Component Value - Date/Time    Respiratory Panel PCR  w/COVID-19(SARS-CoV-2) LA/ANAT/[facility]IRA/PAD/[facility]LE[facility] In-House, NP Swab in UTM/VTM, 3-4 HR TAT - Swab, Nasopharynx [[id number]]  (Abnormal) Collected: 03/15/21 1547    Lab Status: Final result Specimen: Swab from Nasopharynx Updated: 03/15/21 9490     ADENOVIRUS, PCR Not Detected     Coronavirus 229E Not Detected     Coronavirus HKU1 Not Detected     Coronavirus NL63 Not Detected     Coronavirus OC43 Not Detected     COVID19 Detected     Human Metapneumovirus Not Detected     Human Rhinovirus/Enterovirus Not Detected     Influenza A PCR Not Detected     Influenza B PCR Not Detected     Parainfluenza Virus 1 Not Detected     Parainfluenza Virus 2 Not Detected     Parainfluenza Virus 3 Not Detected     Parainfluenza Virus 4 Not Detected     RSV, PCR Not Detected     Bordetella pertussis pcr Not Detected     Bordetella parapertussis PCR Not Detected     Chlamydophila pneumoniae PCR Not Detected     Mycoplasma pneumo by PCR Not Detected    Narrative:      Fact sheet for providers: https://docs.[employer]/wp-content/uploads/HEA2368-0376-RU4.1-EUA-Provider-Fact-Sheet-3.pdf    Fact sheet for patients: https://docs.[employer]/wp-content/uploads/IPU0144-5864-VM6.1-EUA-Patient-Fact-Sheet-1.pdf    Test performed by PCR.    Blood Culture - Blood, Arm, Left [[id number]] Collected: 03/15/21 1447    Lab Status: Preliminary result Specimen: Blood from Arm, Left Updated: 03/19/21 1500     Blood Culture No growth at 4 days    Blood Culture - Blood, Arm, Right [[id number]] Collected: 03/15/21 1447    Lab Status: Preliminary result Specimen: Blood from Arm, Right Updated: 03/19/21 1500     Blood Culture No growth at 4 days          Assessment: Overall seems to have improved with decreasing oxygen requirement.    Pneumonia due to COVID-19 virus  1-systemic COVID-19 infection with  2-COVID-19 pneumonia with possible evolving hypoxia and possible  3-superimposed bacterial pneumonia  4-diabetes mellitus  5-hypertension  6-other diagnosis  per primary team.  7-oral thrush     Recommendations/Discussions:  · Continue with dexamethasone and given his oxygen requirement at remdesivir.  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  · Management of underlying diabetes hypertension and other medical issues per primary team.  Roberto Jensen MD  3/20/2021  09:57 EDT    Much of this encounter note is an electronic transcription/translation of spoken language to printed text. The electronic translation of spoken language may permit erroneous, or at times, nonsensical words or phrases to be inadvertently transcribed; Although I have reviewed the note for such errors, some may still exist

## 2021-03-21 LAB
ALBUMIN SERPL-MCNC: 3 G/DL (ref 3.5–5.2)
ALBUMIN/GLOB SERPL: 1 G/DL
ALP SERPL-CCNC: 46 U/L (ref 39–117)
ALT SERPL W P-5'-P-CCNC: 41 U/L (ref 1–41)
ANION GAP SERPL CALCULATED.3IONS-SCNC: 7.3 MMOL/L (ref 5–15)
AST SERPL-CCNC: 27 U/L (ref 1–40)
BILIRUB CONJ SERPL-MCNC: <0.2 MG/DL (ref 0–0.3)
BILIRUB SERPL-MCNC: 0.5 MG/DL (ref 0–1.2)
BUN SERPL-MCNC: 20 MG/DL (ref 8–23)
BUN/CREAT SERPL: 27 (ref 7–25)
BURR CELLS BLD QL SMEAR: ABNORMAL
CALCIUM SPEC-SCNC: 9.5 MG/DL (ref 8.6–10.5)
CHLORIDE SERPL-SCNC: 105 MMOL/L (ref 98–107)
CO2 SERPL-SCNC: 24.7 MMOL/L (ref 22–29)
CREAT SERPL-MCNC: 0.74 MG/DL (ref 0.76–1.27)
CRP SERPL-MCNC: 0.87 MG/DL (ref 0–0.5)
DEPRECATED RDW RBC AUTO: 40.1 FL (ref 37–54)
ERYTHROCYTE [DISTWIDTH] IN BLOOD BY AUTOMATED COUNT: 13.3 % (ref 12.3–15.4)
GFR SERPL CREATININE-BSD FRML MDRD: 105 ML/MIN/1.73
GLOBULIN UR ELPH-MCNC: 2.9 GM/DL
GLUCOSE BLDC GLUCOMTR-MCNC: 142 MG/DL (ref 70–130)
GLUCOSE BLDC GLUCOMTR-MCNC: 178 MG/DL (ref 70–130)
GLUCOSE BLDC GLUCOMTR-MCNC: 193 MG/DL (ref 70–130)
GLUCOSE BLDC GLUCOMTR-MCNC: 215 MG/DL (ref 70–130)
GLUCOSE SERPL-MCNC: 153 MG/DL (ref 65–99)
HCT VFR BLD AUTO: 39.7 % (ref 37.5–51)
HGB BLD-MCNC: 13.6 G/DL (ref 13–17.7)
LYMPHOCYTES # BLD MANUAL: 0.83 10*3/MM3 (ref 0.7–3.1)
LYMPHOCYTES NFR BLD MANUAL: 5.4 % (ref 19.6–45.3)
LYMPHOCYTES NFR BLD MANUAL: 7.5 % (ref 5–12)
MCH RBC QN AUTO: 29 PG (ref 26.6–33)
MCHC RBC AUTO-ENTMCNC: 34.3 G/DL (ref 31.5–35.7)
MCV RBC AUTO: 84.6 FL (ref 79–97)
MONOCYTES # BLD AUTO: 1.15 10*3/MM3 (ref 0.1–0.9)
NEUTROPHILS # BLD AUTO: 13.4 10*3/MM3 (ref 1.7–7)
NEUTROPHILS NFR BLD MANUAL: 87.1 % (ref 42.7–76)
PLAT MORPH BLD: NORMAL
PLATELET # BLD AUTO: 412 10*3/MM3 (ref 140–450)
PMV BLD AUTO: 10.4 FL (ref 6–12)
POIKILOCYTOSIS BLD QL SMEAR: ABNORMAL
POTASSIUM SERPL-SCNC: 4.6 MMOL/L (ref 3.5–5.2)
PROT SERPL-MCNC: 5.9 G/DL (ref 6–8.5)
RBC # BLD AUTO: 4.69 10*6/MM3 (ref 4.14–5.8)
SODIUM SERPL-SCNC: 137 MMOL/L (ref 136–145)
WBC # BLD AUTO: 15.39 10*3/MM3 (ref 3.4–10.8)
WBC MORPH BLD: NORMAL

## 2021-03-21 PROCEDURE — 80053 COMPREHEN METABOLIC PANEL: CPT | Performed by: HOSPITALIST

## 2021-03-21 PROCEDURE — 86140 C-REACTIVE PROTEIN: CPT | Performed by: HOSPITALIST

## 2021-03-21 PROCEDURE — 63710000001 INSULIN LISPRO (HUMAN) PER 5 UNITS: Performed by: HOSPITALIST

## 2021-03-21 PROCEDURE — 25010000002 DEXAMETHASONE SODIUM PHOSPHATE 10 MG/ML SOLUTION: Performed by: HOSPITALIST

## 2021-03-21 PROCEDURE — 94760 N-INVAS EAR/PLS OXIMETRY 1: CPT

## 2021-03-21 PROCEDURE — 94799 UNLISTED PULMONARY SVC/PX: CPT

## 2021-03-21 PROCEDURE — 99232 SBSQ HOSP IP/OBS MODERATE 35: CPT | Performed by: INTERNAL MEDICINE

## 2021-03-21 PROCEDURE — 82248 BILIRUBIN DIRECT: CPT | Performed by: INTERNAL MEDICINE

## 2021-03-21 PROCEDURE — 25010000002 ENOXAPARIN PER 10 MG: Performed by: INTERNAL MEDICINE

## 2021-03-21 PROCEDURE — 82962 GLUCOSE BLOOD TEST: CPT

## 2021-03-21 PROCEDURE — 85025 COMPLETE CBC W/AUTO DIFF WBC: CPT | Performed by: HOSPITALIST

## 2021-03-21 PROCEDURE — 85007 BL SMEAR W/DIFF WBC COUNT: CPT | Performed by: HOSPITALIST

## 2021-03-21 RX ADMIN — ENOXAPARIN SODIUM 40 MG: 40 INJECTION SUBCUTANEOUS at 20:42

## 2021-03-21 RX ADMIN — FAMOTIDINE 20 MG: 20 TABLET, FILM COATED ORAL at 20:41

## 2021-03-21 RX ADMIN — LOSARTAN POTASSIUM 100 MG: 100 TABLET, FILM COATED ORAL at 08:15

## 2021-03-21 RX ADMIN — INSULIN LISPRO 2 UNITS: 100 INJECTION, SOLUTION INTRAVENOUS; SUBCUTANEOUS at 11:48

## 2021-03-21 RX ADMIN — FAMOTIDINE 20 MG: 20 TABLET, FILM COATED ORAL at 08:15

## 2021-03-21 RX ADMIN — AMLODIPINE BESYLATE 10 MG: 10 TABLET ORAL at 08:15

## 2021-03-21 RX ADMIN — DEXAMETHASONE SODIUM PHOSPHATE 6 MG: 10 INJECTION, SOLUTION INTRAMUSCULAR; INTRAVENOUS at 08:16

## 2021-03-21 RX ADMIN — INSULIN LISPRO 20 UNITS: 100 INJECTION, SOLUTION INTRAVENOUS; SUBCUTANEOUS at 11:47

## 2021-03-21 RX ADMIN — Medication 1000 UNITS: at 08:15

## 2021-03-21 RX ADMIN — GUAIFENESIN 1200 MG: 600 TABLET, EXTENDED RELEASE ORAL at 08:15

## 2021-03-21 RX ADMIN — DEXAMETHASONE SODIUM PHOSPHATE 6 MG: 10 INJECTION, SOLUTION INTRAMUSCULAR; INTRAVENOUS at 20:41

## 2021-03-21 RX ADMIN — FINASTERIDE 5 MG: 5 TABLET, FILM COATED ORAL at 08:15

## 2021-03-21 RX ADMIN — CLOPIDOGREL 75 MG: 75 TABLET, FILM COATED ORAL at 08:15

## 2021-03-21 RX ADMIN — FLUCONAZOLE 100 MG: 100 TABLET ORAL at 08:15

## 2021-03-21 RX ADMIN — GUAIFENESIN 1200 MG: 600 TABLET, EXTENDED RELEASE ORAL at 20:41

## 2021-03-21 RX ADMIN — INSULIN LISPRO 3 UNITS: 100 INJECTION, SOLUTION INTRAVENOUS; SUBCUTANEOUS at 16:38

## 2021-03-21 RX ADMIN — INSULIN LISPRO 20 UNITS: 100 INJECTION, SOLUTION INTRAVENOUS; SUBCUTANEOUS at 08:15

## 2021-03-21 RX ADMIN — INSULIN LISPRO 20 UNITS: 100 INJECTION, SOLUTION INTRAVENOUS; SUBCUTANEOUS at 16:38

## 2021-03-21 RX ADMIN — INSULIN LISPRO 2 UNITS: 100 INJECTION, SOLUTION INTRAVENOUS; SUBCUTANEOUS at 21:48

## 2021-03-21 RX ADMIN — TAMSULOSIN HYDROCHLORIDE 0.4 MG: 0.4 CAPSULE ORAL at 20:41

## 2021-03-21 RX ADMIN — ENOXAPARIN SODIUM 40 MG: 40 INJECTION SUBCUTANEOUS at 08:15

## 2021-03-21 NOTE — PROGRESS NOTES
[facility] Follow Up    Chief Complaint: Follow up bradycardia    Interval History: Continues to have intermittent bradycardia primarily at night.  Early this morning he had 3 minutes of wide-complex rhythm with a rate in the 30s to 40s.  He was asleep during this episode.  This morning while awake his heart rates are in the 70s to 80s.  He denies any lightheadedness or dizziness.  He denies any significant shortness of breath.  Denies any chest pain.    Objective:     Objective:  Temp:  [96.9 °F (36.1 °C)-97.5 °F (36.4 °C)] 97.3 °F (36.3 °C)  Heart Rate:  [55-96] 84  Resp:  [18-20] 20  BP: (118-154)/(69-93) 118/69     Intake/Output Summary (Last 24 hours) at 3/21/2021 1339  Last data filed at 3/21/2021 1327  Gross per 24 hour   Intake 660 ml   Output 700 ml   Net -40 ml     Body mass index is 28.73 kg/m².      03/15/21  1447 03/15/21  1847   Weight: 99.8 kg (220 lb) 96.1 kg (211 lb 13.8 oz)     Weight change:       Physical Exam:   General : Alert, cooperative, in no acute distress.  Neuro: Alert,cooperative and oriented.  Lungs: CTAB. Normal respiratory effort and rate.  CV: Regular rate and rhythm, normal S1 and S2, no murmurs, gallops or rubs.  ABD: Soft, nontender, nondistended. Positive bowel sounds.  Extr: No edema or cyanosis, moves all extremities.    Lab Review:   Results from last 7 days   Lab Units 03/21/21  0644 03/20/21  0538   SODIUM mmol/L 137 138   POTASSIUM mmol/L 4.6 4.3   CHLORIDE mmol/L 105 104   CO2 mmol/L 24.7 24.8   BUN mg/dL 20 17   CREATININE mg/dL 0.74* 0.60*   GLUCOSE mg/dL 153* 191*   CALCIUM mg/dL 9.5 9.0   AST (SGOT) U/L 27 27   ALT (SGPT) U/L 41 34         Results from last 7 days   Lab Units 03/21/21  0644 03/20/21  0538   WBC 10*3/mm3 15.39* 11.15*   HEMOGLOBIN g/dL 13.6 13.9   HEMATOCRIT % 39.7 40.8   PLATELETS 10*3/mm3 412 355     Results from last 7 days   Lab Units 03/16/21  1215   INR  1.25*               Invalid input(s): LDLCALC  Results from last 7 days    Lab Units 03/17/21  0437   PROBNP pg/mL 645.2     Results from last 7 days   Lab Units 03/17/21  0437   TSH uIU/mL 0.206*     I reviewed the patient's new clinical results.  I personally viewed and interpreted the patient's EKG  Current Medications:   Scheduled Meds:amLODIPine, 10 mg, Oral, Q24H  cholecalciferol, 1,000 Units, Oral, Daily  clopidogrel, 75 mg, Oral, Daily  dexamethasone, 6 mg, Intravenous, BID  enoxaparin, 40 mg, Subcutaneous, Q12H  famotidine, 20 mg, Oral, BID  finasteride, 5 mg, Oral, Daily  fluconazole, 100 mg, Oral, Q24H  guaiFENesin, 1,200 mg, Oral, Q12H  insulin glargine, 60 Units, Subcutaneous, Q24H  insulin lispro, 0-7 Units, Subcutaneous, 4x Daily With Meals & Nightly  insulin lispro, 20 Units, Subcutaneous, TID With Meals  losartan, 100 mg, Oral, Q24H  tamsulosin, 0.4 mg, Oral, Nightly      Continuous Infusions:     Allergies:  Allergies   Allergen Reactions   • Oxycodone-Acetaminophen Itching   • Percocet [Oxycodone-Acetaminophen]    • Shellfish-Derived Products Hives and Itching   • Statins    • Adhesive Tape Rash   • Latex Rash       Assessment/Plan:     1. COVID-19 pneumonia.  On dexamethasone and remdesivir.  2. Acute hypoxic respiratory failure.  Remains on OptiFlow 60 L with a 70% FiO2 concentration.  3. Sinus bradycardia.  Metoprolol dosage decreased yesterday.  Still with episodes of bradycardia and wide-complex escape rhythm concerning for infranodal disease with block.  Episodes largely occur while asleep and even when awake he appears to be asymptomatic with bradycardia.  4. Coronary artery disease  5. Hypertension    Vital signs remained stable  Normal sinus rhythm  No significant arrhythmia  No chest pain  Continue conservative management    Todd Lux MD  03/21/21  13:39 EDT

## 2021-03-21 NOTE — PROGRESS NOTES
"Daily progress note    Chief complaint  Doing same  No new complaints  Still with cough and shortness of breath    History of present illness  70-year-old white male with history of coronary artery disease hypertension hyperlipidemia and diabetes mellitus presented to [facility] emergency room with shortness of breath body aches fever chills and nonproductive cough for last 1 week.  Patient was diagnosed with Covid about a week ago.  Patient denies any chest pain abdominal pain nausea vomiting diarrhea.  Patient work-up in ER revealed COVID-19 pneumonia with acute hypoxic respiratory failure admit for management.    REVIEW OF SYSTEMS   Unremarkable     PHYSICAL EXAM   Blood pressure 144/83, pulse 96, temperature 96.9 °F (36.1 °C), temperature source Oral, resp. rate 20, height 182.9 cm (72\"), weight 96.1 kg (211 lb 13.8 oz), SpO2 90 %.    GENERAL: not distressed, appears ill but not toxic   HENT: nares patent   EYES: no scleral icterus   NECK: no ROM limitations   CV: regular rhythm, regular rate, no murmur, no rubs and no gallops   RESPIRATORY: Mild increased work of breathing, breath sounds are clear to auscultate bilaterally patient is able to speak in full sentences   ABDOMEN: soft, rounded, no focal tenderness bowel sounds positive  MUSCULOSKELETAL: no deformity   NEURO: alert, moves all extremities, follows commands   SKIN: warm, dry     LAB RESULTS   Lab Results (last 24 hours)     Procedure Component Value Units Date/Time    POC Glucose Once [[id number]]  (Abnormal) Collected: 03/21/21 1146    Specimen: Blood Updated: 03/21/21 1151     Glucose 193 mg/dL     Manual Differential [[id number]]  (Abnormal) Collected: 03/21/21 0644    Specimen: Blood Updated: 03/21/21 0800     Neutrophil % 87.1 %      Lymphocyte % 5.4 %      Monocyte % 7.5 %      Neutrophils Absolute 13.40 10*3/mm3      Lymphocytes Absolute 0.83 10*3/mm3      Monocytes Absolute 1.15 10*3/mm3      Maurice Cells Mod/2+     Poikilocytes " Large/3+     WBC Morphology Normal     Platelet Morphology Normal    CBC & Differential [[id number]]  (Abnormal) Collected: 03/21/21 0644    Specimen: Blood Updated: 03/21/21 0800    Narrative:      The following orders were created for panel order CBC & Differential.  Procedure                               Abnormality         Status                     ---------                               -----------         ------                     CBC Auto Differential[[id number]]        Abnormal            Final result                 Please view results for these tests on the individual orders.    CBC Auto Differential [[id number]]  (Abnormal) Collected: 03/21/21 0644    Specimen: Blood Updated: 03/21/21 0800     WBC 15.39 10*3/mm3      RBC 4.69 10*6/mm3      Hemoglobin 13.6 g/dL      Hematocrit 39.7 %      MCV 84.6 fL      MCH 29.0 pg      MCHC 34.3 g/dL      RDW 13.3 %      RDW-SD 40.1 fl      MPV 10.4 fL      Platelets 412 10*3/mm3     Comprehensive Metabolic Panel [[id number]]  (Abnormal) Collected: 03/21/21 0644    Specimen: Blood Updated: 03/21/21 0735     Glucose 153 mg/dL      BUN 20 mg/dL      Creatinine 0.74 mg/dL      Sodium 137 mmol/L      Potassium 4.6 mmol/L      Chloride 105 mmol/L      CO2 24.7 mmol/L      Calcium 9.5 mg/dL      Total Protein 5.9 g/dL      Albumin 3.00 g/dL      ALT (SGPT) 41 U/L      AST (SGOT) 27 U/L      Alkaline Phosphatase 46 U/L      Total Bilirubin 0.5 mg/dL      eGFR Non African Amer 105 mL/min/1.73      Globulin 2.9 gm/dL      A/G Ratio 1.0 g/dL      BUN/Creatinine Ratio 27.0     Anion Gap 7.3 mmol/L     Narrative:      GFR Normal >60  Chronic Kidney Disease <60  Kidney Failure <15      Bilirubin, Direct [[id number]]  (Normal) Collected: 03/21/21 0644    Specimen: Blood Updated: 03/21/21 0735     Bilirubin, Direct <0.2 mg/dL     C-reactive Protein [[id number]]  (Abnormal) Collected: 03/21/21 0644    Specimen: Blood Updated: 03/21/21 0735     C-Reactive Protein 0.87 mg/dL     POC Glucose  Once [[id number]]  (Abnormal) Collected: 03/21/21 0605    Specimen: Blood Updated: 03/21/21 0609     Glucose 142 mg/dL     POC Glucose Once [[id number]]  (Abnormal) Collected: 03/20/21 2021    Specimen: Blood Updated: 03/20/21 2022     Glucose 198 mg/dL     POC Glucose Once [[id number]]  (Abnormal) Collected: 03/20/21 1841    Specimen: Blood Updated: 03/20/21 1850     Glucose 218 mg/dL            Study Result    Narrative & Impression   ONE VIEW PORTABLE CHEST     HISTORY: Shortness of breath and cough. Possible Covid 19 pneumonia.     FINDINGS: The lungs are moderately expanded with some vague haziness  extending into the periphery of the left lung and at the right base  highly suspicious for early changes of Covid 19 pneumonia and continued  follow-up evaluation is recommended. The heart is slightly enlarged.          Current Facility-Administered Medications:   •  amLODIPine (NORVASC) tablet 10 mg, 10 mg, Oral, Q24H, Kevin Hayes MD, 10 mg at 03/21/21 0815  •  cholecalciferol (VITAMIN D3) tablet 1,000 Units, 1,000 Units, Oral, Daily, Kevin Hayes MD, 1,000 Units at 03/21/21 0815  •  clopidogrel (PLAVIX) tablet 75 mg, 75 mg, Oral, Daily, Kevin Hayes MD, 75 mg at 03/21/21 0815  •  dexamethasone sodium phosphate injection 6 mg, 6 mg, Intravenous, BID, Kevin Hayes MD, 6 mg at 03/21/21 0816  •  enoxaparin (LOVENOX) syringe 40 mg, 40 mg, Subcutaneous, Q12H, Antonio Serrano MD, 40 mg at 03/21/21 0815  •  famotidine (PEPCID) tablet 20 mg, 20 mg, Oral, BID, Kevin Hayes MD, 20 mg at 03/21/21 0815  •  finasteride (PROSCAR) tablet 5 mg, 5 mg, Oral, Daily, Kevin Hayes MD, 5 mg at 03/21/21 0815  •  fluconazole (DIFLUCAN) tablet 100 mg, 100 mg, Oral, Q24H, Antonio Serrano MD, 100 mg at 03/21/21 0815  •  guaiFENesin (MUCINEX) 12 hr tablet 1,200 mg, 1,200 mg, Oral, Q12H, Kevin Hayes MD, 1,200 mg at 03/21/21 0815  •  insulin glargine (LANTUS, SEMGLEE) injection 60 Units, 60 Units, Subcutaneous, Q24H, Abigail,  MD Honye  •  insulin lispro (ADMELOG) injection 0-7 Units, 0-7 Units, Subcutaneous, 4x Daily With Meals & Nightly, Honey Hayes MD, 2 Units at 03/21/21 1148  •  insulin lispro (ADMELOG) injection 20 Units, 20 Units, Subcutaneous, TID With Meals, Honey Hayes MD, 20 Units at 03/21/21 1147  •  losartan (COZAAR) tablet 100 mg, 100 mg, Oral, Q24H, Honey Hayes MD, 100 mg at 03/21/21 0815  •  ondansetron (ZOFRAN) injection 4 mg, 4 mg, Intravenous, Q6H PRN, Honey Hayes MD  •  tamsulosin (FLOMAX) 24 hr capsule 0.4 mg, 0.4 mg, Oral, Nightly, Honey Hayes MD, 0.4 mg at 03/20/21 2030     ASSESSMENT  COVID-19 pneumonia  Acute hypoxic respiratory failure   Sinus bradycardia  Diabetes mellitus  Hypertension  Hyperlipidemia  Coronary artery disease  BPH  Gastroesophageal reflux disease    PLAN  CPM  Supplement oxygen and titrate  Decadron  Remdesivir  Actemra completed  Off beta-blocker  Making slow progress  Infectious disease consult appreciated   Pulmonary and cardiology to follow patient  Adjust home medications  Stress ulcer DVT prophylaxis  Supportive care  Discussed with nursing staff  Follow closely further recommendation according to hospital course    HONEY HAYES MD

## 2021-03-21 NOTE — PROGRESS NOTES
"  Infectious Diseases Progress Note    Roberto Jensen MD     [facility]  Los: 5 days  Patient Identification:  Name: Alex Geiger  Age: 70 y.o.  Sex: male  :  1950  MRN: [id number]         Primary Care Physician: Real Hudson MD            Subjective: Continues to feel well.  Had good appetite.  Feels stronger.  Understands that his oxygenation need to improve before he could be considered safe for discharge.   Interval History: See consultation note.  · Completed his remdesivir on 3/20/2021.  · Received Tocilizumab on 3/17/2021  Objective:    Scheduled Meds:amLODIPine, 10 mg, Oral, Q24H  cholecalciferol, 1,000 Units, Oral, Daily  clopidogrel, 75 mg, Oral, Daily  dexamethasone, 6 mg, Intravenous, BID  enoxaparin, 40 mg, Subcutaneous, Q12H  famotidine, 20 mg, Oral, BID  finasteride, 5 mg, Oral, Daily  fluconazole, 100 mg, Oral, Q24H  guaiFENesin, 1,200 mg, Oral, Q12H  insulin glargine, 60 Units, Subcutaneous, Q24H  insulin lispro, 0-7 Units, Subcutaneous, 4x Daily With Meals & Nightly  insulin lispro, 20 Units, Subcutaneous, TID With Meals  losartan, 100 mg, Oral, Q24H  tamsulosin, 0.4 mg, Oral, Nightly      Continuous Infusions:     Vital signs in last 24 hours:  Temp:  [96.9 °F (36.1 °C)-97.5 °F (36.4 °C)] 96.9 °F (36.1 °C)  Heart Rate:  [55-87] 55  Resp:  [18-20] 20  BP: (144-154)/(83-93) 144/83    Intake/Output:    Intake/Output Summary (Last 24 hours) at 3/21/2021 0855  Last data filed at 3/21/2021 0844  Gross per 24 hour   Intake 640 ml   Output 700 ml   Net -60 ml       Exam:  /83 (BP Location: Right arm, Patient Position: Lying)   Pulse 55   Temp 96.9 °F (36.1 °C) (Oral)   Resp 20   Ht 182.9 cm (72\")   Wt 96.1 kg (211 lb 13.8 oz)   SpO2 90%   BMI 28.73 kg/m²   Patient is examined using the personal protective equipment as per guidelines from infection control for this particular patient as enacted.  Hand washing was performed before and after patient " interaction.  General Appearance:    Alert, cooperative, no distress, AAOx3                          Head:    Normocephalic, without obvious abnormality, atraumatic                           Eyes:    PERRL, conjunctivae/corneas clear, EOM's intact, both eyes                         Throat:   Lips, tongue, gums normal; oral mucosa pink and moist                           Neck:   Supple, symmetrical, trachea midline, no JVD                         Lungs:    Clear to auscultation bilaterally, respirations unlabored                 Chest Wall:    No tenderness or deformity                          Heart:    Regular rate and rhythm, S1 and S2 normal                  Abdomen:     Soft, non-tender, bowel sounds active                 Extremities:   Extremities normal, atraumatic, no cyanosis or edema                        Pulses:   Pulses palpable in all extremities                            Skin:   Skin is warm and dry,  no rashes or palpable lesions                  Neurologic:   CNII-XII intact, motor strength grossly intact     Data Review:    I reviewed the patient's new clinical results.  Results from last 7 days   Lab Units 03/21/21  0644 03/20/21  0538 03/19/21  0626 03/18/21  0721 03/17/21  0437 03/16/21  0811 03/15/21  1447   WBC 10*3/mm3 15.39* 11.15* 10.37 8.71 9.83 10.37 8.17   HEMOGLOBIN g/dL 13.6 13.9 13.4 12.8* 11.8* 12.3* 13.5   PLATELETS 10*3/mm3 412 355 337 286 253 216 208     Results from last 7 days   Lab Units 03/21/21  0644 03/20/21  0538 03/19/21  0626 03/18/21  0721 03/17/21  0437 03/16/21  1215 03/16/21  0811 03/15/21  1447   SODIUM mmol/L 137 138 141 142 140  --  137 136   POTASSIUM mmol/L 4.6 4.3 4.4 4.5 4.6  --  4.6 4.3   CHLORIDE mmol/L 105 104 105 106 106  --  103 99   CO2 mmol/L 24.7 24.8 27.2 24.8 24.1  --  22.7 24.8   BUN mg/dL 20 17 22 24* 29*  --  24* 19   CREATININE mg/dL 0.74* 0.60* 0.75* 0.77 1.01 0.96 0.87 1.05   CALCIUM mg/dL 9.5 9.0 9.1 9.7 9.1  --  9.0 9.6   GLUCOSE mg/dL 153*  191* 155* 207* 151*  --  236* 238*       Microbiology Results (last 10 days)     Procedure Component Value - Date/Time    Respiratory Panel PCR w/COVID-19(SARS-CoV-2) LA/ANAT/AMIRA/PAD/COR/MAD/VIOLETA In-House, NP Swab in UTM/VTM, 3-4 HR TAT - Swab, Nasopharynx [[id number]]  (Abnormal) Collected: 03/15/21 1547    Lab Status: Final result Specimen: Swab from Nasopharynx Updated: 03/15/21 3854     ADENOVIRUS, PCR Not Detected     Coronavirus 229E Not Detected     Coronavirus HKU1 Not Detected     Coronavirus NL63 Not Detected     Coronavirus OC43 Not Detected     COVID19 Detected     Human Metapneumovirus Not Detected     Human Rhinovirus/Enterovirus Not Detected     Influenza A PCR Not Detected     Influenza B PCR Not Detected     Parainfluenza Virus 1 Not Detected     Parainfluenza Virus 2 Not Detected     Parainfluenza Virus 3 Not Detected     Parainfluenza Virus 4 Not Detected     RSV, PCR Not Detected     Bordetella pertussis pcr Not Detected     Bordetella parapertussis PCR Not Detected     Chlamydophila pneumoniae PCR Not Detected     Mycoplasma pneumo by PCR Not Detected    Narrative:      Fact sheet for providers: https://docs.[employer]/wp-content/uploads/NCU9410-9522-JV8.1-EUA-Provider-Fact-Sheet-3.pdf    Fact sheet for patients: https://docs.[employer]/wp-content/uploads/AFQ7096-4928-YQ5.1-EUA-Patient-Fact-Sheet-1.pdf    Test performed by PCR.    Blood Culture - Blood, Arm, Left [[id number]] Collected: 03/15/21 1447    Lab Status: Final result Specimen: Blood from Arm, Left Updated: 03/20/21 1500     Blood Culture No growth at 5 days    Blood Culture - Blood, Arm, Right [[id number]] Collected: 03/15/21 1447    Lab Status: Final result Specimen: Blood from Arm, Right Updated: 03/20/21 1500     Blood Culture No growth at 5 days          Assessment: Overall seems to have improved with decreasing oxygen requirement.    Pneumonia due to COVID-19 virus  1-systemic COVID-19 infection with  2-COVID-19 pneumonia with  possible evolving hypoxia and possible  3-superimposed bacterial pneumonia  4-diabetes mellitus  5-hypertension  6-other diagnosis per primary team.  7-oral thrush     Recommendations/Discussions:  · Continue with dexamethasone and given his oxygen requirement at remdesivir.  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  · Management of underlying diabetes hypertension and other medical issues per primary team.  Roberto Jensen MD  3/21/2021  08:55 EDT    Much of this encounter note is an electronic transcription/translation of spoken language to printed text. The electronic translation of spoken language may permit erroneous, or at times, nonsensical words or phrases to be inadvertently transcribed; Although I have reviewed the note for such errors, some may still exist

## 2021-03-21 NOTE — PROGRESS NOTES
[facility] INPATIENT PROGRESS NOTE         [address]    3/21/2021      PATIENT IDENTIFICATION:  Name: Alex Geiger ADMIT: 3/15/2021   : 1950  PCP: Real Hudson MD    MRN: [id number] LOS: 5 days   AGE/SEX: 70 y.o. male  ROOM: [facility]                     LOS 5    Reason for visit: Respiratory failure with COVID-19 pneumonia      SUBJECTIVE:      No new complaints.      Objective   OBJECTIVE:    Vital Sign Min/Max for last 24 hours  Temp  Min: 96.9 °F (36.1 °C)  Max: 97.5 °F (36.4 °C)   BP  Min: 144/83  Max: 154/93   Pulse  Min: 55  Max: 87   Resp  Min: 18  Max: 20   SpO2  Min: 90 %  Max: 94 %   No data recorded   No data recorded                         Body mass index is 28.73 kg/m².    Intake/Output Summary (Last 24 hours) at 3/21/2021 0858  Last data filed at 3/21/2021 0844  Gross per 24 hour   Intake 640 ml   Output 700 ml   Net -60 ml         Exam:  GEN:  No distress, appears stated age  NECK:  No adenopathy, midline trachea  LUNGS: Normal chest on inspection, palpation and auscultation  CV:  Normal S1S2, without murmur      Assessment     Scheduled meds:  amLODIPine, 10 mg, Oral, Q24H  cholecalciferol, 1,000 Units, Oral, Daily  clopidogrel, 75 mg, Oral, Daily  dexamethasone, 6 mg, Intravenous, BID  enoxaparin, 40 mg, Subcutaneous, Q12H  famotidine, 20 mg, Oral, BID  finasteride, 5 mg, Oral, Daily  fluconazole, 100 mg, Oral, Q24H  guaiFENesin, 1,200 mg, Oral, Q12H  insulin glargine, 60 Units, Subcutaneous, Q24H  insulin lispro, 0-7 Units, Subcutaneous, 4x Daily With Meals & Nightly  insulin lispro, 20 Units, Subcutaneous, TID With Meals  losartan, 100 mg, Oral, Q24H  tamsulosin, 0.4 mg, Oral, Nightly      IV meds:                         Data Review:  Results from last 7 days   Lab Units 21  0644 21  0538 21  0626 21  0721 21  0437   SODIUM mmol/L 137 138 141 142 140   POTASSIUM mmol/L 4.6 4.3 4.4 4.5 4.6   CHLORIDE mmol/L 105 104 105 106 106   CO2  mmol/L 24.7 24.8 27.2 24.8 24.1   BUN mg/dL 20 17 22 24* 29*   CREATININE mg/dL 0.74* 0.60* 0.75* 0.77 1.01   GLUCOSE mg/dL 153* 191* 155* 207* 151*   CALCIUM mg/dL 9.5 9.0 9.1 9.7 9.1         Estimated Creatinine Clearance: 103.3 mL/min (A) (by C-G formula based on SCr of 0.74 mg/dL (L)).  Results from last 7 days   Lab Units 03/21/21  0644 03/20/21  0538 03/19/21  0626 03/18/21  0721 03/17/21  0437   WBC 10*3/mm3 15.39* 11.15* 10.37 8.71 9.83   HEMOGLOBIN g/dL 13.6 13.9 13.4 12.8* 11.8*   PLATELETS 10*3/mm3 412 355 337 286 253     Results from last 7 days   Lab Units 03/16/21  1215   INR  1.25*     Results from last 7 days   Lab Units 03/21/21  0644 03/20/21  0538 03/19/21  0626 03/18/21  0721 03/17/21  0437   ALT (SGPT) U/L 41 34 23 17 17   AST (SGOT) U/L 27 27 25 21 26         Results from last 7 days   Lab Units 03/15/21  1903 03/15/21  1447   PROCALCITONIN ng/mL  --  0.89*   LACTATE mmol/L 1.3 2.1*     COVID LABS:  Results From Last 14 Days   Lab Units 03/21/21  0644 03/20/21  0538 03/19/21  0626 03/17/21  0437 03/16/21  1215 03/15/21  1903 03/15/21  1447   PROBNP pg/mL  --   --   --  645.2  --   --   --    CRP mg/dL 0.87* 1.61* 3.07* 12.37*  --   --   --    D DIMER QUANT MCGFEU/mL  --   --   --   --  0.93*  --   --    FERRITIN ng/mL  --   --   --  1,212.00*  --   --   --    LACTATE mmol/L  --   --   --   --   --  1.3 2.1*   PROCALCITONIN ng/mL  --   --   --   --   --   --  0.89*   PROTIME Seconds  --   --   --   --  15.5*  --   --    INR   --   --   --   --  1.25*  --   --            Chest x-ray 3/19/2021 viewed shows bilateral airspace disease consistent with COVID-19 pneumonia.  Worse compared to previous.            Microbiology reviewed    )        Active Hospital Problems    Diagnosis  POA   • Pneumonia due to COVID-19 virus [U07.1, J12.82]  Yes      Resolved Hospital Problems   No resolved problems to display.         ASSESSMENT:  COVID-19 pneumonia  Acute hypoxemic respiratory failure secondary to  above  Type 2 diabetes  Thrush  CAD  Hypertension  Hyperlipidemia        PLAN:    Completed remdesivir and IV Actemra x1.  Continuing steroids.  Wean oxygen as able.  Requiring OptiFlow.  Following inflammatory labs.  Slowly trending down.  Control glucose.  Control blood pressure.        Hung Quintanilla MD  Pulmonary and Critical Care Medicine  [facility]  3/21/2021    08:58 EDT

## 2021-03-21 NOTE — PLAN OF CARE
Goal Outcome Evaluation:  Plan of Care Reviewed With: patient  Progress: no change  Outcome Summary: Pt complaint with care. Oxygen slightly drops during activity with a quick recover time. Some slight drops in O2 saturation levels during sleep. VSS with no acute changes at this time.Pt noted to be mouth breathing while in deep sleep which causes O2 to drop.

## 2021-03-22 LAB
ALBUMIN SERPL-MCNC: 3 G/DL (ref 3.5–5.2)
ALBUMIN/GLOB SERPL: 1.2 G/DL
ALP SERPL-CCNC: 44 U/L (ref 39–117)
ALT SERPL W P-5'-P-CCNC: 44 U/L (ref 1–41)
ANION GAP SERPL CALCULATED.3IONS-SCNC: 8.5 MMOL/L (ref 5–15)
AST SERPL-CCNC: 21 U/L (ref 1–40)
BILIRUB SERPL-MCNC: 0.6 MG/DL (ref 0–1.2)
BUN SERPL-MCNC: 21 MG/DL (ref 8–23)
BUN/CREAT SERPL: 30 (ref 7–25)
CALCIUM SPEC-SCNC: 8.8 MG/DL (ref 8.6–10.5)
CHLORIDE SERPL-SCNC: 104 MMOL/L (ref 98–107)
CO2 SERPL-SCNC: 23.5 MMOL/L (ref 22–29)
CREAT SERPL-MCNC: 0.7 MG/DL (ref 0.76–1.27)
CRP SERPL-MCNC: 0.5 MG/DL (ref 0–0.5)
DEPRECATED RDW RBC AUTO: 41 FL (ref 37–54)
ERYTHROCYTE [DISTWIDTH] IN BLOOD BY AUTOMATED COUNT: 13.3 % (ref 12.3–15.4)
GFR SERPL CREATININE-BSD FRML MDRD: 111 ML/MIN/1.73
GLOBULIN UR ELPH-MCNC: 2.5 GM/DL
GLUCOSE BLDC GLUCOMTR-MCNC: 138 MG/DL (ref 70–130)
GLUCOSE BLDC GLUCOMTR-MCNC: 154 MG/DL (ref 70–130)
GLUCOSE BLDC GLUCOMTR-MCNC: 253 MG/DL (ref 70–130)
GLUCOSE BLDC GLUCOMTR-MCNC: 79 MG/DL (ref 70–130)
GLUCOSE SERPL-MCNC: 86 MG/DL (ref 65–99)
HCT VFR BLD AUTO: 41.4 % (ref 37.5–51)
HGB BLD-MCNC: 14.3 G/DL (ref 13–17.7)
LYMPHOCYTES # BLD MANUAL: 0.4 10*3/MM3 (ref 0.7–3.1)
LYMPHOCYTES NFR BLD MANUAL: 2 % (ref 19.6–45.3)
LYMPHOCYTES NFR BLD MANUAL: 2 % (ref 5–12)
MCH RBC QN AUTO: 29.9 PG (ref 26.6–33)
MCHC RBC AUTO-ENTMCNC: 34.5 G/DL (ref 31.5–35.7)
MCV RBC AUTO: 86.4 FL (ref 79–97)
METAMYELOCYTES NFR BLD MANUAL: 2 % (ref 0–0)
MONOCYTES # BLD AUTO: 0.4 10*3/MM3 (ref 0.1–0.9)
MYELOCYTES NFR BLD MANUAL: 1 % (ref 0–0)
NEUTROPHILS # BLD AUTO: 18.52 10*3/MM3 (ref 1.7–7)
NEUTROPHILS NFR BLD MANUAL: 93 % (ref 42.7–76)
NRBC BLD AUTO-RTO: 0.2 /100 WBC (ref 0–0.2)
PLAT MORPH BLD: NORMAL
PLATELET # BLD AUTO: 398 10*3/MM3 (ref 140–450)
PMV BLD AUTO: 10.3 FL (ref 6–12)
POTASSIUM SERPL-SCNC: 4.3 MMOL/L (ref 3.5–5.2)
PROT SERPL-MCNC: 5.5 G/DL (ref 6–8.5)
RBC # BLD AUTO: 4.79 10*6/MM3 (ref 4.14–5.8)
RBC MORPH BLD: NORMAL
SODIUM SERPL-SCNC: 136 MMOL/L (ref 136–145)
WBC # BLD AUTO: 19.91 10*3/MM3 (ref 3.4–10.8)
WBC MORPH BLD: NORMAL

## 2021-03-22 PROCEDURE — 63710000001 INSULIN GLARGINE PER 5 UNITS: Performed by: HOSPITALIST

## 2021-03-22 PROCEDURE — 99231 SBSQ HOSP IP/OBS SF/LOW 25: CPT | Performed by: INTERNAL MEDICINE

## 2021-03-22 PROCEDURE — 85025 COMPLETE CBC W/AUTO DIFF WBC: CPT | Performed by: HOSPITALIST

## 2021-03-22 PROCEDURE — 80053 COMPREHEN METABOLIC PANEL: CPT | Performed by: HOSPITALIST

## 2021-03-22 PROCEDURE — 25010000002 DEXAMETHASONE SODIUM PHOSPHATE 10 MG/ML SOLUTION: Performed by: HOSPITALIST

## 2021-03-22 PROCEDURE — 63710000001 INSULIN LISPRO (HUMAN) PER 5 UNITS: Performed by: HOSPITALIST

## 2021-03-22 PROCEDURE — 25010000002 ENOXAPARIN PER 10 MG: Performed by: INTERNAL MEDICINE

## 2021-03-22 PROCEDURE — 86140 C-REACTIVE PROTEIN: CPT | Performed by: HOSPITALIST

## 2021-03-22 PROCEDURE — 85007 BL SMEAR W/DIFF WBC COUNT: CPT | Performed by: HOSPITALIST

## 2021-03-22 PROCEDURE — 94760 N-INVAS EAR/PLS OXIMETRY 1: CPT

## 2021-03-22 PROCEDURE — 82962 GLUCOSE BLOOD TEST: CPT

## 2021-03-22 PROCEDURE — 94799 UNLISTED PULMONARY SVC/PX: CPT

## 2021-03-22 RX ORDER — INSULIN LISPRO 100 [IU]/ML
10 INJECTION, SOLUTION INTRAVENOUS; SUBCUTANEOUS
Status: DISCONTINUED | OUTPATIENT
Start: 2021-03-22 | End: 2021-03-26 | Stop reason: HOSPADM

## 2021-03-22 RX ORDER — ACETAMINOPHEN 325 MG/1
650 TABLET ORAL EVERY 6 HOURS PRN
Status: DISCONTINUED | OUTPATIENT
Start: 2021-03-22 | End: 2021-03-26 | Stop reason: HOSPADM

## 2021-03-22 RX ORDER — INSULIN GLARGINE 100 [IU]/ML
30 INJECTION, SOLUTION SUBCUTANEOUS EVERY 24 HOURS
Status: DISCONTINUED | OUTPATIENT
Start: 2021-03-22 | End: 2021-03-26 | Stop reason: HOSPADM

## 2021-03-22 RX ADMIN — ACETAMINOPHEN 650 MG: 325 TABLET, FILM COATED ORAL at 21:00

## 2021-03-22 RX ADMIN — INSULIN GLARGINE 30 UNITS: 100 INJECTION, SOLUTION SUBCUTANEOUS at 13:51

## 2021-03-22 RX ADMIN — CLOPIDOGREL 75 MG: 75 TABLET, FILM COATED ORAL at 11:56

## 2021-03-22 RX ADMIN — INSULIN LISPRO 20 UNITS: 100 INJECTION, SOLUTION INTRAVENOUS; SUBCUTANEOUS at 11:55

## 2021-03-22 RX ADMIN — DEXAMETHASONE SODIUM PHOSPHATE 6 MG: 10 INJECTION, SOLUTION INTRAMUSCULAR; INTRAVENOUS at 11:57

## 2021-03-22 RX ADMIN — ACETAMINOPHEN 650 MG: 325 TABLET, FILM COATED ORAL at 12:00

## 2021-03-22 RX ADMIN — Medication 1000 UNITS: at 11:56

## 2021-03-22 RX ADMIN — ENOXAPARIN SODIUM 40 MG: 40 INJECTION SUBCUTANEOUS at 11:56

## 2021-03-22 RX ADMIN — INSULIN LISPRO 2 UNITS: 100 INJECTION, SOLUTION INTRAVENOUS; SUBCUTANEOUS at 11:58

## 2021-03-22 RX ADMIN — FLUCONAZOLE 100 MG: 100 TABLET ORAL at 11:56

## 2021-03-22 RX ADMIN — INSULIN LISPRO 4 UNITS: 100 INJECTION, SOLUTION INTRAVENOUS; SUBCUTANEOUS at 20:57

## 2021-03-22 RX ADMIN — TAMSULOSIN HYDROCHLORIDE 0.4 MG: 0.4 CAPSULE ORAL at 21:18

## 2021-03-22 RX ADMIN — LOSARTAN POTASSIUM 100 MG: 100 TABLET, FILM COATED ORAL at 11:56

## 2021-03-22 RX ADMIN — AMLODIPINE BESYLATE 10 MG: 10 TABLET ORAL at 11:56

## 2021-03-22 RX ADMIN — FAMOTIDINE 20 MG: 20 TABLET, FILM COATED ORAL at 11:55

## 2021-03-22 RX ADMIN — FINASTERIDE 5 MG: 5 TABLET, FILM COATED ORAL at 11:56

## 2021-03-22 RX ADMIN — GUAIFENESIN 1200 MG: 600 TABLET, EXTENDED RELEASE ORAL at 21:18

## 2021-03-22 RX ADMIN — GUAIFENESIN 1200 MG: 600 TABLET, EXTENDED RELEASE ORAL at 11:56

## 2021-03-22 RX ADMIN — INSULIN LISPRO 10 UNITS: 100 INJECTION, SOLUTION INTRAVENOUS; SUBCUTANEOUS at 17:28

## 2021-03-22 RX ADMIN — ENOXAPARIN SODIUM 40 MG: 40 INJECTION SUBCUTANEOUS at 20:58

## 2021-03-22 RX ADMIN — FAMOTIDINE 20 MG: 20 TABLET, FILM COATED ORAL at 20:57

## 2021-03-22 NOTE — PROGRESS NOTES
"  Infectious Diseases Progress Note    Roberto Jensen MD     [facility]  [place]: 6 days  Patient Identification:  Name: Alex Geiger  Age: 70 y.o.  Sex: male  :  1950  MRN: [id number]         Primary Care Physician: Real Hudson MD            Subjective: Feeling better continues to feel stronger.  Interval History: See consultation note.  · Completed his remdesivir on 3/20/2021.  · Received Tocilizumab on 3/17/2021  Objective:    Scheduled Meds:amLODIPine, 10 mg, Oral, Q24H  cholecalciferol, 1,000 Units, Oral, Daily  clopidogrel, 75 mg, Oral, Daily  dexamethasone, 6 mg, Intravenous, BID  enoxaparin, 40 mg, Subcutaneous, Q12H  famotidine, 20 mg, Oral, BID  finasteride, 5 mg, Oral, Daily  fluconazole, 100 mg, Oral, Q24H  guaiFENesin, 1,200 mg, Oral, Q12H  insulin glargine, 60 Units, Subcutaneous, Q24H  insulin lispro, 0-7 Units, Subcutaneous, 4x Daily With Meals & Nightly  insulin lispro, 20 Units, Subcutaneous, TID With Meals  losartan, 100 mg, Oral, Q24H  tamsulosin, 0.4 mg, Oral, Nightly      Continuous Infusions:     Vital signs in last 24 hours:  Temp:  [96.3 °F (35.7 °C)-97.4 °F (36.3 °C)] 96.3 °F (35.7 °C)  Heart Rate:  [55-96] 55  Resp:  [16-20] 16  BP: (118-152)/(69-89) 139/77    Intake/Output:    Intake/Output Summary (Last 24 hours) at 3/22/2021 0814  Last data filed at 3/21/2021 1700  Gross per 24 hour   Intake 780 ml   Output --   Net 780 ml       Exam:  /77 (BP Location: Right arm, Patient Position: Lying)   Pulse 55   Temp 96.3 °F (35.7 °C) (Oral)   Resp 16   Ht 182.9 cm (72\")   Wt 96.1 kg (211 lb 13.8 oz)   SpO2 92%   BMI 28.73 kg/m²   Patient is examined using the personal protective equipment as per guidelines from infection control for this particular patient as enacted.  Hand washing was performed before and after patient interaction.  General Appearance:    Alert, cooperative, no distress, AAOx3                          Head:    Normocephalic, without obvious " abnormality, atraumatic                           Eyes:    PERRL, conjunctivae/corneas clear, EOM's intact, both eyes                         Throat:   Lips, tongue, gums normal; oral mucosa pink and moist                           Neck:   Supple, symmetrical, trachea midline, no JVD                         Lungs:    Clear to auscultation bilaterally, respirations unlabored                 Chest Wall:    No tenderness or deformity                          Heart:    Regular rate and rhythm, S1 and S2 normal                  Abdomen:     Soft, non-tender, bowel sounds active                 Extremities:   Extremities normal, atraumatic, no cyanosis or edema                        Pulses:   Pulses palpable in all extremities                            Skin:   Skin is warm and dry,  no rashes or palpable lesions                  Neurologic:   CNII-XII intact, motor strength grossly intact     Data Review:    I reviewed the patient's new clinical results.  Results from last 7 days   Lab Units 03/22/21  0600 03/21/21  0644 03/20/21  0538 03/19/21  0626 03/18/21  0721 03/17/21  0437 03/16/21  0811   WBC 10*3/mm3 19.91* 15.39* 11.15* 10.37 8.71 9.83 10.37   HEMOGLOBIN g/dL 14.3 13.6 13.9 13.4 12.8* 11.8* 12.3*   PLATELETS 10*3/mm3 398 412 355 337 286 253 216     Results from last 7 days   Lab Units 03/22/21  0600 03/21/21  0644 03/20/21  0538 03/19/21  0626 03/18/21  0721 03/17/21  0437 03/16/21  1215 03/16/21  0811   SODIUM mmol/L 136 137 138 141 142 140  --  137   POTASSIUM mmol/L 4.3 4.6 4.3 4.4 4.5 4.6  --  4.6   CHLORIDE mmol/L 104 105 104 105 106 106  --  103   CO2 mmol/L 23.5 24.7 24.8 27.2 24.8 24.1  --  22.7   BUN mg/dL 21 20 17 22 24* 29*  --  24*   CREATININE mg/dL 0.70* 0.74* 0.60* 0.75* 0.77 1.01 0.96 0.87   CALCIUM mg/dL 8.8 9.5 9.0 9.1 9.7 9.1  --  9.0   GLUCOSE mg/dL 86 153* 191* 155* 207* 151*  --  236*       Microbiology Results (last 10 days)     Procedure Component Value - Date/Time    Respiratory Panel  PCR w/COVID-19(SARS-CoV-2) LA/ANAT/AMIRA/PAD/COR/MAD/VIOLETA In-House, NP Swab in UTM/VTM, 3-4 HR TAT - Swab, Nasopharynx [[id number]]  (Abnormal) Collected: 03/15/21 1547    Lab Status: Final result Specimen: Swab from Nasopharynx Updated: 03/15/21 9539     ADENOVIRUS, PCR Not Detected     Coronavirus 229E Not Detected     Coronavirus HKU1 Not Detected     Coronavirus NL63 Not Detected     Coronavirus OC43 Not Detected     COVID19 Detected     Human Metapneumovirus Not Detected     Human Rhinovirus/Enterovirus Not Detected     Influenza A PCR Not Detected     Influenza B PCR Not Detected     Parainfluenza Virus 1 Not Detected     Parainfluenza Virus 2 Not Detected     Parainfluenza Virus 3 Not Detected     Parainfluenza Virus 4 Not Detected     RSV, PCR Not Detected     Bordetella pertussis pcr Not Detected     Bordetella parapertussis PCR Not Detected     Chlamydophila pneumoniae PCR Not Detected     Mycoplasma pneumo by PCR Not Detected    Narrative:      Fact sheet for providers: https://docs.[employer]/wp-content/uploads/XYE8440-6137-KU8.1-EUA-Provider-Fact-Sheet-3.pdf    Fact sheet for patients: https://docs.[employer]/wp-content/uploads/DGX0948-0157-XU8.1-EUA-Patient-Fact-Sheet-1.pdf    Test performed by PCR.    Blood Culture - Blood, Arm, Left [[id number]] Collected: 03/15/21 1447    Lab Status: Final result Specimen: Blood from Arm, Left Updated: 03/20/21 1500     Blood Culture No growth at 5 days    Blood Culture - Blood, Arm, Right [[id number]] Collected: 03/15/21 1447    Lab Status: Final result Specimen: Blood from Arm, Right Updated: 03/20/21 1500     Blood Culture No growth at 5 days          Assessment: Overall seems to have improved with decreasing oxygen requirement.    Pneumonia due to COVID-19 virus  1-systemic COVID-19 infection with  2-COVID-19 pneumonia with possible evolving hypoxia and possible  3-superimposed bacterial pneumonia  4-diabetes mellitus  5-hypertension  6-other diagnosis per  primary team.  7-oral thrush     Recommendations/Discussions:  · Continue with dexamethasone and given his oxygen requirement at remdesivir.  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  · Management of underlying diabetes hypertension and other medical issues per primary team.  Roberto Jensen MD  3/22/2021  08:14 EDT    Much of this encounter note is an electronic transcription/translation of spoken language to printed text. The electronic translation of spoken language may permit erroneous, or at times, nonsensical words or phrases to be inadvertently transcribed; Although I have reviewed the note for such errors, some may still exist

## 2021-03-22 NOTE — PROGRESS NOTES
"Daily progress note    Chief complaint  Doing better  No new complaints  Denies chest pain shortness of breath or palpitation    History of present illness  70-year-old white male with history of coronary artery disease hypertension hyperlipidemia and diabetes mellitus presented to [facility] emergency room with shortness of breath body aches fever chills and nonproductive cough for last 1 week.  Patient was diagnosed with Covid about a week ago.  Patient denies any chest pain abdominal pain nausea vomiting diarrhea.  Patient work-up in ER revealed COVID-19 pneumonia with acute hypoxic respiratory failure admit for management.    REVIEW OF SYSTEMS   Unremarkable     PHYSICAL EXAM   Blood pressure 139/77, pulse 55, temperature 96.3 °F (35.7 °C), temperature source Oral, resp. rate 16, height 182.9 cm (72\"), weight 96.1 kg (211 lb 13.8 oz), SpO2 92 %.    GENERAL: not distressed, appears ill but not toxic   HENT: nares patent   EYES: no scleral icterus   NECK: no ROM limitations   CV: regular rhythm, regular rate, no murmur, no rubs and no gallops   RESPIRATORY: Mild increased work of breathing, breath sounds are clear to auscultate bilaterally patient is able to speak in full sentences   ABDOMEN: soft, rounded, no focal tenderness bowel sounds positive  MUSCULOSKELETAL: no deformity   NEURO: alert, moves all extremities, follows commands   SKIN: warm, dry     LAB RESULTS   Lab Results (last 24 hours)     Procedure Component Value Units Date/Time    Manual Differential [[id number]]  (Abnormal) Collected: 03/22/21 0600    Specimen: Blood Updated: 03/22/21 0744     Neutrophil % 93.0 %      Comment: 1+ bands        Lymphocyte % 2.0 %      Monocyte % 2.0 %      Metamyelocyte % 2.0 %      Myelocyte % 1.0 %      Neutrophils Absolute 18.52 10*3/mm3      Lymphocytes Absolute 0.40 10*3/mm3      Monocytes Absolute 0.40 10*3/mm3      RBC Morphology Normal     WBC Morphology Normal     Platelet Morphology Normal    " Comprehensive Metabolic Panel [[id number]]  (Abnormal) Collected: 03/22/21 0600    Specimen: Blood Updated: 03/22/21 0655     Glucose 86 mg/dL      BUN 21 mg/dL      Creatinine 0.70 mg/dL      Sodium 136 mmol/L      Potassium 4.3 mmol/L      Chloride 104 mmol/L      CO2 23.5 mmol/L      Calcium 8.8 mg/dL      Total Protein 5.5 g/dL      Albumin 3.00 g/dL      ALT (SGPT) 44 U/L      AST (SGOT) 21 U/L      Alkaline Phosphatase 44 U/L      Total Bilirubin 0.6 mg/dL      eGFR Non African Amer 111 mL/min/1.73      Globulin 2.5 gm/dL      A/G Ratio 1.2 g/dL      BUN/Creatinine Ratio 30.0     Anion Gap 8.5 mmol/L     Narrative:      GFR Normal >60  Chronic Kidney Disease <60  Kidney Failure <15      C-reactive Protein [[id number]]  (Normal) Collected: 03/22/21 0600    Specimen: Blood Updated: 03/22/21 0655     C-Reactive Protein 0.50 mg/dL     CBC & Differential [[id number]]  (Abnormal) Collected: 03/22/21 0600    Specimen: Blood Updated: 03/22/21 0642    Narrative:      The following orders were created for panel order CBC & Differential.  Procedure                               Abnormality         Status                     ---------                               -----------         ------                     CBC Auto Differential[[id number]]        Abnormal            Final result                 Please view results for these tests on the individual orders.    CBC Auto Differential [[id number]]  (Abnormal) Collected: 03/22/21 0600    Specimen: Blood Updated: 03/22/21 0642     WBC 19.91 10*3/mm3      RBC 4.79 10*6/mm3      Hemoglobin 14.3 g/dL      Hematocrit 41.4 %      MCV 86.4 fL      MCH 29.9 pg      MCHC 34.5 g/dL      RDW 13.3 %      RDW-SD 41.0 fl      MPV 10.3 fL      Platelets 398 10*3/mm3      nRBC 0.2 /100 WBC     POC Glucose Once [[id number]]  (Normal) Collected: 03/22/21 0616    Specimen: Blood Updated: 03/22/21 0618     Glucose 79 mg/dL     POC Glucose Once [[id number]]  (Abnormal) Collected: 03/21/21 2040     Specimen: Blood Updated: 03/21/21 2041     Glucose 178 mg/dL     POC Glucose Once [[id number]]  (Abnormal) Collected: 03/21/21 1612    Specimen: Blood Updated: 03/21/21 1614     Glucose 215 mg/dL            Study Result    Narrative & Impression   ONE VIEW PORTABLE CHEST     HISTORY: Shortness of breath and cough. Possible Covid 19 pneumonia.     FINDINGS: The lungs are moderately expanded with some vague haziness  extending into the periphery of the left lung and at the right base  highly suspicious for early changes of Covid 19 pneumonia and continued  follow-up evaluation is recommended. The heart is slightly enlarged.          Current Facility-Administered Medications:   •  acetaminophen (TYLENOL) tablet 650 mg, 650 mg, Oral, Q6H PRN, Kevin Hayes MD  •  amLODIPine (NORVASC) tablet 10 mg, 10 mg, Oral, Q24H, Kevin Hayes MD, 10 mg at 03/21/21 0815  •  cholecalciferol (VITAMIN D3) tablet 1,000 Units, 1,000 Units, Oral, Daily, Kevin Hayes MD, 1,000 Units at 03/21/21 0815  •  clopidogrel (PLAVIX) tablet 75 mg, 75 mg, Oral, Daily, Kevin Hayes MD, 75 mg at 03/21/21 0815  •  dexamethasone (DECADRON) tablet 6 mg, 6 mg, Oral, Daily With Breakfast & Dinner, Kevin Hayes MD  •  enoxaparin (LOVENOX) syringe 40 mg, 40 mg, Subcutaneous, Q12H, Antonio Serrano MD, 40 mg at 03/21/21 2042  •  famotidine (PEPCID) tablet 20 mg, 20 mg, Oral, BID, Kevin Hayes MD, 20 mg at 03/21/21 2041  •  finasteride (PROSCAR) tablet 5 mg, 5 mg, Oral, Daily, Kevin Hayes MD, 5 mg at 03/21/21 0815  •  fluconazole (DIFLUCAN) tablet 100 mg, 100 mg, Oral, Q24H, Antonio Serrano MD, 100 mg at 03/21/21 0815  •  guaiFENesin (MUCINEX) 12 hr tablet 1,200 mg, 1,200 mg, Oral, Q12H, Kevin Hayes MD, 1,200 mg at 03/21/21 2041  •  insulin glargine (LANTUS, SEMGLEE) injection 60 Units, 60 Units, Subcutaneous, Q24H, Kevin Hayes MD  •  insulin lispro (ADMELOG) injection 0-7 Units, 0-7 Units, Subcutaneous, 4x Daily With Meals & Nightly, Abigail  MD Honey, 2 Units at 03/21/21 2148  •  insulin lispro (ADMELOG) injection 20 Units, 20 Units, Subcutaneous, TID With Meals, Honey Hayes MD, 20 Units at 03/21/21 1638  •  losartan (COZAAR) tablet 100 mg, 100 mg, Oral, Q24H, Honey Hayes MD, 100 mg at 03/21/21 0815  •  ondansetron (ZOFRAN) injection 4 mg, 4 mg, Intravenous, Q6H PRN, Honey Hayes MD  •  tamsulosin (FLOMAX) 24 hr capsule 0.4 mg, 0.4 mg, Oral, Nightly, Honey Hayes MD, 0.4 mg at 03/21/21 2041     ASSESSMENT  COVID-19 pneumonia  Acute hypoxic respiratory failure   Sinus bradycardia  Diabetes mellitus  Hypertension  Hyperlipidemia  Coronary artery disease  BPH  Gastroesophageal reflux disease    PLAN  CPM  Supplement oxygen and titrate  Decadron  Remdesivir  Actemra completed  Off beta-blocker  Making slow progress  Infectious disease consult appreciated   Pulmonary and cardiology to follow patient  Adjust home medications  Stress ulcer DVT prophylaxis  Supportive care  Discussed with nursing staff  Follow closely further recommendation according to hospital course    HONEY HAYES MD

## 2021-03-22 NOTE — PLAN OF CARE
Goal Outcome Evaluation:  Plan of Care Reviewed With: patient  Progress: improving  Outcome Summary: Pt rested comfortably throughout the night. VSS with no acute changes.

## 2021-03-22 NOTE — PROGRESS NOTES
[facility] INPATIENT PROGRESS NOTE         [address]    3/22/2021      PATIENT IDENTIFICATION:  Name: Alex Geiger ADMIT: 3/15/2021   : 1950  PCP: Real Hudson MD    MRN: [id number] LOS: 6 days   AGE/SEX: 70 y.o. male  ROOM: [facility]                     LOS 6    Reason for visit: Respiratory failure with COVID-19 pneumonia      SUBJECTIVE:      No new complaints.      Objective   OBJECTIVE:    Vital Sign Min/Max for last 24 hours  Temp  Min: 96.3 °F (35.7 °C)  Max: 97.4 °F (36.3 °C)   BP  Min: 118/69  Max: 152/89   Pulse  Min: 55  Max: 96   Resp  Min: 16  Max: 20   SpO2  Min: 90 %  Max: 94 %   No data recorded   No data recorded                         Body mass index is 28.73 kg/m².    Intake/Output Summary (Last 24 hours) at 3/22/2021 1200  Last data filed at 3/21/2021 1700  Gross per 24 hour   Intake 540 ml   Output --   Net 540 ml         Exam:  GEN:  No distress, appears stated age  NECK:  No adenopathy, midline trachea  LUNGS: Normal chest on inspection, palpation and auscultation  CV:  Normal S1S2, without murmur      Assessment     Scheduled meds:  amLODIPine, 10 mg, Oral, Q24H  cholecalciferol, 1,000 Units, Oral, Daily  clopidogrel, 75 mg, Oral, Daily  dexamethasone, 6 mg, Oral, Daily With Breakfast & Dinner  enoxaparin, 40 mg, Subcutaneous, Q12H  famotidine, 20 mg, Oral, BID  finasteride, 5 mg, Oral, Daily  fluconazole, 100 mg, Oral, Q24H  guaiFENesin, 1,200 mg, Oral, Q12H  insulin glargine, 30 Units, Subcutaneous, Q24H  insulin lispro, 0-7 Units, Subcutaneous, 4x Daily With Meals & Nightly  insulin lispro, 10 Units, Subcutaneous, TID With Meals  losartan, 100 mg, Oral, Q24H  tamsulosin, 0.4 mg, Oral, Nightly      IV meds:                         Data Review:  Results from last 7 days   Lab Units 21  0600 21  0644 21  0538 21  0626 21  0721   SODIUM mmol/L 136 137 138 141 142   POTASSIUM mmol/L 4.3 4.6 4.3 4.4 4.5   CHLORIDE mmol/L 104 105 104  105 106   CO2 mmol/L 23.5 24.7 24.8 27.2 24.8   BUN mg/dL 21 20 17 22 24*   CREATININE mg/dL 0.70* 0.74* 0.60* 0.75* 0.77   GLUCOSE mg/dL 86 153* 191* 155* 207*   CALCIUM mg/dL 8.8 9.5 9.0 9.1 9.7         Estimated Creatinine Clearance: 103.3 mL/min (A) (by C-G formula based on SCr of 0.7 mg/dL (L)).  Results from last 7 days   Lab Units 03/22/21  0600 03/21/21  0644 03/20/21  0538 03/19/21  0626 03/18/21  0721   WBC 10*3/mm3 19.91* 15.39* 11.15* 10.37 8.71   HEMOGLOBIN g/dL 14.3 13.6 13.9 13.4 12.8*   PLATELETS 10*3/mm3 398 412 355 337 286     Results from last 7 days   Lab Units 03/16/21  1215   INR  1.25*     Results from last 7 days   Lab Units 03/22/21  0600 03/21/21  0644 03/20/21  0538 03/19/21  0626 03/18/21  0721   ALT (SGPT) U/L 44* 41 34 23 17   AST (SGOT) U/L 21 27 27 25 21         Results from last 7 days   Lab Units 03/15/21  1903 03/15/21  1447   PROCALCITONIN ng/mL  --  0.89*   LACTATE mmol/L 1.3 2.1*     COVID LABS:  Results From Last 14 Days   Lab Units 03/22/21  0600 03/21/21  0644 03/20/21  0538 03/17/21  0437 03/16/21  1215 03/15/21  1903 03/15/21  1447   PROBNP pg/mL  --   --   --  645.2  --   --   --    CRP mg/dL 0.50 0.87* 1.61* 12.37*  --   --   --    D DIMER QUANT MCGFEU/mL  --   --   --   --  0.93*  --   --    FERRITIN ng/mL  --   --   --  1,212.00*  --   --   --    LACTATE mmol/L  --   --   --   --   --  1.3 2.1*   PROCALCITONIN ng/mL  --   --   --   --   --   --  0.89*   PROTIME Seconds  --   --   --   --  15.5*  --   --    INR   --   --   --   --  1.25*  --   --            Chest x-ray 3/19/2021 viewed shows bilateral airspace disease consistent with COVID-19 pneumonia.  Worse compared to previous.            Microbiology reviewed    )        Active Hospital Problems    Diagnosis  POA   • Pneumonia due to COVID-19 virus [U07.1, J12.82]  Yes      Resolved Hospital Problems   No resolved problems to display.         ASSESSMENT:  COVID-19 pneumonia  Acute hypoxemic respiratory failure  secondary to above  Type 2 diabetes  Thrush  CAD  Hypertension  Hyperlipidemia        PLAN:    Completed remdesivir and IV Actemra x1.  On oral steroid.  Wean oxygen as able.    Following inflammatory labs.  Slowly trending down.  Control glucose.  Control blood pressure.        Hung Quintanilla MD  Pulmonary and Critical Care Medicine  [facility], [facility]  3/22/2021    12:00 EDT

## 2021-03-22 NOTE — PROGRESS NOTES
[facility] Progress Note       Encounter Date:21  Patient:Alex Geiger  :1950  MRN:[id number]    I did not into the patient's room in order to avoid unnecessary exposure and conserve PPE    Chief Complaint: Follow up bradycardia      Subjective:      No significant changes in vital signs.  Has bradycardia in the 50s but stable.      Review of Systems:  Review of Systems   Unable to perform ROS: acuity of condition       Medications:  Scheduled Meds:  amLODIPine, 10 mg, Oral, Q24H  cholecalciferol, 1,000 Units, Oral, Daily  clopidogrel, 75 mg, Oral, Daily  dexamethasone, 6 mg, Intravenous, BID  enoxaparin, 40 mg, Subcutaneous, Q12H  famotidine, 20 mg, Oral, BID  finasteride, 5 mg, Oral, Daily  fluconazole, 100 mg, Oral, Q24H  guaiFENesin, 1,200 mg, Oral, Q12H  insulin glargine, 60 Units, Subcutaneous, Q24H  insulin lispro, 0-7 Units, Subcutaneous, 4x Daily With Meals & Nightly  insulin lispro, 20 Units, Subcutaneous, TID With Meals  losartan, 100 mg, Oral, Q24H  tamsulosin, 0.4 mg, Oral, Nightly    Continuous Infusions:   PRN Meds:  ondansetron         Objective:       Vitals:    21 1828 21 2340 21 0751 21 0758   BP: 129/74 152/89  139/77   BP Location: Right arm Right arm  Right arm   Patient Position: Lying Lying  Lying   Pulse: 94 59 55    Resp: 20 16 16 16   Temp: 97.4 °F (36.3 °C) 96.8 °F (36 °C)  96.3 °F (35.7 °C)   TempSrc: Oral Oral  Oral   SpO2: 90% 94% 92%    Weight:       Height:               Physical Exam:  Deferred           Lab Review:   Results from last 7 days   Lab Units 21  0600 21  0644 21  0538 21  0626 21  0721 21  0437 21  1215 21  0811   SODIUM mmol/L 136 137 138 141 142 140  --  137   POTASSIUM mmol/L 4.3 4.6 4.3 4.4 4.5 4.6  --  4.6   CHLORIDE mmol/L 104 105 104 105 106 106  --  103   CO2 mmol/L 23.5 24.7 24.8 27.2 24.8 24.1  --  22.7   BUN mg/dL 21 20 17 22 24* 29*  --  24*    CREATININE mg/dL 0.70* 0.74* 0.60* 0.75* 0.77 1.01 0.96 0.87   GLUCOSE mg/dL 86 153* 191* 155* 207* 151*  --  236*   CALCIUM mg/dL 8.8 9.5 9.0 9.1 9.7 9.1  --  9.0   AST (SGOT) U/L 21 27 27 25 21 26 23  --    ALT (SGPT) U/L 44* 41 34 23 17 17 14  --          Results from last 7 days   Lab Units 03/22/21  0600 03/21/21  0644 03/20/21  0538 03/19/21  0626 03/18/21  0721 03/17/21  0437 03/16/21  0811   WBC 10*3/mm3 19.91* 15.39* 11.15* 10.37 8.71 9.83 10.37   HEMOGLOBIN g/dL 14.3 13.6 13.9 13.4 12.8* 11.8* 12.3*   HEMATOCRIT % 41.4 39.7 40.8 39.4 37.9 34.3* 34.7*   PLATELETS 10*3/mm3 398 412 355 337 286 253 216     Results from last 7 days   Lab Units 03/16/21  1215   INR  1.25*               Invalid input(s): LDLCALC  Results from last 7 days   Lab Units 03/17/21  0437   PROBNP pg/mL 645.2     Results from last 7 days   Lab Units 03/17/21  0437   TSH uIU/mL 0.206*            Assessment:          Diagnosis Plan   1. Pneumonia due to COVID-19 virus     2. Hypoxia            Plan:       Mr. Geiger is a 70-year-old gentleman with past medical history notable for coronary artery disease status post percutaneous intervention, hypertension, mixed hyperlipidemia, and PVCs who presented to the hospital on 3/18/2021 with COVID-19 pneumonia.  He is slowly recovering.  His hospital course has been complicated by bradycardia but patient has remained relatively asymptomatic from his bradycardia.  His metoprolol was discontinued and overall his vitals have remained relatively stable over the last 72 hours.  I would continue with close monitoring but from a cardiac standpoint he seems to be stable and will follow along peripherally going forward.  There are any new issues that arise please do not hesitate to call.    Sinus bradycardia:  · Heart rate and rhythm remained stable we will continue to monitor but no further cardiac testing needed at this time    Hypertension:  · Blood pressure reasonably controlled    Coronary artery  disease:  · No beta-blocker given bradycardia  · Continue calcium channel blocker  · Continue clopidogrel  · Patient with allergy to statin           Dennis Alvarez MD  [facility]  03/22/21  10:24 EDT

## 2021-03-23 LAB
ALBUMIN SERPL-MCNC: 2.9 G/DL (ref 3.5–5.2)
ALBUMIN/GLOB SERPL: 1.3 G/DL
ALP SERPL-CCNC: 43 U/L (ref 39–117)
ALT SERPL W P-5'-P-CCNC: 39 U/L (ref 1–41)
ANION GAP SERPL CALCULATED.3IONS-SCNC: 7 MMOL/L (ref 5–15)
AST SERPL-CCNC: 16 U/L (ref 1–40)
BILIRUB SERPL-MCNC: 0.6 MG/DL (ref 0–1.2)
BUN SERPL-MCNC: 21 MG/DL (ref 8–23)
BUN/CREAT SERPL: 30 (ref 7–25)
CALCIUM SPEC-SCNC: 8.5 MG/DL (ref 8.6–10.5)
CHLORIDE SERPL-SCNC: 106 MMOL/L (ref 98–107)
CO2 SERPL-SCNC: 24 MMOL/L (ref 22–29)
CREAT SERPL-MCNC: 0.7 MG/DL (ref 0.76–1.27)
CRP SERPL-MCNC: 0.32 MG/DL (ref 0–0.5)
DEPRECATED RDW RBC AUTO: 41 FL (ref 37–54)
ERYTHROCYTE [DISTWIDTH] IN BLOOD BY AUTOMATED COUNT: 13.2 % (ref 12.3–15.4)
GFR SERPL CREATININE-BSD FRML MDRD: 111 ML/MIN/1.73
GLOBULIN UR ELPH-MCNC: 2.2 GM/DL
GLUCOSE BLDC GLUCOMTR-MCNC: 139 MG/DL (ref 70–130)
GLUCOSE BLDC GLUCOMTR-MCNC: 164 MG/DL (ref 70–130)
GLUCOSE BLDC GLUCOMTR-MCNC: 177 MG/DL (ref 70–130)
GLUCOSE BLDC GLUCOMTR-MCNC: 197 MG/DL (ref 70–130)
GLUCOSE SERPL-MCNC: 118 MG/DL (ref 65–99)
HCT VFR BLD AUTO: 40.8 % (ref 37.5–51)
HGB BLD-MCNC: 14.3 G/DL (ref 13–17.7)
LYMPHOCYTES # BLD MANUAL: 1.64 10*3/MM3 (ref 0.7–3.1)
LYMPHOCYTES NFR BLD MANUAL: 5 % (ref 5–12)
LYMPHOCYTES NFR BLD MANUAL: 8 % (ref 19.6–45.3)
MCH RBC QN AUTO: 30.4 PG (ref 26.6–33)
MCHC RBC AUTO-ENTMCNC: 35 G/DL (ref 31.5–35.7)
MCV RBC AUTO: 86.6 FL (ref 79–97)
METAMYELOCYTES NFR BLD MANUAL: 7 % (ref 0–0)
MONOCYTES # BLD AUTO: 1.02 10*3/MM3 (ref 0.1–0.9)
MYELOCYTES NFR BLD MANUAL: 4 % (ref 0–0)
NEUTROPHILS # BLD AUTO: 15.53 10*3/MM3 (ref 1.7–7)
NEUTROPHILS NFR BLD MANUAL: 76 % (ref 42.7–76)
PLAT MORPH BLD: NORMAL
PLATELET # BLD AUTO: 411 10*3/MM3 (ref 140–450)
PMV BLD AUTO: 10.2 FL (ref 6–12)
POTASSIUM SERPL-SCNC: 4.4 MMOL/L (ref 3.5–5.2)
PROT SERPL-MCNC: 5.1 G/DL (ref 6–8.5)
RBC # BLD AUTO: 4.71 10*6/MM3 (ref 4.14–5.8)
RBC MORPH BLD: NORMAL
SODIUM SERPL-SCNC: 137 MMOL/L (ref 136–145)
WBC # BLD AUTO: 20.44 10*3/MM3 (ref 3.4–10.8)
WBC MORPH BLD: NORMAL

## 2021-03-23 PROCEDURE — 94760 N-INVAS EAR/PLS OXIMETRY 1: CPT

## 2021-03-23 PROCEDURE — 82962 GLUCOSE BLOOD TEST: CPT

## 2021-03-23 PROCEDURE — 80053 COMPREHEN METABOLIC PANEL: CPT | Performed by: HOSPITALIST

## 2021-03-23 PROCEDURE — 94799 UNLISTED PULMONARY SVC/PX: CPT

## 2021-03-23 PROCEDURE — 85025 COMPLETE CBC W/AUTO DIFF WBC: CPT | Performed by: HOSPITALIST

## 2021-03-23 PROCEDURE — 85007 BL SMEAR W/DIFF WBC COUNT: CPT | Performed by: HOSPITALIST

## 2021-03-23 PROCEDURE — 63710000001 DEXAMETHASONE PER 0.25 MG: Performed by: HOSPITALIST

## 2021-03-23 PROCEDURE — 63710000001 INSULIN LISPRO (HUMAN) PER 5 UNITS: Performed by: HOSPITALIST

## 2021-03-23 PROCEDURE — 86140 C-REACTIVE PROTEIN: CPT | Performed by: HOSPITALIST

## 2021-03-23 PROCEDURE — 25010000002 ENOXAPARIN PER 10 MG: Performed by: INTERNAL MEDICINE

## 2021-03-23 PROCEDURE — 63710000001 INSULIN GLARGINE PER 5 UNITS: Performed by: HOSPITALIST

## 2021-03-23 RX ADMIN — GUAIFENESIN 1200 MG: 600 TABLET, EXTENDED RELEASE ORAL at 21:11

## 2021-03-23 RX ADMIN — GUAIFENESIN 1200 MG: 600 TABLET, EXTENDED RELEASE ORAL at 09:14

## 2021-03-23 RX ADMIN — INSULIN LISPRO 10 UNITS: 100 INJECTION, SOLUTION INTRAVENOUS; SUBCUTANEOUS at 18:01

## 2021-03-23 RX ADMIN — FAMOTIDINE 20 MG: 20 TABLET, FILM COATED ORAL at 09:14

## 2021-03-23 RX ADMIN — CLOPIDOGREL 75 MG: 75 TABLET, FILM COATED ORAL at 09:14

## 2021-03-23 RX ADMIN — INSULIN LISPRO 2 UNITS: 100 INJECTION, SOLUTION INTRAVENOUS; SUBCUTANEOUS at 21:11

## 2021-03-23 RX ADMIN — Medication 1000 UNITS: at 09:14

## 2021-03-23 RX ADMIN — TAMSULOSIN HYDROCHLORIDE 0.4 MG: 0.4 CAPSULE ORAL at 21:10

## 2021-03-23 RX ADMIN — ENOXAPARIN SODIUM 40 MG: 40 INJECTION SUBCUTANEOUS at 09:13

## 2021-03-23 RX ADMIN — FLUCONAZOLE 100 MG: 100 TABLET ORAL at 09:14

## 2021-03-23 RX ADMIN — INSULIN GLARGINE 30 UNITS: 100 INJECTION, SOLUTION SUBCUTANEOUS at 13:00

## 2021-03-23 RX ADMIN — DEXAMETHASONE 6 MG: 4 TABLET ORAL at 09:14

## 2021-03-23 RX ADMIN — INSULIN LISPRO 2 UNITS: 100 INJECTION, SOLUTION INTRAVENOUS; SUBCUTANEOUS at 18:02

## 2021-03-23 RX ADMIN — ACETAMINOPHEN 650 MG: 325 TABLET, FILM COATED ORAL at 21:11

## 2021-03-23 RX ADMIN — ENOXAPARIN SODIUM 40 MG: 40 INJECTION SUBCUTANEOUS at 21:11

## 2021-03-23 RX ADMIN — AMLODIPINE BESYLATE 10 MG: 10 TABLET ORAL at 09:15

## 2021-03-23 RX ADMIN — FAMOTIDINE 20 MG: 20 TABLET, FILM COATED ORAL at 21:11

## 2021-03-23 RX ADMIN — INSULIN LISPRO 10 UNITS: 100 INJECTION, SOLUTION INTRAVENOUS; SUBCUTANEOUS at 13:02

## 2021-03-23 RX ADMIN — DEXAMETHASONE 6 MG: 4 TABLET ORAL at 18:01

## 2021-03-23 RX ADMIN — FINASTERIDE 5 MG: 5 TABLET, FILM COATED ORAL at 09:14

## 2021-03-23 RX ADMIN — INSULIN LISPRO 10 UNITS: 100 INJECTION, SOLUTION INTRAVENOUS; SUBCUTANEOUS at 09:15

## 2021-03-23 RX ADMIN — LOSARTAN POTASSIUM 100 MG: 100 TABLET, FILM COATED ORAL at 09:14

## 2021-03-23 NOTE — PROGRESS NOTES
[facility] INPATIENT PROGRESS NOTE         [address]    3/23/2021      PATIENT IDENTIFICATION:  Name: Alex Geiger ADMIT: 3/15/2021   : 1950  PCP: Real Hudson MD    MRN: [id number] LOS: 7 days   AGE/SEX: 70 y.o. male  ROOM: [facility]                     LOS 7    Reason for visit: Respiratory failure with COVID-19 pneumonia      SUBJECTIVE:      Denies new complaints.  On OptiFlow but down to 40 L 46% FiO2 at time of visit.  Saturations 88% and remained stable with talking to me.      Objective   OBJECTIVE:    Vital Sign Min/Max for last 24 hours  Temp  Min: 96 °F (35.6 °C)  Max: 97.5 °F (36.4 °C)   BP  Min: 131/76  Max: 152/82   Pulse  Min: 52  Max: 91   Resp  Min: 12  Max: 16   SpO2  Min: 89 %  Max: 93 %   No data recorded   No data recorded                         Body mass index is 28.73 kg/m².    Intake/Output Summary (Last 24 hours) at 3/23/2021 0906  Last data filed at 3/23/2021 0806  Gross per 24 hour   Intake --   Output 200 ml   Net -200 ml         Exam:  GEN:  No distress, appears stated age  NECK:  No adenopathy, midline trachea  LUNGS: Normal chest on inspection, palpation and diminished on auscultation  CV:  Normal S1S2, without murmur      Assessment     Scheduled meds:  amLODIPine, 10 mg, Oral, Q24H  cholecalciferol, 1,000 Units, Oral, Daily  clopidogrel, 75 mg, Oral, Daily  dexamethasone, 6 mg, Oral, Daily With Breakfast & Dinner  enoxaparin, 40 mg, Subcutaneous, Q12H  famotidine, 20 mg, Oral, BID  finasteride, 5 mg, Oral, Daily  fluconazole, 100 mg, Oral, Q24H  guaiFENesin, 1,200 mg, Oral, Q12H  insulin glargine, 30 Units, Subcutaneous, Q24H  insulin lispro, 0-7 Units, Subcutaneous, 4x Daily With Meals & Nightly  insulin lispro, 10 Units, Subcutaneous, TID With Meals  losartan, 100 mg, Oral, Q24H  tamsulosin, 0.4 mg, Oral, Nightly      IV meds:                         Data Review:  Results from last 7 days   Lab Units 21  0543 21  0600 21  0644  03/20/21  0538 03/19/21  0626   SODIUM mmol/L 137 136 137 138 141   POTASSIUM mmol/L 4.4 4.3 4.6 4.3 4.4   CHLORIDE mmol/L 106 104 105 104 105   CO2 mmol/L 24.0 23.5 24.7 24.8 27.2   BUN mg/dL 21 21 20 17 22   CREATININE mg/dL 0.70* 0.70* 0.74* 0.60* 0.75*   GLUCOSE mg/dL 118* 86 153* 191* 155*   CALCIUM mg/dL 8.5* 8.8 9.5 9.0 9.1         Estimated Creatinine Clearance: 103.3 mL/min (A) (by C-G formula based on SCr of 0.7 mg/dL (L)).  Results from last 7 days   Lab Units 03/23/21  0543 03/22/21  0600 03/21/21  0644 03/20/21  0538 03/19/21  0626   WBC 10*3/mm3 20.44* 19.91* 15.39* 11.15* 10.37   HEMOGLOBIN g/dL 14.3 14.3 13.6 13.9 13.4   PLATELETS 10*3/mm3 411 398 412 355 337     Results from last 7 days   Lab Units 03/16/21  1215   INR  1.25*     Results from last 7 days   Lab Units 03/23/21  0543 03/22/21  0600 03/21/21  0644 03/20/21  0538 03/19/21  0626   ALT (SGPT) U/L 39 44* 41 34 23   AST (SGOT) U/L 16 21 27 27 25             COVID LABS:  Results From Last 14 Days   Lab Units 03/23/21  0543 03/22/21  0600 03/21/21  0644 03/17/21  0437 03/16/21  1215 03/15/21  1903 03/15/21  1447   PROBNP pg/mL  --   --   --  645.2  --   --   --    CRP mg/dL 0.32 0.50 0.87* 12.37*  --   --   --    D DIMER QUANT MCGFEU/mL  --   --   --   --  0.93*  --   --    FERRITIN ng/mL  --   --   --  1,212.00*  --   --   --    LACTATE mmol/L  --   --   --   --   --  1.3 2.1*   PROCALCITONIN ng/mL  --   --   --   --   --   --  0.89*   PROTIME Seconds  --   --   --   --  15.5*  --   --    INR   --   --   --   --  1.25*  --   --            Chest x-ray 3/19/2021 viewed shows bilateral airspace disease consistent with COVID-19 pneumonia.  Worse compared to previous.            Microbiology reviewed    )        Active Hospital Problems    Diagnosis  POA   • Pneumonia due to COVID-19 virus [U07.1, J12.82]  Yes      Resolved Hospital Problems   No resolved problems to display.         ASSESSMENT:  COVID-19 pneumonia  Acute hypoxemic respiratory  failure secondary to above  Viral sepsis  Type 2 diabetes  Thrush  CAD  Hypertension  Hyperlipidemia        PLAN:    Completed remdesivir and IV Actemra x1.  On oral steroid.  Wean oxygen as able.    Following inflammatory labs.  Slowly trending down.  Control glucose.  Control blood pressure.  Repeat x-ray in AM for follow-up.    Hung Quintanilla MD  Pulmonary and Critical Care Medicine  [facility], [facility]  3/23/2021    09:06 EDT

## 2021-03-23 NOTE — PROGRESS NOTES
"Daily progress note    Chief complaint  Doing better  No new complaints  Denies chest pain shortness of breath or palpitation    History of present illness  70-year-old white male with history of coronary artery disease hypertension hyperlipidemia and diabetes mellitus presented to [facility] emergency room with shortness of breath body aches fever chills and nonproductive cough for last 1 week.  Patient was diagnosed with Covid about a week ago.  Patient denies any chest pain abdominal pain nausea vomiting diarrhea.  Patient work-up in ER revealed COVID-19 pneumonia with acute hypoxic respiratory failure admit for management.    REVIEW OF SYSTEMS   Unremarkable     PHYSICAL EXAM   Blood pressure 136/94, pulse 91, temperature 96 °F (35.6 °C), temperature source Oral, resp. rate 16, height 182.9 cm (72\"), weight 96.1 kg (211 lb 13.8 oz), SpO2 91 %.    GENERAL: not distressed, appears ill but not toxic   HENT: nares patent   EYES: no scleral icterus   NECK: no ROM limitations   CV: regular rhythm, regular rate, no murmur, no rubs and no gallops   RESPIRATORY: Mild increased work of breathing, breath sounds are clear to auscultate bilaterally patient is able to speak in full sentences   ABDOMEN: soft, rounded, no focal tenderness bowel sounds positive  MUSCULOSKELETAL: no deformity   NEURO: alert, moves all extremities, follows commands   SKIN: warm, dry     LAB RESULTS   Lab Results (last 24 hours)     Procedure Component Value Units Date/Time    POC Glucose Once [[id number]]  (Abnormal) Collected: 03/23/21 1151    Specimen: Blood Updated: 03/23/21 1204     Glucose 177 mg/dL     Manual Differential [[id number]]  (Abnormal) Collected: 03/23/21 0543    Specimen: Blood Updated: 03/23/21 0738     Neutrophil % 76.0 %      Lymphocyte % 8.0 %      Monocyte % 5.0 %      Metamyelocyte % 7.0 %      Myelocyte % 4.0 %      Neutrophils Absolute 15.53 10*3/mm3      Lymphocytes Absolute 1.64 10*3/mm3      Monocytes Absolute " 1.02 10*3/mm3      RBC Morphology Normal     WBC Morphology Normal     Platelet Morphology Normal    CBC & Differential [[id number]]  (Abnormal) Collected: 03/23/21 0543    Specimen: Blood Updated: 03/23/21 0736    Narrative:      The following orders were created for panel order CBC & Differential.  Procedure                               Abnormality         Status                     ---------                               -----------         ------                     CBC Auto Differential[[id number]]        Abnormal            Final result                 Please view results for these tests on the individual orders.    CBC Auto Differential [[id number]]  (Abnormal) Collected: 03/23/21 0543    Specimen: Blood Updated: 03/23/21 0736     WBC 20.44 10*3/mm3      RBC 4.71 10*6/mm3      Hemoglobin 14.3 g/dL      Hematocrit 40.8 %      MCV 86.6 fL      MCH 30.4 pg      MCHC 35.0 g/dL      RDW 13.2 %      RDW-SD 41.0 fl      MPV 10.2 fL      Platelets 411 10*3/mm3     Comprehensive Metabolic Panel [[id number]]  (Abnormal) Collected: 03/23/21 0543    Specimen: Blood from Arm, Right Updated: 03/23/21 0703     Glucose 118 mg/dL      BUN 21 mg/dL      Creatinine 0.70 mg/dL      Sodium 137 mmol/L      Potassium 4.4 mmol/L      Chloride 106 mmol/L      CO2 24.0 mmol/L      Calcium 8.5 mg/dL      Total Protein 5.1 g/dL      Albumin 2.90 g/dL      ALT (SGPT) 39 U/L      AST (SGOT) 16 U/L      Alkaline Phosphatase 43 U/L      Total Bilirubin 0.6 mg/dL      eGFR Non African Amer 111 mL/min/1.73      Globulin 2.2 gm/dL      A/G Ratio 1.3 g/dL      BUN/Creatinine Ratio 30.0     Anion Gap 7.0 mmol/L     Narrative:      GFR Normal >60  Chronic Kidney Disease <60  Kidney Failure <15      C-reactive Protein [[id number]]  (Normal) Collected: 03/23/21 0543    Specimen: Blood from Arm, Right Updated: 03/23/21 0703     C-Reactive Protein 0.32 mg/dL     POC Glucose Once [[id number]]  (Abnormal) Collected: 03/23/21 0640    Specimen: Blood Updated:  03/23/21 0644     Glucose 139 mg/dL     POC Glucose Once [[id number]]  (Abnormal) Collected: 03/22/21 2029    Specimen: Blood Updated: 03/22/21 2032     Glucose 253 mg/dL            Study Result    Narrative & Impression   ONE VIEW PORTABLE CHEST     HISTORY: Shortness of breath and cough. Possible Covid 19 pneumonia.     FINDINGS: The lungs are moderately expanded with some vague haziness  extending into the periphery of the left lung and at the right base  highly suspicious for early changes of Covid 19 pneumonia and continued  follow-up evaluation is recommended. The heart is slightly enlarged.          Current Facility-Administered Medications:   •  acetaminophen (TYLENOL) tablet 650 mg, 650 mg, Oral, Q6H PRN, Kevin Hayes MD, 650 mg at 03/22/21 2100  •  amLODIPine (NORVASC) tablet 10 mg, 10 mg, Oral, Q24H, Kevin Hayes MD, 10 mg at 03/23/21 0915  •  cholecalciferol (VITAMIN D3) tablet 1,000 Units, 1,000 Units, Oral, Daily, Kevin Hayes MD, 1,000 Units at 03/23/21 0914  •  clopidogrel (PLAVIX) tablet 75 mg, 75 mg, Oral, Daily, Kevin Hayes MD, 75 mg at 03/23/21 0914  •  dexamethasone (DECADRON) tablet 6 mg, 6 mg, Oral, Daily With Breakfast & Dinner, Kevin Hayes MD, 6 mg at 03/23/21 0914  •  enoxaparin (LOVENOX) syringe 40 mg, 40 mg, Subcutaneous, Q12H, Antonio Serrano MD, 40 mg at 03/23/21 0913  •  famotidine (PEPCID) tablet 20 mg, 20 mg, Oral, BID, Kevin Hayes MD, 20 mg at 03/23/21 0914  •  finasteride (PROSCAR) tablet 5 mg, 5 mg, Oral, Daily, Kevin Hayes MD, 5 mg at 03/23/21 0914  •  fluconazole (DIFLUCAN) tablet 100 mg, 100 mg, Oral, Q24H, Antonio Serrano MD, 100 mg at 03/23/21 0914  •  guaiFENesin (MUCINEX) 12 hr tablet 1,200 mg, 1,200 mg, Oral, Q12H, Kevin Hayes MD, 1,200 mg at 03/23/21 0914  •  insulin glargine (LANTUS, SEMGLEE) injection 30 Units, 30 Units, Subcutaneous, Q24H, Kevin Hayes MD, 30 Units at 03/22/21 1351  •  insulin lispro (ADMELOG) injection 0-7 Units, 0-7 Units,  Subcutaneous, 4x Daily With Meals & Nightly, Honey Hayes MD, 4 Units at 03/22/21 2057  •  insulin lispro (ADMELOG) injection 10 Units, 10 Units, Subcutaneous, TID With Meals, Honey Hayes MD, 10 Units at 03/23/21 1302  •  losartan (COZAAR) tablet 100 mg, 100 mg, Oral, Q24H, Honey Hayes MD, 100 mg at 03/23/21 0914  •  ondansetron (ZOFRAN) injection 4 mg, 4 mg, Intravenous, Q6H PRN, Honey Hayes MD  •  tamsulosin (FLOMAX) 24 hr capsule 0.4 mg, 0.4 mg, Oral, Nightly, Honey Hayes MD, 0.4 mg at 03/22/21 2118     ASSESSMENT  COVID-19 pneumonia  Acute hypoxic respiratory failure   Sinus bradycardia  Diabetes mellitus  Hypertension  Hyperlipidemia  Coronary artery disease  BPH  Leukocytosis secondary to steroids  Gastroesophageal reflux disease    PLAN  CPM  Supplement oxygen and titrate  Decadron PO  Remdesivir completed  Actemra completed  Off beta-blocker  Making slow progress  Infectious disease consult appreciated   Pulmonary and cardiology to follow patient  Adjust home medications  Stress ulcer DVT prophylaxis  Supportive care  Discussed with nursing staff  Discharge planning    HONEY HAYES MD

## 2021-03-23 NOTE — PLAN OF CARE
Goal Outcome Evaluation:         Vitals as charted, no c/o pain, heated HF NC as charted, up w/ 1 assist, A&Ox4, will continue to monitor.

## 2021-03-24 ENCOUNTER — APPOINTMENT (OUTPATIENT)
Dept: GENERAL RADIOLOGY | Facility: HOSPITAL | Age: 71
End: 2021-03-24

## 2021-03-24 LAB
ALBUMIN SERPL-MCNC: 3 G/DL (ref 3.5–5.2)
ALBUMIN/GLOB SERPL: 1.3 G/DL
ALP SERPL-CCNC: 43 U/L (ref 39–117)
ALT SERPL W P-5'-P-CCNC: 36 U/L (ref 1–41)
ANION GAP SERPL CALCULATED.3IONS-SCNC: 7 MMOL/L (ref 5–15)
AST SERPL-CCNC: 12 U/L (ref 1–40)
BILIRUB SERPL-MCNC: 0.7 MG/DL (ref 0–1.2)
BUN SERPL-MCNC: 18 MG/DL (ref 8–23)
BUN/CREAT SERPL: 27.7 (ref 7–25)
CALCIUM SPEC-SCNC: 8.7 MG/DL (ref 8.6–10.5)
CHLORIDE SERPL-SCNC: 104 MMOL/L (ref 98–107)
CO2 SERPL-SCNC: 22 MMOL/L (ref 22–29)
CREAT SERPL-MCNC: 0.65 MG/DL (ref 0.76–1.27)
DEPRECATED RDW RBC AUTO: 41.4 FL (ref 37–54)
ERYTHROCYTE [DISTWIDTH] IN BLOOD BY AUTOMATED COUNT: 13.4 % (ref 12.3–15.4)
GFR SERPL CREATININE-BSD FRML MDRD: 121 ML/MIN/1.73
GLOBULIN UR ELPH-MCNC: 2.3 GM/DL
GLUCOSE BLDC GLUCOMTR-MCNC: 147 MG/DL (ref 70–130)
GLUCOSE BLDC GLUCOMTR-MCNC: 176 MG/DL (ref 70–130)
GLUCOSE BLDC GLUCOMTR-MCNC: 207 MG/DL (ref 70–130)
GLUCOSE BLDC GLUCOMTR-MCNC: 223 MG/DL (ref 70–130)
GLUCOSE SERPL-MCNC: 141 MG/DL (ref 65–99)
HCT VFR BLD AUTO: 42.7 % (ref 37.5–51)
HGB BLD-MCNC: 14.6 G/DL (ref 13–17.7)
LYMPHOCYTES # BLD MANUAL: 1.61 10*3/MM3 (ref 0.7–3.1)
LYMPHOCYTES NFR BLD MANUAL: 3 % (ref 5–12)
LYMPHOCYTES NFR BLD MANUAL: 8.1 % (ref 19.6–45.3)
MCH RBC QN AUTO: 29.7 PG (ref 26.6–33)
MCHC RBC AUTO-ENTMCNC: 34.2 G/DL (ref 31.5–35.7)
MCV RBC AUTO: 87 FL (ref 79–97)
MONOCYTES # BLD AUTO: 0.6 10*3/MM3 (ref 0.1–0.9)
NEUTROPHILS # BLD AUTO: 17.68 10*3/MM3 (ref 1.7–7)
NEUTROPHILS NFR BLD MANUAL: 88.9 % (ref 42.7–76)
PLAT MORPH BLD: NORMAL
PLATELET # BLD AUTO: 440 10*3/MM3 (ref 140–450)
PMV BLD AUTO: 10.1 FL (ref 6–12)
POTASSIUM SERPL-SCNC: 4.7 MMOL/L (ref 3.5–5.2)
PROT SERPL-MCNC: 5.3 G/DL (ref 6–8.5)
RBC # BLD AUTO: 4.91 10*6/MM3 (ref 4.14–5.8)
RBC MORPH BLD: NORMAL
SODIUM SERPL-SCNC: 133 MMOL/L (ref 136–145)
WBC # BLD AUTO: 19.89 10*3/MM3 (ref 3.4–10.8)
WBC MORPH BLD: NORMAL

## 2021-03-24 PROCEDURE — 94799 UNLISTED PULMONARY SVC/PX: CPT

## 2021-03-24 PROCEDURE — 63710000001 INSULIN LISPRO (HUMAN) PER 5 UNITS: Performed by: HOSPITALIST

## 2021-03-24 PROCEDURE — 25010000002 ENOXAPARIN PER 10 MG: Performed by: INTERNAL MEDICINE

## 2021-03-24 PROCEDURE — 63710000001 INSULIN GLARGINE PER 5 UNITS: Performed by: HOSPITALIST

## 2021-03-24 PROCEDURE — 71045 X-RAY EXAM CHEST 1 VIEW: CPT

## 2021-03-24 PROCEDURE — 82962 GLUCOSE BLOOD TEST: CPT

## 2021-03-24 PROCEDURE — 80053 COMPREHEN METABOLIC PANEL: CPT | Performed by: HOSPITALIST

## 2021-03-24 PROCEDURE — 85025 COMPLETE CBC W/AUTO DIFF WBC: CPT | Performed by: HOSPITALIST

## 2021-03-24 PROCEDURE — 85007 BL SMEAR W/DIFF WBC COUNT: CPT | Performed by: HOSPITALIST

## 2021-03-24 PROCEDURE — 94760 N-INVAS EAR/PLS OXIMETRY 1: CPT

## 2021-03-24 PROCEDURE — 63710000001 DEXAMETHASONE PER 0.25 MG: Performed by: HOSPITALIST

## 2021-03-24 RX ADMIN — INSULIN LISPRO 2 UNITS: 100 INJECTION, SOLUTION INTRAVENOUS; SUBCUTANEOUS at 17:15

## 2021-03-24 RX ADMIN — GUAIFENESIN 1200 MG: 600 TABLET, EXTENDED RELEASE ORAL at 20:25

## 2021-03-24 RX ADMIN — FINASTERIDE 5 MG: 5 TABLET, FILM COATED ORAL at 08:39

## 2021-03-24 RX ADMIN — INSULIN GLARGINE 30 UNITS: 100 INJECTION, SOLUTION SUBCUTANEOUS at 13:08

## 2021-03-24 RX ADMIN — INSULIN LISPRO 3 UNITS: 100 INJECTION, SOLUTION INTRAVENOUS; SUBCUTANEOUS at 20:25

## 2021-03-24 RX ADMIN — LOSARTAN POTASSIUM 100 MG: 100 TABLET, FILM COATED ORAL at 08:39

## 2021-03-24 RX ADMIN — FAMOTIDINE 20 MG: 20 TABLET, FILM COATED ORAL at 08:39

## 2021-03-24 RX ADMIN — FLUCONAZOLE 100 MG: 100 TABLET ORAL at 08:40

## 2021-03-24 RX ADMIN — INSULIN LISPRO 10 UNITS: 100 INJECTION, SOLUTION INTRAVENOUS; SUBCUTANEOUS at 17:14

## 2021-03-24 RX ADMIN — ENOXAPARIN SODIUM 40 MG: 40 INJECTION SUBCUTANEOUS at 08:38

## 2021-03-24 RX ADMIN — CLOPIDOGREL 75 MG: 75 TABLET, FILM COATED ORAL at 08:39

## 2021-03-24 RX ADMIN — INSULIN LISPRO 3 UNITS: 100 INJECTION, SOLUTION INTRAVENOUS; SUBCUTANEOUS at 13:07

## 2021-03-24 RX ADMIN — INSULIN LISPRO 10 UNITS: 100 INJECTION, SOLUTION INTRAVENOUS; SUBCUTANEOUS at 08:38

## 2021-03-24 RX ADMIN — GUAIFENESIN 1200 MG: 600 TABLET, EXTENDED RELEASE ORAL at 08:39

## 2021-03-24 RX ADMIN — ENOXAPARIN SODIUM 40 MG: 40 INJECTION SUBCUTANEOUS at 20:25

## 2021-03-24 RX ADMIN — Medication 1000 UNITS: at 08:39

## 2021-03-24 RX ADMIN — DEXAMETHASONE 6 MG: 4 TABLET ORAL at 17:14

## 2021-03-24 RX ADMIN — FAMOTIDINE 20 MG: 20 TABLET, FILM COATED ORAL at 20:25

## 2021-03-24 RX ADMIN — INSULIN LISPRO 10 UNITS: 100 INJECTION, SOLUTION INTRAVENOUS; SUBCUTANEOUS at 13:08

## 2021-03-24 RX ADMIN — TAMSULOSIN HYDROCHLORIDE 0.4 MG: 0.4 CAPSULE ORAL at 20:25

## 2021-03-24 RX ADMIN — AMLODIPINE BESYLATE 10 MG: 10 TABLET ORAL at 08:39

## 2021-03-24 RX ADMIN — DEXAMETHASONE 6 MG: 4 TABLET ORAL at 08:39

## 2021-03-24 NOTE — PROGRESS NOTES
[facility] INPATIENT PROGRESS NOTE         [address]    3/24/2021      PATIENT IDENTIFICATION:  Name: Alex Geiger ADMIT: 3/15/2021   : 1950  PCP: Real Hudson MD    MRN: [id number] LOS: 8 days   AGE/SEX: 70 y.o. male  ROOM: [facility]                     LOS 8    Reason for visit: Respiratory failure with COVID-19 pneumonia      SUBJECTIVE:      Denies new complaints.  On high flow nasal cannula oxygen.      Objective   OBJECTIVE:    Vital Sign Min/Max for last 24 hours  Temp  Min: 96 °F (35.6 °C)  Max: 96.9 °F (36.1 °C)   BP  Min: 129/76  Max: 140/76   Pulse  Min: 54  Max: 98   Resp  Min: 14  Max: 18   SpO2  Min: 89 %  Max: 95 %   No data recorded   No data recorded                         Body mass index is 28.73 kg/m².    Intake/Output Summary (Last 24 hours) at 3/24/2021 0751  Last data filed at 3/24/2021 0748  Gross per 24 hour   Intake 480 ml   Output 1325 ml   Net -845 ml         Exam:  GEN:  No distress, appears stated age  NECK:  No adenopathy, midline trachea  LUNGS: Normal chest on inspection, palpation and diminished on auscultation  CV:  Normal S1S2, without murmur      Assessment     Scheduled meds:  amLODIPine, 10 mg, Oral, Q24H  cholecalciferol, 1,000 Units, Oral, Daily  clopidogrel, 75 mg, Oral, Daily  dexamethasone, 6 mg, Oral, Daily With Breakfast & Dinner  enoxaparin, 40 mg, Subcutaneous, Q12H  famotidine, 20 mg, Oral, BID  finasteride, 5 mg, Oral, Daily  fluconazole, 100 mg, Oral, Q24H  guaiFENesin, 1,200 mg, Oral, Q12H  insulin glargine, 30 Units, Subcutaneous, Q24H  insulin lispro, 0-7 Units, Subcutaneous, 4x Daily With Meals & Nightly  insulin lispro, 10 Units, Subcutaneous, TID With Meals  losartan, 100 mg, Oral, Q24H  tamsulosin, 0.4 mg, Oral, Nightly      IV meds:                         Data Review:  Results from last 7 days   Lab Units 21  0543 21  0600 21  0644 21  0538 21  0626   SODIUM mmol/L 137 136 137 138 141   POTASSIUM  mmol/L 4.4 4.3 4.6 4.3 4.4   CHLORIDE mmol/L 106 104 105 104 105   CO2 mmol/L 24.0 23.5 24.7 24.8 27.2   BUN mg/dL 21 21 20 17 22   CREATININE mg/dL 0.70* 0.70* 0.74* 0.60* 0.75*   GLUCOSE mg/dL 118* 86 153* 191* 155*   CALCIUM mg/dL 8.5* 8.8 9.5 9.0 9.1         Estimated Creatinine Clearance: 103.3 mL/min (A) (by C-G formula based on SCr of 0.7 mg/dL (L)).  Results from last 7 days   Lab Units 03/23/21  0543 03/22/21  0600 03/21/21  0644 03/20/21  0538 03/19/21  0626   WBC 10*3/mm3 20.44* 19.91* 15.39* 11.15* 10.37   HEMOGLOBIN g/dL 14.3 14.3 13.6 13.9 13.4   PLATELETS 10*3/mm3 411 398 412 355 337         Results from last 7 days   Lab Units 03/23/21  0543 03/22/21  0600 03/21/21  0644 03/20/21  0538 03/19/21  0626   ALT (SGPT) U/L 39 44* 41 34 23   AST (SGOT) U/L 16 21 27 27 25             COVID LABS:  Results From Last 14 Days   Lab Units 03/23/21  0543 03/22/21  0600 03/21/21  0644 03/17/21  0437 03/16/21  1215 03/15/21  1903 03/15/21  1447   PROBNP pg/mL  --   --   --  645.2  --   --   --    CRP mg/dL 0.32 0.50 0.87* 12.37*  --   --   --    D DIMER QUANT MCGFEU/mL  --   --   --   --  0.93*  --   --    FERRITIN ng/mL  --   --   --  1,212.00*  --   --   --    LACTATE mmol/L  --   --   --   --   --  1.3 2.1*   PROCALCITONIN ng/mL  --   --   --   --   --   --  0.89*   PROTIME Seconds  --   --   --   --  15.5*  --   --    INR   --   --   --   --  1.25*  --   --            Chest x-ray 3/24 reviewed            Microbiology reviewed    )        Active Hospital Problems    Diagnosis  POA   • Pneumonia due to COVID-19 virus [U07.1, J12.82]  Yes      Resolved Hospital Problems   No resolved problems to display.         ASSESSMENT:  COVID-19 pneumonia  Acute hypoxemic respiratory failure secondary to above  Viral sepsis  Type 2 diabetes  Thrush  CAD  Hypertension  Hyperlipidemia        PLAN:    Completed remdesivir and IV Actemra x1.  On oral steroid.  Wean oxygen as able.  On high flow nasal cannula and Airvo on  standby.  Inflammatory labs improving.  Control glucose.  Control blood pressure.  Increase activity as tolerates.  Consult physical therapy for strength training.      Hung Quintanilla MD  Pulmonary and Critical Care Medicine  [facility], [facility]  3/24/2021    07:51 EDT

## 2021-03-24 NOTE — PROGRESS NOTES
"  Infectious Diseases Progress Note    Robetro Jensen MD     [facility]  Los: 8 days  Patient Identification:  Name: Alex Geiger  Age: 70 y.o.  Sex: male  :  1950  MRN: [id number]         Primary Care Physician: Real Hudson MD            Subjective: Feels well and wants to know when can he get out of here.  Interval History: See consultation note.  · Completed his remdesivir on 3/20/2021.  · Received Tocilizumab on 3/17/2021  · Oxygen requirement has gone down to 35 L high flow with 38% oxygen concentration and maintaining oxygen saturation greater than 90%.  Objective:    Scheduled Meds:amLODIPine, 10 mg, Oral, Q24H  cholecalciferol, 1,000 Units, Oral, Daily  clopidogrel, 75 mg, Oral, Daily  dexamethasone, 6 mg, Oral, Daily With Breakfast & Dinner  enoxaparin, 40 mg, Subcutaneous, Q12H  famotidine, 20 mg, Oral, BID  finasteride, 5 mg, Oral, Daily  fluconazole, 100 mg, Oral, Q24H  guaiFENesin, 1,200 mg, Oral, Q12H  insulin glargine, 30 Units, Subcutaneous, Q24H  insulin lispro, 0-7 Units, Subcutaneous, 4x Daily With Meals & Nightly  insulin lispro, 10 Units, Subcutaneous, TID With Meals  losartan, 100 mg, Oral, Q24H  tamsulosin, 0.4 mg, Oral, Nightly      Continuous Infusions:     Vital signs in last 24 hours:  Temp:  [96.3 °F (35.7 °C)-96.9 °F (36.1 °C)] 96.3 °F (35.7 °C)  Heart Rate:  [54-98] 88  Resp:  [16-18] 16  BP: (129-140)/(76-81) 133/81    Intake/Output:    Intake/Output Summary (Last 24 hours) at 3/24/2021 1043  Last data filed at 3/24/2021 0748  Gross per 24 hour   Intake 240 ml   Output 1125 ml   Net -885 ml       Exam:  /81 (BP Location: Right arm, Patient Position: Lying)   Pulse 88   Temp 96.3 °F (35.7 °C) (Oral)   Resp 16   Ht 182.9 cm (72\")   Wt 96.1 kg (211 lb 13.8 oz)   SpO2 94%   BMI 28.73 kg/m²   Patient is examined using the personal protective equipment as per guidelines from infection control for this particular patient as enacted.  Hand washing was " performed before and after patient interaction.  General Appearance:    Alert, cooperative, no distress, AAOx3                          Head:    Normocephalic, without obvious abnormality, atraumatic                           Eyes:    PERRL, conjunctivae/corneas clear, EOM's intact, both eyes                         Throat:   Lips, tongue, gums normal; oral mucosa pink and moist                           Neck:   Supple, symmetrical, trachea midline, no JVD                         Lungs:    Clear to auscultation bilaterally, respirations unlabored                 Chest Wall:    No tenderness or deformity                          Heart:    Regular rate and rhythm, S1 and S2 normal                  Abdomen:     Soft, non-tender, bowel sounds active                 Extremities:   Extremities normal, atraumatic, no cyanosis or edema                        Pulses:   Pulses palpable in all extremities                            Skin:   Skin is warm and dry,  no rashes or palpable lesions                  Neurologic:   CNII-XII intact, motor strength grossly intact     Data Review:    I reviewed the patient's new clinical results.  Results from last 7 days   Lab Units 03/24/21  0627 03/23/21  0543 03/22/21  0600 03/21/21  0644 03/20/21  0538 03/19/21  0626 03/18/21  0721   WBC 10*3/mm3 19.89* 20.44* 19.91* 15.39* 11.15* 10.37 8.71   HEMOGLOBIN g/dL 14.6 14.3 14.3 13.6 13.9 13.4 12.8*   PLATELETS 10*3/mm3 440 411 398 412 355 337 286     Results from last 7 days   Lab Units 03/24/21  0627 03/23/21  0543 03/22/21  0600 03/21/21  0644 03/20/21  0538 03/19/21  0626 03/18/21  0721   SODIUM mmol/L 133* 137 136 137 138 141 142   POTASSIUM mmol/L 4.7 4.4 4.3 4.6 4.3 4.4 4.5   CHLORIDE mmol/L 104 106 104 105 104 105 106   CO2 mmol/L 22.0 24.0 23.5 24.7 24.8 27.2 24.8   BUN mg/dL 18 21 21 20 17 22 24*   CREATININE mg/dL 0.65* 0.70* 0.70* 0.74* 0.60* 0.75* 0.77   CALCIUM mg/dL 8.7 8.5* 8.8 9.5 9.0 9.1 9.7   GLUCOSE mg/dL 141* 118* 86  153* 191* 155* 207*       Microbiology Results (last 10 days)     Procedure Component Value - Date/Time    Respiratory Panel PCR w/COVID-19(SARS-CoV-2) LA/ANAT/AMIRA/PAD/COR/MAD/VIOLETA In-House, NP Swab in UTM/VTM, 3-4 HR TAT - Swab, Nasopharynx [[id number]]  (Abnormal) Collected: 03/15/21 1547    Lab Status: Final result Specimen: Swab from Nasopharynx Updated: 03/15/21 1749     ADENOVIRUS, PCR Not Detected     Coronavirus 229E Not Detected     Coronavirus HKU1 Not Detected     Coronavirus NL63 Not Detected     Coronavirus OC43 Not Detected     COVID19 Detected     Human Metapneumovirus Not Detected     Human Rhinovirus/Enterovirus Not Detected     Influenza A PCR Not Detected     Influenza B PCR Not Detected     Parainfluenza Virus 1 Not Detected     Parainfluenza Virus 2 Not Detected     Parainfluenza Virus 3 Not Detected     Parainfluenza Virus 4 Not Detected     RSV, PCR Not Detected     Bordetella pertussis pcr Not Detected     Bordetella parapertussis PCR Not Detected     Chlamydophila pneumoniae PCR Not Detected     Mycoplasma pneumo by PCR Not Detected    Narrative:      Fact sheet for providers: https://docs.[employer]/wp-content/uploads/DEC8479-3710-UK2.1-EUA-Provider-Fact-Sheet-3.pdf    Fact sheet for patients: https://docs.[employer]/wp-content/uploads/LZM4931-1021-PN1.1-EUA-Patient-Fact-Sheet-1.pdf    Test performed by PCR.    Blood Culture - Blood, Arm, Left [[id number]] Collected: 03/15/21 1447    Lab Status: Final result Specimen: Blood from Arm, Left Updated: 03/20/21 1500     Blood Culture No growth at 5 days    Blood Culture - Blood, Arm, Right [[id number]] Collected: 03/15/21 1447    Lab Status: Final result Specimen: Blood from Arm, Right Updated: 03/20/21 1500     Blood Culture No growth at 5 days          Assessment: Overall seems to have improved with decreasing oxygen requirement.    Pneumonia due to COVID-19 virus  1-systemic COVID-19 infection with  2-COVID-19 pneumonia with possible  evolving hypoxia and possible  3-superimposed bacterial pneumonia  4-diabetes mellitus  5-hypertension  6-other diagnosis per primary team.  7-oral thrush     Recommendations/Discussions:  · Continue with dexamethasone and given his oxygen requirement at remdesivir.  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  · Management of underlying diabetes hypertension and other medical issues per primary team.  Roberto Jensen MD  3/24/2021  10:43 EDT    Much of this encounter note is an electronic transcription/translation of spoken language to printed text. The electronic translation of spoken language may permit erroneous, or at times, nonsensical words or phrases to be inadvertently transcribed; Although I have reviewed the note for such errors, some may still exist

## 2021-03-24 NOTE — PLAN OF CARE
Problem: Adult Inpatient Plan of Care  Goal: Plan of Care Review  Outcome: Ongoing, Progressing  Flowsheets (Taken 3/24/2021 1650)  Progress: improving  Outcome Summary: VSS, toleraing O2 titration down to 6L O2 HFNC with sats 92-94. No c/o pain, no signs of distress, SR-Afib on monitor, A&Ox4, will continue O2 titration as tolerated  Goal: Patient-Specific Goal (Individualized)  Outcome: Ongoing, Progressing  Goal: Absence of Hospital-Acquired Illness or Injury  Outcome: Ongoing, Progressing  Intervention: Identify and Manage Fall Risk  Recent Flowsheet Documentation  Taken 3/24/2021 1622 by Sukumar Lim, RN  Safety Promotion/Fall Prevention:  • activity supervised  • assistive device/personal items within reach  • clutter free environment maintained  • nonskid shoes/slippers when out of bed  • room organization consistent  • safety round/check completed  Taken 3/24/2021 1418 by Sukumar Lim, RN  Safety Promotion/Fall Prevention:  • activity supervised  • assistive device/personal items within reach  • clutter free environment maintained  • nonskid shoes/slippers when out of bed  • room organization consistent  • safety round/check completed  • lighting adjusted  Taken 3/24/2021 1208 by Sukumar Lim, RN  Safety Promotion/Fall Prevention:  • activity supervised  • assistive device/personal items within reach  • clutter free environment maintained  • nonskid shoes/slippers when out of bed  • room organization consistent  • safety round/check completed  Taken 3/24/2021 1030 by Sukumar Lim, RN  Safety Promotion/Fall Prevention:  • clutter free environment maintained  • assistive device/personal items within reach  • activity supervised  • fall prevention program maintained  • nonskid shoes/slippers when out of bed  • safety round/check completed  • room organization consistent  Taken 3/24/2021 0835 by Sukumar Lim, RN  Safety Promotion/Fall Prevention:  • activity  supervised  • clutter free environment maintained  • assistive device/personal items within reach  • fall prevention program maintained  • nonskid shoes/slippers when out of bed  • room organization consistent  • safety round/check completed  Intervention: Prevent Skin Injury  Recent Flowsheet Documentation  Taken 3/24/2021 1622 by Sukumar Lim RN  Body Position: position changed independently  Taken 3/24/2021 1418 by Sukumar Lim RN  Body Position: position changed independently  Taken 3/24/2021 1208 by Sukumar Lim RN  Body Position: position changed independently  Taken 3/24/2021 1030 by Sukumar Lim RN  Body Position: position changed independently  Taken 3/24/2021 0835 by Sukumar Lim RN  Body Position: position changed independently  Intervention: Prevent and Manage VTE (venous thromboembolism) Risk  Recent Flowsheet Documentation  Taken 3/24/2021 1418 by Sukumar Lim RN  VTE Prevention/Management:  • bilateral  • dorsiflexion/plantar flexion performed  Taken 3/24/2021 0835 by Sukumar Lim RN  VTE Prevention/Management:  • bilateral  • dorsiflexion/plantar flexion performed  Intervention: Prevent Infection  Recent Flowsheet Documentation  Taken 3/24/2021 1622 by Sukumar Lim RN  Infection Prevention:  • rest/sleep promoted  • single patient room provided  • visitors restricted/screened  • personal protective equipment utilized  Taken 3/24/2021 1418 by Sukumar Lim RN  Infection Prevention:  • personal protective equipment utilized  • rest/sleep promoted  • single patient room provided  • visitors restricted/screened  Taken 3/24/2021 1208 by Sukumar Lim RN  Infection Prevention:  • visitors restricted/screened  • single patient room provided  • rest/sleep promoted  • personal protective equipment utilized  Taken 3/24/2021 1030 by Sukumar Lim RN  Infection Prevention:  • visitors restricted/screened  •  single patient room provided  • rest/sleep promoted  • personal protective equipment utilized  Taken 3/24/2021 0835 by Sukumar Lim RN  Infection Prevention:  • visitors restricted/screened  • single patient room provided  • rest/sleep promoted  • personal protective equipment utilized  Goal: Optimal Comfort and Wellbeing  Outcome: Ongoing, Progressing  Intervention: Provide Person-Centered Care  Recent Flowsheet Documentation  Taken 3/24/2021 1418 by Sukumar Lim RN  Trust Relationship/Rapport: care explained  Taken 3/24/2021 0835 by Sukumar Lim RN  Trust Relationship/Rapport: care explained  Goal: Readiness for Transition of Care  Outcome: Ongoing, Progressing     Problem: Fall Injury Risk  Goal: Absence of Fall and Fall-Related Injury  Outcome: Ongoing, Progressing  Intervention: Identify and Manage Contributors to Fall Injury Risk  Recent Flowsheet Documentation  Taken 3/24/2021 1622 by Sukumar Lim RN  Medication Review/Management: medications reviewed  Taken 3/24/2021 1418 by Sukumar Lim RN  Medication Review/Management: medications reviewed  Taken 3/24/2021 1208 by Sukumar Lim RN  Medication Review/Management: medications reviewed  Taken 3/24/2021 1030 by Sukumar Lim RN  Medication Review/Management: medications reviewed  Taken 3/24/2021 0835 by Sukumar Lim RN  Medication Review/Management: medications reviewed  Intervention: Promote Injury-Free Environment  Recent Flowsheet Documentation  Taken 3/24/2021 1622 by Sukumar Lim RN  Safety Promotion/Fall Prevention:  • activity supervised  • assistive device/personal items within reach  • clutter free environment maintained  • nonskid shoes/slippers when out of bed  • room organization consistent  • safety round/check completed  Taken 3/24/2021 1418 by Sukumar Lim RN  Safety Promotion/Fall Prevention:  • activity supervised  • assistive device/personal items  within reach  • clutter free environment maintained  • nonskid shoes/slippers when out of bed  • room organization consistent  • safety round/check completed  • lighting adjusted  Taken 3/24/2021 1208 by Sukumar Lim RN  Safety Promotion/Fall Prevention:  • activity supervised  • assistive device/personal items within reach  • clutter free environment maintained  • nonskid shoes/slippers when out of bed  • room organization consistent  • safety round/check completed  Taken 3/24/2021 1030 by Sukumar Lim RN  Safety Promotion/Fall Prevention:  • clutter free environment maintained  • assistive device/personal items within reach  • activity supervised  • fall prevention program maintained  • nonskid shoes/slippers when out of bed  • safety round/check completed  • room organization consistent  Taken 3/24/2021 0835 by Sukumar Lim RN  Safety Promotion/Fall Prevention:  • activity supervised  • clutter free environment maintained  • assistive device/personal items within reach  • fall prevention program maintained  • nonskid shoes/slippers when out of bed  • room organization consistent  • safety round/check completed     Problem: Skin Injury Risk Increased  Goal: Skin Health and Integrity  Outcome: Ongoing, Progressing  Intervention: Optimize Skin Protection  Recent Flowsheet Documentation  Taken 3/24/2021 1622 by Sukumar Lim RN  Head of Bed (HOB): HOB elevated  Taken 3/24/2021 1418 by Sukumar Lim RN  Head of Bed (HOB): HOB elevated  Pressure Reduction Devices: positioning supports utilized  Skin Protection:  • adhesive use limited  • tubing/devices free from skin contact  • transparent dressing maintained  Taken 3/24/2021 1208 by Sukumar Lim RN  Head of Bed (HOB): HOB elevated  Taken 3/24/2021 1030 by Sukumar Lim RN  Head of Bed (HOB): HOB elevated  Taken 3/24/2021 0835 by Sukumar Lim RN  Pressure Reduction Techniques:  • pressure  points protected  • positioned off wounds  Head of Bed (HOB): HOB elevated  Pressure Reduction Devices: positioning supports utilized  Skin Protection:  • adhesive use limited  • tubing/devices free from skin contact  • transparent dressing maintained   Goal Outcome Evaluation:     Progress: improving  Outcome Summary: VSS, toleraing O2 titration down to 10L O2 HFNC. No c/o pain, no signs of distress, SR-Afib on monitor, A&Ox4, will continue O2 titration as tolerated

## 2021-03-24 NOTE — SIGNIFICANT NOTE
03/24/21 1618   OTHER   Discipline physical therapist   Rehab Time/Intention   Session Not Performed other (see comments)  (PT spoke with nurse who states patient is up SBA with nursing and no acute PT needs at this time need. Re-consult PT if acute needs required at later date.)      "  

## 2021-03-24 NOTE — PROGRESS NOTES
"  Infectious Diseases Progress Note    Roberto Jensen MD     [facility]  Los: 7 days  Patient Identification:  Name: Alex Geiger  Age: 70 y.o.  Sex: male  :  1950  MRN: [id number]         Primary Care Physician: Real Hudson MD            Subjective: Doing well denies any specific complaints getting stronger.  Interval History: See consultation note.  · Completed his remdesivir on 3/20/2021.  · Received Tocilizumab on 3/17/2021  · Oxygen requirement has gone down to 35 L high flow with 38% oxygen concentration and maintaining oxygen saturation greater than 90%.  Objective:    Scheduled Meds:amLODIPine, 10 mg, Oral, Q24H  cholecalciferol, 1,000 Units, Oral, Daily  clopidogrel, 75 mg, Oral, Daily  dexamethasone, 6 mg, Oral, Daily With Breakfast & Dinner  enoxaparin, 40 mg, Subcutaneous, Q12H  famotidine, 20 mg, Oral, BID  finasteride, 5 mg, Oral, Daily  fluconazole, 100 mg, Oral, Q24H  guaiFENesin, 1,200 mg, Oral, Q12H  insulin glargine, 30 Units, Subcutaneous, Q24H  insulin lispro, 0-7 Units, Subcutaneous, 4x Daily With Meals & Nightly  insulin lispro, 10 Units, Subcutaneous, TID With Meals  losartan, 100 mg, Oral, Q24H  tamsulosin, 0.4 mg, Oral, Nightly      Continuous Infusions:     Vital signs in last 24 hours:  Temp:  [96 °F (35.6 °C)-97.5 °F (36.4 °C)] 96.9 °F (36.1 °C)  Heart Rate:  [52-98] 76  Resp:  [12-16] 16  BP: (129-152)/(76-94) 140/76    Intake/Output:    Intake/Output Summary (Last 24 hours) at 3/23/2021 2033  Last data filed at 3/23/2021 2002  Gross per 24 hour   Intake 480 ml   Output 575 ml   Net -95 ml       Exam:  /76 (BP Location: Right arm, Patient Position: Lying)   Pulse 76   Temp 96.9 °F (36.1 °C) (Oral)   Resp 16   Ht 182.9 cm (72\")   Wt 96.1 kg (211 lb 13.8 oz)   SpO2 92%   BMI 28.73 kg/m²   Patient is examined using the personal protective equipment as per guidelines from infection control for this particular patient as enacted.  Hand washing was " performed before and after patient interaction.  General Appearance:    Alert, cooperative, no distress, AAOx3                          Head:    Normocephalic, without obvious abnormality, atraumatic                           Eyes:    PERRL, conjunctivae/corneas clear, EOM's intact, both eyes                         Throat:   Lips, tongue, gums normal; oral mucosa pink and moist                           Neck:   Supple, symmetrical, trachea midline, no JVD                         Lungs:    Clear to auscultation bilaterally, respirations unlabored                 Chest Wall:    No tenderness or deformity                          Heart:    Regular rate and rhythm, S1 and S2 normal                  Abdomen:     Soft, non-tender, bowel sounds active                 Extremities:   Extremities normal, atraumatic, no cyanosis or edema                        Pulses:   Pulses palpable in all extremities                            Skin:   Skin is warm and dry,  no rashes or palpable lesions                  Neurologic:   CNII-XII intact, motor strength grossly intact     Data Review:    I reviewed the patient's new clinical results.  Results from last 7 days   Lab Units 03/23/21  0543 03/22/21  0600 03/21/21  0644 03/20/21  0538 03/19/21  0626 03/18/21  0721 03/17/21  0437   WBC 10*3/mm3 20.44* 19.91* 15.39* 11.15* 10.37 8.71 9.83   HEMOGLOBIN g/dL 14.3 14.3 13.6 13.9 13.4 12.8* 11.8*   PLATELETS 10*3/mm3 411 398 412 355 337 286 253     Results from last 7 days   Lab Units 03/23/21  0543 03/22/21  0600 03/21/21  0644 03/20/21  0538 03/19/21  0626 03/18/21  0721 03/17/21  0437   SODIUM mmol/L 137 136 137 138 141 142 140   POTASSIUM mmol/L 4.4 4.3 4.6 4.3 4.4 4.5 4.6   CHLORIDE mmol/L 106 104 105 104 105 106 106   CO2 mmol/L 24.0 23.5 24.7 24.8 27.2 24.8 24.1   BUN mg/dL 21 21 20 17 22 24* 29*   CREATININE mg/dL 0.70* 0.70* 0.74* 0.60* 0.75* 0.77 1.01   CALCIUM mg/dL 8.5* 8.8 9.5 9.0 9.1 9.7 9.1   GLUCOSE mg/dL 118* 86 153*  191* 155* 207* 151*       Microbiology Results (last 10 days)     Procedure Component Value - Date/Time    Respiratory Panel PCR w/COVID-19(SARS-CoV-2) LA/[facility]AT/[facility] In-House, NP Swab in UTM/VTM, 3-4 HR TAT - Swab, Nasopharynx [[id number]]  (Abnormal) Collected: 03/15/21 1547    Lab Status: Final result Specimen: Swab from Nasopharynx Updated: 03/15/21 1749     ADENOVIRUS, PCR Not Detected     Coronavirus 229E Not Detected     Coronavirus HKU1 Not Detected     Coronavirus NL63 Not Detected     Coronavirus OC43 Not Detected     COVID19 Detected     Human Metapneumovirus Not Detected     Human Rhinovirus/Enterovirus Not Detected     Influenza A PCR Not Detected     Influenza B PCR Not Detected     Parainfluenza Virus 1 Not Detected     Parainfluenza Virus 2 Not Detected     Parainfluenza Virus 3 Not Detected     Parainfluenza Virus 4 Not Detected     RSV, PCR Not Detected     Bordetella pertussis pcr Not Detected     Bordetella parapertussis PCR Not Detected     Chlamydophila pneumoniae PCR Not Detected     Mycoplasma pneumo by PCR Not Detected    Narrative:      Fact sheet for providers: https://docs.4tiitoo/wp-content/uploads/RUZ1446-6596-OZ4.1-EUA-Provider-Fact-Sheet-3.pdf    Fact sheet for patients: https://docs.4tiit[employer]/wp-content/uploads/NSU4891-2698-TO5.1-EUA-Patient-Fact-Sheet-1.pdf    Test performed by PCR.    Blood Culture - Blood, Arm, Left [[id number]] Collected: 03/15/21 1447    Lab Status: Final result Specimen: Blood from Arm, Left Updated: 03/20/21 1500     Blood Culture No growth at 5 days    Blood Culture - Blood, Arm, Right [[id number]] Collected: 03/15/21 1447    Lab Status: Final result Specimen: Blood from Arm, Right Updated: 03/20/21 1500     Blood Culture No growth at 5 days          Assessment: Overall seems to have improved with decreasing oxygen requirement.    Pneumonia due to COVID-19 virus  1-systemic COVID-19 infection with  2-COVID-19 pneumonia with possible  evolving hypoxia and possible  3-superimposed bacterial pneumonia  4-diabetes mellitus  5-hypertension  6-other diagnosis per primary team.  7-oral thrush     Recommendations/Discussions:  · Continue with dexamethasone and given his oxygen requirement at remdesivir.  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  · Management of underlying diabetes hypertension and other medical issues per primary team.  Roberto Jensen MD  3/23/2021  20:33 EDT    Much of this encounter note is an electronic transcription/translation of spoken language to printed text. The electronic translation of spoken language may permit erroneous, or at times, nonsensical words or phrases to be inadvertently transcribed; Although I have reviewed the note for such errors, some may still exist

## 2021-03-24 NOTE — PLAN OF CARE
Problem: Adult Inpatient Plan of Care  Goal: Plan of Care Review  Outcome: Ongoing, Progressing  Flowsheets  Taken 3/24/2021 0524 by Lisa Macias RN  Progress: improving  Outcome Summary: VSS. PRN Tylenol given for mild headache and pt says it helps him sleep better. Pt able to rest comfortably throughout the shift. Nursing will continue to monitor.  Taken 3/21/2021 0628 by Feng Finley RN  Plan of Care Reviewed With: patient  Goal: Patient-Specific Goal (Individualized)  Outcome: Ongoing, Progressing  Goal: Absence of Hospital-Acquired Illness or Injury  Outcome: Ongoing, Progressing  Intervention: Identify and Manage Fall Risk  Recent Flowsheet Documentation  Taken 3/24/2021 0421 by Lisa Macias RN  Safety Promotion/Fall Prevention: safety round/check completed  Taken 3/24/2021 0245 by Lisa Macias RN  Safety Promotion/Fall Prevention: safety round/check completed  Taken 3/24/2021 0049 by Lisa Macias RN  Safety Promotion/Fall Prevention: safety round/check completed  Taken 3/23/2021 2205 by Lisa Macias RN  Safety Promotion/Fall Prevention: safety round/check completed  Taken 3/23/2021 2111 by Lisa Macias RN  Safety Promotion/Fall Prevention: safety round/check completed  Taken 3/23/2021 2002 by Lisa Macias RN  Safety Promotion/Fall Prevention: safety round/check completed  Intervention: Prevent Skin Injury  Recent Flowsheet Documentation  Taken 3/24/2021 0421 by Lisa Macias RN  Body Position: position changed independently  Taken 3/24/2021 0245 by Lisa Macias RN  Body Position: position changed independently  Taken 3/24/2021 0049 by Lisa Macias RN  Body Position: position changed independently  Taken 3/23/2021 2205 by Lisa Macias RN  Body Position: position changed independently  Taken 3/23/2021 2111 by Lisa Macias RN  Body Position: position changed independently  Taken 3/23/2021 2002 by Lisa Macias RN  Body Position: position changed independently  Goal: Optimal Comfort and  Wellbeing  Outcome: Ongoing, Progressing  Goal: Readiness for Transition of Care  Outcome: Ongoing, Progressing     Problem: Fall Injury Risk  Goal: Absence of Fall and Fall-Related Injury  Outcome: Ongoing, Progressing  Intervention: Promote Injury-Free Environment  Recent Flowsheet Documentation  Taken 3/24/2021 0421 by Lisa Macias RN  Safety Promotion/Fall Prevention: safety round/check completed  Taken 3/24/2021 0245 by Lisa Macias RN  Safety Promotion/Fall Prevention: safety round/check completed  Taken 3/24/2021 0049 by Lisa Macias RN  Safety Promotion/Fall Prevention: safety round/check completed  Taken 3/23/2021 2205 by Lisa Macias RN  Safety Promotion/Fall Prevention: safety round/check completed  Taken 3/23/2021 2111 by Lisa Macias RN  Safety Promotion/Fall Prevention: safety round/check completed  Taken 3/23/2021 2002 by Lisa Macias RN  Safety Promotion/Fall Prevention: safety round/check completed     Problem: Skin Injury Risk Increased  Goal: Skin Health and Integrity  Outcome: Ongoing, Progressing  Intervention: Optimize Skin Protection  Recent Flowsheet Documentation  Taken 3/23/2021 2111 by Lisa Macias RN  Pressure Reduction Devices: pressure-redistributing mattress utilized   Goal Outcome Evaluation:     Progress: improving  Outcome Summary: VSS. PRN Tylenol given for mild headache and pt says it helps him sleep better. Pt able to rest comfortably throughout the shift. Nursing will continue to monitor.

## 2021-03-25 LAB
ANION GAP SERPL CALCULATED.3IONS-SCNC: 9.6 MMOL/L (ref 5–15)
BUN SERPL-MCNC: 19 MG/DL (ref 8–23)
BUN/CREAT SERPL: 26.8 (ref 7–25)
BURR CELLS BLD QL SMEAR: ABNORMAL
CALCIUM SPEC-SCNC: 8.7 MG/DL (ref 8.6–10.5)
CHLORIDE SERPL-SCNC: 102 MMOL/L (ref 98–107)
CO2 SERPL-SCNC: 22.4 MMOL/L (ref 22–29)
CREAT SERPL-MCNC: 0.71 MG/DL (ref 0.76–1.27)
DEPRECATED RDW RBC AUTO: 41.1 FL (ref 37–54)
ERYTHROCYTE [DISTWIDTH] IN BLOOD BY AUTOMATED COUNT: 13.5 % (ref 12.3–15.4)
GFR SERPL CREATININE-BSD FRML MDRD: 110 ML/MIN/1.73
GLUCOSE BLDC GLUCOMTR-MCNC: 141 MG/DL (ref 70–130)
GLUCOSE BLDC GLUCOMTR-MCNC: 238 MG/DL (ref 70–130)
GLUCOSE BLDC GLUCOMTR-MCNC: 281 MG/DL (ref 70–130)
GLUCOSE BLDC GLUCOMTR-MCNC: 294 MG/DL (ref 70–130)
GLUCOSE SERPL-MCNC: 146 MG/DL (ref 65–99)
HCT VFR BLD AUTO: 41.5 % (ref 37.5–51)
HGB BLD-MCNC: 14.4 G/DL (ref 13–17.7)
LYMPHOCYTES # BLD MANUAL: 0.81 10*3/MM3 (ref 0.7–3.1)
LYMPHOCYTES NFR BLD MANUAL: 4.1 % (ref 19.6–45.3)
LYMPHOCYTES NFR BLD MANUAL: 6.1 % (ref 5–12)
MCH RBC QN AUTO: 29.3 PG (ref 26.6–33)
MCHC RBC AUTO-ENTMCNC: 34.7 G/DL (ref 31.5–35.7)
MCV RBC AUTO: 84.5 FL (ref 79–97)
MONOCYTES # BLD AUTO: 1.2 10*3/MM3 (ref 0.1–0.9)
NEUTROPHILS # BLD AUTO: 17.7 10*3/MM3 (ref 1.7–7)
NEUTROPHILS NFR BLD MANUAL: 89.8 % (ref 42.7–76)
PLAT MORPH BLD: NORMAL
PLATELET # BLD AUTO: 441 10*3/MM3 (ref 140–450)
PMV BLD AUTO: 10.1 FL (ref 6–12)
POIKILOCYTOSIS BLD QL SMEAR: ABNORMAL
POTASSIUM SERPL-SCNC: 4.6 MMOL/L (ref 3.5–5.2)
RBC # BLD AUTO: 4.91 10*6/MM3 (ref 4.14–5.8)
SODIUM SERPL-SCNC: 134 MMOL/L (ref 136–145)
WBC # BLD AUTO: 19.71 10*3/MM3 (ref 3.4–10.8)
WBC MORPH BLD: NORMAL

## 2021-03-25 PROCEDURE — 85007 BL SMEAR W/DIFF WBC COUNT: CPT | Performed by: HOSPITALIST

## 2021-03-25 PROCEDURE — 25010000002 ENOXAPARIN PER 10 MG: Performed by: INTERNAL MEDICINE

## 2021-03-25 PROCEDURE — 63710000001 INSULIN LISPRO (HUMAN) PER 5 UNITS: Performed by: HOSPITALIST

## 2021-03-25 PROCEDURE — 63710000001 DEXAMETHASONE PER 0.25 MG: Performed by: HOSPITALIST

## 2021-03-25 PROCEDURE — 63710000001 INSULIN GLARGINE PER 5 UNITS: Performed by: HOSPITALIST

## 2021-03-25 PROCEDURE — 85025 COMPLETE CBC W/AUTO DIFF WBC: CPT | Performed by: HOSPITALIST

## 2021-03-25 PROCEDURE — 82962 GLUCOSE BLOOD TEST: CPT

## 2021-03-25 PROCEDURE — 80048 BASIC METABOLIC PNL TOTAL CA: CPT | Performed by: HOSPITALIST

## 2021-03-25 RX ADMIN — INSULIN LISPRO 10 UNITS: 100 INJECTION, SOLUTION INTRAVENOUS; SUBCUTANEOUS at 17:31

## 2021-03-25 RX ADMIN — ENOXAPARIN SODIUM 40 MG: 40 INJECTION SUBCUTANEOUS at 08:44

## 2021-03-25 RX ADMIN — FAMOTIDINE 20 MG: 20 TABLET, FILM COATED ORAL at 20:47

## 2021-03-25 RX ADMIN — TAMSULOSIN HYDROCHLORIDE 0.4 MG: 0.4 CAPSULE ORAL at 20:47

## 2021-03-25 RX ADMIN — AMLODIPINE BESYLATE 10 MG: 10 TABLET ORAL at 08:45

## 2021-03-25 RX ADMIN — FINASTERIDE 5 MG: 5 TABLET, FILM COATED ORAL at 08:44

## 2021-03-25 RX ADMIN — GUAIFENESIN 1200 MG: 600 TABLET, EXTENDED RELEASE ORAL at 08:45

## 2021-03-25 RX ADMIN — FAMOTIDINE 20 MG: 20 TABLET, FILM COATED ORAL at 08:45

## 2021-03-25 RX ADMIN — INSULIN LISPRO 10 UNITS: 100 INJECTION, SOLUTION INTRAVENOUS; SUBCUTANEOUS at 08:45

## 2021-03-25 RX ADMIN — INSULIN LISPRO 3 UNITS: 100 INJECTION, SOLUTION INTRAVENOUS; SUBCUTANEOUS at 17:31

## 2021-03-25 RX ADMIN — Medication 1000 UNITS: at 08:45

## 2021-03-25 RX ADMIN — DEXAMETHASONE 6 MG: 4 TABLET ORAL at 08:44

## 2021-03-25 RX ADMIN — CLOPIDOGREL 75 MG: 75 TABLET, FILM COATED ORAL at 08:45

## 2021-03-25 RX ADMIN — GUAIFENESIN 1200 MG: 600 TABLET, EXTENDED RELEASE ORAL at 20:47

## 2021-03-25 RX ADMIN — INSULIN LISPRO 10 UNITS: 100 INJECTION, SOLUTION INTRAVENOUS; SUBCUTANEOUS at 12:28

## 2021-03-25 RX ADMIN — INSULIN LISPRO 4 UNITS: 100 INJECTION, SOLUTION INTRAVENOUS; SUBCUTANEOUS at 20:47

## 2021-03-25 RX ADMIN — LOSARTAN POTASSIUM 100 MG: 100 TABLET, FILM COATED ORAL at 08:44

## 2021-03-25 RX ADMIN — INSULIN GLARGINE 30 UNITS: 100 INJECTION, SOLUTION SUBCUTANEOUS at 12:29

## 2021-03-25 RX ADMIN — FLUCONAZOLE 100 MG: 100 TABLET ORAL at 08:44

## 2021-03-25 RX ADMIN — INSULIN LISPRO 4 UNITS: 100 INJECTION, SOLUTION INTRAVENOUS; SUBCUTANEOUS at 12:28

## 2021-03-25 NOTE — PLAN OF CARE
Goal Outcome Evaluation:  Plan of Care Reviewed With: patient  Progress: improving  Outcome Summary: Has slept long intervals. O2 now on 5l NC sats remain 90-94. Soa with exertion. Denies pain. VSS. Poss home later today. No other changes no distress

## 2021-03-25 NOTE — PROGRESS NOTES
[facility] INPATIENT PROGRESS NOTE         [address]    3/25/2021      PATIENT IDENTIFICATION:  Name: Alex Geiger ADMIT: 3/15/2021   : 1950  PCP: Real Hudson MD    MRN: [id number] LOS: 9 days   AGE/SEX: 70 y.o. male  ROOM: [facility]                     LOS 9    Reason for visit: Respiratory failure with COVID-19 pneumonia      SUBJECTIVE:      Denies new complaints.  On high flow nasal cannula oxygen.      Objective   OBJECTIVE:    Vital Sign Min/Max for last 24 hours  Temp  Min: 96.5 °F (35.8 °C)  Max: 96.8 °F (36 °C)   BP  Min: 134/75  Max: 142/74   Pulse  Min: 66  Max: 101   Resp  Min: 12  Max: 16   SpO2  Min: 90 %  Max: 95 %   No data recorded   No data recorded                         Body mass index is 28.73 kg/m².    Intake/Output Summary (Last 24 hours) at 3/25/2021 0751  Last data filed at 3/25/2021 0556  Gross per 24 hour   Intake 650 ml   Output 1700 ml   Net -1050 ml         Exam:  GEN:  No distress, appears stated age  NECK:  No adenopathy, midline trachea  LUNGS: Normal chest on inspection, palpation and diminished on auscultation  CV:  Normal S1S2, without murmur      Assessment     Scheduled meds:  amLODIPine, 10 mg, Oral, Q24H  cholecalciferol, 1,000 Units, Oral, Daily  clopidogrel, 75 mg, Oral, Daily  dexamethasone, 6 mg, Oral, Daily With Breakfast & Dinner  enoxaparin, 40 mg, Subcutaneous, Q12H  famotidine, 20 mg, Oral, BID  finasteride, 5 mg, Oral, Daily  fluconazole, 100 mg, Oral, Q24H  guaiFENesin, 1,200 mg, Oral, Q12H  insulin glargine, 30 Units, Subcutaneous, Q24H  insulin lispro, 0-7 Units, Subcutaneous, 4x Daily With Meals & Nightly  insulin lispro, 10 Units, Subcutaneous, TID With Meals  losartan, 100 mg, Oral, Q24H  tamsulosin, 0.4 mg, Oral, Nightly      IV meds:                         Data Review:  Results from last 7 days   Lab Units 21  0603 21  0627 21  0543 21  0600 21  0644   SODIUM mmol/L 134* 133* 137 136 137    POTASSIUM mmol/L 4.6 4.7 4.4 4.3 4.6   CHLORIDE mmol/L 102 104 106 104 105   CO2 mmol/L 22.4 22.0 24.0 23.5 24.7   BUN mg/dL 19 18 21 21 20   CREATININE mg/dL 0.71* 0.65* 0.70* 0.70* 0.74*   GLUCOSE mg/dL 146* 141* 118* 86 153*   CALCIUM mg/dL 8.7 8.7 8.5* 8.8 9.5         Estimated Creatinine Clearance: 103.3 mL/min (A) (by C-G formula based on SCr of 0.71 mg/dL (L)).  Results from last 7 days   Lab Units 03/25/21  0603 03/24/21  0627 03/23/21  0543 03/22/21  0600 03/21/21  0644   WBC 10*3/mm3 19.71* 19.89* 20.44* 19.91* 15.39*   HEMOGLOBIN g/dL 14.4 14.6 14.3 14.3 13.6   PLATELETS 10*3/mm3 441 440 411 398 412         Results from last 7 days   Lab Units 03/24/21  0627 03/23/21  0543 03/22/21  0600 03/21/21  0644 03/20/21  0538   ALT (SGPT) U/L 36 39 44* 41 34   AST (SGOT) U/L 12 16 21 27 27             COVID LABS:  Results From Last 14 Days   Lab Units 03/23/21  0543 03/22/21  0600 03/21/21  0644 03/17/21  0437 03/16/21  1215 03/15/21  1903 03/15/21  1447   PROBNP pg/mL  --   --   --  645.2  --   --   --    CRP mg/dL 0.32 0.50 0.87* 12.37*  --   --   --    D DIMER QUANT MCGFEU/mL  --   --   --   --  0.93*  --   --    FERRITIN ng/mL  --   --   --  1,212.00*  --   --   --    LACTATE mmol/L  --   --   --   --   --  1.3 2.1*   PROCALCITONIN ng/mL  --   --   --   --   --   --  0.89*   PROTIME Seconds  --   --   --   --  15.5*  --   --    INR   --   --   --   --  1.25*  --   --            Chest x-ray 3/24 reviewed            Microbiology reviewed    )        Active Hospital Problems    Diagnosis  POA   • Pneumonia due to COVID-19 virus [U07.1, J12.82]  Yes      Resolved Hospital Problems   No resolved problems to display.         ASSESSMENT:  COVID-19 pneumonia  Acute hypoxemic respiratory failure secondary to above  Viral sepsis  Type 2 diabetes  Thrush  CAD  Hypertension  Hyperlipidemia  Hyponatremia       PLAN:    Completed remdesivir and IV Actemra x1.  On oral steroid.  Wean oxygen as able.  On high flow nasal  cannula at 5LPM.  Control glucose.  Control blood pressure.  Increase activity as tolerates.  Consulted physical therapy for strength training.      Hung Quintanilla MD  Pulmonary and Critical Care Medicine  [facility], [facility]  3/25/2021    07:51 EDT

## 2021-03-25 NOTE — PLAN OF CARE
Problem: Adult Inpatient Plan of Care  Goal: Plan of Care Review  Outcome: Ongoing, Progressing  Flowsheets (Taken 3/25/2021 1546)  Progress: improving  Plan of Care Reviewed With: patient  Outcome Summary: Pt's VSS, remains on 5L O2 NC, no c/o pain, no signs of distress, monitoring BG, d/c decadron, possible home tomorrow  Goal: Patient-Specific Goal (Individualized)  Outcome: Ongoing, Progressing  Goal: Absence of Hospital-Acquired Illness or Injury  Outcome: Ongoing, Progressing  Intervention: Identify and Manage Fall Risk  Recent Flowsheet Documentation  Taken 3/25/2021 1435 by Sukumar Lim, RN  Safety Promotion/Fall Prevention:   activity supervised   assistive device/personal items within reach   clutter free environment maintained   fall prevention program maintained   nonskid shoes/slippers when out of bed   room organization consistent   safety round/check completed  Taken 3/25/2021 1228 by Sukumar Lim, RN  Safety Promotion/Fall Prevention:   activity supervised   assistive device/personal items within reach   clutter free environment maintained   nonskid shoes/slippers when out of bed   room organization consistent   safety round/check completed  Taken 3/25/2021 1036 by Sukumar Lim, RN  Safety Promotion/Fall Prevention:   activity supervised   assistive device/personal items within reach   clutter free environment maintained   fall prevention program maintained   nonskid shoes/slippers when out of bed   room organization consistent   safety round/check completed  Taken 3/25/2021 0845 by Sukumar Lim, RN  Safety Promotion/Fall Prevention:   activity supervised   assistive device/personal items within reach   clutter free environment maintained   fall prevention program maintained   nonskid shoes/slippers when out of bed   room organization consistent   safety round/check completed  Intervention: Prevent Skin Injury  Recent Flowsheet Documentation  Taken 3/25/2021 1435  by Sukumar Lim RN  Body Position: position changed independently  Taken 3/25/2021 1228 by Sukumar Lim RN  Body Position: position changed independently  Taken 3/25/2021 1036 by Sukumar Lim RN  Body Position: position changed independently  Taken 3/25/2021 0845 by Sukumar Lim RN  Body Position: position changed independently  Intervention: Prevent and Manage VTE (venous thromboembolism) Risk  Recent Flowsheet Documentation  Taken 3/25/2021 1435 by Sukumar Lim RN  VTE Prevention/Management:   bilateral   dorsiflexion/plantar flexion performed  Taken 3/25/2021 0845 by Sukumar Lim RN  VTE Prevention/Management: (pt's on lovenox )   bilateral   dorsiflexion/plantar flexion performed  Intervention: Prevent Infection  Recent Flowsheet Documentation  Taken 3/25/2021 1435 by Sukumar Lim RN  Infection Prevention:   visitors restricted/screened   single patient room provided   rest/sleep promoted   personal protective equipment utilized  Taken 3/25/2021 1228 by Sukumar Lim RN  Infection Prevention:   personal protective equipment utilized   rest/sleep promoted   single patient room provided   visitors restricted/screened  Taken 3/25/2021 1036 by Sukumar Lim RN  Infection Prevention:   visitors restricted/screened   single patient room provided   rest/sleep promoted   personal protective equipment utilized  Taken 3/25/2021 0845 by Sukumar Lim RN  Infection Prevention:   rest/sleep promoted   single patient room provided   visitors restricted/screened   personal protective equipment utilized  Goal: Optimal Comfort and Wellbeing  Outcome: Ongoing, Progressing  Intervention: Provide Person-Centered Care  Recent Flowsheet Documentation  Taken 3/25/2021 1435 by Sukumar Lim RN  Trust Relationship/Rapport: care explained  Goal: Readiness for Transition of Care  Outcome: Ongoing, Progressing     Problem: Fall Injury  Risk  Goal: Absence of Fall and Fall-Related Injury  Outcome: Ongoing, Progressing  Intervention: Identify and Manage Contributors to Fall Injury Risk  Recent Flowsheet Documentation  Taken 3/25/2021 1435 by Sukumar Lim RN  Medication Review/Management: medications reviewed  Taken 3/25/2021 1228 by Sukumar Lim RN  Medication Review/Management: medications reviewed  Taken 3/25/2021 1036 by uSkumar Lim RN  Medication Review/Management: medications reviewed  Taken 3/25/2021 0845 by Sukumar Lim RN  Medication Review/Management: medications reviewed  Intervention: Promote Injury-Free Environment  Recent Flowsheet Documentation  Taken 3/25/2021 1435 by Sukumar Lim RN  Safety Promotion/Fall Prevention:   activity supervised   assistive device/personal items within reach   clutter free environment maintained   fall prevention program maintained   nonskid shoes/slippers when out of bed   room organization consistent   safety round/check completed  Taken 3/25/2021 1228 by Sukumar Lim RN  Safety Promotion/Fall Prevention:   activity supervised   assistive device/personal items within reach   clutter free environment maintained   nonskid shoes/slippers when out of bed   room organization consistent   safety round/check completed  Taken 3/25/2021 1036 by Sukumar Lim RN  Safety Promotion/Fall Prevention:   activity supervised   assistive device/personal items within reach   clutter free environment maintained   fall prevention program maintained   nonskid shoes/slippers when out of bed   room organization consistent   safety round/check completed  Taken 3/25/2021 0845 by Sukumar Lim RN  Safety Promotion/Fall Prevention:   activity supervised   assistive device/personal items within reach   clutter free environment maintained   fall prevention program maintained   nonskid shoes/slippers when out of bed   room organization consistent   safety  round/check completed     Problem: Skin Injury Risk Increased  Goal: Skin Health and Integrity  Outcome: Ongoing, Progressing  Intervention: Optimize Skin Protection  Recent Flowsheet Documentation  Taken 3/25/2021 1435 by Sukumar Lim RN  Pressure Reduction Techniques:   weight shift assistance provided   sit time limited to 2 hours   pressure points protected   rest period provided between sit times   heels elevated off bed  Head of Bed (HOB): HOB elevated  Pressure Reduction Devices: positioning supports utilized  Skin Protection:   adhesive use limited   tubing/devices free from skin contact   transparent dressing maintained  Taken 3/25/2021 1228 by Sukumar Lim RN  Head of Bed (HOB): HOB elevated  Taken 3/25/2021 1036 by Sukumar Lim RN  Head of Bed (HOB): HOB elevated  Taken 3/25/2021 0845 by Sukumar Lim RN  Pressure Reduction Techniques:   frequent weight shift encouraged   sit time limited to 2 hours   rest period provided between sit times  Head of Bed (HOB): HOB elevated  Pressure Reduction Devices: positioning supports utilized  Skin Protection:   adhesive use limited   transparent dressing maintained   tubing/devices free from skin contact  Intervention: Promote and Optimize Oral Intake  Recent Flowsheet Documentation  Taken 3/25/2021 0845 by Sukumar Lim RN  Oral Nutrition Promotion: rest periods promoted   Goal Outcome Evaluation:  Plan of Care Reviewed With: patient  Progress: improving  Outcome Summary: Pt's VSS, remains on 5L O2 NC, no c/o pain, no signs of distress, monitoring BG, d/c decadron, possible home tomorrow

## 2021-03-25 NOTE — PROGRESS NOTES
"  Infectious Diseases Progress Note    Roberto Jensen MD     [facility]  Los: 9 days  Patient Identification:  Name: Alex Geiger  Age: 70 y.o.  Sex: male  :  1950  MRN: [id number]         Primary Care Physician: Real Hudson MD            Subjective: Feels great and hopes to get out of here today.  Interval History: See consultation note.  · Completed his remdesivir on 3/20/2021.  · Received Tocilizumab on 3/17/2021  · Oxygen requirement is now down to 5 to 6 L per nasal cannula  Objective:    Scheduled Meds:amLODIPine, 10 mg, Oral, Q24H  cholecalciferol, 1,000 Units, Oral, Daily  clopidogrel, 75 mg, Oral, Daily  dexamethasone, 6 mg, Oral, Daily With Breakfast & Dinner  enoxaparin, 40 mg, Subcutaneous, Q12H  famotidine, 20 mg, Oral, BID  finasteride, 5 mg, Oral, Daily  fluconazole, 100 mg, Oral, Q24H  guaiFENesin, 1,200 mg, Oral, Q12H  insulin glargine, 30 Units, Subcutaneous, Q24H  insulin lispro, 0-7 Units, Subcutaneous, 4x Daily With Meals & Nightly  insulin lispro, 10 Units, Subcutaneous, TID With Meals  losartan, 100 mg, Oral, Q24H  tamsulosin, 0.4 mg, Oral, Nightly      Continuous Infusions:     Vital signs in last 24 hours:  Temp:  [96.5 °F (35.8 °C)-96.8 °F (36 °C)] 96.5 °F (35.8 °C)  Heart Rate:  [] 78  Resp:  [12-18] 18  BP: (134-142)/(69-77) 142/77    Intake/Output:    Intake/Output Summary (Last 24 hours) at 3/25/2021 1118  Last data filed at 3/25/2021 0957  Gross per 24 hour   Intake 680 ml   Output 1650 ml   Net -970 ml       Exam:  /77 (BP Location: Right arm, Patient Position: Lying)   Pulse 78   Temp 96.5 °F (35.8 °C) (Oral)   Resp 18   Ht 182.9 cm (72\")   Wt 96.1 kg (211 lb 13.8 oz)   SpO2 91%   BMI 28.73 kg/m²   Patient is examined using the personal protective equipment as per guidelines from infection control for this particular patient as enacted.  Hand washing was performed before and after patient interaction.  General Appearance:    Alert, " cooperative, no distress, AAOx3                          Head:    Normocephalic, without obvious abnormality, atraumatic                           Eyes:    PERRL, conjunctivae/corneas clear, EOM's intact, both eyes                         Throat:   Lips, tongue, gums normal; oral mucosa pink and moist                           Neck:   Supple, symmetrical, trachea midline, no JVD                         Lungs:    Clear to auscultation bilaterally, respirations unlabored                 Chest Wall:    No tenderness or deformity                          Heart:    Regular rate and rhythm, S1 and S2 normal                  Abdomen:     Soft, non-tender, bowel sounds active                 Extremities:   Extremities normal, atraumatic, no cyanosis or edema                        Pulses:   Pulses palpable in all extremities                            Skin:   Skin is warm and dry,  no rashes or palpable lesions                  Neurologic:   CNII-XII intact, motor strength grossly intact     Data Review:    I reviewed the patient's new clinical results.  Results from last 7 days   Lab Units 03/25/21  0603 03/24/21  0627 03/23/21  0543 03/22/21  0600 03/21/21  0644 03/20/21  0538 03/19/21  0626   WBC 10*3/mm3 19.71* 19.89* 20.44* 19.91* 15.39* 11.15* 10.37   HEMOGLOBIN g/dL 14.4 14.6 14.3 14.3 13.6 13.9 13.4   PLATELETS 10*3/mm3 441 440 411 398 412 355 337     Results from last 7 days   Lab Units 03/25/21  0603 03/24/21  0627 03/23/21  0543 03/22/21  0600 03/21/21  0644 03/20/21  0538 03/19/21  0626   SODIUM mmol/L 134* 133* 137 136 137 138 141   POTASSIUM mmol/L 4.6 4.7 4.4 4.3 4.6 4.3 4.4   CHLORIDE mmol/L 102 104 106 104 105 104 105   CO2 mmol/L 22.4 22.0 24.0 23.5 24.7 24.8 27.2   BUN mg/dL 19 18 21 21 20 17 22   CREATININE mg/dL 0.71* 0.65* 0.70* 0.70* 0.74* 0.60* 0.75*   CALCIUM mg/dL 8.7 8.7 8.5* 8.8 9.5 9.0 9.1   GLUCOSE mg/dL 146* 141* 118* 86 153* 191* 155*       Microbiology Results (last 10 days)     Procedure  Component Value - Date/Time    Respiratory Panel PCR w/COVID-19(SARS-CoV-2) LA/[facility]AT/[facility]A/[facility] In-House, NP Swab in UTM/VTM, 3-4 HR TAT - Swab, Nasopharynx [[id number]]  (Abnormal) Collected: 03/15/21 1547    Lab Status: Final result Specimen: Swab from Nasopharynx Updated: 03/15/21 1749     ADENOVIRUS, PCR Not Detected     Coronavirus 229E Not Detected     Coronavirus HKU1 Not Detected     Coronavirus NL63 Not Detected     Coronavirus OC43 Not Detected     COVID19 Detected     Human Metapneumovirus Not Detected     Human Rhinovirus/Enterovirus Not Detected     Influenza A PCR Not Detected     Influenza B PCR Not Detected     Parainfluenza Virus 1 Not Detected     Parainfluenza Virus 2 Not Detected     Parainfluenza Virus 3 Not Detected     Parainfluenza Virus 4 Not Detected     RSV, PCR Not Detected     Bordetella pertussis pcr Not Detected     Bordetella parapertussis PCR Not Detected     Chlamydophila pneumoniae PCR Not Detected     Mycoplasma pneumo by PCR Not Detected    Narrative:      Fact sheet for providers: https://docs.[employer]/wp-content/uploads/KRJ0697-7385-AI3.1-EUA-Provider-Fact-Sheet-3.pdf    Fact sheet for patients: https://docs.[employer]/wp-content/uploads/PGR2504-3392-BZ0.1-EUA-Patient-Fact-Sheet-1.pdf    Test performed by PCR.    Blood Culture - Blood, Arm, Left [[id number]] Collected: 03/15/21 1447    Lab Status: Final result Specimen: Blood from Arm, Left Updated: 03/20/21 1500     Blood Culture No growth at 5 days    Blood Culture - Blood, Arm, Right [[id number]] Collected: 03/15/21 1447    Lab Status: Final result Specimen: Blood from Arm, Right Updated: 03/20/21 1500     Blood Culture No growth at 5 days          Assessment: Overall seems to have improved with decreasing oxygen requirement.    Pneumonia due to COVID-19 virus  1-systemic COVID-19 infection with  2-COVID-19 pneumonia with possible evolving hypoxia and possible  3-superimposed bacterial pneumonia  4-diabetes  mellitus  5-hypertension  6-other diagnosis per primary team.  7-oral thrush     Recommendations/Discussions:  · Continue supportive care and monitor his clinical course.  Roberto Jensen MD  3/25/2021  11:18 EDT    Much of this encounter note is an electronic transcription/translation of spoken language to printed text. The electronic translation of spoken language may permit erroneous, or at times, nonsensical words or phrases to be inadvertently transcribed; Although I have reviewed the note for such errors, some may still exist

## 2021-03-25 NOTE — PROGRESS NOTES
"Daily progress note    Chief complaint  Doing better  No new complaints  On oxygen 5L    History of present illness  70-year-old white male with history of coronary artery disease hypertension hyperlipidemia and diabetes mellitus presented to [facility] emergency room with shortness of breath body aches fever chills and nonproductive cough for last 1 week.  Patient was diagnosed with Covid about a week ago.  Patient denies any chest pain abdominal pain nausea vomiting diarrhea.  Patient work-up in ER revealed COVID-19 pneumonia with acute hypoxic respiratory failure admit for management.    REVIEW OF SYSTEMS   Unremarkable     PHYSICAL EXAM   Blood pressure 127/76, pulse 76, temperature 96.2 °F (35.7 °C), temperature source Oral, resp. rate 14, height 182.9 cm (72\"), weight 96.1 kg (211 lb 13.8 oz), SpO2 92 %.    GENERAL: not distressed, appears ill but not toxic   HENT: nares patent   EYES: no scleral icterus   NECK: no ROM limitations   CV: regular rhythm, regular rate, no murmur, no rubs and no gallops   RESPIRATORY: Mild increased work of breathing, breath sounds are clear to auscultate bilaterally patient is able to speak in full sentences   ABDOMEN: soft, rounded, no focal tenderness bowel sounds positive  MUSCULOSKELETAL: no deformity   NEURO: alert, moves all extremities, follows commands   SKIN: warm, dry     LAB RESULTS   Lab Results (last 24 hours)     Procedure Component Value Units Date/Time    POC Glucose Once [[id number]]  (Abnormal) Collected: 03/25/21 1119    Specimen: Blood Updated: 03/25/21 1122     Glucose 294 mg/dL     Manual Differential [[id number]]  (Abnormal) Collected: 03/25/21 0603    Specimen: Blood Updated: 03/25/21 0744     Neutrophil % 89.8 %      Lymphocyte % 4.1 %      Monocyte % 6.1 %      Neutrophils Absolute 17.70 10*3/mm3      Lymphocytes Absolute 0.81 10*3/mm3      Monocytes Absolute 1.20 10*3/mm3      [place] Cells Mod/2+     Poikilocytes Mod/2+     WBC Morphology Normal "     Platelet Morphology Normal    CBC & Differential [[id number]]  (Abnormal) Collected: 03/25/21 0603    Specimen: Blood Updated: 03/25/21 0744    Narrative:      The following orders were created for panel order CBC & Differential.  Procedure                               Abnormality         Status                     ---------                               -----------         ------                     CBC Auto Differential[[id number]]        Abnormal            Final result                 Please view results for these tests on the individual orders.    CBC Auto Differential [[id number]]  (Abnormal) Collected: 03/25/21 0603    Specimen: Blood Updated: 03/25/21 0744     WBC 19.71 10*3/mm3      RBC 4.91 10*6/mm3      Hemoglobin 14.4 g/dL      Hematocrit 41.5 %      MCV 84.5 fL      MCH 29.3 pg      MCHC 34.7 g/dL      RDW 13.5 %      RDW-SD 41.1 fl      MPV 10.1 fL      Platelets 441 10*3/mm3     Basic Metabolic Panel [[id number]]  (Abnormal) Collected: 03/25/21 0603    Specimen: Blood from Arm, Right Updated: 03/25/21 0729     Glucose 146 mg/dL      BUN 19 mg/dL      Creatinine 0.71 mg/dL      Sodium 134 mmol/L      Potassium 4.6 mmol/L      Chloride 102 mmol/L      CO2 22.4 mmol/L      Calcium 8.7 mg/dL      eGFR Non African Amer 110 mL/min/1.73      BUN/Creatinine Ratio 26.8     Anion Gap 9.6 mmol/L     Narrative:      GFR Normal >60  Chronic Kidney Disease <60  Kidney Failure <15      POC Glucose Once [[id number]]  (Abnormal) Collected: 03/25/21 0555    Specimen: Blood Updated: 03/25/21 0557     Glucose 141 mg/dL     POC Glucose Once [[id number]]  (Abnormal) Collected: 03/24/21 1955    Specimen: Blood Updated: 03/24/21 1956     Glucose 207 mg/dL            Study Result    Narrative & Impression   ONE VIEW PORTABLE CHEST     HISTORY: Shortness of breath and cough. Possible Covid 19 pneumonia.     FINDINGS: The lungs are moderately expanded with some vague haziness  extending into the periphery of the left lung and at  the right base  highly suspicious for early changes of Covid 19 pneumonia and continued  follow-up evaluation is recommended. The heart is slightly enlarged.          Current Facility-Administered Medications:   •  acetaminophen (TYLENOL) tablet 650 mg, 650 mg, Oral, Q6H PRN, Kevin Hayes MD, 650 mg at 03/23/21 2111  •  amLODIPine (NORVASC) tablet 10 mg, 10 mg, Oral, Q24H, Kevin Hayes MD, 10 mg at 03/25/21 0845  •  cholecalciferol (VITAMIN D3) tablet 1,000 Units, 1,000 Units, Oral, Daily, Kevin Hayes MD, 1,000 Units at 03/25/21 0845  •  clopidogrel (PLAVIX) tablet 75 mg, 75 mg, Oral, Daily, Kevin Hayes MD, 75 mg at 03/25/21 0845  •  dexamethasone (DECADRON) tablet 6 mg, 6 mg, Oral, Daily With Breakfast & Dinner, Kevin Hayes MD, 6 mg at 03/25/21 0844  •  enoxaparin (LOVENOX) syringe 40 mg, 40 mg, Subcutaneous, Q12H, Antonio Serrano MD, 40 mg at 03/25/21 0844  •  famotidine (PEPCID) tablet 20 mg, 20 mg, Oral, BID, Kevin Hayes MD, 20 mg at 03/25/21 0845  •  finasteride (PROSCAR) tablet 5 mg, 5 mg, Oral, Daily, Kevin Hayes MD, 5 mg at 03/25/21 0844  •  fluconazole (DIFLUCAN) tablet 100 mg, 100 mg, Oral, Q24H, Antonio Serrano MD, 100 mg at 03/25/21 0844  •  guaiFENesin (MUCINEX) 12 hr tablet 1,200 mg, 1,200 mg, Oral, Q12H, Kevin Hayes MD, 1,200 mg at 03/25/21 0845  •  insulin glargine (LANTUS, SEMGLEE) injection 30 Units, 30 Units, Subcutaneous, Q24H, Kevin Hayes MD, 30 Units at 03/25/21 1229  •  insulin lispro (ADMELOG) injection 0-7 Units, 0-7 Units, Subcutaneous, 4x Daily With Meals & Nightly, Kevin Hayes MD, 4 Units at 03/25/21 1228  •  insulin lispro (ADMELOG) injection 10 Units, 10 Units, Subcutaneous, TID With Meals, Kevin Hayes MD, 10 Units at 03/25/21 1228  •  losartan (COZAAR) tablet 100 mg, 100 mg, Oral, Q24H, Kevin Hayes MD, 100 mg at 03/25/21 0844  •  ondansetron (ZOFRAN) injection 4 mg, 4 mg, Intravenous, Q6H PRN, Kevin Hayes MD  •  tamsulosin (FLOMAX) 24 hr capsule 0.4  mg, 0.4 mg, Oral, Nightly, Honey Hayes MD, 0.4 mg at 03/24/21 2025     ASSESSMENT  COVID-19 pneumonia  Acute hypoxic respiratory failure   Sinus bradycardia  Diabetes mellitus  Hypertension  Hyperlipidemia  Coronary artery disease  BPH  Leukocytosis secondary to steroids  Gastroesophageal reflux disease    PLAN  CPM  Supplement oxygen and titrate  Decadron completed  Remdesivir completed  Actemra completed  Infectious disease consult appreciated   Pulmonary and cardiology to follow patient  Adjust home medications  Stress ulcer DVT prophylaxis  Supportive care  PT/OT  Discussed with nursing staff  Discharge home in a.m. on oxygen and family support if okay with all    HONEY HAYES MD

## 2021-03-26 ENCOUNTER — READMISSION MANAGEMENT (OUTPATIENT)
Dept: CALL CENTER | Facility: HOSPITAL | Age: 71
End: 2021-03-26

## 2021-03-26 VITALS
BODY MASS INDEX: 28.7 KG/M2 | OXYGEN SATURATION: 91 % | WEIGHT: 211.86 LBS | HEIGHT: 72 IN | RESPIRATION RATE: 16 BRPM | DIASTOLIC BLOOD PRESSURE: 83 MMHG | HEART RATE: 101 BPM | SYSTOLIC BLOOD PRESSURE: 132 MMHG | TEMPERATURE: 96.2 F

## 2021-03-26 LAB
ALBUMIN SERPL-MCNC: 2.9 G/DL (ref 3.5–5.2)
ALBUMIN/GLOB SERPL: 1.5 G/DL
ALP SERPL-CCNC: 40 U/L (ref 39–117)
ALT SERPL W P-5'-P-CCNC: 26 U/L (ref 1–41)
ANION GAP SERPL CALCULATED.3IONS-SCNC: 8.3 MMOL/L (ref 5–15)
AST SERPL-CCNC: 13 U/L (ref 1–40)
BILIRUB SERPL-MCNC: 0.6 MG/DL (ref 0–1.2)
BUN SERPL-MCNC: 20 MG/DL (ref 8–23)
BUN/CREAT SERPL: 27.8 (ref 7–25)
CALCIUM SPEC-SCNC: 9 MG/DL (ref 8.6–10.5)
CHLORIDE SERPL-SCNC: 103 MMOL/L (ref 98–107)
CO2 SERPL-SCNC: 22.7 MMOL/L (ref 22–29)
CREAT SERPL-MCNC: 0.72 MG/DL (ref 0.76–1.27)
DEPRECATED RDW RBC AUTO: 41.5 FL (ref 37–54)
ERYTHROCYTE [DISTWIDTH] IN BLOOD BY AUTOMATED COUNT: 13.5 % (ref 12.3–15.4)
GFR SERPL CREATININE-BSD FRML MDRD: 108 ML/MIN/1.73
GLOBULIN UR ELPH-MCNC: 2 GM/DL
GLUCOSE BLDC GLUCOMTR-MCNC: 196 MG/DL (ref 70–130)
GLUCOSE SERPL-MCNC: 167 MG/DL (ref 65–99)
HCT VFR BLD AUTO: 39.2 % (ref 37.5–51)
HGB BLD-MCNC: 13.9 G/DL (ref 13–17.7)
LYMPHOCYTES # BLD MANUAL: 0.55 10*3/MM3 (ref 0.7–3.1)
LYMPHOCYTES NFR BLD MANUAL: 3.1 % (ref 19.6–45.3)
LYMPHOCYTES NFR BLD MANUAL: 6.2 % (ref 5–12)
MCH RBC QN AUTO: 30.4 PG (ref 26.6–33)
MCHC RBC AUTO-ENTMCNC: 35.5 G/DL (ref 31.5–35.7)
MCV RBC AUTO: 85.8 FL (ref 79–97)
METAMYELOCYTES NFR BLD MANUAL: 1 % (ref 0–0)
MONOCYTES # BLD AUTO: 1.1 10*3/MM3 (ref 0.1–0.9)
MYELOCYTES NFR BLD MANUAL: 3.1 % (ref 0–0)
NEUTROPHILS # BLD AUTO: 15.39 10*3/MM3 (ref 1.7–7)
NEUTROPHILS NFR BLD MANUAL: 86.6 % (ref 42.7–76)
NRBC BLD AUTO-RTO: 0 /100 WBC (ref 0–0.2)
PLAT MORPH BLD: NORMAL
PLATELET # BLD AUTO: 374 10*3/MM3 (ref 140–450)
PMV BLD AUTO: 10 FL (ref 6–12)
POTASSIUM SERPL-SCNC: 4.6 MMOL/L (ref 3.5–5.2)
PROT SERPL-MCNC: 4.9 G/DL (ref 6–8.5)
RBC # BLD AUTO: 4.57 10*6/MM3 (ref 4.14–5.8)
RBC MORPH BLD: NORMAL
SODIUM SERPL-SCNC: 134 MMOL/L (ref 136–145)
TOXIC GRANULATION: ABNORMAL
WBC # BLD AUTO: 17.77 10*3/MM3 (ref 3.4–10.8)

## 2021-03-26 PROCEDURE — 85025 COMPLETE CBC W/AUTO DIFF WBC: CPT | Performed by: HOSPITALIST

## 2021-03-26 PROCEDURE — 94799 UNLISTED PULMONARY SVC/PX: CPT

## 2021-03-26 PROCEDURE — 85007 BL SMEAR W/DIFF WBC COUNT: CPT | Performed by: HOSPITALIST

## 2021-03-26 PROCEDURE — 63710000001 INSULIN GLARGINE PER 5 UNITS: Performed by: HOSPITALIST

## 2021-03-26 PROCEDURE — 82962 GLUCOSE BLOOD TEST: CPT

## 2021-03-26 PROCEDURE — 80053 COMPREHEN METABOLIC PANEL: CPT | Performed by: HOSPITALIST

## 2021-03-26 PROCEDURE — 63710000001 INSULIN LISPRO (HUMAN) PER 5 UNITS: Performed by: HOSPITALIST

## 2021-03-26 PROCEDURE — 25010000002 ENOXAPARIN PER 10 MG: Performed by: HOSPITALIST

## 2021-03-26 RX ORDER — GUAIFENESIN 600 MG/1
1200 TABLET, EXTENDED RELEASE ORAL EVERY 12 HOURS SCHEDULED
Qty: 120 TABLET | Refills: 0 | Status: SHIPPED | OUTPATIENT
Start: 2021-03-26 | End: 2021-04-25

## 2021-03-26 RX ORDER — FLUCONAZOLE 100 MG/1
100 TABLET ORAL EVERY 24 HOURS
Qty: 3 TABLET | Refills: 0 | Status: SHIPPED | OUTPATIENT
Start: 2021-03-27 | End: 2021-03-30

## 2021-03-26 RX ORDER — FAMOTIDINE 20 MG/1
20 TABLET, FILM COATED ORAL 2 TIMES DAILY
Qty: 60 TABLET | Refills: 0 | Status: SHIPPED | OUTPATIENT
Start: 2021-03-26 | End: 2021-04-25

## 2021-03-26 RX ORDER — LOSARTAN POTASSIUM 100 MG/1
100 TABLET ORAL
Qty: 30 TABLET | Refills: 0 | Status: SHIPPED | OUTPATIENT
Start: 2021-03-27 | End: 2021-11-30

## 2021-03-26 RX ORDER — MELATONIN
1000 DAILY
Qty: 30 TABLET | Refills: 0 | Status: SHIPPED | OUTPATIENT
Start: 2021-03-27 | End: 2021-04-26

## 2021-03-26 RX ORDER — AMLODIPINE BESYLATE 10 MG/1
10 TABLET ORAL
Qty: 30 TABLET | Refills: 0 | Status: SHIPPED | OUTPATIENT
Start: 2021-03-27 | End: 2021-04-26

## 2021-03-26 RX ADMIN — INSULIN LISPRO 2 UNITS: 100 INJECTION, SOLUTION INTRAVENOUS; SUBCUTANEOUS at 15:39

## 2021-03-26 RX ADMIN — Medication 1000 UNITS: at 09:35

## 2021-03-26 RX ADMIN — CLOPIDOGREL 75 MG: 75 TABLET, FILM COATED ORAL at 09:35

## 2021-03-26 RX ADMIN — ENOXAPARIN SODIUM 40 MG: 40 INJECTION SUBCUTANEOUS at 09:36

## 2021-03-26 RX ADMIN — GUAIFENESIN 1200 MG: 600 TABLET, EXTENDED RELEASE ORAL at 09:35

## 2021-03-26 RX ADMIN — LOSARTAN POTASSIUM 100 MG: 100 TABLET, FILM COATED ORAL at 09:35

## 2021-03-26 RX ADMIN — INSULIN LISPRO 2 UNITS: 100 INJECTION, SOLUTION INTRAVENOUS; SUBCUTANEOUS at 09:40

## 2021-03-26 RX ADMIN — INSULIN GLARGINE 30 UNITS: 100 INJECTION, SOLUTION SUBCUTANEOUS at 15:26

## 2021-03-26 RX ADMIN — INSULIN LISPRO 10 UNITS: 100 INJECTION, SOLUTION INTRAVENOUS; SUBCUTANEOUS at 09:39

## 2021-03-26 RX ADMIN — AMLODIPINE BESYLATE 10 MG: 10 TABLET ORAL at 09:34

## 2021-03-26 RX ADMIN — INSULIN LISPRO 10 UNITS: 100 INJECTION, SOLUTION INTRAVENOUS; SUBCUTANEOUS at 15:40

## 2021-03-26 RX ADMIN — FINASTERIDE 5 MG: 5 TABLET, FILM COATED ORAL at 09:35

## 2021-03-26 RX ADMIN — FLUCONAZOLE 100 MG: 100 TABLET ORAL at 09:35

## 2021-03-26 RX ADMIN — FAMOTIDINE 20 MG: 20 TABLET, FILM COATED ORAL at 09:35

## 2021-03-26 NOTE — PLAN OF CARE
Goal Outcome Evaluation:  Plan of Care Reviewed With: patient      VSS,o2 4L up to bathroom per self,I/S Aggressive use. AAOx4, All pulses palpable only mild distress while up, plan d/c today.

## 2021-03-26 NOTE — DISCHARGE SUMMARY
Discharge summary    Date of admission 3/15/2021  Date of discharge 3/26/2021    Final diagnosis  COVID-19 pneumonia  Acute hypoxic respiratory failure   Sinus bradycardia resolved  Insulin-dependent diabetes mellitus  Hypertension  Hyperlipidemia  Coronary artery disease  BPH  Leukocytosis secondary to steroids  Gastroesophageal reflux disease    Discharge medications    Current Facility-Administered Medications:   •  acetaminophen (TYLENOL) tablet 650 mg, 650 mg, Oral, Q6H PRN, Kevin Hayes MD, 650 mg at 03/23/21 2111  •  amLODIPine (NORVASC) tablet 10 mg, 10 mg, Oral, Q24H, Kevin Hayes MD, 10 mg at 03/26/21 0934  •  cholecalciferol (VITAMIN D3) tablet 1,000 Units, 1,000 Units, Oral, Daily, Kevin Hayes MD, 1,000 Units at 03/26/21 0935  •  clopidogrel (PLAVIX) tablet 75 mg, 75 mg, Oral, Daily, Kevin Hayes MD, 75 mg at 03/26/21 0935  •  enoxaparin (LOVENOX) syringe 40 mg, 40 mg, Subcutaneous, Q24H, Kevin Hayes MD, 40 mg at 03/26/21 0936  •  famotidine (PEPCID) tablet 20 mg, 20 mg, Oral, BID, Kevin Hayes MD, 20 mg at 03/26/21 0935  •  finasteride (PROSCAR) tablet 5 mg, 5 mg, Oral, Daily, Kevin Hayes MD, 5 mg at 03/26/21 0935  •  fluconazole (DIFLUCAN) tablet 100 mg, 100 mg, Oral, Q24H, Antonio Serrano MD, 100 mg at 03/26/21 0935  •  guaiFENesin (MUCINEX) 12 hr tablet 1,200 mg, 1,200 mg, Oral, Q12H, Kevin Hayes MD, 1,200 mg at 03/26/21 0935  •  insulin glargine (LANTUS, SEMGLEE) injection 30 Units, 30 Units, Subcutaneous, Q24H, Kevin Hayes MD, 30 Units at 03/25/21 1229  •  insulin lispro (ADMELOG) injection 0-7 Units, 0-7 Units, Subcutaneous, 4x Daily With Meals & Nightly, Kevin Hayes MD, 2 Units at 03/26/21 0940  •  insulin lispro (ADMELOG) injection 10 Units, 10 Units, Subcutaneous, TID With Meals, Kevin Hayes MD, 10 Units at 03/26/21 0939  •  losartan (COZAAR) tablet 100 mg, 100 mg, Oral, Q24H, Kevin Hayes MD, 100 mg at 03/26/21 0935  •  ondansetron (ZOFRAN) injection 4 mg, 4 mg,  Intravenous, Q6H PRN, Honey Hayes MD  •  tamsulosin (FLOMAX) 24 hr capsule 0.4 mg, 0.4 mg, Oral, Nightly, Honey Hayes MD, 0.4 mg at 03/25/21 2047     Consults obtained  Cardiology  Pulmonary  Infectious disease    Procedures  None    Hospital course  70-year-old white male with history of coronary artery disease hypertension hyperlipidemia and insulin-dependent diabetes mellitus admitted through emergency room with fever chills body aches and nonproductive cough.  Patient was diagnosed with Covid 19 1 week prior to admission.  Patient work-up in ER revealed acute hypoxic respiratory failure with COVID-19 pneumonia admit for management.  Patient admitted and he has been treated with supplemental oxygen nebulizer IV Decadron remdesivir and also received Actemra.  Patient followed by pulmonary and infectious disease and further evaluated by cardiology as he has persistent bradycardia.  Patient initially treated with decreased beta-blocker and completely taken off.  Patient has very slow improvement and responded and required high oxygen for long time and finally down to 4 L and clear for discharge from cardiology pulmonary infectious disease.  Patient advised continue symptomatic treatment but he is off from Decadron.  Patient remained fully alert oriented.  Patient will continue quarantine for total of 20 days.    Discharge diet regular    Activity as tolerated    Medication as above    Follow-up with primary doctor in 1 week and follow-up with cardiology pulmonary and infectious disease per the instructions and take medication as directed    HONEY HAYES MD

## 2021-03-26 NOTE — PROGRESS NOTES
Discharge Planning Assessment  [facility]     Patient Name: Alex Geiger  MRN: [id number]  Today's Date: 3/26/2021    Admit Date: 3/15/2021    Discharge Needs Assessment    No documentation.       Discharge Plan     Row Name 03/26/21 1031       Plan    Plan  HOme with spouse and home oxygen    Final Discharge Disposition Code  01 - home or self-care    Final Note  Patient has discharge orders today and is going home with home Oxygen for Camara's. Patient's room air saturation is 85-86% on room air at rest patient. Patient is maintaining 91-92% oxygen saturation on 4 liters patient will need to go home on 4 liters of Oxygen. Spoke with patient he stated his home has been cleaned and he will have help at home. Oniel GARIBAY        Continued Care and Services - Admitted Since 3/15/2021    Coordination has not been started for this encounter.         Demographic Summary    No documentation.       Functional Status    No documentation.       Psychosocial    No documentation.       Abuse/Neglect    No documentation.       Legal    No documentation.       Substance Abuse    No documentation.       Patient Forms    No documentation.           Tova Durham, RN

## 2021-03-26 NOTE — PROGRESS NOTES
"Daily progress note    Chief complaint  Doing better  No new complaints  On oxygen 4L  Wants to go home    History of present illness  70-year-old white male with history of coronary artery disease hypertension hyperlipidemia and diabetes mellitus presented to [facility] emergency room with shortness of breath body aches fever chills and nonproductive cough for last 1 week.  Patient was diagnosed with Covid about a week ago.  Patient denies any chest pain abdominal pain nausea vomiting diarrhea.  Patient work-up in ER revealed COVID-19 pneumonia with acute hypoxic respiratory failure admit for management.    REVIEW OF SYSTEMS   Unremarkable     PHYSICAL EXAM   Blood pressure 132/83, pulse 101, temperature 96.2 °F (35.7 °C), temperature source Oral, resp. rate 16, height 182.9 cm (72\"), weight 96.1 kg (211 lb 13.8 oz), SpO2 91 %.    GENERAL: not distressed, appears ill but not toxic   HENT: nares patent   EYES: no scleral icterus   NECK: no ROM limitations   CV: regular rhythm, regular rate, no murmur, no rubs and no gallops   RESPIRATORY: Mild increased work of breathing, breath sounds are clear to auscultate bilaterally patient is able to speak in full sentences   ABDOMEN: soft, rounded, no focal tenderness bowel sounds positive  MUSCULOSKELETAL: no deformity   NEURO: alert, moves all extremities, follows commands   SKIN: warm, dry     LAB RESULTS   Lab Results (last 24 hours)     Procedure Component Value Units Date/Time    POC Glucose Once [[id number]]  (Abnormal) Collected: 03/26/21 1129    Specimen: Blood Updated: 03/26/21 1135     Glucose 196 mg/dL     Comprehensive Metabolic Panel [[id number]]  (Abnormal) Collected: 03/26/21 0442    Specimen: Blood Updated: 03/26/21 0631     Glucose 167 mg/dL      BUN 20 mg/dL      Creatinine 0.72 mg/dL      Sodium 134 mmol/L      Potassium 4.6 mmol/L      Chloride 103 mmol/L      CO2 22.7 mmol/L      Calcium 9.0 mg/dL      Total Protein 4.9 g/dL      Albumin 2.90 " g/dL      ALT (SGPT) 26 U/L      AST (SGOT) 13 U/L      Alkaline Phosphatase 40 U/L      Total Bilirubin 0.6 mg/dL      eGFR Non African Amer 108 mL/min/1.73      Globulin 2.0 gm/dL      A/G Ratio 1.5 g/dL      BUN/Creatinine Ratio 27.8     Anion Gap 8.3 mmol/L     Narrative:      GFR Normal >60  Chronic Kidney Disease <60  Kidney Failure <15      Manual Differential [[id number]]  (Abnormal) Collected: 03/26/21 0442    Specimen: Blood Updated: 03/26/21 0549     Neutrophil % 86.6 %      Lymphocyte % 3.1 %      Monocyte % 6.2 %      Metamyelocyte % 1.0 %      Myelocyte % 3.1 %      Neutrophils Absolute 15.39 10*3/mm3      Lymphocytes Absolute 0.55 10*3/mm3      Monocytes Absolute 1.10 10*3/mm3      RBC Morphology Normal     Toxic Granulation Slight/1+     Platelet Morphology Normal    CBC & Differential [[id number]]  (Abnormal) Collected: 03/26/21 0442    Specimen: Blood Updated: 03/26/21 0531    Narrative:      The following orders were created for panel order CBC & Differential.  Procedure                               Abnormality         Status                     ---------                               -----------         ------                     CBC Auto Differential[[id number]]        Abnormal            Final result                 Please view results for these tests on the individual orders.    CBC Auto Differential [[id number]]  (Abnormal) Collected: 03/26/21 0442    Specimen: Blood Updated: 03/26/21 0531     WBC 17.77 10*3/mm3      RBC 4.57 10*6/mm3      Hemoglobin 13.9 g/dL      Hematocrit 39.2 %      MCV 85.8 fL      MCH 30.4 pg      MCHC 35.5 g/dL      RDW 13.5 %      RDW-SD 41.5 fl      MPV 10.0 fL      Platelets 374 10*3/mm3      nRBC 0.0 /100 WBC     POC Glucose Once [[id number]]  (Abnormal) Collected: 03/25/21 2007    Specimen: Blood Updated: 03/25/21 2008     Glucose 281 mg/dL     POC Glucose Once [[id number]]  (Abnormal) Collected: 03/25/21 1624    Specimen: Blood Updated: 03/25/21 1626     Glucose 238  mg/dL            Study Result    Narrative & Impression   ONE VIEW PORTABLE CHEST     HISTORY: Shortness of breath and cough. Possible Covid 19 pneumonia.     FINDINGS: The lungs are moderately expanded with some vague haziness  extending into the periphery of the left lung and at the right base  highly suspicious for early changes of Covid 19 pneumonia and continued  follow-up evaluation is recommended. The heart is slightly enlarged.          Current Facility-Administered Medications:   •  acetaminophen (TYLENOL) tablet 650 mg, 650 mg, Oral, Q6H PRN, Kevin Hayes MD, 650 mg at 03/23/21 2111  •  amLODIPine (NORVASC) tablet 10 mg, 10 mg, Oral, Q24H, Kevin Hayes MD, 10 mg at 03/26/21 0934  •  cholecalciferol (VITAMIN D3) tablet 1,000 Units, 1,000 Units, Oral, Daily, Kevin Hayes MD, 1,000 Units at 03/26/21 0935  •  clopidogrel (PLAVIX) tablet 75 mg, 75 mg, Oral, Daily, Kevin Hayes MD, 75 mg at 03/26/21 0935  •  enoxaparin (LOVENOX) syringe 40 mg, 40 mg, Subcutaneous, Q24H, Kevin Hayes MD, 40 mg at 03/26/21 0936  •  famotidine (PEPCID) tablet 20 mg, 20 mg, Oral, BID, Kevin Hayes MD, 20 mg at 03/26/21 0935  •  finasteride (PROSCAR) tablet 5 mg, 5 mg, Oral, Daily, Kevin Hayes MD, 5 mg at 03/26/21 0935  •  fluconazole (DIFLUCAN) tablet 100 mg, 100 mg, Oral, Q24H, Antonio Serrano MD, 100 mg at 03/26/21 0935  •  guaiFENesin (MUCINEX) 12 hr tablet 1,200 mg, 1,200 mg, Oral, Q12H, Kevin Hayes MD, 1,200 mg at 03/26/21 0935  •  insulin glargine (LANTUS, SEMGLEE) injection 30 Units, 30 Units, Subcutaneous, Q24H, Kevin Hayes MD, 30 Units at 03/25/21 1229  •  insulin lispro (ADMELOG) injection 0-7 Units, 0-7 Units, Subcutaneous, 4x Daily With Meals & Nightly, Kevin Hayes MD, 2 Units at 03/26/21 0940  •  insulin lispro (ADMELOG) injection 10 Units, 10 Units, Subcutaneous, TID With Meals, Kevin Hayes MD, 10 Units at 03/26/21 0939  •  losartan (COZAAR) tablet 100 mg, 100 mg, Oral, Q24H, Kevin Hayes MD, 100  mg at 03/26/21 0954  •  ondansetron (ZOFRAN) injection 4 mg, 4 mg, Intravenous, Q6H PRN, Honey Hayes MD  •  tamsulosin (FLOMAX) 24 hr capsule 0.4 mg, 0.4 mg, Oral, Nightly, Honey Hayes MD, 0.4 mg at 03/25/21 2047     ASSESSMENT  COVID-19 pneumonia  Acute hypoxic respiratory failure   Sinus bradycardia  Diabetes mellitus  Hypertension  Hyperlipidemia  Coronary artery disease  BPH  Leukocytosis secondary to steroids  Gastroesophageal reflux disease    PLAN  Discharge home on home oxygen  Discharge summary dictated    HONEY HAYES MD

## 2021-03-26 NOTE — PROGRESS NOTES
[facility] INPATIENT PROGRESS NOTE         [address]    3/26/2021      PATIENT IDENTIFICATION:  Name: Alex Geiger ADMIT: 3/15/2021   : 1950  PCP: Real Hudson MD    MRN: [id number] LOS: 10 days   AGE/SEX: 70 y.o. male  ROOM: [facility]                     LOS 10    Reason for visit: Respiratory failure with COVID-19 pneumonia      SUBJECTIVE:      Denies new complaints.  On high flow nasal cannula oxygen at 4 L/min with saturations 95%.      Objective   OBJECTIVE:    Vital Sign Min/Max for last 24 hours  Temp  Min: 96.2 °F (35.7 °C)  Max: 97.8 °F (36.6 °C)   BP  Min: 127/76  Max: 140/73   Pulse  Min: 70  Max: 91   Resp  Min: 14  Max: 16   SpO2  Min: 91 %  Max: 92 %   No data recorded   No data recorded                         Body mass index is 28.73 kg/m².    Intake/Output Summary (Last 24 hours) at 3/26/2021 0755  Last data filed at 3/26/2021 0616  Gross per 24 hour   Intake 480 ml   Output 1475 ml   Net -995 ml         Exam:  GEN:  No distress, appears stated age  NECK:  No adenopathy, midline trachea  LUNGS: Normal chest on inspection, palpation and diminished on auscultation  CV:  Normal S1S2, without murmur      Assessment     Scheduled meds:  amLODIPine, 10 mg, Oral, Q24H  cholecalciferol, 1,000 Units, Oral, Daily  clopidogrel, 75 mg, Oral, Daily  enoxaparin, 40 mg, Subcutaneous, Q24H  famotidine, 20 mg, Oral, BID  finasteride, 5 mg, Oral, Daily  fluconazole, 100 mg, Oral, Q24H  guaiFENesin, 1,200 mg, Oral, Q12H  insulin glargine, 30 Units, Subcutaneous, Q24H  insulin lispro, 0-7 Units, Subcutaneous, 4x Daily With Meals & Nightly  insulin lispro, 10 Units, Subcutaneous, TID With Meals  losartan, 100 mg, Oral, Q24H  tamsulosin, 0.4 mg, Oral, Nightly      IV meds:                         Data Review:  Results from last 7 days   Lab Units 21  0442 21  0603 21  0627 21  0543 21  0600   SODIUM mmol/L 134* 134* 133* 137 136   POTASSIUM mmol/L 4.6 4.6 4.7  4.4 4.3   CHLORIDE mmol/L 103 102 104 106 104   CO2 mmol/L 22.7 22.4 22.0 24.0 23.5   BUN mg/dL 20 19 18 21 21   CREATININE mg/dL 0.72* 0.71* 0.65* 0.70* 0.70*   GLUCOSE mg/dL 167* 146* 141* 118* 86   CALCIUM mg/dL 9.0 8.7 8.7 8.5* 8.8         Estimated Creatinine Clearance: 103.3 mL/min (A) (by C-G formula based on SCr of 0.72 mg/dL (L)).  Results from last 7 days   Lab Units 03/26/21  0442 03/25/21  0603 03/24/21  0627 03/23/21  0543 03/22/21  0600   WBC 10*3/mm3 17.77* 19.71* 19.89* 20.44* 19.91*   HEMOGLOBIN g/dL 13.9 14.4 14.6 14.3 14.3   PLATELETS 10*3/mm3 374 441 440 411 398         Results from last 7 days   Lab Units 03/26/21  0442 03/24/21  0627 03/23/21  0543 03/22/21  0600 03/21/21  0644   ALT (SGPT) U/L 26 36 39 44* 41   AST (SGOT) U/L 13 12 16 21 27             COVID LABS:  Results From Last 14 Days   Lab Units 03/23/21  0543 03/22/21  0600 03/21/21  0644 03/17/21  0437 03/16/21  1215 03/15/21  1903 03/15/21  1447   PROBNP pg/mL  --   --   --  645.2  --   --   --    CRP mg/dL 0.32 0.50 0.87* 12.37*  --   --   --    D DIMER QUANT MCGFEU/mL  --   --   --   --  0.93*  --   --    FERRITIN ng/mL  --   --   --  1,212.00*  --   --   --    LACTATE mmol/L  --   --   --   --   --  1.3 2.1*   PROCALCITONIN ng/mL  --   --   --   --   --   --  0.89*   PROTIME Seconds  --   --   --   --  15.5*  --   --    INR   --   --   --   --  1.25*  --   --            Chest x-ray 3/24 reviewed            Microbiology reviewed    )        Active Hospital Problems    Diagnosis  POA   • Pneumonia due to COVID-19 virus [U07.1, J12.82]  Yes      Resolved Hospital Problems   No resolved problems to display.         ASSESSMENT:  COVID-19 pneumonia  Acute hypoxemic respiratory failure secondary to above  Viral sepsis  Type 2 diabetes  Thrush  CAD  Hypertension  Hyperlipidemia  Hyponatremia       PLAN:    Completed remdesivir and IV Actemra x1.  On oral steroid.  Wean oxygen as able.  On high flow nasal cannula at 4LPM and can likely  further decrease.  Control glucose.  Control blood pressure.  He is very eager to go home.  Could likely discharge on oxygen.      Hung Quintanilla MD  Pulmonary and Critical Care Medicine  [facility], [facility]  3/26/2021    07:55 EDT

## 2021-03-26 NOTE — PLAN OF CARE
Problem: Adult Inpatient Plan of Care  Goal: Plan of Care Review  3/26/2021 1401 by Braden Singh RN  Outcome: Met  Flowsheets (Taken 3/26/2021 1401)  Plan of Care Reviewed With: patient  3/26/2021 1356 by Braden Singh RN  Outcome: Ongoing, Progressing  Flowsheets (Taken 3/26/2021 1356)  Plan of Care Reviewed With: patient  Goal: Patient-Specific Goal (Individualized)  3/26/2021 1401 by Braden Singh RN  Outcome: Met  3/26/2021 1356 by Braden Singh RN  Outcome: Ongoing, Progressing  Goal: Absence of Hospital-Acquired Illness or Injury  3/26/2021 1401 by Braden Singh RN  Outcome: Met  3/26/2021 1356 by Braden Singh RN  Outcome: Ongoing, Progressing  Intervention: Identify and Manage Fall Risk  Recent Flowsheet Documentation  Taken 3/26/2021 1200 by Braden Singh RN  Safety Promotion/Fall Prevention:   activity supervised   safety round/check completed  Taken 3/26/2021 1000 by Braden Singh RN  Safety Promotion/Fall Prevention:   activity supervised   safety round/check completed  Taken 3/26/2021 0845 by Braden Singh RN  Safety Promotion/Fall Prevention:   activity supervised   safety round/check completed  Intervention: Prevent Skin Injury  Recent Flowsheet Documentation  Taken 3/26/2021 1200 by Braden Singh RN  Body Position: position changed independently  Taken 3/26/2021 1000 by Braden Singh RN  Body Position: position changed independently  Taken 3/26/2021 0845 by Braden Singh RN  Body Position: position changed independently  Intervention: Prevent Infection  Recent Flowsheet Documentation  Taken 3/26/2021 1200 by Braden Singh RN  Infection Prevention:   hand hygiene promoted   personal protective equipment utilized  Taken 3/26/2021 1000 by Braden Singh RN  Infection Prevention:   hand hygiene promoted   personal protective equipment utilized  Taken 3/26/2021 0845 by Braden Singh RN  Infection Prevention:   hand  hygiene promoted   personal protective equipment utilized  Goal: Optimal Comfort and Wellbeing  3/26/2021 1401 by Braden Singh RN  Outcome: Met  3/26/2021 1356 by Braden Singh RN  Outcome: Ongoing, Progressing  Goal: Readiness for Transition of Care  3/26/2021 1401 by Braden Singh RN  Outcome: Met  3/26/2021 1356 by Braden Singh RN  Outcome: Ongoing, Progressing     Problem: Fall Injury Risk  Goal: Absence of Fall and Fall-Related Injury  3/26/2021 1401 by Braden Singh RN  Outcome: Met  3/26/2021 1356 by Braden Singh RN  Outcome: Ongoing, Progressing  Intervention: Promote Injury-Free Environment  Recent Flowsheet Documentation  Taken 3/26/2021 1200 by Braden Singh RN  Safety Promotion/Fall Prevention:   activity supervised   safety round/check completed  Taken 3/26/2021 1000 by Braden Singh RN  Safety Promotion/Fall Prevention:   activity supervised   safety round/check completed  Taken 3/26/2021 0845 by Braden Singh RN  Safety Promotion/Fall Prevention:   activity supervised   safety round/check completed     Problem: Skin Injury Risk Increased  Goal: Skin Health and Integrity  3/26/2021 1401 by Braden Singh RN  Outcome: Met  3/26/2021 1356 by Braden Singh RN  Outcome: Ongoing, Progressing  Intervention: Optimize Skin Protection  Recent Flowsheet Documentation  Taken 3/26/2021 1000 by Braden Singh RN  Head of Bed (HOB): HOB at 45 degrees  Taken 3/26/2021 0845 by Braden Singh RN  Head of Bed (HOB): HOB at 30-45 degrees   Goal Outcome Evaluation:  Plan of Care Reviewed With: patient

## 2021-03-26 NOTE — PLAN OF CARE
Goal Outcome Evaluation:  Plan of Care Reviewed With: patient     Outcome Summary: No new issues this shift. Rested comfortably overnight. O2 weaned down to 4L. Still SOA and desats with activity but feels it is improving. Voiding per urinal. VSS. Possible dc home today. Will ct to monitor.

## 2021-03-26 NOTE — PROGRESS NOTES
"  Infectious Diseases Progress Note    Roberto Jensen MD     [facility]  [place]: 10 days  Patient Identification:  Name: Alex Geiger  Age: 70 y.o.  Sex: male  :  1950  MRN: [id number]         Primary Care Physician: Real Hudson MD            Subjective: Feels great and hopes to get out of here today.  Interval History: See consultation note.  · Completed his remdesivir on 3/20/2021.  · Received Tocilizumab on 3/17/2021  · Oxygen requirement is now down to 5 to 6 L per nasal cannula  Objective:    Scheduled Meds:amLODIPine, 10 mg, Oral, Q24H  cholecalciferol, 1,000 Units, Oral, Daily  clopidogrel, 75 mg, Oral, Daily  enoxaparin, 40 mg, Subcutaneous, Q24H  famotidine, 20 mg, Oral, BID  finasteride, 5 mg, Oral, Daily  fluconazole, 100 mg, Oral, Q24H  guaiFENesin, 1,200 mg, Oral, Q12H  insulin glargine, 30 Units, Subcutaneous, Q24H  insulin lispro, 0-7 Units, Subcutaneous, 4x Daily With Meals & Nightly  insulin lispro, 10 Units, Subcutaneous, TID With Meals  losartan, 100 mg, Oral, Q24H  tamsulosin, 0.4 mg, Oral, Nightly      Continuous Infusions:     Vital signs in last 24 hours:  Temp:  [96.2 °F (35.7 °C)-97.8 °F (36.6 °C)] 96.2 °F (35.7 °C)  Heart Rate:  [] 101  Resp:  [14-16] 16  BP: (127-140)/(73-83) 132/83    Intake/Output:    Intake/Output Summary (Last 24 hours) at 3/26/2021 1201  Last data filed at 3/26/2021 0927  Gross per 24 hour   Intake 440 ml   Output 1325 ml   Net -885 ml       Exam:  /83   Pulse 101   Temp 96.2 °F (35.7 °C) (Oral)   Resp 16   Ht 182.9 cm (72\")   Wt 96.1 kg (211 lb 13.8 oz)   SpO2 91%   BMI 28.73 kg/m²   Patient is examined using the personal protective equipment as per guidelines from infection control for this particular patient as enacted.  Hand washing was performed before and after patient interaction.  General Appearance:    Alert, cooperative, no distress, AAOx3                          Head:    Normocephalic, without obvious abnormality, " atraumatic                           Eyes:    PERRL, conjunctivae/corneas clear, EOM's intact, both eyes                         Throat:   Lips, tongue, gums normal; oral mucosa pink and moist                           Neck:   Supple, symmetrical, trachea midline, no JVD                         Lungs:    Clear to auscultation bilaterally, respirations unlabored                 Chest Wall:    No tenderness or deformity                          Heart:    Regular rate and rhythm, S1 and S2 normal                  Abdomen:     Soft, non-tender, bowel sounds active                 Extremities:   Extremities normal, atraumatic, no cyanosis or edema                        Pulses:   Pulses palpable in all extremities                            Skin:   Skin is warm and dry,  no rashes or palpable lesions                  Neurologic:   CNII-XII intact, motor strength grossly intact     Data Review:    I reviewed the patient's new clinical results.  Results from last 7 days   Lab Units 03/26/21  0442 03/25/21  0603 03/24/21  0627 03/23/21  0543 03/22/21  0600 03/21/21  0644 03/20/21  0538   WBC 10*3/mm3 17.77* 19.71* 19.89* 20.44* 19.91* 15.39* 11.15*   HEMOGLOBIN g/dL 13.9 14.4 14.6 14.3 14.3 13.6 13.9   PLATELETS 10*3/mm3 374 441 440 411 398 412 355     Results from last 7 days   Lab Units 03/26/21  0442 03/25/21  0603 03/24/21  0627 03/23/21  0543 03/22/21  0600 03/21/21  0644 03/20/21  0538   SODIUM mmol/L 134* 134* 133* 137 136 137 138   POTASSIUM mmol/L 4.6 4.6 4.7 4.4 4.3 4.6 4.3   CHLORIDE mmol/L 103 102 104 106 104 105 104   CO2 mmol/L 22.7 22.4 22.0 24.0 23.5 24.7 24.8   BUN mg/dL 20 19 18 21 21 20 17   CREATININE mg/dL 0.72* 0.71* 0.65* 0.70* 0.70* 0.74* 0.60*   CALCIUM mg/dL 9.0 8.7 8.7 8.5* 8.8 9.5 9.0   GLUCOSE mg/dL 167* 146* 141* 118* 86 153* 191*       Microbiology Results (last 10 days)     ** No results found for the last 240 hours. **          Assessment: Overall seems to have improved with decreasing  oxygen requirement.    Pneumonia due to COVID-19 virus  1-systemic COVID-19 infection with  2-COVID-19 pneumonia with possible evolving hypoxia and possible  3-superimposed bacterial pneumonia  4-diabetes mellitus  5-hypertension  6-other diagnosis per primary team.  7-oral thrush     Recommendations/Discussions:  · Continue supportive care and monitor his clinical course.  · Considered stable from pulmonary standpoint and could be discharged from ID standpoint on no specific treatment but with close pulmonary and primary care follow-up with instructions/education on home oxygen supplementation and use of pulse ox device to adjusted according to the needs.  Roberto Jensen MD  3/26/2021  12:01 EDT    Much of this encounter note is an electronic transcription/translation of spoken language to printed text. The electronic translation of spoken language may permit erroneous, or at times, nonsensical words or phrases to be inadvertently transcribed; Although I have reviewed the note for such errors, some may still exist

## 2021-03-27 ENCOUNTER — READMISSION MANAGEMENT (OUTPATIENT)
Dept: CALL CENTER | Facility: HOSPITAL | Age: 71
End: 2021-03-27

## 2021-03-27 NOTE — OUTREACH NOTE
COVID-19 Week 1 Survey      Responses   [facility] patient discharged from?  [place]   Does the patient have one of the following disease processes/diagnoses(primary or secondary)?  COVID-19   COVID-19 underlying condition?  None   Call Number  Call 1   Week 1 Call successful?  Yes   Call start time  1129   Call end time  [phone]   Discharge diagnosis  Acute hypoxic resp failure d/t COVID-19 PNA, IDDM,    Meds reviewed with patient/caregiver?  Yes   Is the patient having any side effects they believe may be caused by any medication additions or changes?  No   Does the patient have all medications ordered at discharge?  Yes   Is the patient taking all medications as directed (includes completed medication regime)?  Yes   Does the patient have a primary care provider?   Yes   Does the patient have an appointment with their PCP or specialist within 7 days of discharge?  Greater than 7 days   What is preventing the patient from scheduling follow up appointments within 7 days of discharge?  Earlier appointment not available   Nursing Interventions  Verified appointment date/time/provider   Has home health visited the patient within 72 hours of discharge?  N/A   What DME was ordered?  O2 from [place]   Has all DME been delivered?  Yes   Psychosocial issues?  No   Did the patient receive a copy of their discharge instructions?  Yes   Did the patient receive a copy of COVID-19 specific instructions?  Yes   Nursing interventions  Reviewed instructions with patient   What is the patient's perception of their health status since discharge?  Improving   Does the patient have any of the following symptoms?  Shortness of breath [pt states has SOB with exertion r/t increased heart rate on exertion due to other condition]   Nursing Interventions  Nurse provided patient education   Pulse Ox monitoring  Intermittent   Pulse Ox device source  Patient   O2 Sat comments  94-95% on 3-4L NC   O2 Sat: education provided  Sat levels,  Monitoring frequency, When to seek care   O2 Sat education comments  pt states good understanding of parameters for seeking medical care   Is the patient/caregiver able to teach back steps to recovery at home?  Set small, achievable goals for return to baseline health, Rest and rebuild strength, gradually increase activity, Practice good oral hygiene, Eat a well-balance diet   If the patient is a current smoker, are they able to teach back resources for cessation?  Not a smoker   Is the patient/caregiver able to teach back the hierarchy of who to call/visit for symptoms/problems? PCP, Specialist, Home health nurse, Urgent Care, ED, 911  Yes   COVID-19 call completed?  Yes          Abby Brown RN

## 2021-03-27 NOTE — OUTREACH NOTE
Prep Survey      Responses   [facility]?  [place]   Is LACE score < 7 ?  No   Emergency Room discharge w/ pulse ox?  No   Eligibility  Readm Mgmt   Discharge diagnosis  Acute hypoxic resp failure d/t COVID-19 PNA, IDDM,    Does the patient have one of the following disease processes/diagnoses(primary or secondary)?  COVID-19   Does the patient have Home health ordered?  No   Is there a DME ordered?  Yes   What DME was ordered?  O2 from [place]   Prep survey completed?  Yes          Betsy Pollard RN

## 2021-03-28 ENCOUNTER — READMISSION MANAGEMENT (OUTPATIENT)
Dept: CALL CENTER | Facility: HOSPITAL | Age: 71
End: 2021-03-28

## 2021-03-28 NOTE — OUTREACH NOTE
COVID-19 Week 1 Survey      Responses   [facility] patient discharged from?  [place]   Does the patient have one of the following disease processes/diagnoses(primary or secondary)?  COVID-19   COVID-19 underlying condition?  None   Call Number  Call 2   Week 1 Call successful?  Yes   Call start time  1138   Call end time  1145   Discharge diagnosis  Acute hypoxic resp failure d/t COVID-19 PNA, IDDM,    Is the patient taking all medications as directed (includes completed medication regime)?  Yes   Has the patient kept scheduled appointments due by today?  N/A   Comments  Will f/u with PCP when he feels better and hopes to call his cardiologist on Monday for a f/u appt   Psychosocial issues?  No   Does the patient have any of the following symptoms?  Shortness of breath, Cough [very weak, JULIAN]   Nursing Interventions  Nurse provided patient education   Pulse Ox monitoring  Intermittent   Pulse Ox device source  Patient   O2 Sat comments  94-95% on 3-4L NC   O2 Sat: education provided  Sat levels, When to seek care   O2 Sat education comments  pt states good understanding of parameters for seeking medical care   Is the patient/caregiver able to teach back steps to recovery at home?  Rest and rebuild strength, gradually increase activity, Eat a well-balance diet, Set small, achievable goals for return to baseline health   Is the patient/caregiver able to teach back the hierarchy of who to call/visit for symptoms/problems? PCP, Specialist, Home health nurse, Urgent Care, ED, 911  Yes   COVID-19 call completed?  Yes   Wrap up additional comments  pt states he is still extremely weak. Has been mostly in his bed. Enc him to do ankle pumps and move legs to prevent DVT. Enc fluids and he states he is eating well.          Dasia Miller RN

## 2021-03-29 ENCOUNTER — READMISSION MANAGEMENT (OUTPATIENT)
Dept: CALL CENTER | Facility: HOSPITAL | Age: 71
End: 2021-03-29

## 2021-03-29 NOTE — OUTREACH NOTE
COVID-19 Week 1 Survey      Responses   [facility] patient discharged from?  [place]   Does the patient have one of the following disease processes/diagnoses(primary or secondary)?  COVID-19   COVID-19 underlying condition?  None   Call Number  Call 3   Week 1 Call successful?  Yes   Call start time  1710   Call end time  1715   Meds reviewed with patient/caregiver?  Yes   Is the patient having any side effects they believe may be caused by any medication additions or changes?  No   Does the patient have all medications ordered at discharge?  Yes   Is the patient taking all medications as directed (includes completed medication regime)?  Yes   Comments regarding appointments  PCP appt 04/08/21.   Does the patient have a primary care provider?   Yes   Has the patient kept scheduled appointments due by today?  N/A   Has home health visited the patient within 72 hours of discharge?  N/A   Has all DME been delivered?  Yes   Psychosocial issues?  No   Did the patient receive a copy of their discharge instructions?  Yes   Did the patient receive a copy of COVID-19 specific instructions?  Yes   Nursing interventions  Reviewed instructions with patient   What is the patient's perception of their health status since discharge?  Same   Does the patient have any of the following symptoms?  Shortness of breath   Nursing Interventions  Nurse provided patient education   Pulse Ox monitoring  Intermittent   Pulse Ox device source  Patient   O2 Sat comments  96-97% on 3-4L NC.   Is the patient/caregiver able to teach back the hierarchy of who to call/visit for symptoms/problems? PCP, Specialist, Home health nurse, Urgent Care, ED, 911  Yes   COVID-19 call completed?  Yes   Wrap up additional comments  States continues to be weak-denies any fever, cough or chest pain. States some SOA continues. Denies any needs today.          Adilia Villafuerte RN

## 2021-04-01 ENCOUNTER — READMISSION MANAGEMENT (OUTPATIENT)
Dept: CALL CENTER | Facility: HOSPITAL | Age: 71
End: 2021-04-01

## 2021-04-01 NOTE — OUTREACH NOTE
COVID-19 Week 1 Survey      Responses   [facility] patient discharged from?  [place]   Does the patient have one of the following disease processes/diagnoses(primary or secondary)?  COVID-19   COVID-19 underlying condition?  None   Call Number  Call 4   Week 1 Call successful?  No   Discharge diagnosis  Acute hypoxic resp failure d/t COVID-19 JOURDAN, MARY,           Alessandra Byrnes RN

## 2021-04-04 ENCOUNTER — READMISSION MANAGEMENT (OUTPATIENT)
Dept: CALL CENTER | Facility: HOSPITAL | Age: 71
End: 2021-04-04

## 2021-04-04 NOTE — OUTREACH NOTE
COVID-19 Week 2 Survey      Responses   [facility] patient discharged from?  [place]   Does the patient have one of the following disease processes/diagnoses(primary or secondary)?  COVID-19   COVID-19 underlying condition?  None   Call Number  Call 1   COVID-19 Week 2: Call 1 attempt successful?  Yes   Call start time  1533   Call end time  1543   Discharge diagnosis  Acute hypoxic resp failure d/t COVID-19 PNA, IDDM,    Meds reviewed with patient/caregiver?  Yes   Is the patient having any side effects they believe may be caused by any medication additions or changes?  No   Does the patient have all medications ordered at discharge?  Yes   Is the patient taking all medications as directed (includes completed medication regime)?  Yes   Does the patient have a primary care provider?   Yes   Has the patient kept scheduled appointments due by today?  N/A   Has home health visited the patient within 72 hours of discharge?  N/A   Psychosocial issues?  No   Did the patient receive a copy of their discharge instructions?  Yes   Did the patient receive a copy of COVID-19 specific instructions?  Yes   Nursing interventions  Reviewed instructions with patient   What is the patient's perception of their health status since discharge?  Improving   Does the patient have any of the following symptoms?  Shortness of breath   Nursing Interventions  Nurse provided patient education   Pulse Ox monitoring  Intermittent   Pulse Ox device source  Patient   O2 Sat comments  94-97% on RA and with O2 at 3.75L , has been able to go without O2 for up to 3 hours   Is the patient/caregiver able to teach back steps to recovery at home?  Set small, achievable goals for return to baseline health, Rest and rebuild strength, gradually increase activity, Eat a well-balance diet, Make a list of questions for provider's appointment   Is the patient/caregiver able to teach back the hierarchy of who to call/visit for symptoms/problems? PCP,  Specialist, Home health nurse, Urgent Care, ED, 911  Yes [pt states would like to wean off O2, advised pt to keep notes on O2 sats off and on O2 and discuss with MD at appt on 4/8/21, also states heart rate elevated at times in between Metoprolol doses, advise pt to discuss meds with PCP on 4/8/21]   COVID-19 call completed?  Yes          Abby Brown RN

## 2021-04-07 RX ORDER — CLOPIDOGREL BISULFATE 75 MG/1
TABLET ORAL
Qty: 90 TABLET | Refills: 1 | Status: SHIPPED | OUTPATIENT
Start: 2021-04-07

## 2021-04-09 ENCOUNTER — READMISSION MANAGEMENT (OUTPATIENT)
Dept: CALL CENTER | Facility: HOSPITAL | Age: 71
End: 2021-04-09

## 2021-04-09 NOTE — OUTREACH NOTE
COVID-19 Week 3 Survey      Responses   [facility] patient discharged from?  [place]   Does the patient have one of the following disease processes/diagnoses(primary or secondary)?  COVID-19   COVID-19 underlying condition?  None   Call Number  Call 1   COVID-19 Week 3: Call 1 attempt successful?  Yes   Call start time  1040   Call end time  1040   Discharge diagnosis  Acute hypoxic resp failure d/t COVID-19 PNA, IDDM,    Meds reviewed with patient/caregiver?  Yes   Is the patient having any side effects they believe may be caused by any medication additions or changes?  No   Does the patient have all medications ordered at discharge?  Yes   Is the patient taking all medications as directed (includes completed medication regime)?  Yes   Does the patient have a primary care provider?   Yes   Has the patient kept scheduled appointments due by today?  Yes   Psychosocial issues?  No   Did the patient receive a copy of their discharge instructions?  Yes   Did the patient receive a copy of COVID-19 specific instructions?  Yes   Nursing interventions  Reviewed instructions with patient, Educated on MyChart   What is the patient's perception of their health status since discharge?  Improving   Does the patient have any of the following symptoms?  None   Nursing Interventions  Nurse provided patient education   Pulse Ox monitoring  Intermittent   Is the patient/caregiver able to teach back steps to recovery at home?  Set small, achievable goals for return to baseline health, Rest and rebuild strength, gradually increase activity   If the patient is a current smoker, are they able to teach back resources for cessation?  Not a smoker   Is the patient/caregiver able to teach back the hierarchy of who to call/visit for symptoms/problems? PCP, Specialist, Home health nurse, Urgent Care, ED, 911  Yes   COVID-19 call completed?  Yes          Leigh Garsia RN

## 2021-04-16 ENCOUNTER — READMISSION MANAGEMENT (OUTPATIENT)
Dept: CALL CENTER | Facility: HOSPITAL | Age: 71
End: 2021-04-16

## 2021-04-16 NOTE — OUTREACH NOTE
"COVID-19 Week 4 Survey      Responses   [facility] patient discharged from?  [place]   Does the patient have one of the following disease processes/diagnoses(primary or secondary)?  COVID-19   COVID-19 underlying condition?  None   Call Number  Call 1   COVID-19 Week 4: Call 1 attempt successful?  Yes   Call start time  1210   Call end time  1221   Meds reviewed with patient/caregiver?  Yes   Is the patient having any side effects they believe may be caused by any medication additions or changes?  No   Does the patient have all medications ordered at discharge?  Yes   Is the patient taking all medications as directed (includes completed medication regime)?  Yes   Has the patient kept scheduled appointments due by today?  Yes   Is the patient still receiving Home Health Services?  N/A   DME comments  States using home O2 at 2.5L PRN and at night.   What is the patient's perception of their health status since discharge?  Improving   Does the patient have any of the following symptoms?  Shortness of breath [SOA on exertion.]   Nursing Interventions  Nurse provided patient education   Pulse Ox monitoring  Intermittent   Pulse Ox device source  Patient   O2 Sat comments  94% at rest on RA. States O2 sats drop to 88% on activity with quick recovery.   Is the patient/caregiver able to teach back the hierarchy of who to call/visit for symptoms/problems? PCP, Specialist, Home health nurse, Urgent Care, ED, 911  Yes   Is the patient interested in additional calls from an ambulatory ?  NOTE:  applies to high risk patients requiring additional follow-up.  Yes   Did the patient feel the follow up calls were helpful during their recovery period?  Yes   Was the number of calls appropriate?  Yes   Interested in COVID-19 Plasma Donation?  Uncertain   Wrap up additional comments  States has \"good days, and bad days\". States still has slight SOA on exertion with occasional blurry vision when stands from sitting " position. States HR also running over 100 BPM with activity, and at rest at times. Reports /60. Advised to call PCP for evaluation of HR, blurry vision upon standing, BP meds. States still has some weakness/dizziness, but believes is related to BP meds.           Adilia Villafuerte RN

## 2021-04-19 ENCOUNTER — PATIENT OUTREACH (OUTPATIENT)
Dept: CASE MANAGEMENT | Facility: OTHER | Age: 71
End: 2021-04-19

## 2021-04-19 NOTE — OUTREACH NOTE
Care Coordination Assessment    Documented/Reviewed By: Dara Cunha RN Date/time: 4/19/2021  1:16 PM   Assessment completed with: patient  Enrolled in care management program: No  Living arrangement: spouse  Support system: family, friends  Type of residence: private residence  Home care services: No  Equipment used at home: oxygen/respiratory treatment (Comment: [place])  Bed or wheelchair confined: No  Inadequate nutrition: No  Medication adherence problem: No  Experiencing side effects from current medications: No  History of fall(s) in last 6 months: No  Difficulty keeping appointments: No  Chronic pain: No

## 2021-04-19 NOTE — OUTREACH NOTE
Care Plan Note      Responses   Lifestyle Goals  Avoid respiratory irritants, Create a support system, Eat a healthy diet, Exercise 150 min/wk - moderate activity, Fewer exacerbations, Have more energy, Increase physical activity, Less shortness of air, Maintain blood pressure < 130/80, Medication management, Reduce blood pressure, Routine follow-up with doctor(s)   Barriers  Disease education   Self Management  Home BP Monitoring, Breathing techniques, Increase Physical Activities, Medication Adherence, Use oxygen   Annual Wellness Visit:   Patient Will Schedule   AWV Materials  Send Materials   Specific Disease Process Teaching  Diabetes, Heart Disease, Hypertension   Does patient have depression diagnosis?  No   Advanced Directives:  Send Materials   Medication Adherence  Medications understood   Goal Progress  Making Progress Toward Goal(s)   How often do you have someone help you read hospital materials?  Never   How often do you have problems learning about your medical condition because of difficulty understanding written information?  Never   How often do you have a problem understanding what is told to you about your medical condition?  Often   How confident are you filling out medical forms by yourself?  Quite a bit   Health Literacy  Good        The main concerns and/or symptoms the patient would like to address are: fatigue after COVID, O2    Education/instruction provided by Care Coordinator: Call to pt following a referral from the call center. Pt states he is doing well. He feels as though he is getting stronger everyday. Pt encouraged to increase his activity everday. He states he went down in the basement this weekend and it was difficult to go back up the stair. My legs felt like they weighed 5oolbs. Pt encouraged to start with a few steps such as the ones coming into his home and increase the times he goes up gradually to increase leg strength. Also suggested rising up and matthew from a seated  position several times a day. Pt agrees.   He does have an apt in one month with Dr Hudson. He is currently on O2 at night only. Suggested he discuss this at his next appt to see if MD might want to do an overnight study to determine if this is still needed. No questions, concerns or needs regarding health wellness. Pt given number for 24/[facility]. Pt appreciative of phone call and declined to participate in care advising program. No needs identified.     Follow Up Outreach Due: none    Dara Cunha RN  Ambulatory     4/19/2021, 13:20 EDT

## 2021-11-30 ENCOUNTER — OFFICE VISIT (OUTPATIENT)
Dept: CARDIOLOGY | Facility: CLINIC | Age: 71
End: 2021-11-30

## 2021-11-30 VITALS
HEART RATE: 82 BPM | WEIGHT: 228.2 LBS | DIASTOLIC BLOOD PRESSURE: 90 MMHG | HEIGHT: 72 IN | BODY MASS INDEX: 30.91 KG/M2 | SYSTOLIC BLOOD PRESSURE: 156 MMHG

## 2021-11-30 DIAGNOSIS — I49.3 PREMATURE COMPLEX, VENTRICULAR: ICD-10-CM

## 2021-11-30 DIAGNOSIS — I10 ESSENTIAL (PRIMARY) HYPERTENSION: ICD-10-CM

## 2021-11-30 DIAGNOSIS — I25.10 CORONARY ARTERY DISEASE INVOLVING NATIVE CORONARY ARTERY OF NATIVE HEART WITHOUT ANGINA PECTORIS: Primary | ICD-10-CM

## 2021-11-30 DIAGNOSIS — E78.49 OTHER HYPERLIPIDEMIA: ICD-10-CM

## 2021-11-30 PROCEDURE — 93000 ELECTROCARDIOGRAM COMPLETE: CPT | Performed by: INTERNAL MEDICINE

## 2021-11-30 PROCEDURE — 99214 OFFICE O/P EST MOD 30 MIN: CPT | Performed by: INTERNAL MEDICINE

## 2021-11-30 RX ORDER — AMLODIPINE BESYLATE 5 MG/1
5 TABLET ORAL DAILY
COMMUNITY
Start: 2021-10-06 | End: 2021-11-30

## 2021-11-30 RX ORDER — LOSARTAN POTASSIUM 100 MG/1
100 TABLET ORAL
Qty: 90 TABLET | Refills: 2 | Status: SHIPPED | OUTPATIENT
Start: 2021-11-30 | End: 2023-02-23 | Stop reason: HOSPADM

## 2021-11-30 RX ORDER — METOPROLOL SUCCINATE 50 MG/1
50 TABLET, EXTENDED RELEASE ORAL DAILY
COMMUNITY
Start: 2021-11-09 | End: 2022-05-06 | Stop reason: SDUPTHER

## 2021-11-30 NOTE — PROGRESS NOTES
Date of Office Visit: 21  Encounter Provider: Rell Deutsch MD  Place of Service: [facility] CARDIOLOGY  Patient Name: Alex Geiger  :1950  [id number]    Chief Complaint   Patient presents with   • Coronary Artery Disease   :     HPI: Alex Geiger is a 71 y.o. male  He had stents put in his mid-left anterior descending by me in .  At that time, he had 40% disease in his right coronary artery and 30% in his circumflex.  He has had prior drug-eluting stents to his right coronary artery and circumflex in .  He had normal left ventricular function.    In  he had recurrent angina and had developed a new lesion in his distal RCA and had drug-eluting stenting to the LAD his other coronaries look good at that time and his LV function was normal.  He had pretty severe COVID-19 infection and 2021.  He is doing okay from a cardiac standpoint no chest pain no PND orthopnea has some edema in his legs and he also has a lot of chronic myalgias he has been intolerant of all statins.  He says his sugars have been better until the last few weeks and they have been a little bit high    Past Medical History:   Diagnosis Date   • CAD (coronary artery disease)    • COVID-19    • Diabetes mellitus (HCC)    • Hyperlipidemia    • Hypertension    • Renal injury    • Unstable angina (HCC)    • Ventricular ectopy    • VPC (ventricular premature complex)        Past Surgical History:   Procedure Laterality Date   • CARDIAC CATHETERIZATION     • CARDIAC CATHETERIZATION N/A 2017    Procedure: Coronary angiography;  Surgeon: Rell Deutsch MD;  Location: [facility] CATH INVASIVE LOCATION;  Service:    • CARDIAC CATHETERIZATION N/A 2017    Procedure: Stent QUIANA coronary;  Surgeon: Rell Deutsch MD;  Location: [facility] CATH INVASIVE LOCATION;  Service:    • CARDIAC CATHETERIZATION N/A 2017    Procedure: Left Heart Cath;  Surgeon: Rell Deutsch MD;  Location: [facility]  CATH INVASIVE LOCATION;  Service:    • CARDIAC CATHETERIZATION N/A 8/16/2017    Procedure: Left ventriculography;  Surgeon: Rell Deutsch MD;  Location: [facility] CATH INVASIVE LOCATION;  Service:    • CARDIAC CATHETERIZATION N/A 7/25/2019    Procedure: Left Heart Cath;  Surgeon: Rell Deutsch MD;  Location: [facility] CATH INVASIVE LOCATION;  Service: Cardiology   • CARDIAC CATHETERIZATION N/A 7/25/2019    Procedure: Stent QUIANA coronary;  Surgeon: Rell Deutsch MD;  Location: [facility] CATH INVASIVE LOCATION;  Service: Cardiology   • CARDIAC CATHETERIZATION N/A 7/25/2019    Procedure: Left ventriculography;  Surgeon: Rell Deutsch MD;  Location: [facility] CATH INVASIVE LOCATION;  Service: Cardiology   • CARDIAC CATHETERIZATION N/A 7/25/2019    Procedure: Coronary angiography;  Surgeon: Rell Deutsch MD;  Location: [facility] CATH INVASIVE LOCATION;  Service: Cardiology   • CORONARY ANGIOPLASTY WITH STENT PLACEMENT     • KIDNEY STONE SURGERY     • PARATHYROIDECTOMY         Social History     Socioeconomic History   • Marital status:    Tobacco Use   • Smoking status: Former Smoker     Packs/day: 1.00     Years: 25.00     Pack years: 25.00     Types: Cigarettes   • Smokeless tobacco: Never Used   • Tobacco comment: NO CAFFEINE USE   Vaping Use   • Vaping Use: Never used   Substance and Sexual Activity   • Alcohol use: Not Currently     Alcohol/week: 1.0 standard drink     Types: 1 Shots of liquor per week     Comment: 2 drinks a year   • Drug use: No   • Sexual activity: Defer       Family History   Problem Relation Age of Onset   • Diabetes Mother    • Alzheimer's disease Father    • Kidney disease Father    • Diabetes Father    • No Known Problems Maternal Grandmother    • No Known Problems Maternal Grandfather    • No Known Problems Paternal Grandmother    • Heart attack Paternal Grandfather    • Heart disease Paternal Grandfather    • Diabetes Paternal Grandfather        Review of Systems   Constitutional:  "Negative for decreased appetite, fever, malaise/fatigue and weight loss.   HENT: Negative for nosebleeds.    Eyes: Negative for double vision.   Cardiovascular: Negative for chest pain, claudication, cyanosis, dyspnea on exertion, irregular heartbeat, leg swelling, near-syncope, orthopnea, palpitations, paroxysmal nocturnal dyspnea and syncope.   Respiratory: Negative for cough, hemoptysis and shortness of breath.    Hematologic/Lymphatic: Negative for bleeding problem.   Skin: Negative for rash.   Musculoskeletal: Negative for falls and myalgias.   Gastrointestinal: Negative for hematochezia, jaundice, melena, nausea and vomiting.   Genitourinary: Negative for hematuria.   Neurological: Negative for dizziness and seizures.   Psychiatric/Behavioral: Negative for altered mental status and memory loss.       Allergies   Allergen Reactions   • Oxycodone-Acetaminophen Itching   • Percocet [Oxycodone-Acetaminophen]    • Shellfish-Derived Products Hives and Itching   • Statins    • Adhesive Tape Rash   • Latex Rash         Current Outpatient Medications:   •  amLODIPine (NORVASC) 5 MG tablet, 5 mg Daily., Disp: , Rfl:   •  clopidogrel (PLAVIX) 75 MG tablet, TAKE (1) TABLET DAILY, Disp: 90 tablet, Rfl: 1  •  finasteride (PROSCAR) 5 MG tablet, Take 5 mg by mouth Daily., Disp: , Rfl:   •  losartan (COZAAR) 100 MG tablet, Take 1 tablet by mouth Daily for 30 days., Disp: 30 tablet, Rfl: 0  •  metFORMIN (GLUCOPHAGE) 500 MG tablet, Take 2 tablets by mouth 2 (Two) Times a Day With Meals., Disp: , Rfl:   •  metoprolol succinate XL (TOPROL-XL) 50 MG 24 hr tablet, 50 mg Daily., Disp: , Rfl:   •  tamsulosin (FLOMAX) 0.4 MG capsule 24 hr capsule, Take 1 capsule by mouth every night., Disp: , Rfl:   •  TRESIBA FLEXTOUCH 200 UNIT/ML solution pen-injector, Inject 60 Units as directed Daily., Disp: , Rfl:       Objective:     Vitals:    11/30/21 1335   BP: 156/90   Pulse: 82   Weight: 104 kg (228 lb 3.2 oz)   Height: 182.9 cm (72\") "     Body mass index is 30.95 kg/m².    Constitutional:       Appearance: Well-developed.   Eyes:      General: No scleral icterus.  HENT:      Head: Normocephalic.   Neck:      Thyroid: No thyromegaly.      Vascular: No JVD.      Lymphadenopathy: No cervical adenopathy.   Pulmonary:      Effort: Pulmonary effort is normal.      Breath sounds: Normal breath sounds. No wheezing. No rales.   Cardiovascular:      Normal rate. Regular rhythm.      No gallop.   Edema:     Peripheral edema absent.   Abdominal:      Palpations: Abdomen is soft.      Tenderness: There is no abdominal tenderness.   Musculoskeletal: Normal range of motion. Skin:     General: Skin is warm and dry.      Findings: No rash.   Neurological:      Mental Status: Alert and oriented to person, place, and time.           ECG 12 Lead    Date/Time: 11/30/2021 1:59 PM  Performed by: Rell Deutsch MD  Authorized by: Rell Deutsch MD   Rhythm: sinus rhythm  Ectopy: unifocal PVCs  Other findings: non-specific ST-T wave changes    Clinical impression: abnormal EKG             Assessment:       Diagnosis Plan   1. Coronary artery disease involving native coronary artery of native heart without angina pectoris     2. Essential (primary) hypertension     3. Other hyperlipidemia     4. Premature complex, ventricular            Plan:       In general I think he is okay we will get a stop his amlodipine I am pretty sure that is causing the edema and I am going to start him on losartan 100 mg a day I had like him to come back and see Sigrid in 6 weeks for a blood pressure check at that time he is going to need a BMP and I had also like to get a lipid panel on him and you know we started to talk about Repatha and he is like I want to take any cholesterol medicine but I assured him that its not a statin.  Of course the data is the best with statins there is a little bit of data that is helpful with Repatha so it is not worth getting into a big argument about but  he had an unstable angina and 17 and again in 19 so the disease has dementia and I also think we should consider Jardiance for him    As always, it has been a pleasure to participate in your patient's care.      Sincerely,       Rell Deutsch MD

## 2021-12-01 RX ORDER — LEVOFLOXACIN 500 MG/1
500 TABLET, FILM COATED ORAL DAILY
Qty: 10 TABLET | Refills: 0 | Status: ON HOLD | OUTPATIENT
Start: 2021-12-01 | End: 2021-12-22

## 2021-12-21 ENCOUNTER — TRANSCRIBE ORDERS (OUTPATIENT)
Dept: CARDIOLOGY | Facility: CLINIC | Age: 71
End: 2021-12-21

## 2021-12-21 ENCOUNTER — LAB (OUTPATIENT)
Dept: LAB | Facility: HOSPITAL | Age: 71
End: 2021-12-21

## 2021-12-21 ENCOUNTER — OFFICE VISIT (OUTPATIENT)
Dept: CARDIOLOGY | Facility: CLINIC | Age: 71
End: 2021-12-21

## 2021-12-21 VITALS
BODY MASS INDEX: 30.31 KG/M2 | DIASTOLIC BLOOD PRESSURE: 90 MMHG | HEIGHT: 72 IN | WEIGHT: 223.8 LBS | SYSTOLIC BLOOD PRESSURE: 170 MMHG | HEART RATE: 99 BPM

## 2021-12-21 DIAGNOSIS — Z01.810 PREPROCEDURAL CARDIOVASCULAR EXAMINATION: ICD-10-CM

## 2021-12-21 DIAGNOSIS — I20.0 UNSTABLE ANGINA (HCC): Primary | ICD-10-CM

## 2021-12-21 DIAGNOSIS — I20.0 ACCELERATING ANGINA (HCC): ICD-10-CM

## 2021-12-21 DIAGNOSIS — Z01.818 OTHER SPECIFIED PRE-OPERATIVE EXAMINATION: ICD-10-CM

## 2021-12-21 DIAGNOSIS — I10 ESSENTIAL (PRIMARY) HYPERTENSION: ICD-10-CM

## 2021-12-21 DIAGNOSIS — E11.59 TYPE 2 DIABETES MELLITUS WITH OTHER CIRCULATORY COMPLICATION, UNSPECIFIED WHETHER LONG TERM INSULIN USE (HCC): ICD-10-CM

## 2021-12-21 DIAGNOSIS — I25.10 CORONARY ARTERY DISEASE INVOLVING NATIVE CORONARY ARTERY OF NATIVE HEART WITHOUT ANGINA PECTORIS: ICD-10-CM

## 2021-12-21 DIAGNOSIS — Z13.6 SCREENING FOR CARDIOVASCULAR CONDITION: ICD-10-CM

## 2021-12-21 DIAGNOSIS — Z13.6 SCREENING FOR CARDIOVASCULAR CONDITION: Primary | ICD-10-CM

## 2021-12-21 LAB
ANION GAP SERPL CALCULATED.3IONS-SCNC: 9.6 MMOL/L (ref 5–15)
BASOPHILS # BLD AUTO: 0.08 10*3/MM3 (ref 0–0.2)
BASOPHILS NFR BLD AUTO: 1.5 % (ref 0–1.5)
BUN SERPL-MCNC: 10 MG/DL (ref 8–23)
BUN/CREAT SERPL: 9.9 (ref 7–25)
CALCIUM SPEC-SCNC: 10.6 MG/DL (ref 8.6–10.5)
CHLORIDE SERPL-SCNC: 103 MMOL/L (ref 98–107)
CO2 SERPL-SCNC: 27.4 MMOL/L (ref 22–29)
CREAT SERPL-MCNC: 1.01 MG/DL (ref 0.76–1.27)
DEPRECATED RDW RBC AUTO: 42.2 FL (ref 37–54)
EOSINOPHIL # BLD AUTO: 0.28 10*3/MM3 (ref 0–0.4)
EOSINOPHIL NFR BLD AUTO: 5.4 % (ref 0.3–6.2)
ERYTHROCYTE [DISTWIDTH] IN BLOOD BY AUTOMATED COUNT: 13.1 % (ref 12.3–15.4)
GFR SERPL CREATININE-BSD FRML MDRD: 73 ML/MIN/1.73
GLUCOSE SERPL-MCNC: 262 MG/DL (ref 65–99)
HCT VFR BLD AUTO: 42.8 % (ref 37.5–51)
HGB BLD-MCNC: 14.5 G/DL (ref 13–17.7)
IMM GRANULOCYTES # BLD AUTO: 0.12 10*3/MM3 (ref 0–0.05)
IMM GRANULOCYTES NFR BLD AUTO: 2.3 % (ref 0–0.5)
LYMPHOCYTES # BLD AUTO: 1.22 10*3/MM3 (ref 0.7–3.1)
LYMPHOCYTES NFR BLD AUTO: 23.5 % (ref 19.6–45.3)
MCH RBC QN AUTO: 29.9 PG (ref 26.6–33)
MCHC RBC AUTO-ENTMCNC: 33.9 G/DL (ref 31.5–35.7)
MCV RBC AUTO: 88.2 FL (ref 79–97)
MONOCYTES # BLD AUTO: 0.86 10*3/MM3 (ref 0.1–0.9)
MONOCYTES NFR BLD AUTO: 16.5 % (ref 5–12)
NEUTROPHILS NFR BLD AUTO: 2.64 10*3/MM3 (ref 1.7–7)
NEUTROPHILS NFR BLD AUTO: 50.8 % (ref 42.7–76)
NRBC BLD AUTO-RTO: 0 /100 WBC (ref 0–0.2)
PLATELET # BLD AUTO: 248 10*3/MM3 (ref 140–450)
PMV BLD AUTO: 10.5 FL (ref 6–12)
POTASSIUM SERPL-SCNC: 4.1 MMOL/L (ref 3.5–5.2)
RBC # BLD AUTO: 4.85 10*6/MM3 (ref 4.14–5.8)
SARS-COV-2 RNA RESP QL NAA+PROBE: NOT DETECTED
SODIUM SERPL-SCNC: 140 MMOL/L (ref 136–145)
WBC NRBC COR # BLD: 5.2 10*3/MM3 (ref 3.4–10.8)

## 2021-12-21 PROCEDURE — C9803 HOPD COVID-19 SPEC COLLECT: HCPCS

## 2021-12-21 PROCEDURE — 36415 COLL VENOUS BLD VENIPUNCTURE: CPT

## 2021-12-21 PROCEDURE — 85025 COMPLETE CBC W/AUTO DIFF WBC: CPT

## 2021-12-21 PROCEDURE — 80048 BASIC METABOLIC PNL TOTAL CA: CPT

## 2021-12-21 PROCEDURE — 93000 ELECTROCARDIOGRAM COMPLETE: CPT | Performed by: INTERNAL MEDICINE

## 2021-12-21 PROCEDURE — U0003 INFECTIOUS AGENT DETECTION BY NUCLEIC ACID (DNA OR RNA); SEVERE ACUTE RESPIRATORY SYNDROME CORONAVIRUS 2 (SARS-COV-2) (CORONAVIRUS DISEASE [COVID-19]), AMPLIFIED PROBE TECHNIQUE, MAKING USE OF HIGH THROUGHPUT TECHNOLOGIES AS DESCRIBED BY CMS-2020-01-R: HCPCS

## 2021-12-21 PROCEDURE — 99214 OFFICE O/P EST MOD 30 MIN: CPT | Performed by: INTERNAL MEDICINE

## 2021-12-21 NOTE — PROGRESS NOTES
Date of Office Visit: 21  Encounter Provider: Rell Deutsch MD  Place of Service: [facility] CARDIOLOGY  Patient Name: Alex Geiger  :1950  [id number]    Chief Complaint   Patient presents with   • Coronary Artery Disease   :     HPI: Alex Geiger is a 71 y.o. male  He had stents put in his mid-left anterior descending by me in .  At that time, he had 40% disease in his right coronary artery and 30% in his circumflex.  He has had prior drug-eluting stents to his right coronary artery and circumflex in .  He had normal left ventricular function.    In  he had recurrent angina and had developed a new lesion in his distal RCA and had drug-eluting stenting to the LAD his other coronaries look good at that time and his LV function was normal.  He had pretty severe COVID-19 infection and 2021.  he also has a lot of chronic myalgias he has been intolerant of all statins.     He called he has been having a lot of chest discomfort in the last for 5 days a couple days ago he had about 20 minutes of pain he is not had any today on his breathing gets bad it feels just like he did when he had a coronary lesion before he is had a little bit of hematuria couple days ago which is unusual for him otherwise has been doing okay no PND no orthopnea has a little bit of difficulty taking a deep breath sometimes    Past Medical History:   Diagnosis Date   • CAD (coronary artery disease)    • COVID-19    • Diabetes mellitus (HCC)    • Hyperlipidemia    • Hypertension    • Renal injury    • Unstable angina (HCC)    • Ventricular ectopy    • VPC (ventricular premature complex)        Past Surgical History:   Procedure Laterality Date   • CARDIAC CATHETERIZATION     • CARDIAC CATHETERIZATION N/A 2017    Procedure: Coronary angiography;  Surgeon: Rell Deutsch MD;  Location: [facility] CATH INVASIVE LOCATION;  Service:    • CARDIAC CATHETERIZATION N/A 2017     Procedure: Stent QUIANA coronary;  Surgeon: Rell Deutsch MD;  Location: [facility] CATH INVASIVE LOCATION;  Service:    • CARDIAC CATHETERIZATION N/A 8/16/2017    Procedure: Left Heart Cath;  Surgeon: Rell Deutsch MD;  Location: [facility] CATH INVASIVE LOCATION;  Service:    • CARDIAC CATHETERIZATION N/A 8/16/2017    Procedure: Left ventriculography;  Surgeon: Rell Deutsch MD;  Location: [facility] CATH INVASIVE LOCATION;  Service:    • CARDIAC CATHETERIZATION N/A 7/25/2019    Procedure: Left Heart Cath;  Surgeon: Rell Deutsch MD;  Location: [facility] CATH INVASIVE LOCATION;  Service: Cardiology   • CARDIAC CATHETERIZATION N/A 7/25/2019    Procedure: Stent QUIANA coronary;  Surgeon: Rell Deutsch MD;  Location: [facility] CATH INVASIVE LOCATION;  Service: Cardiology   • CARDIAC CATHETERIZATION N/A 7/25/2019    Procedure: Left ventriculography;  Surgeon: Rell Deutsch MD;  Location: [facility] CATH INVASIVE LOCATION;  Service: Cardiology   • CARDIAC CATHETERIZATION N/A 7/25/2019    Procedure: Coronary angiography;  Surgeon: Rell Deutsch MD;  Location: [facility] CATH INVASIVE LOCATION;  Service: Cardiology   • CORONARY ANGIOPLASTY WITH STENT PLACEMENT     • KIDNEY STONE SURGERY     • PARATHYROIDECTOMY         Social History     Socioeconomic History   • Marital status:    Tobacco Use   • Smoking status: Former Smoker     Packs/day: 1.00     Years: 25.00     Pack years: 25.00     Types: Cigarettes   • Smokeless tobacco: Never Used   • Tobacco comment: NO CAFFEINE USE   Vaping Use   • Vaping Use: Never used   Substance and Sexual Activity   • Alcohol use: Not Currently     Alcohol/week: 1.0 standard drink     Types: 1 Shots of liquor per week     Comment: 2 drinks a year   • Drug use: No   • Sexual activity: Defer       Family History   Problem Relation Age of Onset   • Diabetes Mother    • Alzheimer's disease Father    • Kidney disease Father    • Diabetes Father    • No Known Problems Maternal Grandmother    • No Known  Problems Maternal Grandfather    • No Known Problems Paternal Grandmother    • Heart attack Paternal Grandfather    • Heart disease Paternal Grandfather    • Diabetes Paternal Grandfather        Review of Systems   Constitutional: Negative for decreased appetite, fever, malaise/fatigue and weight loss.   HENT: Negative for nosebleeds.    Eyes: Negative for double vision.   Cardiovascular: Negative for chest pain, claudication, cyanosis, dyspnea on exertion, irregular heartbeat, leg swelling, near-syncope, orthopnea, palpitations, paroxysmal nocturnal dyspnea and syncope.   Respiratory: Negative for cough, hemoptysis and shortness of breath.    Hematologic/Lymphatic: Negative for bleeding problem.   Skin: Negative for rash.   Musculoskeletal: Negative for falls and myalgias.   Gastrointestinal: Negative for hematochezia, jaundice, melena, nausea and vomiting.   Genitourinary: Negative for hematuria.   Neurological: Negative for dizziness and seizures.   Psychiatric/Behavioral: Negative for altered mental status and memory loss.       Allergies   Allergen Reactions   • Oxycodone-Acetaminophen Itching   • Percocet [Oxycodone-Acetaminophen]    • Shellfish-Derived Products Hives and Itching   • Statins    • Adhesive Tape Rash   • Latex Rash         Current Outpatient Medications:   •  clopidogrel (PLAVIX) 75 MG tablet, TAKE (1) TABLET DAILY, Disp: 90 tablet, Rfl: 1  •  finasteride (PROSCAR) 5 MG tablet, Take 5 mg by mouth Daily., Disp: , Rfl:   •  levoFLOXacin (Levaquin) 500 MG tablet, Take 1 tablet by mouth Daily., Disp: 10 tablet, Rfl: 0  •  losartan (COZAAR) 100 MG tablet, Take 1 tablet by mouth Daily for 30 days., Disp: 90 tablet, Rfl: 2  •  metFORMIN (GLUCOPHAGE) 500 MG tablet, Take 2 tablets by mouth 2 (Two) Times a Day With Meals., Disp: , Rfl:   •  metoprolol succinate XL (TOPROL-XL) 50 MG 24 hr tablet, 50 mg Daily., Disp: , Rfl:   •  tamsulosin (FLOMAX) 0.4 MG capsule 24 hr capsule, Take 1 capsule by mouth every  "night., Disp: , Rfl:   •  TRESIBA FLEXTOUCH 200 UNIT/ML solution pen-injector, Inject 60 Units as directed Daily., Disp: , Rfl:       Objective:     Vitals:    12/21/21 1614   BP: 170/90   Pulse: 99   Weight: 102 kg (223 lb 12.8 oz)   Height: 182.9 cm (72\")     Body mass index is 30.35 kg/m².    Constitutional:       Appearance: Well-developed.   Eyes:      General: No scleral icterus.  HENT:      Head: Normocephalic.   Neck:      Thyroid: No thyromegaly.      Vascular: No JVD.      Lymphadenopathy: No cervical adenopathy.   Pulmonary:      Effort: Pulmonary effort is normal.      Breath sounds: Normal breath sounds. No wheezing. No rales.   Cardiovascular:      Normal rate. Regular rhythm.      No gallop.   Edema:     Peripheral edema absent.   Abdominal:      Palpations: Abdomen is soft.      Tenderness: There is no abdominal tenderness.   Musculoskeletal: Normal range of motion. Skin:     General: Skin is warm and dry.      Findings: No rash.   Neurological:      Mental Status: Alert and oriented to person, place, and time.           ECG 12 Lead    Date/Time: 12/21/2021 4:27 PM  Performed by: Rell Deutsch MD  Authorized by: Rell Deutsch MD   Comparison: compared with previous ECG   Similar to previous ECG  Rhythm: sinus rhythm  Ectopy: unifocal PVCs  Other findings: non-specific ST-T wave changes    Clinical impression: abnormal EKG             Assessment:       Diagnosis Plan   1. Unstable angina (HCC)  ECG 12 Lead    Case Request Cath Lab: Left Heart Cath   2. Coronary artery disease involving native coronary artery of native heart without angina pectoris  Case Request Cath Lab: Left Heart Cath   3. Essential (primary) hypertension  Case Request Cath Lab: Left Heart Cath   4. Accelerating angina (HCC)  Case Request Cath Lab: Left Heart Cath   5. Type 2 diabetes mellitus with other circulatory complication, unspecified whether long term insulin use (HCC)  Case Request Cath Lab: Left Heart Cath        "   Plan:       Well I mean his story is pretty classic for unstable angina I think we should just proceed right to the Cath Lab I am going to have him take an extra 50 mg of metoprolol tonight and if he has any issues at all he has to come to the hospital will get a get him set up to have a cardiac cath tomorrow to be with my partner Dr. Oliva and further decisions will be based on the findings at the time of that cath I have gone over the risk versus benefits of this with him and he agrees and understands I think if he is got a lesion that can be stented it would be okay to proceed forward with that    As always, it has been a pleasure to participate in your patient's care.      Sincerely,       Rell Deutsch MD

## 2021-12-21 NOTE — H&P (VIEW-ONLY)
Date of Office Visit: 21  Encounter Provider: Rell Deutsch MD  Place of Service: [facility] CARDIOLOGY  Patient Name: Alex Geiger  :1950  [id number]    Chief Complaint   Patient presents with   • Coronary Artery Disease   :     HPI: Alex Geiger is a 71 y.o. male  He had stents put in his mid-left anterior descending by me in .  At that time, he had 40% disease in his right coronary artery and 30% in his circumflex.  He has had prior drug-eluting stents to his right coronary artery and circumflex in .  He had normal left ventricular function.    In  he had recurrent angina and had developed a new lesion in his distal RCA and had drug-eluting stenting to the LAD his other coronaries look good at that time and his LV function was normal.  He had pretty severe COVID-19 infection and 2021.  he also has a lot of chronic myalgias he has been intolerant of all statins.     He called he has been having a lot of chest discomfort in the last for 5 days a couple days ago he had about 20 minutes of pain he is not had any today on his breathing gets bad it feels just like he did when he had a coronary lesion before he is had a little bit of hematuria couple days ago which is unusual for him otherwise has been doing okay no PND no orthopnea has a little bit of difficulty taking a deep breath sometimes    Past Medical History:   Diagnosis Date   • CAD (coronary artery disease)    • COVID-19    • Diabetes mellitus (HCC)    • Hyperlipidemia    • Hypertension    • Renal injury    • Unstable angina (HCC)    • Ventricular ectopy    • VPC (ventricular premature complex)        Past Surgical History:   Procedure Laterality Date   • CARDIAC CATHETERIZATION     • CARDIAC CATHETERIZATION N/A 2017    Procedure: Coronary angiography;  Surgeon: Rell Deutsch MD;  Location: [facility] CATH INVASIVE LOCATION;  Service:    • CARDIAC CATHETERIZATION N/A 2017     Procedure: Stent QUIANA coronary;  Surgeon: Rell Deutsch MD;  Location: [facility] CATH INVASIVE LOCATION;  Service:    • CARDIAC CATHETERIZATION N/A 8/16/2017    Procedure: Left Heart Cath;  Surgeon: Rell Deutsch MD;  Location: [facility] CATH INVASIVE LOC[facility];  Service:    • CARDIAC CATHETERIZATION N/A 8/16/2017    Procedure: Left ventriculography;  Surgeon: Rell Deutsch MD;  Location: [facility] CATH INVASIVE LOCATION;  Service:    • CARDIAC CATHETERIZATION N/A 7/25/2019    Procedure: Left Heart Cath;  Surgeon: Rell Deutsch MD;  [facility];  Service: Cardiology   • CARDIAC CATHETERIZATION N/A 7/25/2019    Procedure: Stent QUIANA coronary;  Surgeon: Rell Deutsch MD;  Location: [facility] CATH INVASIVE LOCATION;  Service: Cardiology   • CARDIAC CATHETERIZATION N/A 7/25/2019    Procedure: Left ventriculography;  Surgeon: Rell Deutsch MD;  Location: [facility] CATH INVASIVE LOCATION;  Service: Cardiology   • CARDIAC CATHETERIZATION N/A 7/25/2019    Procedure: Coronary angiography;  Surgeon: Rell Deutsch MD;  Location: [facility] CATH INVASIVE LOCATION;  Service: Cardiology   • CORONARY ANGIOPLASTY WITH STENT PLACEMENT     • KIDNEY STONE SURGERY     • PARATHYROIDECTOMY         Social History     Socioeconomic History   • Marital status:    Tobacco Use   • Smoking status: Former Smoker     Packs/day: 1.00     Years: 25.00     Pack years: 25.00     Types: Cigarettes   • Smokeless tobacco: Never Used   • Tobacco comment: NO CAFFEINE USE   Vaping Use   • Vaping Use: Never used   Substance and Sexual Activity   • Alcohol use: Not Currently     Alcohol/week: 1.0 standard drink     Types: 1 Shots of liquor per week     Comment: 2 drinks a year   • Drug use: No   • Sexual activity: Defer       Family History   Problem Relation Age of Onset   • Diabetes Mother    • Alzheimer's disease Father    • Kidney disease Father    • Diabetes Father    • No Known Problems Maternal Grandmother    • No Known  Problems Maternal Grandfather    • No Known Problems Paternal Grandmother    • Heart attack Paternal Grandfather    • Heart disease Paternal Grandfather    • Diabetes Paternal Grandfather        Review of Systems   Constitutional: Negative for decreased appetite, fever, malaise/fatigue and weight loss.   HENT: Negative for nosebleeds.    Eyes: Negative for double vision.   Cardiovascular: Negative for chest pain, claudication, cyanosis, dyspnea on exertion, irregular heartbeat, leg swelling, near-syncope, orthopnea, palpitations, paroxysmal nocturnal dyspnea and syncope.   Respiratory: Negative for cough, hemoptysis and shortness of breath.    Hematologic/Lymphatic: Negative for bleeding problem.   Skin: Negative for rash.   Musculoskeletal: Negative for falls and myalgias.   Gastrointestinal: Negative for hematochezia, jaundice, melena, nausea and vomiting.   Genitourinary: Negative for hematuria.   Neurological: Negative for dizziness and seizures.   Psychiatric/Behavioral: Negative for altered mental status and memory loss.       Allergies   Allergen Reactions   • Oxycodone-Acetaminophen Itching   • Percocet [Oxycodone-Acetaminophen]    • Shellfish-Derived Products Hives and Itching   • Statins    • Adhesive Tape Rash   • Latex Rash         Current Outpatient Medications:   •  clopidogrel (PLAVIX) 75 MG tablet, TAKE (1) TABLET DAILY, Disp: 90 tablet, Rfl: 1  •  finasteride (PROSCAR) 5 MG tablet, Take 5 mg by mouth Daily., Disp: , Rfl:   •  levoFLOXacin (Levaquin) 500 MG tablet, Take 1 tablet by mouth Daily., Disp: 10 tablet, Rfl: 0  •  losartan (COZAAR) 100 MG tablet, Take 1 tablet by mouth Daily for 30 days., Disp: 90 tablet, Rfl: 2  •  metFORMIN (GLUCOPHAGE) 500 MG tablet, Take 2 tablets by mouth 2 (Two) Times a Day With Meals., Disp: , Rfl:   •  metoprolol succinate XL (TOPROL-XL) 50 MG 24 hr tablet, 50 mg Daily., Disp: , Rfl:   •  tamsulosin (FLOMAX) 0.4 MG capsule 24 hr capsule, Take 1 capsule by mouth every  "night., Disp: , Rfl:   •  TRESIBA FLEXTOUCH 200 UNIT/ML solution pen-injector, Inject 60 Units as directed Daily., Disp: , Rfl:       Objective:     Vitals:    12/21/21 1614   BP: 170/90   Pulse: 99   Weight: 102 kg (223 lb 12.8 oz)   Height: 182.9 cm (72\")     Body mass index is 30.35 kg/m².    Constitutional:       Appearance: Well-developed.   Eyes:      General: No scleral icterus.  HENT:      Head: Normocephalic.   Neck:      Thyroid: No thyromegaly.      Vascular: No JVD.      Lymphadenopathy: No cervical adenopathy.   Pulmonary:      Effort: Pulmonary effort is normal.      Breath sounds: Normal breath sounds. No wheezing. No rales.   Cardiovascular:      Normal rate. Regular rhythm.      No gallop.   Edema:     Peripheral edema absent.   Abdominal:      Palpations: Abdomen is soft.      Tenderness: There is no abdominal tenderness.   Musculoskeletal: Normal range of motion. Skin:     General: Skin is warm and dry.      Findings: No rash.   Neurological:      Mental Status: Alert and oriented to person, place, and time.           ECG 12 Lead    Date/Time: 12/21/2021 4:27 PM  Performed by: Rell Deutsch MD  Authorized by: Rell Deutsch MD   Comparison: compared with previous ECG   Similar to previous ECG  Rhythm: sinus rhythm  Ectopy: unifocal PVCs  Other findings: non-specific ST-T wave changes    Clinical impression: abnormal EKG             Assessment:       Diagnosis Plan   1. Unstable angina (HCC)  ECG 12 Lead    Case Request Cath Lab: Left Heart Cath   2. Coronary artery disease involving native coronary artery of native heart without angina pectoris  Case Request Cath Lab: Left Heart Cath   3. Essential (primary) hypertension  Case Request Cath Lab: Left Heart Cath   4. Accelerating angina (HCC)  Case Request Cath Lab: Left Heart Cath   5. Type 2 diabetes mellitus with other circulatory complication, unspecified whether long term insulin use (HCC)  Case Request Cath Lab: Left Heart Cath        "   Plan:       Well I mean his story is pretty classic for unstable angina I think we should just proceed right to the Cath Lab I am going to have him take an extra 50 mg of metoprolol tonight and if he has any issues at all he has to come to the hospital will get a get him set up to have a cardiac cath tomorrow to be with my partner Dr. Oliva and further decisions will be based on the findings at the time of that cath I have gone over the risk versus benefits of this with him and he agrees and understands I think if he is got a lesion that can be stented it would be okay to proceed forward with that    As always, it has been a pleasure to participate in your patient's care.      Sincerely,       Rell Deutsch MD

## 2021-12-22 ENCOUNTER — HOSPITAL ENCOUNTER (OUTPATIENT)
Facility: HOSPITAL | Age: 71
Setting detail: HOSPITAL OUTPATIENT SURGERY
Discharge: HOME | End: 2021-12-22
Attending: INTERNAL MEDICINE | Admitting: INTERNAL MEDICINE

## 2021-12-22 VITALS
WEIGHT: 215.5 LBS | SYSTOLIC BLOOD PRESSURE: 142 MMHG | RESPIRATION RATE: 18 BRPM | HEART RATE: 72 BPM | BODY MASS INDEX: 29.19 KG/M2 | OXYGEN SATURATION: 95 % | TEMPERATURE: 97.8 F | DIASTOLIC BLOOD PRESSURE: 65 MMHG | HEIGHT: 72 IN

## 2021-12-22 DIAGNOSIS — I25.10 CORONARY ARTERY DISEASE INVOLVING NATIVE CORONARY ARTERY OF NATIVE HEART WITHOUT ANGINA PECTORIS: ICD-10-CM

## 2021-12-22 DIAGNOSIS — I20.0 UNSTABLE ANGINA (HCC): Primary | ICD-10-CM

## 2021-12-22 DIAGNOSIS — I10 ESSENTIAL (PRIMARY) HYPERTENSION: ICD-10-CM

## 2021-12-22 DIAGNOSIS — E11.59 TYPE 2 DIABETES MELLITUS WITH OTHER CIRCULATORY COMPLICATION, UNSPECIFIED WHETHER LONG TERM INSULIN USE (HCC): ICD-10-CM

## 2021-12-22 DIAGNOSIS — I20.0 ACCELERATING ANGINA (HCC): ICD-10-CM

## 2021-12-22 LAB
GLUCOSE BLDC GLUCOMTR-MCNC: 178 MG/DL (ref 70–130)
GLUCOSE BLDC GLUCOMTR-MCNC: 178 MG/DL (ref 70–130)
QT INTERVAL: 399 MS

## 2021-12-22 PROCEDURE — 93458 L HRT ARTERY/VENTRICLE ANGIO: CPT | Performed by: INTERNAL MEDICINE

## 2021-12-22 PROCEDURE — C9600 PERC DRUG-EL COR STENT SING: HCPCS | Performed by: INTERNAL MEDICINE

## 2021-12-22 PROCEDURE — C1725 CATH, TRANSLUMIN NON-LASER: HCPCS | Performed by: INTERNAL MEDICINE

## 2021-12-22 PROCEDURE — C1887 CATHETER, GUIDING: HCPCS | Performed by: INTERNAL MEDICINE

## 2021-12-22 PROCEDURE — 25010000002 MIDAZOLAM PER 1 MG: Performed by: INTERNAL MEDICINE

## 2021-12-22 PROCEDURE — C1769 GUIDE WIRE: HCPCS | Performed by: INTERNAL MEDICINE

## 2021-12-22 PROCEDURE — 85347 COAGULATION TIME ACTIVATED: CPT

## 2021-12-22 PROCEDURE — 0 IOPAMIDOL PER 1 ML: Performed by: INTERNAL MEDICINE

## 2021-12-22 PROCEDURE — C1894 INTRO/SHEATH, NON-LASER: HCPCS | Performed by: INTERNAL MEDICINE

## 2021-12-22 PROCEDURE — 92928 PRQ TCAT PLMT NTRAC ST 1 LES: CPT | Performed by: INTERNAL MEDICINE

## 2021-12-22 PROCEDURE — C1874 STENT, COATED/COV W/DEL SYS: HCPCS | Performed by: INTERNAL MEDICINE

## 2021-12-22 PROCEDURE — 93010 ELECTROCARDIOGRAM REPORT: CPT | Performed by: INTERNAL MEDICINE

## 2021-12-22 PROCEDURE — 25010000002 BH (CUPID ONLY) ADENOSINE 6 MG/100ML MIXTURE: Performed by: INTERNAL MEDICINE

## 2021-12-22 PROCEDURE — 25010000002 FENTANYL CITRATE (PF) 50 MCG/ML SOLUTION: Performed by: INTERNAL MEDICINE

## 2021-12-22 PROCEDURE — 82962 GLUCOSE BLOOD TEST: CPT

## 2021-12-22 PROCEDURE — 93005 ELECTROCARDIOGRAM TRACING: CPT | Performed by: INTERNAL MEDICINE

## 2021-12-22 PROCEDURE — 99153 MOD SED SAME PHYS/QHP EA: CPT | Performed by: INTERNAL MEDICINE

## 2021-12-22 PROCEDURE — 99152 MOD SED SAME PHYS/QHP 5/>YRS: CPT | Performed by: INTERNAL MEDICINE

## 2021-12-22 PROCEDURE — 25010000002 HEPARIN (PORCINE) PER 1000 UNITS: Performed by: INTERNAL MEDICINE

## 2021-12-22 DEVICE — XIENCE SKYPOINT™ EVEROLIMUS ELUTING CORONARY STENT SYSTEM 4.00 MM X 33 MM / RAPID-EXCHANGE
Type: IMPLANTABLE DEVICE | Status: FUNCTIONAL
Brand: XIENCE SKYPOINT™

## 2021-12-22 RX ORDER — ASPIRIN 81 MG/1
TABLET, CHEWABLE ORAL AS NEEDED
Status: DISCONTINUED | OUTPATIENT
Start: 2021-12-22 | End: 2021-12-22 | Stop reason: HOSPADM

## 2021-12-22 RX ORDER — SODIUM CHLORIDE 9 MG/ML
75 INJECTION, SOLUTION INTRAVENOUS CONTINUOUS
Status: DISCONTINUED | OUTPATIENT
Start: 2021-12-22 | End: 2021-12-22 | Stop reason: HOSPADM

## 2021-12-22 RX ORDER — SODIUM CHLORIDE 0.9 % (FLUSH) 0.9 %
3 SYRINGE (ML) INJECTION EVERY 12 HOURS SCHEDULED
Status: DISCONTINUED | OUTPATIENT
Start: 2021-12-22 | End: 2021-12-22 | Stop reason: HOSPADM

## 2021-12-22 RX ORDER — HEPARIN SODIUM 1000 [USP'U]/ML
INJECTION, SOLUTION INTRAVENOUS; SUBCUTANEOUS AS NEEDED
Status: DISCONTINUED | OUTPATIENT
Start: 2021-12-22 | End: 2021-12-22 | Stop reason: HOSPADM

## 2021-12-22 RX ORDER — FLUNISOLIDE 0.25 MG/ML
2 SOLUTION NASAL 2 TIMES DAILY
COMMUNITY

## 2021-12-22 RX ORDER — MELATONIN
1000 DAILY
COMMUNITY

## 2021-12-22 RX ORDER — CLOPIDOGREL BISULFATE 75 MG/1
TABLET ORAL AS NEEDED
Status: DISCONTINUED | OUTPATIENT
Start: 2021-12-22 | End: 2021-12-22 | Stop reason: HOSPADM

## 2021-12-22 RX ORDER — MIDAZOLAM HYDROCHLORIDE 1 MG/ML
INJECTION INTRAMUSCULAR; INTRAVENOUS AS NEEDED
Status: DISCONTINUED | OUTPATIENT
Start: 2021-12-22 | End: 2021-12-22 | Stop reason: HOSPADM

## 2021-12-22 RX ORDER — FENTANYL CITRATE 50 UG/ML
INJECTION, SOLUTION INTRAMUSCULAR; INTRAVENOUS AS NEEDED
Status: DISCONTINUED | OUTPATIENT
Start: 2021-12-22 | End: 2021-12-22 | Stop reason: HOSPADM

## 2021-12-22 RX ORDER — ASPIRIN 81 MG/1
81 TABLET ORAL DAILY
Status: DISCONTINUED | OUTPATIENT
Start: 2021-12-22 | End: 2021-12-22 | Stop reason: HOSPADM

## 2021-12-22 RX ORDER — FEXOFENADINE HCL 180 MG/1
180 TABLET ORAL DAILY
COMMUNITY

## 2021-12-22 RX ORDER — LIDOCAINE HYDROCHLORIDE 20 MG/ML
INJECTION, SOLUTION INFILTRATION; PERINEURAL AS NEEDED
Status: DISCONTINUED | OUTPATIENT
Start: 2021-12-22 | End: 2021-12-22 | Stop reason: HOSPADM

## 2021-12-22 RX ORDER — SODIUM CHLORIDE 0.9 % (FLUSH) 0.9 %
10 SYRINGE (ML) INJECTION AS NEEDED
Status: DISCONTINUED | OUTPATIENT
Start: 2021-12-22 | End: 2021-12-22 | Stop reason: HOSPADM

## 2021-12-22 RX ORDER — SODIUM CHLORIDE 0.9 % (FLUSH) 0.9 %
10 SYRINGE (ML) INJECTION EVERY 12 HOURS SCHEDULED
Status: DISCONTINUED | OUTPATIENT
Start: 2021-12-22 | End: 2021-12-22 | Stop reason: HOSPADM

## 2021-12-22 RX ADMIN — SODIUM CHLORIDE 75 ML/HR: 9 INJECTION, SOLUTION INTRAVENOUS at 08:51

## 2021-12-22 NOTE — CONSULTS
Met with patient and wife, discussed benefits of cardiac rehab. Provided phase II information along with the contact information for cardiac rehab here at [facility]. Patient stated that he has never attend cardiac rehab following stent placement. Dr Oliva present to talk with patient and encourage him to attend cardiac rehab, patient states he does not have the time to attend right now. Stated would contact us if he changes his mind. Reports having exercise equipment available  in his home.

## 2021-12-22 NOTE — DISCHARGE INSTRUCTIONS
[facility]  [address] [place] [address]    Coronary Angioplasty with stent (Radial Approach) After Care    Refer to this sheet in the next few weeks. These instructions provide you with information on caring for yourself after your procedure. Your health care provider may also give you more specific instructions. Your treatment has been planned according to current medical practices, but problems sometimes occur. Call your health care provider if you have any problems or questions after your procedure.       Home Care Instructions:  · You may shower the day after the procedure. Remove the bandage (dressing) and gently wash the site with plain soap and water. Gently pat the site dry. You may apply a band aid daily for 2 days if desired.    · Do not apply powder or lotion to the site.  · Do not submerge the affected site in water for 3 to 5 days or until the site is completely healed.   · Do not flex or bend at the wrist with affected arm for 24 hours.  · Do not lift, push or pull anything over 5 pounds for 5 days after your procedure or as directed by your physician.  For a reference, a gallon of milk weighs 8 pounds.    · Do not operate machinery or power tools for 24 hours.  · Inspect the site at least twice daily. You may notice some bruising at the site and it may be tender for 1 to 2 weeks.     · Increase your fluid intake for the next 2 days.    · Keep arm elevated for 24 hours.  For the remainder of the day, keep your arm in the “Pledge of Allegiance” position when up and about.    · Limit your activity for the next 48 hours and avoid strenuous activity as directed by your physician.   · Cardiac Rehab may or may not be ordered.  Please consult with your physician  · You may drive 24 hours after the procedure unless otherwise instructed by your caregiver.  · A responsible adult should be with you for the first 24 hours after you arrive home.   · Do not make any important legal decisions  or sign legal papers for 24 hours. Do not drink alcohol for 24 hours.    · Take medicines only as directed by your health care provider.  Blood thinners may be prescribed after your procedure to improve blood flow through the stent.    · Metformin or any medications containing Metformin should not be taken for 48 hours after your procedure.    · Eat a heart-healthy diet. This should include plenty of fresh fruits and vegetables. Meat should be lean cuts. Avoid the following types of food:    ¨ Food that is high in salt.    ¨ Canned or highly processed food.    ¨ Food that is high in saturated fat or sugar.    ¨ Fried food.    · Make any other lifestyle changes recommended by your health care provider. This may include:    ¨ Not using any tobacco products including cigarettes, chewing tobacco, or electronic cigarettes.   ¨ Managing your weight.    ¨ Getting regular exercise.    ¨ Managing your blood pressure.    ¨ Limiting your alcohol intake.    ¨ Managing other health problems, such as diabetes.    · If you need an MRI after your heart stent was placed, be sure to tell the health care provider who orders the MRI that you have a heart stent.    · Keep all follow-up visits as directed by your health care provider.    ·   Call Your Doctor If:    · You have unusual pain at the radial/ulnar (wrist) site.  · You have redness, warmth, swelling, or pain at the radial/ulnar (wrist) site.  · You have drainage (other than a small amount of blood on the dressing).  · `You have chills or a fever > 101.  · Your arm becomes pale or dark, cool, tingly, or numb.  · You develop chest pain, shortness of breath, feel faint or pass out.    · You have heavy bleeding from the site.  If you do, hold pressure on the site for 20 minutes.  If the bleeding stops, apply a fresh bandage and call your cardiologist.  However, if you continue to have bleeding, maintain pressure and call 911.    · You have any symptoms of a stroke.  Remember BE  FAST  · B-balance. Sudden trouble walking or loss of balance.  · E-eyes.  Sudden changes in how you see or a sudden onset of a very bad headache.   · F-face. Sudden weakness or loss of feeling of the face or facial droop on one side.   · A-arms Sudden weakness or numbness in one arm. One arm drifts down if they are both held out in front of you. This happens suddenly and usually on one side of the body.   · S-speech.  Sudden trouble speaking, slurred speech or trouble understanding what people are saying.   · T-time  Time to call emergency services.  Write down the symptoms and the time they started.

## 2021-12-23 LAB
ACT BLD: 249 SECONDS (ref 82–152)
ACT BLD: 291 SECONDS (ref 82–152)

## 2022-01-25 ENCOUNTER — OFFICE VISIT (OUTPATIENT)
Dept: CARDIOLOGY | Facility: CLINIC | Age: 72
End: 2022-01-25

## 2022-01-25 VITALS
DIASTOLIC BLOOD PRESSURE: 100 MMHG | HEIGHT: 72 IN | BODY MASS INDEX: 30.72 KG/M2 | SYSTOLIC BLOOD PRESSURE: 170 MMHG | HEART RATE: 118 BPM | WEIGHT: 226.8 LBS

## 2022-01-25 DIAGNOSIS — I25.10 CORONARY ARTERY DISEASE INVOLVING NATIVE CORONARY ARTERY OF NATIVE HEART WITHOUT ANGINA PECTORIS: Primary | ICD-10-CM

## 2022-01-25 DIAGNOSIS — I10 ESSENTIAL (PRIMARY) HYPERTENSION: ICD-10-CM

## 2022-01-25 DIAGNOSIS — E78.49 OTHER HYPERLIPIDEMIA: ICD-10-CM

## 2022-01-25 DIAGNOSIS — I20.9 ANGINA PECTORIS: ICD-10-CM

## 2022-01-25 PROCEDURE — 93000 ELECTROCARDIOGRAM COMPLETE: CPT | Performed by: INTERNAL MEDICINE

## 2022-01-25 PROCEDURE — 99214 OFFICE O/P EST MOD 30 MIN: CPT | Performed by: INTERNAL MEDICINE

## 2022-01-25 RX ORDER — EVOLOCUMAB 140 MG/ML
140 INJECTION, SOLUTION SUBCUTANEOUS
COMMUNITY
Start: 2022-01-04 | End: 2022-08-09

## 2022-01-25 RX ORDER — ISOSORBIDE MONONITRATE 30 MG/1
30 TABLET, EXTENDED RELEASE ORAL EVERY MORNING
Qty: 90 TABLET | Refills: 3 | Status: SHIPPED | OUTPATIENT
Start: 2022-01-25 | End: 2022-09-21 | Stop reason: SDUPTHER

## 2022-01-25 RX ORDER — NITROGLYCERIN 0.4 MG/1
TABLET SUBLINGUAL
Qty: 45 TABLET | Refills: 11 | Status: SHIPPED | OUTPATIENT
Start: 2022-01-25

## 2022-01-25 NOTE — PROGRESS NOTES
Date of Office Visit: 22  Encounter Provider: Rell Deutsch MD  Place of Service: [facility]ARD[facility]  Patient Name: Alex Geiger  :1950  [id number]    Chief Complaint   Patient presents with   • Coronary Artery Disease   :     HPI: Alex Geiger is a 71 y.o. male  He had stents put in his mid-left anterior descending by me in .  At that time, he had 40% disease in his right coronary artery and 30% in his circumflex.  He has had prior drug-eluting stents to his right coronary artery and circumflex in .  He had normal left ventricular function.    In  he had recurrent angina and had developed a new lesion in his distal RCA and had drug-eluting stenting to the LAD his other coronaries look good at that time and his LV function was normal.  He had pretty severe COVID-19 infection and 2021.  In 2021 he had recurrent angina we recath him need restenosis LAD stent and had a 4.0x23 Xience placed in that the other arteries looked good.  Well since then he still has symptoms though not quite as bad as what he had before but he has shortness of breath he gets chest discomfort with exertion up into his jaw.  He has had a cold here recently and has been taking a lot of cold medicine his heart rates a little quick and is a little bit hypertensive today    Past Medical History:   Diagnosis Date   • CAD (coronary artery disease)    • COVID-19    • Diabetes mellitus (HCC)    • Hyperlipidemia    • Hypertension    • Renal injury    • Unstable angina (HCC)    • Ventricular ectopy    • VPC (ventricular premature complex)        Past Surgical History:   Procedure Laterality Date   • CARDIAC CATHETERIZATION     • CARDIAC CATHETERIZATION N/A 2017    Procedure: Coronary angiography;  Surgeon: Rell Deutsch MD;  Location: [facility] CATH INVASIVE LOCATION;  Service:    • CARDIAC CATHETERIZATION N/A 2017    Procedure: Stent QUIANA coronary;  Surgeon:  Rell Deutsch MD;  [facility];  Service:    • CARDIAC CATHETERIZATION N/A 8/16/2017    Procedure: Left Heart Cath;  Surgeon: Rell Deutsch MD;  Location:  [facility] CATH INVASIVE LOCATION;  Service:    • CARDIAC CATHETERIZATION N/A 8/16/2017    Procedure: Left ventriculography;  Surgeon: Rell Deutsch MD;  [facility];  Service:    • CARDIAC CATHETERIZATION N/A 7/25/2019    Procedure: Left Heart Cath;  Surgeon: Rell Deutsch MD;  [facility];  Service: Cardiology   • CARDIAC CATHETERIZATION N/A 7/25/2019    Procedure: Stent QUIANA coronary;  Surgeon: Rell Deutsch MD;  [facility];  Service: Cardiology   • CARDIAC CATHETERIZATION N/A 7/25/2019    Procedure: Left ventriculography;  Surgeon: Rell Deutsch MD;  [facility];  Service: Cardiology   • CARDIAC CATHETERIZATION N/A 7/25/2019    Procedure: Coronary angiography;  Surgeon: Rell Deutsch MD;  [facility];  Service: Cardiology   • CARDIAC CATHETERIZATION Right 12/22/2021    Procedure: Left Heart Cath;  Surgeon: Helen Oliva MD;  [facility];  Service: Cardiology;  Laterality: Right;   • CARDIAC CATHETERIZATION N/A 12/22/2021    Procedure: Coronary angiography;  Surgeon: Helen Oliva MD;  [facility];  Service: Cardiology;  Laterality: N/A;   • CARDIAC CATHETERIZATION N/A 12/22/2021    Procedure: Left ventriculography;  Surgeon: Helen Oliva MD;  [facility];  Service: Cardiology;  Laterality: N/A;   • CARDIAC CATHETERIZATION N/A 12/22/2021    Procedure: Percutaneous Coronary Intervention;  Surgeon: Helen Oliva MD;  [facility];  Service: Cardiology;  Laterality: N/A;   • CARDIAC CATHETERIZATION N/A 12/22/2021    Procedure: Stent QUIANA coronary;  Surgeon: Helen Oliva  MD;  Location: [facility] CATH INVASIVE LOCATION;  Service: Cardiology;  Laterality: N/A;   • CORONARY ANGIOPLASTY WITH STENT PLACEMENT     • KIDNEY STONE SURGERY     • PARATHYROIDECTOMY         Social History     Socioeconomic History   • Marital status:    Tobacco Use   • Smoking status: Former Smoker     Packs/day: 1.00     Years: 25.00     Pack years: 25.00     Types: Cigarettes   • Smokeless tobacco: Never Used   • Tobacco comment: NO CAFFEINE USE   Vaping Use   • Vaping Use: Never used   Substance and Sexual Activity   • Alcohol use: Not Currently   • Drug use: Never   • Sexual activity: Defer       Family History   Problem Relation Age of Onset   • Diabetes Mother    • Alzheimer's disease Father    • Kidney disease Father    • Diabetes Father    • No Known Problems Maternal Grandmother    • No Known Problems Maternal Grandfather    • No Known Problems Paternal Grandmother    • Heart attack Paternal Grandfather    • Heart disease Paternal Grandfather    • Diabetes Paternal Grandfather        Review of Systems   Constitutional: Negative for decreased appetite, fever, malaise/fatigue and weight loss.   HENT: Negative for nosebleeds.    Eyes: Negative for double vision.   Cardiovascular: Negative for chest pain, claudication, cyanosis, dyspnea on exertion, irregular heartbeat, leg swelling, near-syncope, orthopnea, palpitations, paroxysmal nocturnal dyspnea and syncope.   Respiratory: Negative for cough, hemoptysis and shortness of breath.    Hematologic/Lymphatic: Negative for bleeding problem.   Skin: Negative for rash.   Musculoskeletal: Negative for falls and myalgias.   Gastrointestinal: Negative for hematochezia, jaundice, melena, nausea and vomiting.   Genitourinary: Negative for hematuria.   Neurological: Negative for dizziness and seizures.   Psychiatric/Behavioral: Negative for altered mental status and memory loss.       Allergies   Allergen Reactions   • Oxycodone-Acetaminophen Itching   • Percocet  "[Oxycodone-Acetaminophen]    • Shellfish-Derived Products Hives and Itching   • Statins    • Adhesive Tape Rash   • Latex Rash         Current Outpatient Medications:   •  cholecalciferol (VITAMIN D3) 25 MCG (1000 UT) tablet, Take 1,000 Units by mouth Daily., Disp: , Rfl:   •  clopidogrel (PLAVIX) 75 MG tablet, TAKE (1) TABLET DAILY, Disp: 90 tablet, Rfl: 1  •  fexofenadine (ALLEGRA) 180 MG tablet, Take 180 mg by mouth Daily., Disp: , Rfl:   •  finasteride (PROSCAR) 5 MG tablet, Take 5 mg by mouth Daily., Disp: , Rfl:   •  flunisolide (NASALIDE) 25 MCG/ACT (0.025%) solution nasal spray, Inhale 2 sprays 2 (Two) Times a Day., Disp: , Rfl:   •  losartan (COZAAR) 100 MG tablet, Take 1 tablet by mouth Daily for 30 days., Disp: 90 tablet, Rfl: 2  •  metFORMIN (GLUCOPHAGE) 500 MG tablet, Take 2 tablets by mouth 2 (Two) Times a Day With Meals., Disp: , Rfl:   •  metoprolol succinate XL (TOPROL-XL) 50 MG 24 hr tablet, 50 mg Daily., Disp: , Rfl:   •  NON FORMULARY, Allergy shot weekly, Disp: , Rfl:   •  Repatha SureClick solution auto-injector SureClick injection, , Disp: , Rfl:   •  tamsulosin (FLOMAX) 0.4 MG capsule 24 hr capsule, Take 1 capsule by mouth every night., Disp: , Rfl:   •  TRESIBA FLEXTOUCH 200 UNIT/ML solution pen-injector, Inject 65 Units as directed Daily., Disp: , Rfl:   •  isosorbide mononitrate (IMDUR) 30 MG 24 hr tablet, Take 1 tablet by mouth Every Morning., Disp: 90 tablet, Rfl: 3  •  nitroglycerin (NITROSTAT) 0.4 MG SL tablet, 1 under the tongue as needed for angina, may repeat q5mins for up three doses, Disp: 45 tablet, Rfl: 11      Objective:     Vitals:    01/25/22 1452   BP: 170/100   Pulse: 118   Weight: 103 kg (226 lb 12.8 oz)   Height: 182.9 cm (72\")     Body mass index is 30.76 kg/m².    Constitutional:       Appearance: Well-developed.   Eyes:      General: No scleral icterus.  HENT:      Head: Normocephalic.   Neck:      Thyroid: No thyromegaly.      Vascular: No JVD.      Lymphadenopathy: " No cervical adenopathy.   Pulmonary:      Effort: Pulmonary effort is normal.      Breath sounds: Normal breath sounds. No wheezing. No rales.   Cardiovascular:      Normal rate. Regular rhythm.      No gallop.   Edema:     Peripheral edema absent.   Abdominal:      Palpations: Abdomen is soft.      Tenderness: There is no abdominal tenderness.   Musculoskeletal: Normal range of motion. Skin:     General: Skin is warm and dry.      Findings: No rash.   Neurological:      Mental Status: Alert and oriented to person, place, and time.           ECG 12 Lead    Date/Time: 1/25/2022 3:31 PM  Performed by: Rell Deutsch MD  Authorized by: Rell Deutsch MD   Comparison: compared with previous ECG   Rhythm: sinus tachycardia  Other findings: non-specific ST-T wave changes    Clinical impression: abnormal EKG  Comments: Sinus tachycardia is new             Assessment:       Diagnosis Plan   1. Coronary artery disease involving native coronary artery of native heart without angina pectoris     2. Essential (primary) hypertension     3. Other hyperlipidemia     4. Angina pectoris (HCC)            Plan:       Well we need to be careful with the cold medicine that is been taking I think that is contributed some of his current hyperadrenergic state I told him when he goes home today to take an extra 50 of metoprolol.  I am going to start him on some isosorbide and see if that helps with his symptoms we will treat him for small vessel disease and I would like him to come back and see Sigrid in 6 weeks if he is not any better we will increase his nitrates and or add Ranexa and keep uptitrating and we need to know that his blood pressures are okay    As always, it has been a pleasure to participate in your patient's care.      Sincerely,       Rell Deutsch MD

## 2022-02-08 ENCOUNTER — TELEPHONE (OUTPATIENT)
Dept: CARDIOLOGY | Facility: CLINIC | Age: 72
End: 2022-02-08

## 2022-02-08 NOTE — TELEPHONE ENCOUNTER
Dr Horowitz would like you to call and discuss this pt with him  He can be reached at [phone]  Thanks  Shirley Pinto RN  Triage nurse

## 2022-03-09 ENCOUNTER — TELEPHONE (OUTPATIENT)
Dept: CARDIOLOGY | Facility: CLINIC | Age: 72
End: 2022-03-09

## 2022-03-09 NOTE — TELEPHONE ENCOUNTER
Danuta with Dr. Horowitz's office called. They are wanting to know if the pt is cleared to have a Ureteroscopy w/ Laser Bladder Stone Removal.     You saw him last on 1/25/22. His last heart cath with stent was on 12/22/21.    Please advise.    Thanks,    Ashley SUAZO#[phone]  F#[phone]

## 2022-05-06 ENCOUNTER — OFFICE VISIT (OUTPATIENT)
Dept: CARDIOLOGY | Facility: CLINIC | Age: 72
End: 2022-05-06

## 2022-05-06 VITALS
SYSTOLIC BLOOD PRESSURE: 180 MMHG | DIASTOLIC BLOOD PRESSURE: 92 MMHG | HEIGHT: 72 IN | HEART RATE: 103 BPM | BODY MASS INDEX: 31.45 KG/M2 | OXYGEN SATURATION: 95 % | WEIGHT: 232.2 LBS

## 2022-05-06 DIAGNOSIS — I25.10 CORONARY ARTERY DISEASE INVOLVING NATIVE CORONARY ARTERY OF NATIVE HEART WITHOUT ANGINA PECTORIS: Primary | ICD-10-CM

## 2022-05-06 DIAGNOSIS — E78.49 OTHER HYPERLIPIDEMIA: ICD-10-CM

## 2022-05-06 DIAGNOSIS — Z95.5 STATUS POST INSERTION OF DRUG-ELUTING STENT INTO RIGHT CORONARY ARTERY FOR CORONARY ARTERY DISEASE: ICD-10-CM

## 2022-05-06 DIAGNOSIS — I20.9 ISCHEMIC CHEST PAIN: ICD-10-CM

## 2022-05-06 DIAGNOSIS — I10 ESSENTIAL (PRIMARY) HYPERTENSION: ICD-10-CM

## 2022-05-06 DIAGNOSIS — Z95.5 HISTORY OF CORONARY ARTERY STENT PLACEMENT: ICD-10-CM

## 2022-05-06 PROCEDURE — 99214 OFFICE O/P EST MOD 30 MIN: CPT | Performed by: NURSE PRACTITIONER

## 2022-05-06 PROCEDURE — 93000 ELECTROCARDIOGRAM COMPLETE: CPT | Performed by: NURSE PRACTITIONER

## 2022-05-06 RX ORDER — METOPROLOL SUCCINATE 50 MG/1
50 TABLET, EXTENDED RELEASE ORAL 2 TIMES DAILY
Qty: 60 TABLET | Refills: 11 | Status: SHIPPED | OUTPATIENT
Start: 2022-05-06 | End: 2023-03-02 | Stop reason: DRUGHIGH

## 2022-05-06 NOTE — PROGRESS NOTES
Date of Office Visit: 2022  Encounter Provider: MATIAS Braden  Place of Service: [facility] CARDIOLOGY  Patient Name: Alex Geiger  :1950    Chief Complaint   Patient presents with   • Coronary Artery Disease   • Hypertension   :     HPI: Alex Geiger is a 71 y.o. male who is a patient of  Dr Deutsch and is new to me today. He has a history of CAD and in  had stents placed to the LAD. He had moderate  Disease in his RCA and Cx at that time. He previously had stents placed to the RCA and Cx in . He had repeat PCI to the LAD in  and all other coronaries were stable. He developed Covid in 2021 and had a pretty severe course. In December he had angina and got cathed. He had a QUIANA to the LAD again. He was last in the office and January and was having some angina symptoms but not as severe. We increased his BB and added some imdur. He comes in for follow up today.    He has been doing well. He denies chest pain or shortness of breath. He still works full time. He has only had to use nitro once since his last appointment. He is now on repatha for cholesterol and it is managed by his PCP who he goes to next month. He will send us the results.   Previous testing and notes have been reviewed by me.   Past Medical History:   Diagnosis Date   • CAD (coronary artery disease)    • COVID-19    • Diabetes mellitus (HCC)    • Hyperlipidemia    • Hypertension    • Kidney stones    • Renal injury    • Unstable angina (HCC)    • Ventricular ectopy    • VPC (ventricular premature complex)        Past Surgical History:   Procedure Laterality Date   • CARDIAC CATHETERIZATION     • CARDIAC CATHETERIZATION N/A 2017    Procedure: Coronary angiography;  Surgeon: Rell Deutsch MD;  Location:  [facility] CATH INVASIVE LOCATION;  Service:    • CARDIAC CATHETERIZATION N/A 2017    Procedure: Stent QUIANA coronary;  Surgeon: Rell Deutsch MD;  [facility];  Service:    • CARDIAC CATHETERIZATION N/A 8/16/2017    Procedure: Left Heart Cath;  Surgeon: Rell Deutsch MD;  Location:  [facility] C[facility] INVAS[facility];  Service:    • CARDIAC CATHETERIZATION N/A 8/16/2017    Procedure: Left ventriculography;  Surgeon: Rell Deutsch MD;  Location:  [facility] INV[facility];  Service:    • CARDIAC CATHETERIZATION N/A 7/25/2019    Procedure: Left Heart Cath;  Surgeon: Rell Deutsch MD;  [facility];  Service: Cardiology   • CARDIAC CATHETERIZATION N/A 7/25/2019    Procedure: Stent QUIANA coronary;  Surgeon: Rell Deutsch MD;  Location:  [facility] C[facility] INV[facility];  Service: Cardiology   • CARDIAC CATHETERIZATION N/A 7/25/2019    Procedure: Left ventriculography;  Surgeon: Rell Deutsch MD;  [facility];  Service: Cardiology   • CARDIAC CATHETERIZATION N/A 7/25/2019    Procedure: Coronary angiography;  Surgeon: Rell Deutsch MD;  [facility];  Service: Cardiology   • CARDIAC CATHETERIZATION Right 12/22/2021    Procedure: Left Heart Cath;  Surgeon: Helen Oliva MD;  [facility];  Service: Cardiology;  Laterality: Right;   • CARDIAC CATHETERIZATION N/A 12/22/2021    Procedure: Coronary angiography;  Surgeon: Helen Oliva MD;  [facility];  Service: Cardiology;  Laterality: N/A;   • CARDIAC CATHETERIZATION N/A 12/22/2021    Procedure: Left ventriculography;  Surgeon: Helen Oliva MD;  [facility];  Service: Cardiology;  Laterality: N/A;   • CARDIAC CATHETERIZATION N/A 12/22/2021    Procedure: Percutaneous Coronary Intervention;  Surgeon: Helen Oliva MD;  Location:  [facility] C[facility] INV[facility];  Service: Cardiology;  Laterality: N/A;   • CARDIAC CATHETERIZATION N/A 12/22/2021    Procedure: Stent QUIANA coronary;  Surgeon: Helen Oliva MD;  [facility];  Service: Cardiology;  Laterality: N/A;   • CORONARY ANGIOPLASTY WITH STENT PLACEMENT     • KIDNEY STONE SURGERY     • PARATHYROIDECTOMY         Social History     Socioeconomic History   • Marital status:    Tobacco Use   • Smoking status: Former Smoker     Packs/day: 1.00     Years: 25.00     Pack years: 25.00     Types: Cigarettes   • Smokeless tobacco: Never Used   • Tobacco comment: NO CAFFEINE USE   Vaping Use   • Vaping Use: Never used   Substance and Sexual Activity   • Alcohol use: Not Currently   • Drug use: Never   • Sexual activity: Defer       Family History   Problem Relation Age of Onset   • Diabetes Mother    • Alzheimer's disease Father    • Kidney disease Father    • Diabetes Father    • No Known Problems Maternal Grandmother    • No Known Problems Maternal Grandfather    • No Known Problems Paternal Grandmother    • Heart attack Paternal Grandfather    • Heart disease Paternal Grandfather    • Diabetes Paternal Grandfather        Review of Systems   Constitutional: Negative for diaphoresis and malaise/fatigue.   Cardiovascular: Negative for chest pain, claudication, dyspnea on exertion, irregular heartbeat, leg swelling, near-syncope, orthopnea, palpitations, paroxysmal nocturnal dyspnea and syncope.   Respiratory: Negative for cough, shortness of breath and sleep disturbances due to breathing.    Musculoskeletal: Negative for falls.   Neurological: Negative for dizziness and weakness.   Psychiatric/Behavioral: Negative for altered mental status and substance abuse.       Allergies   Allergen Reactions   • Oxycodone-Acetaminophen Itching   • Percocet [Oxycodone-Acetaminophen]    • Shellfish-Derived Products Hives and Itching   • Statins    • Adhesive Tape Rash   • Latex Rash         Current Outpatient Medications:   •  cholecalciferol (VITAMIN D3) 25 MCG (1000 UT) tablet, Take 1,000 Units by mouth Daily., Disp: , Rfl:   •  clopidogrel (PLAVIX) 75 MG tablet, TAKE (1) TABLET DAILY,  "Disp: 90 tablet, Rfl: 1  •  fexofenadine (ALLEGRA) 180 MG tablet, Take 180 mg by mouth Daily., Disp: , Rfl:   •  finasteride (PROSCAR) 5 MG tablet, Take 5 mg by mouth Daily., Disp: , Rfl:   •  flunisolide (NASALIDE) 25 MCG/ACT (0.025%) solution nasal spray, Inhale 2 sprays 2 (Two) Times a Day., Disp: , Rfl:   •  isosorbide mononitrate (IMDUR) 30 MG 24 hr tablet, Take 1 tablet by mouth Every Morning., Disp: 90 tablet, Rfl: 3  •  metFORMIN (GLUCOPHAGE) 500 MG tablet, Take 2 tablets by mouth 2 (Two) Times a Day With Meals., Disp: , Rfl:   •  metoprolol succinate XL (TOPROL-XL) 50 MG 24 hr tablet, 50 mg Daily., Disp: , Rfl:   •  nitroglycerin (NITROSTAT) 0.4 MG SL tablet, 1 under the tongue as needed for angina, may repeat q5mins for up three doses, Disp: 45 tablet, Rfl: 11  •  NON FORMULARY, Allergy shot weekly, Disp: , Rfl:   •  Repatha SureClick solution auto-injector SureClick injection, 140 mg Every 14 (Fourteen) Days., Disp: , Rfl:   •  tamsulosin (FLOMAX) 0.4 MG capsule 24 hr capsule, Take 1 capsule by mouth every night., Disp: , Rfl:   •  TRESIBA FLEXTOUCH 200 UNIT/ML solution pen-injector, Inject 65 Units as directed Daily., Disp: , Rfl:   •  losartan (COZAAR) 100 MG tablet, Take 1 tablet by mouth Daily for 30 days., Disp: 90 tablet, Rfl: 2      Objective:     Vitals:    05/06/22 1251 05/06/22 1259 05/06/22 1304   BP: 162/90 180/92    BP Location: Right arm Right arm    Patient Position: Standing Lying    Pulse:   103   SpO2:   95%   Weight: 105 kg (232 lb 3.2 oz)     Height: 182.9 cm (72\")       Body mass index is 31.49 kg/m².    PHYSICAL EXAM:    Constitutional:       General: Not in acute distress.     Appearance: Normal appearance. Well-developed.   Eyes:      Pupils: Pupils are equal, round, and reactive to light.   HENT:      Head: Normocephalic.   Neck:      Vascular: No carotid bruit or JVD.   Pulmonary:      Effort: Pulmonary effort is normal. No tachypnea.      Breath sounds: Normal breath sounds. No " wheezing. No rales.   Cardiovascular:      Normal rate. Regular rhythm.      No gallop.   Pulses:     Intact distal pulses.   Edema:     Peripheral edema absent.   Abdominal:      General: Bowel sounds are normal.      Palpations: Abdomen is soft.      Tenderness: There is no abdominal tenderness.   Musculoskeletal: Normal range of motion.      Cervical back: Normal range of motion and neck supple. No edema. Skin:     General: Skin is warm and dry.   Neurological:      Mental Status: Alert and oriented to person, place, and time.           ECG 12 Lead    Date/Time: 5/6/2022 1:33 PM  Performed by: Yanet Morales APRN  Authorized by: Yanet Morales APRN   Comparison: compared with previous ECG from 1/25/2022  Similar to previous ECG  Rhythm: sinus rhythm  Rate: normal  Q waves: V1, V2 and V3    QRS axis: normal  Other findings: non-specific ST-T wave changes    Clinical impression: non-specific ECG              Assessment:       Diagnosis Plan   1. Coronary artery disease involving native coronary artery of native heart without angina pectoris     2. Essential (primary) hypertension     3. History of coronary artery stent placement     4. Other hyperlipidemia     5. Ischemic chest pain (HCC)     6. Status post insertion of drug-eluting stent into right coronary artery for coronary artery disease       No orders of the defined types were placed in this encounter.         Plan:       Overall he is doing well. His angina is stable. His BP is high today thought. I am going to increase his metoprolol succ to 50mg BID instead of once a day. He is going to send me his cholesterol readings and I want him to monitor his BP at home and bring them to his next appointment which will be in 3 months.        Your medication list          Accurate as of May 6, 2022  1:08 PM. If you have any questions, ask your nurse or doctor.            CONTINUE taking these medications      Instructions Last Dose Given Next Dose Due    cholecalciferol 25 MCG (1000 UT) tablet  Commonly known as: VITAMIN D3      Take 1,000 Units by mouth Daily.       clopidogrel 75 MG tablet  Commonly known as: PLAVIX      TAKE (1) TABLET DAILY       fexofenadine 180 MG tablet  Commonly known as: ALLEGRA      Take 180 mg by mouth Daily.       finasteride 5 MG tablet  Commonly known as: PROSCAR      Take 5 mg by mouth Daily.       flunisolide 25 MCG/ACT (0.025%) solution nasal spray  Commonly known as: NASALIDE      Inhale 2 sprays 2 (Two) Times a Day.       isosorbide mononitrate 30 MG 24 hr tablet  Commonly known as: IMDUR      Take 1 tablet by mouth Every Morning.       losartan 100 MG tablet  Commonly known as: COZAAR      Take 1 tablet by mouth Daily for 30 days.       metFORMIN 500 MG tablet  Commonly known as: GLUCOPHAGE      Take 2 tablets by mouth 2 (Two) Times a Day With Meals.       metoprolol succinate XL 50 MG 24 hr tablet  Commonly known as: TOPROL-XL      50 mg Daily.       nitroglycerin 0.4 MG SL tablet  Commonly known as: NITROSTAT      1 under the tongue as needed for angina, may repeat q5mins for up three doses       NON FORMULARY      Allergy shot weekly       Repatha SureClick solution auto-injector SureClick injection  Generic drug: Evolocumab      140 mg Every 14 (Fourteen) Days.       tamsulosin 0.4 MG capsule 24 hr capsule  Commonly known as: FLOMAX      Take 1 capsule by mouth every night.       Tresiba FlexTouch 200 UNIT/ML solution pen-injector pen injection  Generic drug: Insulin Degludec      Inject 65 Units as directed Daily.                As always, it has been a pleasure to participate in your patient's care.      Sincerely,     Yanet SALCEDO

## 2022-08-09 ENCOUNTER — OFFICE VISIT (OUTPATIENT)
Dept: CARDIOLOGY | Facility: CLINIC | Age: 72
End: 2022-08-09

## 2022-08-09 VITALS
HEART RATE: 90 BPM | HEIGHT: 72 IN | DIASTOLIC BLOOD PRESSURE: 102 MMHG | WEIGHT: 227.2 LBS | SYSTOLIC BLOOD PRESSURE: 164 MMHG | BODY MASS INDEX: 30.77 KG/M2

## 2022-08-09 DIAGNOSIS — I25.10 CORONARY ARTERY DISEASE INVOLVING NATIVE CORONARY ARTERY OF NATIVE HEART WITHOUT ANGINA PECTORIS: ICD-10-CM

## 2022-08-09 DIAGNOSIS — J12.82 PNEUMONIA DUE TO COVID-19 VIRUS: Primary | ICD-10-CM

## 2022-08-09 DIAGNOSIS — I10 ESSENTIAL (PRIMARY) HYPERTENSION: ICD-10-CM

## 2022-08-09 DIAGNOSIS — E78.49 OTHER HYPERLIPIDEMIA: ICD-10-CM

## 2022-08-09 DIAGNOSIS — U07.1 PNEUMONIA DUE TO COVID-19 VIRUS: Primary | ICD-10-CM

## 2022-08-09 PROCEDURE — 93000 ELECTROCARDIOGRAM COMPLETE: CPT | Performed by: INTERNAL MEDICINE

## 2022-08-09 PROCEDURE — 99214 OFFICE O/P EST MOD 30 MIN: CPT | Performed by: INTERNAL MEDICINE

## 2022-08-09 RX ORDER — AMLODIPINE BESYLATE 5 MG/1
5 TABLET ORAL DAILY
Qty: 90 TABLET | Refills: 3 | Status: SHIPPED | OUTPATIENT
Start: 2022-08-09 | End: 2023-02-23 | Stop reason: HOSPADM

## 2022-08-09 RX ORDER — AMLODIPINE BESYLATE 5 MG/1
5 TABLET ORAL DAILY
Qty: 90 TABLET | Refills: 3 | Status: SHIPPED | OUTPATIENT
Start: 2022-08-09 | End: 2022-08-09

## 2022-08-09 NOTE — PROGRESS NOTES
Date of Office Visit: 22  Encounter Provider: Rell Deutsch MD  Place of Service: [facility] CARDIOLOGY  Patient Name: Alex Geiger  :1950  [id number]    Chief Complaint   Patient presents with   • Coronary Artery Disease   :     HPI: Alex Geiger is a 71 y.o. male  He had stents put in his mid-left anterior descending by me in .  At that time, he had 40% disease in his right coronary artery and 30% in his circumflex.  He has had prior drug-eluting stents to his right coronary artery and circumflex in .  He had normal left ventricular function.    In  he had recurrent angina and had developed a new lesion in his distal RCA and had drug-eluting stenting to the LAD his other coronaries look good at that time and his LV function was normal.  He had pretty severe COVID-19 infection and 2021.  In 2021 he had recurrent angina we recath him need restenosis LAD stent and had a 4.0x23 Xience placed in that the other arteries looked good.  He is here for follow-up.  He just never really is felt that great since his cath in 2021 he sometimes has a little bit of discomfort in his chest he sometimes has some shortness of breath he is complaining of a lot of leg pains right now.  He feels like a lot of his problems are related to the Repatha medicine    Past Medical History:   Diagnosis Date   • CAD (coronary artery disease)    • COVID-19    • Diabetes mellitus (HCC)    • Hyperlipidemia    • Hypertension    • Kidney stones    • Renal injury    • Unstable angina (HCC)    • Ventricular ectopy    • VPC (ventricular premature complex)        Past Surgical History:   Procedure Laterality Date   • CARDIAC CATHETERIZATION     • CARDIAC CATHETERIZATION N/A 2017    Procedure: Coronary angiography;  Surgeon: Rell Deutsch MD;  Location: [facility] INVASIVE LOCATION;  Service:    • CARDIAC CATHETERIZATION N/A 2017    Procedure: Stent QUIANA  coronary;  Surgeon: Rell Deutsch MD;  [facility];  Service:    • CARDIAC CATHETERIZATION N/A 8/16/2017    Procedure: Left Heart Cath;  Surgeon: Rell Deutsch MD;  Location:  [facility] CATH INV[facility] LOCATION;  Service:    • CARDIAC CATHETERIZATION N/A 8/16/2017    Procedure: Left ventriculography;  Surgeon: Rell Deutsch MD;  [facility];  Service:    • CARDIAC CATHETERIZATION N/A 7/25/2019    Procedure: Left Heart Cath;  Surgeon: Rell Deutsch MD;  Location:  [facility] CATH INVAS[facility] LOCATION;  Service: Cardiology   • CARDIAC CATHETERIZATION N/A 7/25/2019    Procedure: Stent QUIANA coronary;  Surgeon: Rell Deutsch MD;  [facility];  Service: Cardiology   • CARDIAC CATHETERIZATION N/A 7/25/2019    Procedure: Left ventriculography;  Surgeon: Rell Deutsch MD;  [facility];  Service: Cardiology   • CARDIAC CATHETERIZATION N/A 7/25/2019    Procedure: Coronary angiography;  Surgeon: Rell Deutsch MD;  [facility];  Service: Cardiology   • CARDIAC CATHETERIZATION Right 12/22/2021    Procedure: Left Heart Cath;  Surgeon: Helen Oliva MD;  [facility];  Service: Cardiology;  Laterality: Right;   • CARDIAC CATHETERIZATION N/A 12/22/2021    Procedure: Coronary angiography;  Surgeon: Helen Oliva MD;  [facility];  Service: Cardiology;  Laterality: N/A;   • CARDIAC CATHETERIZATION N/A 12/22/2021    Procedure: Left ventriculography;  Surgeon: Helen Oliva MD;  [facility] C[facility];  Service: Cardiology;  Laterality: N/A;   • CARDIAC CATHETERIZATION N/A 12/22/2021    Procedure: Percutaneous Coronary Intervention;  Surgeon: Helen Oliva MD;  [facility];  Service: Cardiology;  Laterality: N/A;   • CARDIAC CATHETERIZATION N/A 12/22/2021    Procedure: Stent QUIANA coronary;   Surgeon: Helen Oliva MD;  Location: [facility] INVASIVE LOCATION;  Service: Cardiology;  Laterality: N/A;   • CORONARY ANGIOPLASTY WITH STENT PLACEMENT     • KIDNEY STONE SURGERY     • PARATHYROIDECTOMY         Social History     Socioeconomic History   • Marital status:    Tobacco Use   • Smoking status: Former Smoker     Packs/day: 1.00     Years: 25.00     Pack years: 25.00     Types: Cigarettes   • Smokeless tobacco: Never Used   • Tobacco comment: NO CAFFEINE USE   Vaping Use   • Vaping Use: Never used   Substance and Sexual Activity   • Alcohol use: Not Currently   • Drug use: Never   • Sexual activity: Defer       Family History   Problem Relation Age of Onset   • Diabetes Mother    • Alzheimer's disease Father    • Kidney disease Father    • Diabetes Father    • No Known Problems Maternal Grandmother    • No Known Problems Maternal Grandfather    • No Known Problems Paternal Grandmother    • Heart attack Paternal Grandfather    • Heart disease Paternal Grandfather    • Diabetes Paternal Grandfather        Review of Systems   Constitutional: Negative for decreased appetite, fever, malaise/fatigue and weight loss.   HENT: Negative for nosebleeds.    Eyes: Negative for double vision.   Cardiovascular: Negative for chest pain, claudication, cyanosis, dyspnea on exertion, irregular heartbeat, leg swelling, near-syncope, orthopnea, palpitations, paroxysmal nocturnal dyspnea and syncope.   Respiratory: Negative for cough, hemoptysis and shortness of breath.    Hematologic/Lymphatic: Negative for bleeding problem.   Skin: Negative for rash.   Musculoskeletal: Negative for falls and myalgias.   Gastrointestinal: Negative for hematochezia, jaundice, melena, nausea and vomiting.   Genitourinary: Negative for hematuria.   Neurological: Negative for dizziness and seizures.   Psychiatric/Behavioral: Negative for altered mental status and memory loss.       Allergies   Allergen Reactions   •  "Oxycodone-Acetaminophen Itching   • Percocet [Oxycodone-Acetaminophen]    • Shellfish-Derived Products Hives and Itching   • Statins    • Adhesive Tape Rash   • Latex Rash         Current Outpatient Medications:   •  cholecalciferol (VITAMIN D3) 25 MCG (1000 UT) tablet, Take 1,000 Units by mouth Daily., Disp: , Rfl:   •  clopidogrel (PLAVIX) 75 MG tablet, TAKE (1) TABLET DAILY, Disp: 90 tablet, Rfl: 1  •  fexofenadine (ALLEGRA) 180 MG tablet, Take 180 mg by mouth Daily., Disp: , Rfl:   •  finasteride (PROSCAR) 5 MG tablet, Take 5 mg by mouth Daily., Disp: , Rfl:   •  flunisolide (NASALIDE) 25 MCG/ACT (0.025%) solution nasal spray, Inhale 2 sprays 2 (Two) Times a Day., Disp: , Rfl:   •  isosorbide mononitrate (IMDUR) 30 MG 24 hr tablet, Take 1 tablet by mouth Every Morning., Disp: 90 tablet, Rfl: 3  •  metFORMIN (GLUCOPHAGE) 500 MG tablet, Take 2 tablets by mouth 2 (Two) Times a Day With Meals., Disp: , Rfl:   •  metoprolol succinate XL (TOPROL-XL) 50 MG 24 hr tablet, Take 1 tablet by mouth 2 (Two) Times a Day., Disp: 60 tablet, Rfl: 11  •  nitroglycerin (NITROSTAT) 0.4 MG SL tablet, 1 under the tongue as needed for angina, may repeat q5mins for up three doses, Disp: 45 tablet, Rfl: 11  •  NON FORMULARY, Allergy shot weekly, Disp: , Rfl:   •  Repatha SureClick solution auto-injector SureClick injection, 140 mg Every 14 (Fourteen) Days., Disp: , Rfl:   •  tamsulosin (FLOMAX) 0.4 MG capsule 24 hr capsule, Take 1 capsule by mouth every night., Disp: , Rfl:   •  TRESIBA FLEXTOUCH 200 UNIT/ML solution pen-injector, Inject 65 Units as directed Daily., Disp: , Rfl:   •  losartan (COZAAR) 100 MG tablet, Take 1 tablet by mouth Daily for 30 days., Disp: 90 tablet, Rfl: 2      Objective:     Vitals:    08/09/22 1312   BP: (!) 164/102   Pulse: 90   Weight: 103 kg (227 lb 3.2 oz)   Height: 182.9 cm (72\")     Body mass index is 30.81 kg/m².    Constitutional:       Appearance: Well-developed.   Eyes:      General: No scleral " icterus.  HENT:      Head: Normocephalic.   Neck:      Thyroid: No thyromegaly.      Vascular: No JVD.      Lymphadenopathy: No cervical adenopathy.   Pulmonary:      Effort: Pulmonary effort is normal.      Breath sounds: Normal breath sounds. No wheezing. No rales.   Cardiovascular:      Normal rate. Regular rhythm.      No gallop.   Edema:     Peripheral edema absent.   Abdominal:      Palpations: Abdomen is soft.      Tenderness: There is no abdominal tenderness.   Musculoskeletal: Normal range of motion. Skin:     General: Skin is warm and dry.      Findings: No rash.   Neurological:      Mental Status: Alert and oriented to person, place, and time.           ECG 12 Lead    Date/Time: 8/9/2022 1:56 PM  Performed by: Rell Deutsch MD  Authorized by: Rell Deutsch MD   Comparison: compared with previous ECG   Similar to previous ECG  Rhythm: sinus rhythm  Other findings: non-specific ST-T wave changes    Clinical impression: abnormal EKG             Assessment:       Diagnosis Plan   1. Pneumonia due to COVID-19 virus     2. Coronary artery disease involving native coronary artery of native heart without angina pectoris     3. Essential (primary) hypertension     4. Other hyperlipidemia            Plan:       I Audery stop his Repatha and see how he does off of it I Audrey start him on some amlodipine to treat his hypertension a little bit better I would like him to come back and see Sigrid in 6 weeks and see if he is any better I do not think he is having claudication in his legs he is got great pulses in his entire lower extremity I really reviewed his cath film from 2021 and really felt like things look pretty good and were done.  I will have him come see Sigrid in 6 weeks    As always, it has been a pleasure to participate in your patient's care.      Sincerely,       Rell Deutsch MD

## 2022-09-21 ENCOUNTER — OFFICE VISIT (OUTPATIENT)
Dept: CARDIOLOGY | Facility: CLINIC | Age: 72
End: 2022-09-21

## 2022-09-21 VITALS
BODY MASS INDEX: 30.75 KG/M2 | WEIGHT: 227 LBS | SYSTOLIC BLOOD PRESSURE: 160 MMHG | DIASTOLIC BLOOD PRESSURE: 82 MMHG | HEART RATE: 87 BPM | HEIGHT: 72 IN

## 2022-09-21 DIAGNOSIS — I10 ESSENTIAL (PRIMARY) HYPERTENSION: ICD-10-CM

## 2022-09-21 DIAGNOSIS — I25.118 CORONARY ARTERY DISEASE OF NATIVE ARTERY OF NATIVE HEART WITH STABLE ANGINA PECTORIS: Primary | ICD-10-CM

## 2022-09-21 PROCEDURE — 93000 ELECTROCARDIOGRAM COMPLETE: CPT | Performed by: NURSE PRACTITIONER

## 2022-09-21 PROCEDURE — 99214 OFFICE O/P EST MOD 30 MIN: CPT | Performed by: NURSE PRACTITIONER

## 2022-09-21 RX ORDER — ISOSORBIDE MONONITRATE 60 MG/1
60 TABLET, EXTENDED RELEASE ORAL EVERY MORNING
Qty: 90 TABLET | Refills: 0 | Status: SHIPPED | OUTPATIENT
Start: 2022-09-21 | End: 2023-01-09

## 2022-09-21 NOTE — PROGRESS NOTES
Date of Office Visit: 2022  Encounter Provider: MATIAS Mitchell  Place of Service: [facility] CARDIOLOGY  Patient Name: Alex Geiger  :1950    Chief Complaint   Patient presents with   • Coronary Artery Disease   :     HPI: Alex Geiger is a 72 y.o. male.  He is a patient of Dr. Deutsch's whom we have followed for the management of hypertension, hyperlipidemia, PVCs, and coronary artery disease.   In 2019, he presented with complaints consistent with angina.  He was referred for cardiac catheterization which revealed a 70 to 80% lesion in the distal RCA for which he underwent drug-eluting stent placement.  At that time,he had mild luminal irregularities in the proximal circumflex, a patent stent in the mid circumflex, a patent stent in the proximal LAD, and a normal left main.     In 2021, he presented with unstable angina and was referred for a cardiac catheterization.  This demonstrated an ulcerated 95% lesion in the proximal LAD for which he underwent drug-eluting stent placement.   He was last seen in the office by Dr. Deutsch on .  Evidently he has not felt well ever since his cath in 2021.  He continues experiencing chest discomfort as well as shortness of breath.  Additionally he was reporting a lot of pain in his legs.  Evidently he was attributing a lot of his symptoms to Repatha.  Dr. Deutsch recommended discontinuing the Repatha.  He was also started on amlodipine for blood pressure control.  Regarding the pain in his legs, Dr. Deutsch did not feel he was experiencing claudication.  Dr. Deutsch also reviewed his cath film from  and felt like things looked pretty good.  He was advised to follow-up in 6 weeks.   In regards to his muscle aches, headaches, and stomach upset, he is feeling much better since stopping the Repatha.  However, he is still reporting shortness of breath, fatigue, and chest tightness that has  essentially been present since his last cardiac catheterization in 2021.  The symptoms are no worse but also no better.  Overall he just does not feel well anymore.  His activity is limited due to the symptoms.  He denies any palpitations, edema, dizziness, syncope, bleeding difficulties or melena.  His blood pressures at home are generally in the 120s to 140s systolic.  He attributes the elevated reading today to the stress of traffic on the way to his appointment.    Past Medical History:   Diagnosis Date   • CAD (coronary artery disease)    • COVID-19    • Diabetes mellitus (HCC)    • Hyperlipidemia    • Hypertension    • Kidney stones    • Renal injury    • Unstable angina (HCC)    • Ventricular ectopy    • VPC (ventricular premature complex)        Past Surgical History:   Procedure Laterality Date   • CARDIAC CATHETERIZATION     • CARDIAC CATHETERIZATION N/A 8/16/2017    Procedure: Coronary angiography;  Surgeon: Rell Deutsch MD;  Location: [facility] INVASIVE LOCATION;  Service:    • CARDIAC CATHETERIZATION N/A 8/16/2017    Procedure: Stent QUIANA coronary;  Surgeon: Rell Deutsch MD;  Location: [facility] INVASIVE LOCATION;  Service:    • CARDIAC CATHETERIZATION N/A 8/16/2017    Procedure: Left Heart Cath;  Surgeon: Rell Deutsch MD;  Location: [facility] INVASIVE LOCATION;  Service:    • CARDIAC CATHETERIZATION N/A 8/16/2017    Procedure: Left ventriculography;  Surgeon: Rell Deutsch MD;  Location: [facility] INVASIVE LOCATION;  Service:    • CARDIAC CATHETERIZATION N/A 7/25/2019    Procedure: Left Heart Cath;  Surgeon: Rell Deutsch MD;  Location: [facility] INVASIVE LOCATION;  Service: Cardiology   • CARDIAC CATHETERIZATION N/A 7/25/2019    Procedure: Stent QUIANA coronary;  Surgeon: Rell Deutsch MD;  Location: [facility] INVASIVE LOCATION;  Service: Cardiology   • CARDIAC CATHETERIZATION N/A 7/25/2019    Procedure: Left ventriculography;  Surgeon: Rell Deutsch MD;  Location: [facility]  CATH INVASIVE LOCATION;  Service: Cardiology   • CARDIAC CATHETERIZATION N/A 7/25/2019    Procedure: Coronary angiography;  Surgeon: Rell Deutsch MD;  Location:  [facility] CATH INVASIVE LOCATION;  Service: Cardiology   • CARDIAC CATHETERIZATION Right 12/22/2021    Procedure: Left Heart Cath;  Surgeon: Helen Oliva MD;  [facility];  Service: Cardiology;  Laterality: Right;   • CARDIAC CATHETERIZATION N/A 12/22/2021    Procedure: Coronary angiography;  Surgeon: Helen Oliva MD;  Location:  [facility] CATH INVASIVE LOCATION;  Service: Cardiology;  Laterality: N/A;   • CARDIAC CATHETERIZATION N/A 12/22/2021    Procedure: Left ventriculography;  Surgeon: Helen Oliva MD;  [facility];  Service: Cardiology;  Laterality: N/A;   • CARDIAC CATHETERIZATION N/A 12/22/2021    Procedure: Percutaneous Coronary Intervention;  Surgeon: Helen Oliva MD;  [facility];  Service: Cardiology;  Laterality: N/A;   • CARDIAC CATHETERIZATION N/A 12/22/2021    Procedure: Stent QUIANA coronary;  Surgeon: Helen Oliva MD;  [facility];  Service: Cardiology;  Laterality: N/A;   • CORONARY ANGIOPLASTY WITH STENT PLACEMENT     • KIDNEY STONE SURGERY     • PARATHYROIDECTOMY         Social History     Socioeconomic History   • Marital status:    Tobacco Use   • Smoking status: Former Smoker     Packs/day: 1.00     Years: 25.00     Pack years: 25.00     Types: Cigarettes   • Smokeless tobacco: Never Used   • Tobacco comment: NO CAFFEINE USE   Vaping Use   • Vaping Use: Never used   Substance and Sexual Activity   • Alcohol use: Not Currently   • Drug use: Never   • Sexual activity: Defer       Family History   Problem Relation Age of Onset   • Diabetes Mother    • Alzheimer's disease Father    • Kidney disease Father    • Diabetes Father    • No Known Problems Maternal Grandmother    • No Known Problems Maternal Grandfather    •  No Known Problems Paternal Grandmother    • Heart attack Paternal Grandfather    • Heart disease Paternal Grandfather    • Diabetes Paternal Grandfather        Review of Systems   Constitutional: Negative.   Cardiovascular: Positive for chest pain and dyspnea on exertion. Negative for leg swelling, orthopnea, paroxysmal nocturnal dyspnea and syncope.   Respiratory: Negative.    Hematologic/Lymphatic: Negative for bleeding problem.   Musculoskeletal: Negative for falls.   Gastrointestinal: Negative for melena.   Neurological: Negative for dizziness and light-headedness.       Allergies   Allergen Reactions   • Oxycodone-Acetaminophen Itching   • Percocet [Oxycodone-Acetaminophen]    • Shellfish-Derived Products Hives and Itching   • Statins    • Adhesive Tape Rash   • Latex Rash         Current Outpatient Medications:   •  amLODIPine (NORVASC) 5 MG tablet, Take 1 tablet by mouth Daily., Disp: 90 tablet, Rfl: 3  •  cholecalciferol (VITAMIN D3) 25 MCG (1000 UT) tablet, Take 1,000 Units by mouth Daily., Disp: , Rfl:   •  clopidogrel (PLAVIX) 75 MG tablet, TAKE (1) TABLET DAILY, Disp: 90 tablet, Rfl: 1  •  fexofenadine (ALLEGRA) 180 MG tablet, Take 180 mg by mouth Daily., Disp: , Rfl:   •  finasteride (PROSCAR) 5 MG tablet, Take 5 mg by mouth Daily., Disp: , Rfl:   •  flunisolide (NASALIDE) 25 MCG/ACT (0.025%) solution nasal spray, Inhale 2 sprays 2 (Two) Times a Day., Disp: , Rfl:   •  metFORMIN (GLUCOPHAGE) 500 MG tablet, Take 2 tablets by mouth 2 (Two) Times a Day With Meals., Disp: , Rfl:   •  metoprolol succinate XL (TOPROL-XL) 50 MG 24 hr tablet, Take 1 tablet by mouth 2 (Two) Times a Day., Disp: 60 tablet, Rfl: 11  •  nitroglycerin (NITROSTAT) 0.4 MG SL tablet, 1 under the tongue as needed for angina, may repeat q5mins for up three doses, Disp: 45 tablet, Rfl: 11  •  NON FORMULARY, Allergy shot weekly, Disp: , Rfl:   •  tamsulosin (FLOMAX) 0.4 MG capsule 24 hr capsule, Take 1 capsule by mouth every night., Disp: ,  "Rfl:   •  TRESIBA FLEXTOUCH 200 UNIT/ML solution pen-injector, Inject 65 Units as directed Daily., Disp: , Rfl:   •  isosorbide mononitrate (IMDUR) 60 MG 24 hr tablet, Take 1 tablet by mouth Every Morning., Disp: 90 tablet, Rfl: 0  •  losartan (COZAAR) 100 MG tablet, Take 1 tablet by mouth Daily for 30 days., Disp: 90 tablet, Rfl: 2      Objective:     Vitals:    09/21/22 0852   BP: 160/82   Pulse: 87   Weight: 103 kg (227 lb)   Height: 182.9 cm (72\")     Body mass index is 30.79 kg/m².    PHYSICAL EXAM:    Neck:      Vascular: No JVD.   Pulmonary:      Effort: Pulmonary effort is normal.      Breath sounds: Normal breath sounds.   Cardiovascular:      Normal rate. Regular rhythm.      Murmurs: There is no murmur.      No gallop. No click. No rub.   Pulses:     Intact distal pulses.           ECG 12 Lead    Date/Time: 9/21/2022 9:16 AM  Performed by: Sigrid Ng APRN  Authorized by: Sigrid Ng APRN   Comparison: compared with previous ECG from 8/9/2022  Similar to previous ECG  Rhythm: sinus rhythm  Q waves: V3 and III    T inversion: V6, V5, III, aVF and II                Assessment:       Diagnosis Plan   1. Coronary artery disease of native artery of native heart with stable angina pectoris (HCC)  ECG 12 Lead     Orders Placed This Encounter   Procedures   • ECG 12 Lead     This order was created via procedure documentation     Order Specific Question:   Release to patient     Answer:   Routine Release          Plan:       1.  Coronary artery disease.  History of multivessel stenting.  Most recently in December 2021 when he underwent PCI of the proximal LAD.  Since that time, he has not felt well.  He continues reporting dyspnea on exertion, fatigue, and chest tightness.  Dr. Deutsch has already reviewed his catheterization films.  I think we should focus on medical therapy, and I recommended increasing the isosorbide to 60 mg daily.  Unfortunately he is statin intolerant and did not tolerate " the Repatha.      2.  Hypertension.  His blood pressure is high today.  However, he reports adequate control at home.  Continue amlodipine, Toprol, and losartan.      Overall I think he stable.  He will follow-up with Dr. Deutsch as scheduled in February.      As always, it has been a pleasure to participate in your patient's care.      Sincerely,         MATIAS Hannah

## 2023-01-09 RX ORDER — ISOSORBIDE MONONITRATE 60 MG/1
60 TABLET, EXTENDED RELEASE ORAL EVERY MORNING
Qty: 90 TABLET | Refills: 0 | Status: ON HOLD | OUTPATIENT
Start: 2023-01-09 | End: 2023-02-23 | Stop reason: SDUPTHER

## 2023-02-22 ENCOUNTER — TRANSCRIBE ORDERS (OUTPATIENT)
Dept: CARDIOLOGY | Facility: CLINIC | Age: 73
End: 2023-02-22

## 2023-02-22 ENCOUNTER — OFFICE VISIT (OUTPATIENT)
Dept: CARDIOLOGY | Facility: CLINIC | Age: 73
End: 2023-02-22
Payer: MEDICARE

## 2023-02-22 ENCOUNTER — LAB (OUTPATIENT)
Dept: LAB | Facility: HOSPITAL | Age: 73
End: 2023-02-22
Payer: MEDICARE

## 2023-02-22 VITALS
DIASTOLIC BLOOD PRESSURE: 98 MMHG | WEIGHT: 228.4 LBS | HEART RATE: 85 BPM | BODY MASS INDEX: 30.94 KG/M2 | HEIGHT: 72 IN | SYSTOLIC BLOOD PRESSURE: 160 MMHG

## 2023-02-22 DIAGNOSIS — Z01.810 PRE-OPERATIVE CARDIOVASCULAR EXAMINATION: Primary | ICD-10-CM

## 2023-02-22 DIAGNOSIS — Z01.810 PRE-OPERATIVE CARDIOVASCULAR EXAMINATION: ICD-10-CM

## 2023-02-22 DIAGNOSIS — E11.59 TYPE 2 DIABETES MELLITUS WITH OTHER CIRCULATORY COMPLICATION, UNSPECIFIED WHETHER LONG TERM INSULIN USE: ICD-10-CM

## 2023-02-22 DIAGNOSIS — Z13.6 SCREENING FOR ISCHEMIC HEART DISEASE: ICD-10-CM

## 2023-02-22 DIAGNOSIS — I20.0 UNSTABLE ANGINA: ICD-10-CM

## 2023-02-22 DIAGNOSIS — I20.9 ANGINA PECTORIS: ICD-10-CM

## 2023-02-22 DIAGNOSIS — I25.10 CORONARY ARTERY DISEASE INVOLVING NATIVE CORONARY ARTERY OF NATIVE HEART WITHOUT ANGINA PECTORIS: Primary | ICD-10-CM

## 2023-02-22 LAB
ANION GAP SERPL CALCULATED.3IONS-SCNC: 9 MMOL/L (ref 5–15)
BASOPHILS # BLD AUTO: 0.12 10*3/MM3 (ref 0–0.2)
BASOPHILS NFR BLD AUTO: 1.5 % (ref 0–1.5)
BUN SERPL-MCNC: 8 MG/DL (ref 8–23)
BUN/CREAT SERPL: 9.9 (ref 7–25)
CALCIUM SPEC-SCNC: 10.1 MG/DL (ref 8.6–10.5)
CHLORIDE SERPL-SCNC: 103 MMOL/L (ref 98–107)
CO2 SERPL-SCNC: 26 MMOL/L (ref 22–29)
CREAT SERPL-MCNC: 0.81 MG/DL (ref 0.76–1.27)
DEPRECATED RDW RBC AUTO: 43.8 FL (ref 37–54)
EGFRCR SERPLBLD CKD-EPI 2021: 93.7 ML/MIN/1.73
EOSINOPHIL # BLD AUTO: 0.26 10*3/MM3 (ref 0–0.4)
EOSINOPHIL NFR BLD AUTO: 3.2 % (ref 0.3–6.2)
ERYTHROCYTE [DISTWIDTH] IN BLOOD BY AUTOMATED COUNT: 13.3 % (ref 12.3–15.4)
GLUCOSE SERPL-MCNC: 313 MG/DL (ref 65–99)
HCT VFR BLD AUTO: 40.4 % (ref 37.5–51)
HGB BLD-MCNC: 13.4 G/DL (ref 13–17.7)
IMM GRANULOCYTES # BLD AUTO: 0.09 10*3/MM3 (ref 0–0.05)
IMM GRANULOCYTES NFR BLD AUTO: 1.1 % (ref 0–0.5)
LYMPHOCYTES # BLD AUTO: 1.74 10*3/MM3 (ref 0.7–3.1)
LYMPHOCYTES NFR BLD AUTO: 21.2 % (ref 19.6–45.3)
MCH RBC QN AUTO: 29.9 PG (ref 26.6–33)
MCHC RBC AUTO-ENTMCNC: 33.2 G/DL (ref 31.5–35.7)
MCV RBC AUTO: 90.2 FL (ref 79–97)
MONOCYTES # BLD AUTO: 0.8 10*3/MM3 (ref 0.1–0.9)
MONOCYTES NFR BLD AUTO: 9.7 % (ref 5–12)
NEUTROPHILS NFR BLD AUTO: 5.21 10*3/MM3 (ref 1.7–7)
NEUTROPHILS NFR BLD AUTO: 63.3 % (ref 42.7–76)
NRBC BLD AUTO-RTO: 0 /100 WBC (ref 0–0.2)
PLATELET # BLD AUTO: 250 10*3/MM3 (ref 140–450)
PMV BLD AUTO: 10.6 FL (ref 6–12)
POTASSIUM SERPL-SCNC: 4.4 MMOL/L (ref 3.5–5.2)
RBC # BLD AUTO: 4.48 10*6/MM3 (ref 4.14–5.8)
SODIUM SERPL-SCNC: 138 MMOL/L (ref 136–145)
WBC NRBC COR # BLD: 8.22 10*3/MM3 (ref 3.4–10.8)

## 2023-02-22 PROCEDURE — 93000 ELECTROCARDIOGRAM COMPLETE: CPT | Performed by: INTERNAL MEDICINE

## 2023-02-22 PROCEDURE — 36415 COLL VENOUS BLD VENIPUNCTURE: CPT

## 2023-02-22 PROCEDURE — 85025 COMPLETE CBC W/AUTO DIFF WBC: CPT

## 2023-02-22 PROCEDURE — 99214 OFFICE O/P EST MOD 30 MIN: CPT | Performed by: INTERNAL MEDICINE

## 2023-02-22 PROCEDURE — 80048 BASIC METABOLIC PNL TOTAL CA: CPT

## 2023-02-22 NOTE — H&P (VIEW-ONLY)
Date of Office Visit: 23  Encounter Provider: Rell Deutsch MD  Place of Service: [facility] CARDIOLOGY  Patient Name: Alex Geiger  :1950  [id number]    Chief Complaint   Patient presents with   • Coronary Artery Disease   :     HPI: Alex Geiger is a 72 y.o. male  He had stents put in his mid-left anterior descending by me in .  At that time, he had 40% disease in his right coronary artery and 30% in his circumflex.  He has had prior drug-eluting stents to his right coronary artery and circumflex in .  He had normal left ventricular function.    In  he had recurrent angina and had developed a new lesion in his distal RCA and had drug-eluting stenting to the LAD his other coronaries look good at that time and his LV function was normal.  He had pretty severe COVID-19 infection and 2021.  In 2021 he had recurrent angina we recath him need restenosis LAD stent and had a 4.0x23 Xience placed in that the other arteries looked good.  He is here for follow-up.  He feels terrible again for the last couple weeks he is having discomfort in his chest up into his jaw into his arms if he sits around and does not do anything he is fine he is a little bit of shortness of breath at nighttime a little swelling in his left leg no bleeding problems no PND orthopnea edema syncope or palpitations    Past Medical History:   Diagnosis Date   • CAD (coronary artery disease)    • COVID-19    • Diabetes mellitus (HCC)    • Hyperlipidemia    • Hypertension    • Kidney stones    • Renal injury    • Unstable angina (HCC)    • Ventricular ectopy    • VPC (ventricular premature complex)        Past Surgical History:   Procedure Laterality Date   • CARDIAC CATHETERIZATION     • CARDIAC CATHETERIZATION N/A 2017    Procedure: Coronary angiography;  Surgeon: Rell Deutsch MD;  Location: [facility] INVASIVE LOCATION;  Service:    • CARDIAC CATHETERIZATION N/A  8/16/2017    Procedure: Stent QUIANA coronary;  Surgeon: Rell Deutsch MD;  Location:  [facility] CATH INVASIVE LOCATION;  Service:    • CARDIAC CATHETERIZATION N/A 8/16/2017    Procedure: Left Heart Cath;  Surgeon: Rell Deutsch MD;  Location:  [facility] CATH INVASIVE LOCATION;  Service:    • CARDIAC CATHETERIZATION N/A 8/16/2017    Procedure: Left ventriculography;  Surgeon: Rell Deutsch MD;  Location:  [facility] CATH INVASIVE LOCATION;  Service:    • CARDIAC CATHETERIZATION N/A 7/25/2019    Procedure: Left Heart Cath;  Surgeon: Rell Deutsch MD;  Location:  [facility] CATH INVASIVE LOCATION;  Service: Cardiology   • CARDIAC CATHETERIZATION N/A 7/25/2019    Procedure: Stent QUIANA coronary;  Surgeon: Rell Deutsch MD;  [facility];  Service: Cardiology   • CARDIAC CATHETERIZATION N/A 7/25/2019    Procedure: Left ventriculography;  Surgeon: Rell Deutsch MD;  [facility];  Service: Cardiology   • CARDIAC CATHETERIZATION N/A 7/25/2019    Procedure: Coronary angiography;  Surgeon: Rell Deutsch MD;  Location:  [facility] CATH INVASIVE LOCATION;  Service: Cardiology   • CARDIAC CATHETERIZATION Right 12/22/2021    Procedure: Left Heart Cath;  Surgeon: Helen Oliva MD;  [facility];  Service: Cardiology;  Laterality: Right;   • CARDIAC CATHETERIZATION N/A 12/22/2021    Procedure: Coronary angiography;  Surgeon: Helen Oliva MD;  [facility];  Service: Cardiology;  Laterality: N/A;   • CARDIAC CATHETERIZATION N/A 12/22/2021    Procedure: Left ventriculography;  Surgeon: Helen Oliva MD;  Location:  [facility] CATH INVASIVE LOCATION;  Service: Cardiology;  Laterality: N/A;   • CARDIAC CATHETERIZATION N/A 12/22/2021    Procedure: Percutaneous Coronary Intervention;  Surgeon: Helen Oliva MD;  Location:  [facility] CATH INVASIVE LOCATION;  Service: Cardiology;  Laterality: N/A;   • CARDIAC CATHETERIZATION N/A 12/22/2021     Procedure: Stent QUIANA coronary;  Surgeon: Helen Oliva MD;  Location: [facility] INVASIVE LOCATION;  Service: Cardiology;  Laterality: N/A;   • CORONARY ANGIOPLASTY WITH STENT PLACEMENT     • KIDNEY STONE SURGERY     • PARATHYROIDECTOMY         Social History     Socioeconomic History   • Marital status:    Tobacco Use   • Smoking status: Former     Packs/day: 1.00     Years: 25.00     Pack years: 25.00     Types: Cigarettes   • Smokeless tobacco: Never   • Tobacco comments:     NO CAFFEINE USE   Vaping Use   • Vaping Use: Never used   Substance and Sexual Activity   • Alcohol use: Not Currently   • Drug use: Never   • Sexual activity: Defer       Family History   Problem Relation Age of Onset   • Diabetes Mother    • Alzheimer's disease Father    • Kidney disease Father    • Diabetes Father    • No Known Problems Maternal Grandmother    • No Known Problems Maternal Grandfather    • No Known Problems Paternal Grandmother    • Heart attack Paternal Grandfather    • Heart disease Paternal Grandfather    • Diabetes Paternal Grandfather        Review of Systems   Constitutional: Negative for decreased appetite, fever, malaise/fatigue and weight loss.   HENT: Negative for nosebleeds.    Eyes: Negative for double vision.   Cardiovascular: Negative for chest pain, claudication, cyanosis, dyspnea on exertion, irregular heartbeat, leg swelling, near-syncope, orthopnea, palpitations, paroxysmal nocturnal dyspnea and syncope.   Respiratory: Negative for cough, hemoptysis and shortness of breath.    Hematologic/Lymphatic: Negative for bleeding problem.   Skin: Negative for rash.   Musculoskeletal: Negative for falls and myalgias.   Gastrointestinal: Negative for hematochezia, jaundice, melena, nausea and vomiting.   Genitourinary: Negative for hematuria.   Neurological: Negative for dizziness and seizures.   Psychiatric/Behavioral: Negative for altered mental status and memory loss.       Allergies   Allergen Reactions  "  • Oxycodone-Acetaminophen Itching   • Percocet [Oxycodone-Acetaminophen]    • Shellfish-Derived Products Hives and Itching   • Statins    • Adhesive Tape Rash   • Latex Rash         Current Outpatient Medications:   •  amLODIPine (NORVASC) 5 MG tablet, Take 1 tablet by mouth Daily., Disp: 90 tablet, Rfl: 3  •  cholecalciferol (VITAMIN D3) 25 MCG (1000 UT) tablet, Take 1,000 Units by mouth Daily., Disp: , Rfl:   •  clopidogrel (PLAVIX) 75 MG tablet, TAKE (1) TABLET DAILY, Disp: 90 tablet, Rfl: 1  •  fexofenadine (ALLEGRA) 180 MG tablet, Take 180 mg by mouth Daily., Disp: , Rfl:   •  finasteride (PROSCAR) 5 MG tablet, Take 5 mg by mouth Daily., Disp: , Rfl:   •  flunisolide (NASALIDE) 25 MCG/ACT (0.025%) solution nasal spray, Inhale 2 sprays 2 (Two) Times a Day., Disp: , Rfl:   •  isosorbide mononitrate (IMDUR) 60 MG 24 hr tablet, Take 1 tablet by mouth Every Morning., Disp: 90 tablet, Rfl: 0  •  losartan (COZAAR) 100 MG tablet, Take 1 tablet by mouth Daily for 30 days., Disp: 90 tablet, Rfl: 2  •  metFORMIN (GLUCOPHAGE) 500 MG tablet, Take 2 tablets by mouth 2 (Two) Times a Day With Meals., Disp: , Rfl:   •  metoprolol succinate XL (TOPROL-XL) 50 MG 24 hr tablet, Take 1 tablet by mouth 2 (Two) Times a Day., Disp: 60 tablet, Rfl: 11  •  nitroglycerin (NITROSTAT) 0.4 MG SL tablet, 1 under the tongue as needed for angina, may repeat q5mins for up three doses, Disp: 45 tablet, Rfl: 11  •  NON FORMULARY, Allergy shot weekly, Disp: , Rfl:   •  tamsulosin (FLOMAX) 0.4 MG capsule 24 hr capsule, Take 1 capsule by mouth every night., Disp: , Rfl:   •  TRESIBA FLEXTOUCH 200 UNIT/ML solution pen-injector, Inject 65 Units as directed Daily., Disp: , Rfl:       Objective:     Vitals:    02/22/23 1108   BP: 160/98   Pulse: 85   Weight: 104 kg (228 lb 6.4 oz)   Height: 182.9 cm (72\")     Body mass index is 30.98 kg/m².    Constitutional:       Appearance: Well-developed.   Eyes:      General: No scleral icterus.  HENT:      Head: " Normocephalic.   Neck:      Thyroid: No thyromegaly.      Vascular: No JVD.      Lymphadenopathy: No cervical adenopathy.   Pulmonary:      Effort: Pulmonary effort is normal.      Breath sounds: Normal breath sounds. No wheezing. No rales.   Cardiovascular:      Normal rate. Regular rhythm.      No gallop.   Edema:     Peripheral edema absent.   Abdominal:      Palpations: Abdomen is soft.      Tenderness: There is no abdominal tenderness.   Musculoskeletal: Normal range of motion. Skin:     General: Skin is warm and dry.      Findings: No rash.   Neurological:      Mental Status: Alert and oriented to person, place, and time.           ECG 12 Lead    Date/Time: 2/22/2023 12:08 PM  Performed by: Rell Deutsch MD  Authorized by: Rell Deutsch MD   Rhythm: sinus rhythm  Q waves: II, III and aVF    Other findings: non-specific ST-T wave changes    Clinical impression: abnormal EKG             Assessment:       Diagnosis Plan   1. Coronary artery disease involving native coronary artery of native heart without angina pectoris  Case Request Cath Lab: Left Heart Cath      2. Angina pectoris ([facility])  Case Request Cath Lab: Left Heart Cath      3. Type 2 diabetes mellitus with other circulatory complication, unspecified whether long term insulin use ([facility])  Case Request Cath Lab: Left Heart Cath      4. Unstable angina ([facility])  Case Request Cath Lab: Left Heart Cath             Plan:       Well sadly his unstable angina symptoms are back he has had 5 stents placed all of his arteries so I be concerned he has developed a restenotic area or new lesion I think we need to just take him to the Cath Lab tomorrow we will get a plan on doing that further decisions will be based on the findings at the time of the cath.    As always, it has been a pleasure to participate in your patient's care.      Sincerely,       Rell Deutsch MD

## 2023-02-22 NOTE — PROGRESS NOTES
Date of Office Visit: 23  Encounter Provider: Rell Deutsch MD  Place of Service: [facility] CARDIOLOGY  Patient Name: Alex Geiger  :1950  [id number]    Chief Complaint   Patient presents with   • Coronary Artery Disease   :     HPI: Alex Geiger is a 72 y.o. male  He had stents put in his mid-left anterior descending by me in .  At that time, he had 40% disease in his right coronary artery and 30% in his circumflex.  He has had prior drug-eluting stents to his right coronary artery and circumflex in .  He had normal left ventricular function.    In  he had recurrent angina and had developed a new lesion in his distal RCA and had drug-eluting stenting to the LAD his other coronaries look good at that time and his LV function was normal.  He had pretty severe COVID-19 infection and 2021.  In 2021 he had recurrent angina we recath him need restenosis LAD stent and had a 4.0x23 Xience placed in that the other arteries looked good.  He is here for follow-up.  He feels terrible again for the last couple weeks he is having discomfort in his chest up into his jaw into his arms if he sits around and does not do anything he is fine he is a little bit of shortness of breath at nighttime a little swelling in his left leg no bleeding problems no PND orthopnea edema syncope or palpitations    Past Medical History:   Diagnosis Date   • CAD (coronary artery disease)    • COVID-19    • Diabetes mellitus (HCC)    • Hyperlipidemia    • Hypertension    • Kidney stones    • Renal injury    • Unstable angina (HCC)    • Ventricular ectopy    • VPC (ventricular premature complex)        Past Surgical History:   Procedure Laterality Date   • CARDIAC CATHETERIZATION     • CARDIAC CATHETERIZATION N/A 2017    Procedure: Coronary angiography;  Surgeon: Rell Deutsch MD;  Location: [facility] INVASIVE LOCATION;  Service:    • CARDIAC CATHETERIZATION N/A  8/16/2017    Procedure: Stent QUIANA coronary;  Surgeon: Rell Deutsch MD;  Location:  [facility] CATH INVASIVE LOCATION;  Service:    • CARDIAC CATHETERIZATION N/A 8/16/2017    Procedure: Left Heart Cath;  Surgeon: Rell Deutsch MD;  Location:  [facility] CATH INVASIVE LOCATION;  Service:    • CARDIAC CATHETERIZATION N/A 8/16/2017    Procedure: Left ventriculography;  Surgeon: Rell Deutsch MD;  Location:  [facility] CATH INVASIVE LOCATION;  Service:    • CARDIAC CATHETERIZATION N/A 7/25/2019    Procedure: Left Heart Cath;  Surgeon: Rell Deutsch MD;  Location:  [facility] CATH INVASIVE LOCATION;  Service: Cardiology   • CARDIAC CATHETERIZATION N/A 7/25/2019    Procedure: Stent QUIANA coronary;  Surgeon: Rell Deutsch MD;  Location: [facility] CATH INVASIVE LOCATION;  Service: Cardiology   • CARDIAC CATHETERIZATION N/A 7/25/2019    Procedure: Left ventriculography;  Surgeon: Rell Deutsch MD;  Location: [facility] CATH INVASIVE LOCATION;  Service: Cardiology   • CARDIAC CATHETERIZATION N/A 7/25/2019    Procedure: Coronary angiography;  Surgeon: Rell Deutsch MD;  Location:  [facility] CATH INVASIVE LOCATION;  Service: Cardiology   • CARDIAC CATHETERIZATION Right 12/22/2021    Procedure: Left Heart Cath;  Surgeon: Helen Oliva MD;  Location: [facility] CATH INVASIVE LOCATION;  Service: Cardiology;  Laterality: Right;   • CARDIAC CATHETERIZATION N/A 12/22/2021    Procedure: Coronary angiography;  Surgeon: Helen Oliva MD;  Location: [facility] CATH INVASIVE LOCATION;  Service: Cardiology;  Laterality: N/A;   • CARDIAC CATHETERIZATION N/A 12/22/2021    Procedure: Left ventriculography;  Surgeon: Helen Oliva MD;  Location:  [facility] CATH INVASIVE LOCATION;  Service: Cardiology;  Laterality: N/A;   • CARDIAC CATHETERIZATION N/A 12/22/2021    Procedure: Percutaneous Coronary Intervention;  Surgeon: Helen Oliva MD;  Location:  [facility] CATH INVASIVE LOCATION;  Service: Cardiology;  Laterality: N/A;   • CARDIAC CATHETERIZATION N/A 12/22/2021     Procedure: Stent QUIANA coronary;  Surgeon: Helen Oliva MD;  Location: [facility] INVASIVE LOCATION;  Service: Cardiology;  Laterality: N/A;   • CORONARY ANGIOPLASTY WITH STENT PLACEMENT     • KIDNEY STONE SURGERY     • PARATHYROIDECTOMY         Social History     Socioeconomic History   • Marital status:    Tobacco Use   • Smoking status: Former     Packs/day: 1.00     Years: 25.00     Pack years: 25.00     Types: Cigarettes   • Smokeless tobacco: Never   • Tobacco comments:     NO CAFFEINE USE   Vaping Use   • Vaping Use: Never used   Substance and Sexual Activity   • Alcohol use: Not Currently   • Drug use: Never   • Sexual activity: Defer       Family History   Problem Relation Age of Onset   • Diabetes Mother    • Alzheimer's disease Father    • Kidney disease Father    • Diabetes Father    • No Known Problems Maternal Grandmother    • No Known Problems Maternal Grandfather    • No Known Problems Paternal Grandmother    • Heart attack Paternal Grandfather    • Heart disease Paternal Grandfather    • Diabetes Paternal Grandfather        Review of Systems   Constitutional: Negative for decreased appetite, fever, malaise/fatigue and weight loss.   HENT: Negative for nosebleeds.    Eyes: Negative for double vision.   Cardiovascular: Negative for chest pain, claudication, cyanosis, dyspnea on exertion, irregular heartbeat, leg swelling, near-syncope, orthopnea, palpitations, paroxysmal nocturnal dyspnea and syncope.   Respiratory: Negative for cough, hemoptysis and shortness of breath.    Hematologic/Lymphatic: Negative for bleeding problem.   Skin: Negative for rash.   Musculoskeletal: Negative for falls and myalgias.   Gastrointestinal: Negative for hematochezia, jaundice, melena, nausea and vomiting.   Genitourinary: Negative for hematuria.   Neurological: Negative for dizziness and seizures.   Psychiatric/Behavioral: Negative for altered mental status and memory loss.       Allergies   Allergen Reactions  "  • Oxycodone-Acetaminophen Itching   • Percocet [Oxycodone-Acetaminophen]    • Shellfish-Derived Products Hives and Itching   • Statins    • Adhesive Tape Rash   • Latex Rash         Current Outpatient Medications:   •  amLODIPine (NORVASC) 5 MG tablet, Take 1 tablet by mouth Daily., Disp: 90 tablet, Rfl: 3  •  cholecalciferol (VITAMIN D3) 25 MCG (1000 UT) tablet, Take 1,000 Units by mouth Daily., Disp: , Rfl:   •  clopidogrel (PLAVIX) 75 MG tablet, TAKE (1) TABLET DAILY, Disp: 90 tablet, Rfl: 1  •  fexofenadine (ALLEGRA) 180 MG tablet, Take 180 mg by mouth Daily., Disp: , Rfl:   •  finasteride (PROSCAR) 5 MG tablet, Take 5 mg by mouth Daily., Disp: , Rfl:   •  flunisolide (NASALIDE) 25 MCG/ACT (0.025%) solution nasal spray, Inhale 2 sprays 2 (Two) Times a Day., Disp: , Rfl:   •  isosorbide mononitrate (IMDUR) 60 MG 24 hr tablet, Take 1 tablet by mouth Every Morning., Disp: 90 tablet, Rfl: 0  •  losartan (COZAAR) 100 MG tablet, Take 1 tablet by mouth Daily for 30 days., Disp: 90 tablet, Rfl: 2  •  metFORMIN (GLUCOPHAGE) 500 MG tablet, Take 2 tablets by mouth 2 (Two) Times a Day With Meals., Disp: , Rfl:   •  metoprolol succinate XL (TOPROL-XL) 50 MG 24 hr tablet, Take 1 tablet by mouth 2 (Two) Times a Day., Disp: 60 tablet, Rfl: 11  •  nitroglycerin (NITROSTAT) 0.4 MG SL tablet, 1 under the tongue as needed for angina, may repeat q5mins for up three doses, Disp: 45 tablet, Rfl: 11  •  NON FORMULARY, Allergy shot weekly, Disp: , Rfl:   •  tamsulosin (FLOMAX) 0.4 MG capsule 24 hr capsule, Take 1 capsule by mouth every night., Disp: , Rfl:   •  TRESIBA FLEXTOUCH 200 UNIT/ML solution pen-injector, Inject 65 Units as directed Daily., Disp: , Rfl:       Objective:     Vitals:    02/22/23 1108   BP: 160/98   Pulse: 85   Weight: 104 kg (228 lb 6.4 oz)   Height: 182.9 cm (72\")     Body mass index is 30.98 kg/m².    Constitutional:       Appearance: Well-developed.   Eyes:      General: No scleral icterus.  HENT:      Head: " Normocephalic.   Neck:      Thyroid: No thyromegaly.      Vascular: No JVD.      Lymphadenopathy: No cervical adenopathy.   Pulmonary:      Effort: Pulmonary effort is normal.      Breath sounds: Normal breath sounds. No wheezing. No rales.   Cardiovascular:      Normal rate. Regular rhythm.      No gallop.   Edema:     Peripheral edema absent.   Abdominal:      Palpations: Abdomen is soft.      Tenderness: There is no abdominal tenderness.   Musculoskeletal: Normal range of motion. Skin:     General: Skin is warm and dry.      Findings: No rash.   Neurological:      Mental Status: Alert and oriented to person, place, and time.           ECG 12 Lead    Date/Time: 2/22/2023 12:08 PM  Performed by: Rell Deutsch MD  Authorized by: Rell Deutsch MD   Rhythm: sinus rhythm  Q waves: II, III and aVF    Other findings: non-specific ST-T wave changes    Clinical impression: abnormal EKG             Assessment:       Diagnosis Plan   1. Coronary artery disease involving native coronary artery of native heart without angina pectoris  Case Request Cath Lab: Left Heart Cath      2. Angina pectoris ([facility])  Case Request Cath Lab: Left Heart Cath      3. Type 2 diabetes mellitus with other circulatory complication, unspecified whether long term insulin use ([facility])  Case Request Cath Lab: Left Heart Cath      4. Unstable angina ([facility])  Case Request Cath Lab: Left Heart Cath             Plan:       Well sadly his unstable angina symptoms are back he has had 5 stents placed all of his arteries so I be concerned he has developed a restenotic area or new lesion I think we need to just take him to the Cath Lab tomorrow we will get a plan on doing that further decisions will be based on the findings at the time of the cath.    As always, it has been a pleasure to participate in your patient's care.      Sincerely,       Rell Deutsch MD

## 2023-02-23 ENCOUNTER — HOSPITAL ENCOUNTER (OUTPATIENT)
Facility: HOSPITAL | Age: 73
Setting detail: HOSPITAL OUTPATIENT SURGERY
Discharge: HOME | End: 2023-02-23
Attending: INTERNAL MEDICINE | Admitting: INTERNAL MEDICINE
Payer: MEDICARE

## 2023-02-23 VITALS
HEART RATE: 74 BPM | RESPIRATION RATE: 18 BRPM | OXYGEN SATURATION: 96 % | HEIGHT: 72 IN | DIASTOLIC BLOOD PRESSURE: 68 MMHG | TEMPERATURE: 98.2 F | WEIGHT: 225 LBS | SYSTOLIC BLOOD PRESSURE: 142 MMHG | BODY MASS INDEX: 30.48 KG/M2

## 2023-02-23 DIAGNOSIS — I25.10 CORONARY ARTERY DISEASE INVOLVING NATIVE CORONARY ARTERY OF NATIVE HEART WITHOUT ANGINA PECTORIS: ICD-10-CM

## 2023-02-23 DIAGNOSIS — I20.9 ANGINA PECTORIS: ICD-10-CM

## 2023-02-23 DIAGNOSIS — I25.5 CARDIOMYOPATHY, ISCHEMIC: Primary | ICD-10-CM

## 2023-02-23 DIAGNOSIS — I20.0 UNSTABLE ANGINA: ICD-10-CM

## 2023-02-23 DIAGNOSIS — E11.59 TYPE 2 DIABETES MELLITUS WITH OTHER CIRCULATORY COMPLICATION, UNSPECIFIED WHETHER LONG TERM INSULIN USE: ICD-10-CM

## 2023-02-23 LAB — GLUCOSE BLDC GLUCOMTR-MCNC: 253 MG/DL (ref 70–130)

## 2023-02-23 PROCEDURE — 25010000002 FENTANYL CITRATE (PF) 50 MCG/ML SOLUTION: Performed by: INTERNAL MEDICINE

## 2023-02-23 PROCEDURE — C1894 INTRO/SHEATH, NON-LASER: HCPCS | Performed by: INTERNAL MEDICINE

## 2023-02-23 PROCEDURE — 0 IOPAMIDOL PER 1 ML: Performed by: INTERNAL MEDICINE

## 2023-02-23 PROCEDURE — C1769 GUIDE WIRE: HCPCS | Performed by: INTERNAL MEDICINE

## 2023-02-23 PROCEDURE — 99152 MOD SED SAME PHYS/QHP 5/>YRS: CPT | Performed by: INTERNAL MEDICINE

## 2023-02-23 PROCEDURE — 25010000002 MIDAZOLAM PER 1 MG: Performed by: INTERNAL MEDICINE

## 2023-02-23 PROCEDURE — 93458 L HRT ARTERY/VENTRICLE ANGIO: CPT | Performed by: INTERNAL MEDICINE

## 2023-02-23 PROCEDURE — 82962 GLUCOSE BLOOD TEST: CPT

## 2023-02-23 PROCEDURE — 25010000002 HEPARIN (PORCINE) PER 1000 UNITS: Performed by: INTERNAL MEDICINE

## 2023-02-23 RX ORDER — SPIRONOLACTONE 25 MG/1
25 TABLET ORAL DAILY
Qty: 90 TABLET | Refills: 3 | Status: SHIPPED | OUTPATIENT
Start: 2023-02-23

## 2023-02-23 RX ORDER — SODIUM CHLORIDE 9 MG/ML
40 INJECTION, SOLUTION INTRAVENOUS AS NEEDED
Status: DISCONTINUED | OUTPATIENT
Start: 2023-02-23 | End: 2023-02-23 | Stop reason: HOSPADM

## 2023-02-23 RX ORDER — SODIUM CHLORIDE 0.9 % (FLUSH) 0.9 %
10 SYRINGE (ML) INJECTION EVERY 12 HOURS SCHEDULED
Status: DISCONTINUED | OUTPATIENT
Start: 2023-02-23 | End: 2023-02-23 | Stop reason: HOSPADM

## 2023-02-23 RX ORDER — LIDOCAINE HYDROCHLORIDE 20 MG/ML
INJECTION, SOLUTION INFILTRATION; PERINEURAL
Status: DISCONTINUED | OUTPATIENT
Start: 2023-02-23 | End: 2023-02-23 | Stop reason: HOSPADM

## 2023-02-23 RX ORDER — SODIUM CHLORIDE 0.9 % (FLUSH) 0.9 %
10 SYRINGE (ML) INJECTION AS NEEDED
Status: DISCONTINUED | OUTPATIENT
Start: 2023-02-23 | End: 2023-02-23 | Stop reason: HOSPADM

## 2023-02-23 RX ORDER — FENTANYL CITRATE 50 UG/ML
INJECTION, SOLUTION INTRAMUSCULAR; INTRAVENOUS
Status: DISCONTINUED | OUTPATIENT
Start: 2023-02-23 | End: 2023-02-23 | Stop reason: HOSPADM

## 2023-02-23 RX ORDER — ACETAMINOPHEN 325 MG/1
650 TABLET ORAL EVERY 4 HOURS PRN
Status: DISCONTINUED | OUTPATIENT
Start: 2023-02-23 | End: 2023-02-23 | Stop reason: HOSPADM

## 2023-02-23 RX ORDER — VERAPAMIL HYDROCHLORIDE 2.5 MG/ML
INJECTION, SOLUTION INTRAVENOUS
Status: DISCONTINUED | OUTPATIENT
Start: 2023-02-23 | End: 2023-02-23 | Stop reason: HOSPADM

## 2023-02-23 RX ORDER — HEPARIN SODIUM 1000 [USP'U]/ML
INJECTION, SOLUTION INTRAVENOUS; SUBCUTANEOUS
Status: DISCONTINUED | OUTPATIENT
Start: 2023-02-23 | End: 2023-02-23 | Stop reason: HOSPADM

## 2023-02-23 RX ORDER — MIDAZOLAM HYDROCHLORIDE 1 MG/ML
INJECTION INTRAMUSCULAR; INTRAVENOUS
Status: DISCONTINUED | OUTPATIENT
Start: 2023-02-23 | End: 2023-02-23 | Stop reason: HOSPADM

## 2023-02-23 RX ORDER — ISOSORBIDE MONONITRATE 60 MG/1
90 TABLET, EXTENDED RELEASE ORAL EVERY MORNING
Qty: 135 TABLET | Refills: 3 | Status: SHIPPED | OUTPATIENT
Start: 2023-02-23

## 2023-02-23 RX ORDER — SODIUM CHLORIDE 9 MG/ML
75 INJECTION, SOLUTION INTRAVENOUS CONTINUOUS
Status: DISCONTINUED | OUTPATIENT
Start: 2023-02-23 | End: 2023-02-23 | Stop reason: HOSPADM

## 2023-02-23 RX ADMIN — SODIUM CHLORIDE 75 ML/HR: 9 INJECTION, SOLUTION INTRAVENOUS at 09:40

## 2023-02-23 NOTE — DISCHARGE INSTRUCTIONS
[facility]  [address] [place] [address]    Coronary Angiogram (Radial/Ulnar Approach) After Care    Refer to this sheet in the next few weeks. These instructions provide you with information on caring for yourself after your procedure. Your caregiver may also give you more specific instructions. Your treatment has been planned according to current medical practices, but problems sometimes occur. Call your caregiver if you have any problems or questions after your procedure.    Home Care Instructions:  You may shower the day after the procedure. Remove the bandage (dressing) and gently wash the site with plain soap and water. Gently pat the site dry. You may apply a band aid daily for 2 days if desired.    Do not apply powder or lotion to the site.  Do not submerge the affected site in water for 3 to 5 days or until the site is completely healed.   Do not lift, push or pull anything over 5 pounds for 5 days after your procedure or as directed by your physician.  As a reference, a gallon of milk weighs 8 pounds.   Inspect the site at least twice daily. You may notice some bruising at the site and it may be tender for 1 to 2 weeks.     Increase your fluid intake for the next 2 days.    Keep arm elevated for 24 hours. For the remainder of the day, keep your arm in “Pledge of Allegiance” position when up and about.     You may drive 24 hours after the procedure unless otherwise instructed by your caregiver.  Do not operate machinery or power tools for 24 hours.  A responsible adult should be with you for the first 24 hours after you arrive home. Do not make any important legal decisions or sign legal papers for 24 hours.  Do not drink alcohol for 24 hours.    Metformin or any medications containing Metformin should not be taken for 48 hours after your procedure.      Call Your Doctor if:   You have unusual pain at the radial/ulnar (wrist) site.  You have redness, warmth, swelling, or pain at the  radial/ulnar (wrist) site.  You have drainage (other than a small amount of blood on the dressing).  `You have chills or a fever > 101.  Your arm becomes pale or dark, cool, tingly, or numb.  You develop chest pain, shortness of breath, feel faint or pass out.    You have heavy bleeding from the site, hold pressure on the site for 20 minutes.  If the bleeding stops, apply a fresh bandage and call your cardiologist.  However, if you        continue to have bleeding, call 911 and continue to apply pressure to the site.   You have any symptoms of a stroke.  Remember BE FAST  B-balance. Sudden trouble walking or loss of balance.  E-eyes.  Sudden changes in how you see or a sudden onset of a very bad headache.   F-face. Sudden weakness or loss of feeling of the face or facial droop on one side.   A-arms Sudden weakness or numbness in one arm.  One arm drifts down if they are both held out in front of you. This happens suddenly and usually on one side of the body.   S-speech.  Sudden trouble speaking, slurred speech or trouble understanding what are saying.   T-time  Time to call emergency services.  Write down the symptoms and the time they started.

## 2023-03-02 ENCOUNTER — HOSPITAL ENCOUNTER (OUTPATIENT)
Dept: CARDIOLOGY | Facility: HOSPITAL | Age: 73
Discharge: HOME | End: 2023-03-02
Payer: MEDICARE

## 2023-03-02 ENCOUNTER — LAB (OUTPATIENT)
Dept: LAB | Facility: HOSPITAL | Age: 73
End: 2023-03-02
Payer: MEDICARE

## 2023-03-02 VITALS
OXYGEN SATURATION: 97 % | SYSTOLIC BLOOD PRESSURE: 130 MMHG | BODY MASS INDEX: 29.8 KG/M2 | HEIGHT: 72 IN | WEIGHT: 220 LBS | HEART RATE: 107 BPM | DIASTOLIC BLOOD PRESSURE: 69 MMHG

## 2023-03-02 VITALS
SYSTOLIC BLOOD PRESSURE: 130 MMHG | DIASTOLIC BLOOD PRESSURE: 69 MMHG | WEIGHT: 220 LBS | BODY MASS INDEX: 29.8 KG/M2 | HEART RATE: 107 BPM | OXYGEN SATURATION: 97 % | HEIGHT: 72 IN

## 2023-03-02 DIAGNOSIS — I10 ESSENTIAL (PRIMARY) HYPERTENSION: ICD-10-CM

## 2023-03-02 DIAGNOSIS — I50.22 CHRONIC HFREF (HEART FAILURE WITH REDUCED EJECTION FRACTION): Primary | ICD-10-CM

## 2023-03-02 DIAGNOSIS — I25.5 ISCHEMIC CARDIOMYOPATHY: ICD-10-CM

## 2023-03-02 DIAGNOSIS — E11.59 TYPE 2 DIABETES MELLITUS WITH OTHER CIRCULATORY COMPLICATION, WITHOUT LONG-TERM CURRENT USE OF INSULIN: ICD-10-CM

## 2023-03-02 DIAGNOSIS — E78.49 OTHER HYPERLIPIDEMIA: ICD-10-CM

## 2023-03-02 DIAGNOSIS — R79.9 ABNORMAL FINDING OF BLOOD CHEMISTRY, UNSPECIFIED: ICD-10-CM

## 2023-03-02 DIAGNOSIS — I25.10 CORONARY ARTERY DISEASE INVOLVING NATIVE CORONARY ARTERY OF NATIVE HEART WITHOUT ANGINA PECTORIS: ICD-10-CM

## 2023-03-02 PROBLEM — U07.1 PNEUMONIA DUE TO COVID-19 VIRUS: Status: RESOLVED | Noted: 2021-03-15 | Resolved: 2023-03-02

## 2023-03-02 PROBLEM — I20.0 UNSTABLE ANGINA (HCC): Status: RESOLVED | Noted: 2017-08-09 | Resolved: 2023-03-02

## 2023-03-02 PROBLEM — I49.3 PREMATURE COMPLEX, VENTRICULAR: Status: RESOLVED | Noted: 2017-08-09 | Resolved: 2023-03-02

## 2023-03-02 PROBLEM — Z95.5 STATUS POST INSERTION OF DRUG-ELUTING STENT INTO RIGHT CORONARY ARTERY FOR CORONARY ARTERY DISEASE: Status: RESOLVED | Noted: 2017-08-09 | Resolved: 2023-03-02

## 2023-03-02 PROBLEM — I20.9 ISCHEMIC CHEST PAIN (HCC): Status: RESOLVED | Noted: 2017-08-09 | Resolved: 2023-03-02

## 2023-03-02 PROBLEM — Z95.5 HISTORY OF CORONARY ARTERY STENT PLACEMENT: Status: RESOLVED | Noted: 2017-08-09 | Resolved: 2023-03-02

## 2023-03-02 PROBLEM — J12.82 PNEUMONIA DUE TO COVID-19 VIRUS: Status: RESOLVED | Noted: 2021-03-15 | Resolved: 2023-03-02

## 2023-03-02 PROBLEM — E11.9 DIABETES (HCC): Status: RESOLVED | Noted: 2017-08-09 | Resolved: 2023-03-02

## 2023-03-02 LAB
ALBUMIN SERPL-MCNC: 4.5 G/DL (ref 3.5–5.2)
ALBUMIN/GLOB SERPL: 2 G/DL
ALP SERPL-CCNC: 59 U/L (ref 39–117)
ALT SERPL W P-5'-P-CCNC: 24 U/L (ref 1–41)
ANION GAP SERPL CALCULATED.3IONS-SCNC: 9.5 MMOL/L (ref 5–15)
AST SERPL-CCNC: 20 U/L (ref 1–40)
BASOPHILS # BLD AUTO: 0.12 10*3/MM3 (ref 0–0.2)
BASOPHILS NFR BLD AUTO: 1.5 % (ref 0–1.5)
BILIRUB SERPL-MCNC: 0.4 MG/DL (ref 0–1.2)
BUN SERPL-MCNC: 17 MG/DL (ref 8–23)
BUN/CREAT SERPL: 15.7 (ref 7–25)
CALCIUM SPEC-SCNC: 10.6 MG/DL (ref 8.6–10.5)
CHLORIDE SERPL-SCNC: 105 MMOL/L (ref 98–107)
CHOLEST SERPL-MCNC: 240 MG/DL (ref 0–200)
CO2 SERPL-SCNC: 24.5 MMOL/L (ref 22–29)
CREAT SERPL-MCNC: 1.08 MG/DL (ref 0.76–1.27)
DEPRECATED RDW RBC AUTO: 41.1 FL (ref 37–54)
EGFRCR SERPLBLD CKD-EPI 2021: 72.9 ML/MIN/1.73
EOSINOPHIL # BLD AUTO: 0.22 10*3/MM3 (ref 0–0.4)
EOSINOPHIL NFR BLD AUTO: 2.7 % (ref 0.3–6.2)
ERYTHROCYTE [DISTWIDTH] IN BLOOD BY AUTOMATED COUNT: 13.1 % (ref 12.3–15.4)
GLOBULIN UR ELPH-MCNC: 2.2 GM/DL
GLUCOSE SERPL-MCNC: 225 MG/DL (ref 65–99)
HBA1C MFR BLD: 9.1 % (ref 4.8–5.6)
HCT VFR BLD AUTO: 39.1 % (ref 37.5–51)
HDLC SERPL-MCNC: 35 MG/DL (ref 40–60)
HGB BLD-MCNC: 13.5 G/DL (ref 13–17.7)
IMM GRANULOCYTES # BLD AUTO: 0.13 10*3/MM3 (ref 0–0.05)
IMM GRANULOCYTES NFR BLD AUTO: 1.6 % (ref 0–0.5)
LDLC SERPL CALC-MCNC: 106 MG/DL (ref 0–100)
LDLC/HDLC SERPL: 2.57 {RATIO}
LYMPHOCYTES # BLD AUTO: 2.08 10*3/MM3 (ref 0.7–3.1)
LYMPHOCYTES NFR BLD AUTO: 25.5 % (ref 19.6–45.3)
MCH RBC QN AUTO: 29.7 PG (ref 26.6–33)
MCHC RBC AUTO-ENTMCNC: 34.5 G/DL (ref 31.5–35.7)
MCV RBC AUTO: 85.9 FL (ref 79–97)
MONOCYTES # BLD AUTO: 0.77 10*3/MM3 (ref 0.1–0.9)
MONOCYTES NFR BLD AUTO: 9.4 % (ref 5–12)
NEUTROPHILS NFR BLD AUTO: 4.85 10*3/MM3 (ref 1.7–7)
NEUTROPHILS NFR BLD AUTO: 59.3 % (ref 42.7–76)
NRBC BLD AUTO-RTO: 0 /100 WBC (ref 0–0.2)
NT-PROBNP SERPL-MCNC: 68.5 PG/ML (ref 0–900)
PLATELET # BLD AUTO: 289 10*3/MM3 (ref 140–450)
PMV BLD AUTO: 10.6 FL (ref 6–12)
POTASSIUM SERPL-SCNC: 4.7 MMOL/L (ref 3.5–5.2)
PROT SERPL-MCNC: 6.7 G/DL (ref 6–8.5)
RBC # BLD AUTO: 4.55 10*6/MM3 (ref 4.14–5.8)
SODIUM SERPL-SCNC: 139 MMOL/L (ref 136–145)
TRIGL SERPL-MCNC: 575 MG/DL (ref 0–150)
VLDLC SERPL-MCNC: 99 MG/DL (ref 5–40)
WBC NRBC COR # BLD: 8.17 10*3/MM3 (ref 3.4–10.8)

## 2023-03-02 PROCEDURE — 80053 COMPREHEN METABOLIC PANEL: CPT | Performed by: NURSE PRACTITIONER

## 2023-03-02 PROCEDURE — 83880 ASSAY OF NATRIURETIC PEPTIDE: CPT | Performed by: NURSE PRACTITIONER

## 2023-03-02 PROCEDURE — 80061 LIPID PANEL: CPT | Performed by: NURSE PRACTITIONER

## 2023-03-02 PROCEDURE — 85025 COMPLETE CBC W/AUTO DIFF WBC: CPT | Performed by: NURSE PRACTITIONER

## 2023-03-02 PROCEDURE — 83036 HEMOGLOBIN GLYCOSYLATED A1C: CPT | Performed by: NURSE PRACTITIONER

## 2023-03-02 PROCEDURE — 94618 PULMONARY STRESS TESTING: CPT

## 2023-03-02 PROCEDURE — 99214 OFFICE O/P EST MOD 30 MIN: CPT | Performed by: NURSE PRACTITIONER

## 2023-03-02 RX ORDER — METOPROLOL SUCCINATE 100 MG/1
100 TABLET, EXTENDED RELEASE ORAL 2 TIMES DAILY
Qty: 60 TABLET | Refills: 1 | Status: SHIPPED | OUTPATIENT
Start: 2023-03-02

## 2023-03-02 NOTE — ADDENDUM NOTE
Encounter addended by: April Hernandes MA on: 3/2/2023 3:52 PM   Actions taken: Charge Capture section accepted

## 2023-03-02 NOTE — PROGRESS NOTES
[facility]      Patient Name: Alex Geiger  :1950  Age: 72 y.o.  Sex: male  Referring Provider: Dr. Deutsch  Primary Cardiologist: Dr. Deutsch  Encounter Provider: Ruth Mohan, PharmD      Chief Complaint:   Chief Complaint   Patient presents with   • Congestive Heart Failure       HPI:  Patient presents for their initial visit. PMH is significant for HFrEF (EF 35%), CAD s/p QUIAAN, HTN, HLD, DM, and unstable angina. He recently saw Dr. Deutsch and had not been feeling well with chest discomfort up into his jaw and arms. Because of this, he had a repeat LHC. His stents were patent but he does have a newly reduced EF of 35%. He was then initiated on Entresto, spironolactone, and Jardiance and referred to the [facility] for further optimization. He has been tolerating these new medications well. He says he has lost about 5 lbs since starting these.      Current Regimen:  Heart Failure  Jardiance 10 mg daily  Metoprolol succinate 50 mg BID  Entresto 24/26 mg BID  Spironolactone 25 mg daily      Other CV Meds  Clopidogrel 75 mg daily  Isosorbide mononitrate 60 mg (1.5 tablets) daily      Medication Use:  Adherence: Good. Missed 0 doses in the last week. Adherence tools used include: medication list. Barriers for adherence include: none  Past hx of medication use/intolerance: statins (myalgias); Repatha (muscle ache/HA/stomach upset)  Affordability: Medicare Advantage  no issues with cost currently      Diet:  Defer in-depth discussion to future visit      Social History:  Tobacco use: former smoker  EtOH use: none  Illicit drug use: no history of illicit drug use  Exercise: moderately active at work/home      Immunization Status:  Pneumococcal: PCV20 due per chart review  he does believe he has received one but will check with PCP's office  Influenza: obtains annually  COVID-19: 3/3/21 and 6/10/21  booster 21      OBJECTIVE:    /69 (BP Location: Left arm, Patient  "Position: Sitting)   Pulse 107   Ht 182.9 cm (72.01\")   Wt 99.8 kg (220 lb)   SpO2 97%   BMI 29.83 kg/m²     Body mass index is 29.83 kg/m².  Wt Readings from Last 1 Encounters:   03/02/23 99.8 kg (220 lb)       Home BP Readings: checks sporadically - yesterday was 126/61      Lab Review:  Renal Function: Estimated Creatinine Clearance: 100.9 mL/min (by C-G formula based on SCr of 0.81 mg/dL).    Lab Results   Component Value Date    PROBNP 645.2 03/17/2021       Results for orders placed in visit on 04/08/14    SCANNED - ECHOCARDIOGRAM        ASSESSMENT/PLAN OF CARE:    1. HFrEF (EF 35%)  • Patient tolerating Entresto, spironolactone, and Jardiance well. His HR is well above goal today. He does state his HR has seemed to be high ever since he had COVID  • Increase metoprolol succinate to 100 mg twice daily. Goal heart rate is 50s to 60s. HR is not at goal  • Continue Entresto 24/26 mg twice daily. Patient's dose is not at target dose. Will look to up-titrate dose at future visits  • Continue spironolactone 25 mg daily. Patient's dose is at target dose  • Continue Jardiance 10 mg daily.  • I do not see any history of afib. If his HR continues to be above goal on max metoprolol dose, would consider initiation of ivabradine  • BiDil and Verquvo not indicated at this time  • Advised patient to call clinic with 2-3 lb weight gain in 24 hrs or >5 lb weight gain in 1 week  • Defer in-depth discussion on diet and exercise to future visit. We did briefly discuss cardiac rehab but he does have a home gym setup so he may or may not be interested      2. CAD s/p multiple stents  · Repeat lipid panel today. Goal LDL < 70. He has been intolerant of statins due to myalgias and Repatha due to myalgia, headache, and stomach upset  · I actually think he could be a good candidate for Leqvio given his previous intolerances to therapy options. The process is almost finalized for this to be an option at [facility]. I think the " limiting factor to this would be his copay so a benefits investigation would need to be run prior to the first injection and discussed with the patient  · If Leqvio is not an option, I would trial Praluent to see if he may be able to tolerate a different PCSK9i. If this also does not work, I would suggest trialing patient on Zetia and Vascepa  · Continue clopidogrel 75 mg daily      3. DM  · Repeat A1c. Goal A1c < 7%  · Continue Tresiba, metformin, and Jardiance. Depending on A1c, further changes per PCP. He could be a good candidate for an GLP1        Thank you for allowing me to participate in the care of your patient,         Ruth Mohan, Mei  [facility]  03/02/23  13:37 EST

## 2023-03-02 NOTE — PROGRESS NOTES
[facility] HEART FAILURE      Patient Name: Alex Geiger  :1950  Age: 72 y.o.  Sex: male  Referring Provider: Rell Deutsch MD   Primary Cardiologist: Rell Deutsch MD  Encounter Provider:  MATIAS Chakraborty      Chief Complaint:   Chief Complaint   Patient presents with   • Congestive Heart Failure         History of Present Illness This 72-year-old male, new to this provider, comes today for further evaluation regarding his chronic systolic heart failure.  Current diagnoses to include coronary artery disease, DM2, hyperlipidemia, hypertension, history of ventricular ectopy and kidney stones as well as history of COVID-19.  She began following with Dr. Deutsch in .  Records have been reviewed by me.    Cardiac catheterization performed in  led to intervention and left circumflex as well as RCA, LV function was noted to be normal at that time per chart review.    In  his LVEF was noted to be mildly reduced at 45%.    In 2017 it was noted that the patient was having chest pain.  Repeat cardiac catheterization revealed LVEF of 40%, normal left main, 90% mid LAD stenosis, 90% small diagonal stenosis not amenable to PCI, widely patent mid circumflex stent, 20% distal circumflex stenosis, widely patent proximal RCA stent with 40 to 50% proximal to mid RCA stenosis-now status post QUIANA to mid LAD lesion.        In 2019 he underwent repeat cardiac catheterization at which point normal left main was found, widely patent LAD stent, left circumflex stent normal, 10 to 20% restenosis in the mid RCA stent, 70 to 80% lesion of distal RCA at bifurcation which was treated with QUIANA placement.  In 2021 he had recurrent angina which led to repeat cardiac catheterization again which revealed restenosis of his LAD stent so QUIANA was again placed.  2023 he was seen in the office by Dr. Deutsch and was having chest pain that went up into his jaw and arms.  Repeat  cardiac catheterization revealed normal left main, LAD with patent stent, left circumflex with 30% proximal disease and 2030% disease in proximal circumflex, RCA with 30% diffuse disease proximally and a moderate to severely reduced global LVEF.    He is currently feeling much better, and states that he has lost about 5 pounds since starting his medications.    The following portions of the patient's history were reviewed and updated as appropriate: allergies, current medications, past family history, past medical history, past social history, past surgical history and problem list.    Current Outpatient Medications   Medication Sig Dispense Refill   • cholecalciferol (VITAMIN D3) 25 MCG (1000 UT) tablet Take 1 tablet by mouth Daily.     • clopidogrel (PLAVIX) 75 MG tablet TAKE (1) TABLET DAILY 90 tablet 1   • empagliflozin (Jardiance) 10 MG tablet tablet Take 1 tablet by mouth Daily. 90 tablet 3   • fexofenadine (ALLEGRA) 180 MG tablet Take 1 tablet by mouth Daily.     • finasteride (PROSCAR) 5 MG tablet Take 1 tablet by mouth Daily.     • flunisolide (NASALIDE) 25 MCG/ACT (0.025%) solution nasal spray Inhale 2 sprays 2 (Two) Times a Day.     • isosorbide mononitrate (IMDUR) 60 MG 24 hr tablet Take 1.5 tablets by mouth Every Morning. 135 tablet 3   • metFORMIN (GLUCOPHAGE) 500 MG tablet Take 2 tablets by mouth 2 (Two) Times a Day With Meals.     • nitroglycerin (NITROSTAT) 0.4 MG SL tablet 1 under the tongue as needed for angina, may repeat q5mins for up three doses 45 tablet 11   • NON FORMULARY Allergy shot weekly     • sacubitril-valsartan (ENTRESTO) 24-26 MG tablet Take 1 tablet by mouth 2 (Two) Times a Day. 180 tablet 3   • spironolactone (ALDACTONE) 25 MG tablet Take 1 tablet by mouth Daily. 90 tablet 3   • tamsulosin (FLOMAX) 0.4 MG capsule 24 hr capsule Take 1 capsule by mouth Every Night.     • TRESIBA FLEXTOUCH 200 UNIT/ML solution pen-injector Inject 65 Units as directed Daily.       No current  facility-administered medications for this encounter.       Past Medical History:   Diagnosis Date   • CAD (coronary artery disease)    • COVID-19    • Diabetes mellitus (HCC)    • Hyperlipidemia    • Hypertension    • Kidney stones    • Renal injury    • Unstable angina (HCC)    • Ventricular ectopy    • VPC (ventricular premature complex)        Past Surgical History:   Procedure Laterality Date   • CARDIAC CATHETERIZATION     • CARDIAC CATHETERIZATION N/A 8/16/2017    Procedure: Coronary angiography;  Surgeon: Rell Deutsch MD;  [facility];  Service:    • CARDIAC CATHETERIZATION N/A 8/16/2017    Procedure: Stent QUIANA coronary;  Surgeon: Rell Deutsch MD;  [facility];  Service:    • CARDIAC CATHETERIZATION N/A 8/16/2017    Procedure: Left Heart Cath;  Surgeon: Rell Deutsch MD;  [facility];  Service:    • CARDIAC CATHETERIZATION N/A 8/16/2017    Procedure: Left ventriculography;  Surgeon: Rell Deutsch MD;  [facility];  Service:    • CARDIAC CATHETERIZATION N/A 7/25/2019    Procedure: Left Heart Cath;  Surgeon: Rell Deutsch MD;  [facility];  Service: Cardiology   • CARDIAC CATHETERIZATION N/A 7/25/2019    Procedure: Stent QUIANA coronary;  Surgeon: Rell Deutsch MD;  [facility];  Service: Cardiology   • CARDIAC CATHETERIZATION N/A 7/25/2019    Procedure: Left ventriculography;  Surgeon: Rell Deutsch MD;  [facility];  Service: Cardiology   • CARDIAC CATHETERIZATION N/A 7/25/2019    Procedure: Coronary angiography;  Surgeon: Rell Deutsch MD;  [facility];  Service: Cardiology   • CARDIAC CATHETERIZATION Right 12/22/2021    Procedure: Left Heart Cath;  Surgeon: Helen Oliva MD;  [facility];  Service: Cardiology;  Laterality: Right;   •  CARDIAC CATHETERIZATION N/A 12/22/2021    Procedure: Coronary angiography;  Surgeon: Helen Oliva MD;  Location:  [facility] CATH INVASIVE LOCATION;  Service: Cardiology;  Laterality: N/A;   • CARDIAC CATHETERIZATION N/A 12/22/2021    Procedure: Left ventriculography;  Surgeon: Helen Oliva MD;  Location:  [facility] CATH INVASIVE LOCATION;  Service: Cardiology;  Laterality: N/A;   • CARDIAC CATHETERIZATION N/A 12/22/2021    Procedure: Percutaneous Coronary Intervention;  Surgeon: Helen Oliva MD;  Location:  [facility] CATH INVASIVE LOCATION;  Service: Cardiology;  Laterality: N/A;   • CARDIAC CATHETERIZATION N/A 12/22/2021    Procedure: Stent QUIANA coronary;  Surgeon: Helen Oliva MD;  [facility];  Service: Cardiology;  Laterality: N/A;   • CARDIAC CATHETERIZATION N/A 2/23/2023    Procedure: Left Heart Cath;  Surgeon: Rell Deutsch MD;  Location: [facility] CATH INVASIVE LOCATION;  Service: Cardiology;  Laterality: N/A;   • CARDIAC CATHETERIZATION N/A 2/23/2023    Procedure: Left ventriculography;  Surgeon: Rell Deutsch MD;  [facility];  Service: Cardiology;  Laterality: N/A;   • CARDIAC CATHETERIZATION N/A 2/23/2023    Procedure: Coronary angiography;  Surgeon: Rell Deutsch MD;  [facility];  Service: Cardiology;  Laterality: N/A;   • CORONARY ANGIOPLASTY WITH STENT PLACEMENT     • KIDNEY STONE SURGERY     • PARATHYROIDECTOMY         Physical Exam  Vitals and nursing note reviewed.   Constitutional:       General: He is not in acute distress.     Appearance: He is well-developed. He is not ill-appearing.   HENT:      Head: Normocephalic and atraumatic.   Eyes:      Conjunctiva/sclera: Conjunctivae normal.      Pupils: Pupils are equal, round, and reactive to light.   Neck:      Vascular: No JVD.   Cardiovascular:      Rate and Rhythm: Normal rate and regular rhythm.      Heart sounds: Normal heart sounds. No murmur heard.    No  "friction rub. No gallop.   Pulmonary:      Effort: Pulmonary effort is normal. No respiratory distress.      Breath sounds: Normal breath sounds.   Abdominal:      General: Bowel sounds are normal. There is no distension.      Palpations: Abdomen is soft.   Musculoskeletal:         General: No swelling or deformity.   Skin:     General: Skin is warm and dry.      Capillary Refill: Capillary refill takes less than 2 seconds.   Neurological:      Mental Status: He is alert and oriented to person, place, and time. Mental status is at baseline.   Psychiatric:         Mood and Affect: Mood normal.         Behavior: Behavior normal.          Review of Systems   Constitutional: Positive for fatigue. Negative for unexpected weight change.   HENT: Negative for congestion and nosebleeds.    Eyes: Negative for photophobia and visual disturbance.   Respiratory: Positive for shortness of breath. Negative for cough and chest tightness.    Cardiovascular: Negative for chest pain, palpitations and leg swelling.   Gastrointestinal: Negative for abdominal distention and blood in stool.   Endocrine: Negative for polyphagia and polyuria.   Genitourinary: Positive for frequency. Negative for urgency.   Musculoskeletal: Negative for joint swelling and myalgias.   Skin: Negative for pallor and rash.   Neurological: Negative for dizziness, syncope, weakness, light-headedness, numbness and headaches.   Hematological: Does not bruise/bleed easily.   Psychiatric/Behavioral: Negative for confusion and sleep disturbance.        OBJECTIVE:  /69 (BP Location: Left arm, Patient Position: Sitting)   Pulse 107   Ht 182.9 cm (72.01\")   Wt 99.8 kg (220 lb)   SpO2 97%   BMI 29.83 kg/m²      Body mass index is 29.83 kg/m².  Wt Readings from Last 1 Encounters:   03/02/23 99.8 kg (220 lb)       Lab Review:  Renal Function: Estimated Creatinine Clearance: 100.9 mL/min (by C-G formula based on SCr of 0.81 mg/dL).    Lab Results   Component Value " Date    PROBNP 645.2 03/17/2021       Results for orders placed in visit on 04/08/14    SCANNED - ECHOCARDIOGRAM      Procedures      6 MINUTE WALK  Distance: 1600     SpO2: 95             Cardiac Procedures:  1. 2/22/2023 Cardiac catheterization  FINDINGS:     LEFT VENTRICULOGRAPHY: The LV pressure was 100/10.  There was moderate LV dilatation there is severe mid anterior wall hypokinesis the overall EF is reduced at about 35%.  There was no mitral insufficiency or gradient across the aortic valve on pullback.     CORONARY ANGIOGRAPHY:  Left main: This is normal  Left anterior descending: The stent in the proximal LAD is widely patent and looks normal there really is no significant disease in the mid or distal LAD the diagonals are free of obstructive disease as are the septal perforators  Ramus intermedius:Not present  Circumflex: There is a very large OM1 with some 30% proximal disease there is some 20 to 30% disease in the proximal circumflex just beyond OM1 and then distally the vessel looks good this is unchanged  RCA: Is a dominant vessel.  The stent in the proximal right with some diffuse 30% disease and it distally the vessel is normal     SUMMARY: Moderate to severely reduced global LV function probably an ischemic cardiomyopathy mild nonobstructive coronary disease all stents are patent     RECOMMENDATIONS: We will optimize guideline directed medical therapy have him get followed up in the heart failure clinic next week         Previously trialed diuretics  *      Previously trialed GDMT    Entresto  Spironolactone  Metoprolol succinate  Jardiance      ASSESSMENT:     Diagnosis Plan   1. Chronic HFrEF (heart failure with reduced ejection fraction) ([facility])  CBC & Differential    Comprehensive Metabolic Panel    BNP    Lipid Panel    Hemoglobin A1c      2. Abnormal finding of blood chemistry, unspecified  Hemoglobin A1c      3. Coronary artery disease involving native coronary artery of native heart without  angina pectoris        4. Essential (primary) hypertension        5. Other hyperlipidemia        6. Ischemic cardiomyopathy        7. Type 2 diabetes mellitus with other circulatory complication, without long-term current use of insulin ([facility])              PLAN OF CARE:  1.  HFrEF-NYHA class II.  Most recent ejection fraction 35%.  Currently on Entresto, metoprolol succinate, Jardiance, and spironolactone.    He appears euvolemic on exam.  I will check a BMP today as below.  I would like to begin with increasing his metoprolol to 200 mg daily as his heart rate is greater than 100.  I will have him follow-up in 2 weeks and we will begin to further uptitrate his GDMT at that time.  Once we have optimized his medication regimen, we will start a countdown for 90 days to recheck an echocardiogram.  We will work on lifestyle modification at a subsequent visit.  We reviewed the pathophysiology of his disease process today.  He is not amenable to entertaining conversation regarding any potential for sleep apnea.  I would like him to follow a low-sodium diet of 2000 mg daily or less, fluid restriction of 1500 mL daily, as well as remain adherent with daily weights.    Directions for when to call the clinic reviewed with the patient to include weight gain of 2 to 3 pounds in 24 hours, weight gain of 5 to 10 pounds within 7 days; worsening shortness of breath; worsening lower extremity edema or abdominal distention.    2.  CAD- complicated history as above.  Currently stable, denies angina.    3.  Hypertension-currently stable and controlled.    4.  Hyperlipidemia-no recent lipid panel on file.  We will check a lipid panel today.    5.  DM2- check labs today as below.         Advance Care Planning   Will address at subsequent visit      Thank you for allowing me to participate in the care of your patient,         Lisset MATIAS Moreno  [facility] HEART FAILURE  03/02/23  13:52 EST      **Jaime Disclaimer:**  Much of this  encounter note is an electronic transcription/translation of spoken language to printed text. The electronic translation of spoken language may permit erroneous, or at times, nonsensical words or phrases to be inadvertently transcribed. Although I have reviewed the note for such errors, some may still exist.

## 2023-03-03 ENCOUNTER — TELEPHONE (OUTPATIENT)
Dept: CARDIOLOGY | Facility: CLINIC | Age: 73
End: 2023-03-03
Payer: MEDICARE

## 2023-03-03 NOTE — TELEPHONE ENCOUNTER
Lab results reviewed the patient.  No change to current plan of care.  Advised that his A1c is further elevated and he should follow-up with this with his PCP soon as possible.  All questions and concerns addressed at this time.  Understanding and agreement verbalized.    MATIAS Chakraborty

## 2023-03-14 ENCOUNTER — HOSPITAL ENCOUNTER (OUTPATIENT)
Dept: CARDIOLOGY | Facility: HOSPITAL | Age: 73
Discharge: HOME | End: 2023-03-14
Admitting: INTERNAL MEDICINE
Payer: MEDICARE

## 2023-03-14 VITALS
HEART RATE: 82 BPM | WEIGHT: 220 LBS | OXYGEN SATURATION: 97 % | SYSTOLIC BLOOD PRESSURE: 118 MMHG | BODY MASS INDEX: 29.8 KG/M2 | HEIGHT: 72 IN | DIASTOLIC BLOOD PRESSURE: 70 MMHG

## 2023-03-14 DIAGNOSIS — I25.5 CARDIOMYOPATHY, ISCHEMIC: ICD-10-CM

## 2023-03-14 PROCEDURE — 93306 TTE W/DOPPLER COMPLETE: CPT | Performed by: INTERNAL MEDICINE

## 2023-03-14 PROCEDURE — 93306 TTE W/DOPPLER COMPLETE: CPT

## 2023-03-14 PROCEDURE — 25510000001 PERFLUTREN (DEFINITY) 8.476 MG IN SODIUM CHLORIDE (PF) 0.9 % 10 ML INJECTION: Performed by: INTERNAL MEDICINE

## 2023-03-14 RX ADMIN — PERFLUTREN 1.5 ML: 6.52 INJECTION, SUSPENSION INTRAVENOUS at 07:36

## 2023-03-15 LAB
AORTIC ARCH: 2.6 CM
ASCENDING AORTA: 3.5 CM
BH CV ECHO MEAS - ACS: 2.26 CM
BH CV ECHO MEAS - AO MAX PG: 3.3 MMHG
BH CV ECHO MEAS - AO MEAN PG: 1.81 MMHG
BH CV ECHO MEAS - AO ROOT DIAM: 4 CM
BH CV ECHO MEAS - AO V2 MAX: 91.1 CM/SEC
BH CV ECHO MEAS - AO V2 VTI: 15.6 CM
BH CV ECHO MEAS - AVA(I,D): 4.4 CM2
BH CV ECHO MEAS - EDV(CUBED): 206.9 ML
BH CV ECHO MEAS - EDV(MOD-SP2): 202 ML
BH CV ECHO MEAS - EDV(MOD-SP4): 197 ML
BH CV ECHO MEAS - EF(MOD-BP): 45.6 %
BH CV ECHO MEAS - EF(MOD-SP2): 43.6 %
BH CV ECHO MEAS - EF(MOD-SP4): 44.7 %
BH CV ECHO MEAS - ESV(CUBED): 102.8 ML
BH CV ECHO MEAS - ESV(MOD-SP2): 114 ML
BH CV ECHO MEAS - ESV(MOD-SP4): 109 ML
BH CV ECHO MEAS - FS: 20.8 %
BH CV ECHO MEAS - IVS/LVPW: 0.96 CM
BH CV ECHO MEAS - IVSD: 1.16 CM
BH CV ECHO MEAS - LAT PEAK E' VEL: 4.5 CM/SEC
BH CV ECHO MEAS - LV DIASTOLIC VOL/BSA (35-75): 89.7 CM2
BH CV ECHO MEAS - LV MASS(C)D: 299.8 GRAMS
BH CV ECHO MEAS - LV MAX PG: 2.05 MMHG
BH CV ECHO MEAS - LV MEAN PG: 1.08 MMHG
BH CV ECHO MEAS - LV SYSTOLIC VOL/BSA (12-30): 49.6 CM2
BH CV ECHO MEAS - LV V1 MAX: 71.6 CM/SEC
BH CV ECHO MEAS - LV V1 VTI: 13.4 CM
BH CV ECHO MEAS - LVIDD: 5.9 CM
BH CV ECHO MEAS - LVIDS: 4.7 CM
BH CV ECHO MEAS - LVOT AREA: 5.1 CM2
BH CV ECHO MEAS - LVOT DIAM: 2.5 CM
BH CV ECHO MEAS - LVPWD: 1.2 CM
BH CV ECHO MEAS - MED PEAK E' VEL: 4.1 CM/SEC
BH CV ECHO MEAS - MV A DUR: 0.11 SEC
BH CV ECHO MEAS - MV A MAX VEL: 81.4 CM/SEC
BH CV ECHO MEAS - MV DEC SLOPE: 379.1 CM/SEC2
BH CV ECHO MEAS - MV DEC TIME: 0.1 MSEC
BH CV ECHO MEAS - MV E MAX VEL: 34.9 CM/SEC
BH CV ECHO MEAS - MV E/A: 0.43
BH CV ECHO MEAS - MV MAX PG: 4.4 MMHG
BH CV ECHO MEAS - MV MEAN PG: 1.43 MMHG
BH CV ECHO MEAS - MV P1/2T: 32.5 MSEC
BH CV ECHO MEAS - MV V2 VTI: 14.9 CM
BH CV ECHO MEAS - MVA(P1/2T): 6.8 CM2
BH CV ECHO MEAS - MVA(VTI): 4.6 CM2
BH CV ECHO MEAS - PA ACC TIME: 0.06 SEC
BH CV ECHO MEAS - PA PR(ACCEL): 52.9 MMHG
BH CV ECHO MEAS - PA V2 MAX: 114 CM/SEC
BH CV ECHO MEAS - PULM A REVS DUR: 0.09 SEC
BH CV ECHO MEAS - PULM A REVS VEL: 29.5 CM/SEC
BH CV ECHO MEAS - PULM DIAS VEL: 54.3 CM/SEC
BH CV ECHO MEAS - PULM S/D: 0.93
BH CV ECHO MEAS - PULM SYS VEL: 50.5 CM/SEC
BH CV ECHO MEAS - QP/QS: 0.8
BH CV ECHO MEAS - RAP SYSTOLE: 3 MMHG
BH CV ECHO MEAS - RV MAX PG: 1.7 MMHG
BH CV ECHO MEAS - RV V1 MAX: 65.1 CM/SEC
BH CV ECHO MEAS - RV V1 VTI: 12.4 CM
BH CV ECHO MEAS - RVOT DIAM: 2.37 CM
BH CV ECHO MEAS - RVSP: 12.2 MMHG
BH CV ECHO MEAS - SI(MOD-SP2): 40.1 ML/M2
BH CV ECHO MEAS - SI(MOD-SP4): 40.1 ML/M2
BH CV ECHO MEAS - SV(LVOT): 68 ML
BH CV ECHO MEAS - SV(MOD-SP2): 88 ML
BH CV ECHO MEAS - SV(MOD-SP4): 88 ML
BH CV ECHO MEAS - SV(RVOT): 54.7 ML
BH CV ECHO MEAS - TAPSE (>1.6): 2.33 CM
BH CV ECHO MEAS - TR MAX PG: 9.2 MMHG
BH CV ECHO MEAS - TR MAX VEL: 152 CM/SEC
BH CV ECHO MEASUREMENTS AVERAGE E/E' RATIO: 8.12
BH CV VAS BP LEFT ARM: NORMAL MMHG
BH CV XLRA - RV BASE: 3 CM
BH CV XLRA - RV LENGTH: 7.6 CM
BH CV XLRA - RV MID: 2.8 CM
BH CV XLRA - TDI S': 14.9 CM/SEC
LEFT ATRIUM VOLUME INDEX: 23.5 ML/M2
MAXIMAL PREDICTED HEART RATE: 148 BPM
SINUS: 3.9 CM
STJ: 3.5 CM
STRESS TARGET HR: 126 BPM

## 2023-03-16 ENCOUNTER — HOSPITAL ENCOUNTER (OUTPATIENT)
Dept: CARDIOLOGY | Facility: HOSPITAL | Age: 73
Discharge: HOME | End: 2023-03-16
Admitting: NURSE PRACTITIONER
Payer: MEDICARE

## 2023-03-16 VITALS
WEIGHT: 220.6 LBS | HEIGHT: 72 IN | HEART RATE: 84 BPM | SYSTOLIC BLOOD PRESSURE: 123 MMHG | BODY MASS INDEX: 29.88 KG/M2 | DIASTOLIC BLOOD PRESSURE: 71 MMHG | OXYGEN SATURATION: 97 %

## 2023-03-16 DIAGNOSIS — I10 ESSENTIAL (PRIMARY) HYPERTENSION: ICD-10-CM

## 2023-03-16 DIAGNOSIS — E11.59 TYPE 2 DIABETES MELLITUS WITH OTHER CIRCULATORY COMPLICATION, WITHOUT LONG-TERM CURRENT USE OF INSULIN: ICD-10-CM

## 2023-03-16 DIAGNOSIS — I25.10 CORONARY ARTERY DISEASE INVOLVING NATIVE CORONARY ARTERY OF NATIVE HEART WITHOUT ANGINA PECTORIS: ICD-10-CM

## 2023-03-16 DIAGNOSIS — I50.22 CHRONIC HFREF (HEART FAILURE WITH REDUCED EJECTION FRACTION): Primary | ICD-10-CM

## 2023-03-16 DIAGNOSIS — I25.5 ISCHEMIC CARDIOMYOPATHY: ICD-10-CM

## 2023-03-16 PROCEDURE — G0463 HOSPITAL OUTPT CLINIC VISIT: HCPCS

## 2023-03-16 PROCEDURE — 99214 OFFICE O/P EST MOD 30 MIN: CPT | Performed by: NURSE PRACTITIONER

## 2023-03-16 RX ORDER — EVOLOCUMAB 140 MG/ML
INJECTION, SOLUTION SUBCUTANEOUS
COMMUNITY
Start: 2023-03-07

## 2023-03-16 NOTE — PROGRESS NOTES
[facility] Heart Failure Clinic      Patient Name: Alex Geiger  :1950  Age: 72 y.o.  Sex: male  Referring Provider: Dr. Deutsch  Primary Cardiologist: Dr. Deutsch  Encounter Provider: Ruth Mohan, PharmD      Chief Complaint:   Chief Complaint   Patient presents with   • Congestive Heart Failure       HPI:  Patient presents for their follow-up visit. PMH is significant for HFrEF (EF 35%), CAD s/p QUIANA, HTN, HLD, DM, and unstable angina. At his last visit, we increased his metoprolol to target dose. He has been tolerating this well and he did bring in a log of his BP/HR readings. His HR is still a little bit above goal. He had a couple instances where he got a little dizzy/lightheaded with a change in position. Weights are stable at home between 213-215 lbs.     Home BP Readings (HR):  3/15: 128/62 (63)  3/14: 125/66 (77)  3/13: 125/68 (68)  3/12: 120/66 (76)  3/11: 113/67 (89)  3/10: 106/62 (90)      Current Regimen:  Heart Failure  Jardiance 10 mg daily  Metoprolol succinate 50 mg BID  Entresto 24/26 mg BID  Spironolactone 25 mg daily      Other CV Meds  Clopidogrel 75 mg daily  Isosorbide mononitrate 60 mg (1.5 tablets) daily  Repatha 1 mL every 2 weeks      Medication Use:  Adherence: Good. Missed 0 doses in the last week. Adherence tools used include: medication list. Barriers for adherence include: none  Past hx of medication use/intolerance: statins (myalgias); Repatha (muscle ache/HA/stomach upset)  Affordability: Medicare Advantage  no issues with cost currently      Diet:  Defer in-depth discussion to future visit      Social History:  Tobacco use: former smoker  EtOH use: none  Illicit drug use: no history of illicit drug use  Exercise: moderately active at work/home  has home gym      Immunization Status:  Pneumococcal: PCV20 due per chart review  he does believe he has received one but will check with PCP's office  Influenza: obtains annually  COVID-19: 3/3/21 and 6/10/21   "booster 12/18/21      OBJECTIVE:    /71 (BP Location: Left arm, Patient Position: Sitting)   Pulse 84   Ht 182.9 cm (72.01\")   Wt 100 kg (220 lb 9.6 oz)   SpO2 97%   BMI 29.91 kg/m²     Body mass index is 29.91 kg/m².  Wt Readings from Last 1 Encounters:   03/16/23 100 kg (220 lb 9.6 oz)       Lab Review:  Renal Function: Estimated Creatinine Clearance: 75.7 mL/min (by C-G formula based on SCr of 1.08 mg/dL).    Lab Results   Component Value Date    PROBNP 68.5 03/02/2023       Results for orders placed during the hospital encounter of 03/14/23    Adult Transthoracic Echo Complete W/ Cont if Necessary Per Protocol    Interpretation Summary  •  Left ventricular systolic function is low normal. Calculated left ventricular EF = 45.6%  •  Left ventricular wall thickness is consistent with mild concentric hypertrophy.  •  Left ventricular diastolic function is consistent with (grade Ia w/high LAP) impaired relaxation.  •  Mild tricuspid valve regurgitation is present.  •  Estimated right ventricular systolic pressure from tricuspid regurgitation is normal (<35 mmHg).        ASSESSMENT/PLAN OF CARE:    1. HFrEF (EF 35%)  • He has tolerated the increase in metoprolol well. HR still above goal. He has had a couple instances of some dizziness/lightheadedness with change in position. Overall, SBP mainly in the 120s  • Increase Entresto to 49/51 mg twice daily. Asked him to continue checking and logging his BP at home and let us know if his SBP drops below 100 or if he has increasing symptoms of dizziness/lightheadedness  • Continue metoprolol succinate 100 mg twice daily. Goal heart rate is 50s to 60s. HR is not at goal but he is now at target dose  • Continue spironolactone 25 mg daily. Patient's dose is at target dose  • Continue Jardiance 10 mg daily.  • I do not see any history of afib. Would consider addition of ivabradine at a future visit to get his HR to goal  • BiDil and Verquvo not indicated at this " time  • Advised patient to call clinic with 2-3 lb weight gain in 24 hrs or >5 lb weight gain in 1 week  • Defer in-depth discussion on diet and exercise to future visit        Thank you for allowing me to participate in the care of your patient,         Ruth Mohan, PharmD  [facility] Heart Failure Clinic  03/16/23  13:37 EST

## 2023-03-16 NOTE — PROGRESS NOTES
[facility] HEART FAILURE      Patient Name: Alex Geiger  :1950  Age: 72 y.o.  Sex: male  Referring Provider: Rell Deutsch MD   Primary Cardiologist: Rell Deutsch MD  Encounter Provider:  MATIAS Chakraborty      Chief Complaint:   Chief Complaint   Patient presents with   • Congestive Heart Failure         Congestive Heart Failure  Associated symptoms include fatigue and shortness of breath. Pertinent negatives include no chest pain, palpitations or unexpected weight change.    This 72-year-old male, known to this provider, comes today for further evaluation regarding his chronic systolic heart failure.  Current diagnoses to include coronary artery disease, DM2, hyperlipidemia, hypertension, history of ventricular ectopy and kidney stones as well as history of COVID-19.  She began following with Dr. Deutsch in .  Records have been reviewed by me.    Cardiac catheterization performed in  led to intervention and left circumflex as well as RCA, LV function was noted to be normal at that time per chart review.    In  his LVEF was noted to be mildly reduced at 45%.    In 2017 it was noted that the patient was having chest pain.  Repeat cardiac catheterization revealed LVEF of 40%, normal left main, 90% mid LAD stenosis, 90% small diagonal stenosis not amenable to PCI, widely patent mid circumflex stent, 20% distal circumflex stenosis, widely patent proximal RCA stent with 40 to 50% proximal to mid RCA stenosis-now status post QUIANA to mid LAD lesion.    In 2019 he underwent repeat cardiac catheterization at which point normal left main was found, widely patent LAD stent, left circumflex stent normal, 10 to 20% restenosis in the mid RCA stent, 70 to 80% lesion of distal RCA at bifurcation which was treated with QUIANA placement.  In 2021 he had recurrent angina which led to repeat cardiac catheterization again which revealed restenosis of his LAD stent so QUIANA was  again placed.  February 22, 2023 he was seen in the office by Dr. Deutsch and was having chest pain that went up into his jaw and arms.  Repeat cardiac catheterization revealed normal left main, LAD with patent stent, left circumflex with 30% proximal disease and 2030% disease in proximal circumflex, RCA with 30% diffuse disease proximally and a moderate to severely reduced global LVEF.    Metoprolol was increased to target dosing on 3/2/2023.  He is tolerating this well.  His chief complaint today is fatigue, but he was able to mow 1 acre of lawn this past weekend.    The following portions of the patient's history were reviewed and updated as appropriate: allergies, current medications, past family history, past medical history, past social history, past surgical history and problem list.    Current Outpatient Medications   Medication Sig Dispense Refill   • cholecalciferol (VITAMIN D3) 25 MCG (1000 UT) tablet Take 1 tablet by mouth Daily.     • clopidogrel (PLAVIX) 75 MG tablet TAKE (1) TABLET DAILY 90 tablet 1   • empagliflozin (Jardiance) 10 MG tablet tablet Take 1 tablet by mouth Daily. 90 tablet 3   • fexofenadine (ALLEGRA) 180 MG tablet Take 1 tablet by mouth Daily.     • finasteride (PROSCAR) 5 MG tablet Take 1 tablet by mouth Daily.     • flunisolide (NASALIDE) 25 MCG/ACT (0.025%) solution nasal spray Inhale 2 sprays 2 (Two) Times a Day.     • isosorbide mononitrate (IMDUR) 60 MG 24 hr tablet Take 1.5 tablets by mouth Every Morning. 135 tablet 3   • metFORMIN (GLUCOPHAGE) 500 MG tablet Take 2 tablets by mouth 2 (Two) Times a Day With Meals.     • metoprolol succinate XL (Toprol XL) 100 MG 24 hr tablet Take 1 tablet by mouth 2 (Two) Times a Day. 60 tablet 1   • nitroglycerin (NITROSTAT) 0.4 MG SL tablet 1 under the tongue as needed for angina, may repeat q5mins for up three doses 45 tablet 11   • NON FORMULARY Allergy shot weekly     • Repatha SureClick solution auto-injector SureClick injection INJECT 1ML  EVERY 2 WEEKS     • spironolactone (ALDACTONE) 25 MG tablet Take 1 tablet by mouth Daily. 90 tablet 3   • tamsulosin (FLOMAX) 0.4 MG capsule 24 hr capsule Take 1 capsule by mouth Every Night.     • TRESIBA FLEXTOUCH 200 UNIT/ML solution pen-injector Inject 65 Units as directed Daily.     • sacubitril-valsartan (ENTRESTO) 49-51 MG tablet Take 1 tablet by mouth 2 (Two) Times a Day. 28 tablet 0     No current facility-administered medications for this encounter.       Past Medical History:   Diagnosis Date   • CAD (coronary artery disease)    • COVID-19    • Diabetes mellitus ([facility])    • Hyperlipidemia    • Hypertension    • Kidney stones    • Renal injury    • Unstable angina ([facility])    • Ventricular ectopy    • VPC (ventricular premature complex)        Past Surgical History:   Procedure Laterality Date   • CARDIAC CATHETERIZATION     • CARDIAC CATHETERIZATION N/A 8/16/2017    Procedure: Coronary angiography;  Surgeon: Rell Deutsch MD;  Location: [facility] INVASIVE LOCATION;  Service:    • CARDIAC CATHETERIZATION N/A 8/16/2017    Procedure: Stent QUIANA coronary;  Surgeon: Rell Deutsch MD;  Location: [facility] CATH INVASIVE LOCATION;  Service:    • CARDIAC CATHETERIZATION N/A 8/16/2017    Procedure: Left Heart Cath;  Surgeon: Rell Deutsch MD;  Location: [facility] CATH INVASIVE LOCATION;  Service:    • CARDIAC CATHETERIZATION N/A 8/16/2017    Procedure: Left ventriculography;  Surgeon: Rell Deutsch MD;  Location: [facility] CATH INVASIVE LOCATION;  Service:    • CARDIAC CATHETERIZATION N/A 7/25/2019    Procedure: Left Heart Cath;  Surgeon: Rell Deutsch MD;  Location: [facility] CATH INVASIVE LOCATION;  Service: Cardiology   • CARDIAC CATHETERIZATION N/A 7/25/2019    Procedure: Stent QUIANA coronary;  Surgeon: Rell Deutsch MD;  Location: [facility] CATH INVASIVE LOCATION;  Service: Cardiology   • CARDIAC CATHETERIZATION N/A 7/25/2019    Procedure: Left ventriculography;  Surgeon: Rell Deutsch MD;  Location: [facility]  INVASIVE LOCATION;  Service: Cardiology   • CARDIAC CATHETERIZATION N/A 7/25/2019    Procedure: Coronary angiography;  Surgeon: Rell Deutsch MD;  Location:  [facility] CATH INVASIVE LOCATION;  Service: Cardiology   • CARDIAC CATHETERIZATION Right 12/22/2021    Procedure: Left Heart Cath;  Surgeon: Helen Oliva MD;  Location:  [facility] CATH INVASIVE LOCATION;  Service: Cardiology;  Laterality: Right;   • CARDIAC CATHETERIZATION N/A 12/22/2021    Procedure: Coronary angiography;  Surgeon: Helen Oliva MD;  Location:  [facility] CATH INVASIVE LOCATION;  Service: Cardiology;  Laterality: N/A;   • CARDIAC CATHETERIZATION N/A 12/22/2021    Procedure: Left ventriculography;  Surgeon: Helen Oliva MD;  Location:  [facility] CATH INVASIVE LOCATION;  Service: Cardiology;  Laterality: N/A;   • CARDIAC CATHETERIZATION N/A 12/22/2021    Procedure: Percutaneous Coronary Intervention;  Surgeon: Helen Oliva MD;  [facility];  Service: Cardiology;  Laterality: N/A;   • CARDIAC CATHETERIZATION N/A 12/22/2021    Procedure: Stent QUIANA coronary;  Surgeon: Helen Oliva MD;  Location:  [facility] CATH INVASIVE LOCATION;  Service: Cardiology;  Laterality: N/A;   • CARDIAC CATHETERIZATION N/A 2/23/2023    Procedure: Left Heart Cath;  Surgeon: Rell Deutsch MD;  [facility];  Service: Cardiology;  Laterality: N/A;   • CARDIAC CATHETERIZATION N/A 2/23/2023    Procedure: Left ventriculography;  Surgeon: Rell Deutsch MD;  [facility];  Service: Cardiology;  Laterality: N/A;   • CARDIAC CATHETERIZATION N/A 2/23/2023    Procedure: Coronary angiography;  Surgeon: Rell Deutsch MD;  [facility];  Service: Cardiology;  Laterality: N/A;   • CORONARY ANGIOPLASTY WITH STENT PLACEMENT     • KIDNEY STONE SURGERY     • PARATHYROIDECTOMY         Physical Exam  Vitals and nursing note reviewed.   Constitutional:       General: He is not in acute  distress.     Appearance: He is well-developed. He is not ill-appearing.   HENT:      Head: Normocephalic and atraumatic.   Eyes:      Conjunctiva/sclera: Conjunctivae normal.      Pupils: Pupils are equal, round, and reactive to light.   Neck:      Vascular: No JVD.   Cardiovascular:      Rate and Rhythm: Normal rate and regular rhythm.      Heart sounds: Normal heart sounds. No murmur heard.    No friction rub. No gallop.   Pulmonary:      Effort: Pulmonary effort is normal. No respiratory distress.      Breath sounds: Normal breath sounds.   Abdominal:      General: Bowel sounds are normal. There is no distension.      Palpations: Abdomen is soft.   Musculoskeletal:         General: No swelling or deformity.   Skin:     General: Skin is warm and dry.      Capillary Refill: Capillary refill takes less than 2 seconds.   Neurological:      Mental Status: He is alert and oriented to person, place, and time. Mental status is at baseline.   Psychiatric:         Mood and Affect: Mood normal.         Behavior: Behavior normal.          Review of Systems   Constitutional: Positive for fatigue. Negative for unexpected weight change.   HENT: Negative for congestion and nosebleeds.    Eyes: Negative for photophobia and visual disturbance.   Respiratory: Positive for shortness of breath. Negative for cough and chest tightness.    Cardiovascular: Negative for chest pain, palpitations and leg swelling.   Gastrointestinal: Negative for abdominal distention and blood in stool.   Endocrine: Negative for polyphagia and polyuria.   Genitourinary: Positive for frequency. Negative for urgency.   Musculoskeletal: Negative for joint swelling and myalgias.   Skin: Negative for pallor and rash.   Neurological: Negative for dizziness, syncope, weakness, light-headedness, numbness and headaches.   Hematological: Does not bruise/bleed easily.   Psychiatric/Behavioral: Negative for confusion and sleep disturbance.        OBJECTIVE:  /71  "(BP Location: Left arm, Patient Position: Sitting)   Pulse 84   Ht 182.9 cm (72.01\")   Wt 100 kg (220 lb 9.6 oz)   SpO2 97%   BMI 29.91 kg/m²      Body mass index is 29.91 kg/m².  Wt Readings from Last 1 Encounters:   03/16/23 100 kg (220 lb 9.6 oz)       Lab Review:  Renal Function: Estimated Creatinine Clearance: 75.7 mL/min (by C-G formula based on SCr of 1.08 mg/dL).    Lab Results   Component Value Date    PROBNP 68.5 03/02/2023       Results for orders placed during the hospital encounter of 03/14/23    Adult Transthoracic Echo Complete W/ Cont if Necessary Per Protocol    Interpretation Summary  •  Left ventricular systolic function is low normal. Calculated left ventricular EF = 45.6%  •  Left ventricular wall thickness is consistent with mild concentric hypertrophy.  •  Left ventricular diastolic function is consistent with (grade Ia w/high LAP) impaired relaxation.  •  Mild tricuspid valve regurgitation is present.  •  Estimated right ventricular systolic pressure from tricuspid regurgitation is normal (<35 mmHg).      Procedures      6 MINUTE WALK                      Cardiac Procedures:  1. 2/22/2023 Cardiac catheterization  FINDINGS:     LEFT VENTRICULOGRAPHY: The LV pressure was 100/10.  There was moderate LV dilatation there is severe mid anterior wall hypokinesis the overall EF is reduced at about 35%.  There was no mitral insufficiency or gradient across the aortic valve on pullback.     CORONARY ANGIOGRAPHY:  Left main: This is normal  Left anterior descending: The stent in the proximal LAD is widely patent and looks normal there really is no significant disease in the mid or distal LAD the diagonals are free of obstructive disease as are the septal perforators  Ramus intermedius:Not present  Circumflex: There is a very large OM1 with some 30% proximal disease there is some 20 to 30% disease in the proximal circumflex just beyond OM1 and then distally the vessel looks good this is " unchanged  RCA: Is a dominant vessel.  The stent in the proximal right with some diffuse 30% disease and it distally the vessel is normal     SUMMARY: Moderate to severely reduced global LV function probably an ischemic cardiomyopathy mild nonobstructive coronary disease all stents are patent     RECOMMENDATIONS: We will optimize guideline directed medical therapy have him get followed up in the heart failure clinic next week         Previously trialed diuretics  *      Previously trialed GDMT    Entresto  Spironolactone  Metoprolol succinate  Jardiance      ASSESSMENT:     Diagnosis Plan   1. Chronic HFrEF (heart failure with reduced ejection fraction) ([facility])        2. Ischemic cardiomyopathy        3. Coronary artery disease involving native coronary artery of native heart without angina pectoris        4. Essential (primary) hypertension        5. Type 2 diabetes mellitus with other circulatory complication, without long-term current use of insulin ([facility])              PLAN OF CARE:  1.  HFrEF-NYHA class II.  Most recent ejection fraction 35%.  Currently on Entresto, metoprolol succinate, Jardiance, and spironolactone.    He remains euvolemic on exam.  He is now on target dosing of metoprolol.  I would like to begin increasing his Entresto.  His heart rate remains a little elevated, we may look into starting ivabradine once we have optimized his other medications.  He is to continue his increased dose of metoprolol.  He is also to continue a strict low-sodium diet of 2000 mg daily or less, fluid restriction of 1500 mL daily, as well as remain adherent with daily weights.    Directions for when to call the clinic reviewed with the patient to include weight gain of 2 to 3 pounds in 24 hours, weight gain of 5 to 10 pounds within 7 days; worsening shortness of breath; worsening lower extremity edema or abdominal distention.    2.  CAD- complicated history as above.  Stable, remains without angina    3.  Hypertension-  stable and controlled.    4.  Hyperlipidemia- now back on Repatha.    5.  DM2--by PCP.        Increase Entresto to 49-51 mg twice daily; follow-up in 2 weeks or sooner if needed with repeat BMP.         Advance Care Planning   Will address at subsequent visit      Thank you for allowing me to participate in the care of your patient,         Lisset Perez APRANTONY  [facility] HEART FAILURE  03/16/23  13:52 EST      **Jaime Disclaimer:**  Much of this encounter note is an electronic transcription/translation of spoken language to printed text. The electronic translation of spoken language may permit erroneous, or at times, nonsensical words or phrases to be inadvertently transcribed. Although I have reviewed the note for such errors, some may still exist.

## 2023-03-16 NOTE — ADDENDUM NOTE
Encounter addended by: April Hernandes MA on: 3/16/2023 2:54 PM   Actions taken: Charge Capture section accepted

## 2023-03-30 ENCOUNTER — HOSPITAL ENCOUNTER (OUTPATIENT)
Dept: CARDIOLOGY | Facility: HOSPITAL | Age: 73
Discharge: HOME | End: 2023-03-30
Admitting: NURSE PRACTITIONER
Payer: MEDICARE

## 2023-03-30 VITALS
DIASTOLIC BLOOD PRESSURE: 68 MMHG | WEIGHT: 217.4 LBS | BODY MASS INDEX: 29.45 KG/M2 | HEIGHT: 72 IN | OXYGEN SATURATION: 98 % | SYSTOLIC BLOOD PRESSURE: 117 MMHG | HEART RATE: 75 BPM

## 2023-03-30 DIAGNOSIS — I10 ESSENTIAL (PRIMARY) HYPERTENSION: ICD-10-CM

## 2023-03-30 DIAGNOSIS — E11.59 TYPE 2 DIABETES MELLITUS WITH OTHER CIRCULATORY COMPLICATION, WITHOUT LONG-TERM CURRENT USE OF INSULIN: ICD-10-CM

## 2023-03-30 DIAGNOSIS — I25.10 CORONARY ARTERY DISEASE INVOLVING NATIVE CORONARY ARTERY OF NATIVE HEART WITHOUT ANGINA PECTORIS: ICD-10-CM

## 2023-03-30 DIAGNOSIS — I50.22 CHRONIC HFREF (HEART FAILURE WITH REDUCED EJECTION FRACTION): Primary | ICD-10-CM

## 2023-03-30 DIAGNOSIS — I25.5 ISCHEMIC CARDIOMYOPATHY: ICD-10-CM

## 2023-03-30 DIAGNOSIS — E78.49 OTHER HYPERLIPIDEMIA: ICD-10-CM

## 2023-03-30 PROCEDURE — G0463 HOSPITAL OUTPT CLINIC VISIT: HCPCS

## 2023-03-30 RX ORDER — TESTOSTERONE 20.25 MG/1.25G
GEL TOPICAL
COMMUNITY
Start: 2023-03-27

## 2023-03-30 NOTE — PROGRESS NOTES
[facility] HEART FAILURE      Patient Name: Alex Geiger  :1950  Age: 72 y.o.  Sex: male  Referring Provider: Rell Deutsch MD   Primary Cardiologist: Rell Deutsch MD  Encounter Provider:  MATIAS Chakraborty      Chief Complaint:   Chief Complaint   Patient presents with   • Congestive Heart Failure         Congestive Heart Failure  Associated symptoms include fatigue. Pertinent negatives include no chest pain, palpitations, shortness of breath or unexpected weight change.    This 72-year-old male, known to this provider, comes today for further evaluation regarding his chronic systolic heart failure.  Current diagnoses to include coronary artery disease, DM2, hyperlipidemia, hypertension, history of ventricular ectopy and kidney stones as well as history of COVID-19.  He began following with Dr. Deutsch in .  Records have been reviewed by me.    Cardiac catheterization performed in  led to intervention of left circumflex as well as RCA, LV function was noted to be normal at that time per chart review.    In  his LVEF was noted to be mildly reduced at 45%.    In 2017 it was noted that the patient was having chest pain.  Repeat cardiac catheterization revealed LVEF of 40%, normal left main, 90% mid LAD stenosis, 90% small diagonal stenosis not amenable to PCI, widely patent mid circumflex stent, 20% distal circumflex stenosis, widely patent proximal RCA stent with 40 to 50% proximal to mid RCA stenosis-now status post QUIANA to mid LAD lesion.    In 2019 he underwent repeat cardiac catheterization at which point normal left main was found, widely patent LAD stent, left circumflex stent normal, 10 to 20% restenosis in the mid RCA stent, 70 to 80% lesion of distal RCA at bifurcation which was treated with QUIAAN placement.  In 2021 he had recurrent angina which led to repeat cardiac catheterization again which revealed restenosis of his LAD stent so QUIANA was again  placed.  February 22, 2023 he was seen in the office by Dr. Deutsch and was having chest pain that went up into his jaw and arms.  Repeat cardiac catheterization revealed normal left main, LAD with patent stent, left circumflex with 30% proximal disease and 20-30% disease in proximal circumflex, RCA with 30% diffuse disease proximally and a moderate to severely reduced global LVEF.    Metoprolol was increased to target dosing on 3/2/2023.  Entresto was increased to mid dosing 3/16/2023.    He is currently feeling very well.  His most recent ejection fraction is now 45% per echocardiogram.    The following portions of the patient's history were reviewed and updated as appropriate: allergies, current medications, past family history, past medical history, past social history, past surgical history and problem list.    Current Outpatient Medications   Medication Sig Dispense Refill   • cholecalciferol (VITAMIN D3) 25 MCG (1000 UT) tablet Take 1 tablet by mouth Daily.     • clopidogrel (PLAVIX) 75 MG tablet TAKE (1) TABLET DAILY 90 tablet 1   • empagliflozin (Jardiance) 10 MG tablet tablet Take 1 tablet by mouth Daily. 90 tablet 3   • fexofenadine (ALLEGRA) 180 MG tablet Take 1 tablet by mouth Daily.     • finasteride (PROSCAR) 5 MG tablet Take 1 tablet by mouth Daily.     • flunisolide (NASALIDE) 25 MCG/ACT (0.025%) solution nasal spray Inhale 2 sprays 2 (Two) Times a Day.     • isosorbide mononitrate (IMDUR) 60 MG 24 hr tablet Take 1.5 tablets by mouth Every Morning. 135 tablet 3   • metFORMIN (GLUCOPHAGE) 500 MG tablet Take 2 tablets by mouth 2 (Two) Times a Day With Meals.     • metoprolol succinate XL (Toprol XL) 100 MG 24 hr tablet Take 1 tablet by mouth 2 (Two) Times a Day. 60 tablet 1   • nitroglycerin (NITROSTAT) 0.4 MG SL tablet 1 under the tongue as needed for angina, may repeat q5mins for up three doses 45 tablet 11   • NON FORMULARY Allergy shot weekly     • Repatha SureClick solution auto-injector SureClick  injection INJECT 1ML EVERY 2 WEEKS     • sacubitril-valsartan (ENTRESTO) 49-51 MG tablet Take 1 tablet by mouth 2 (Two) Times a Day. 28 tablet 0   • spironolactone (ALDACTONE) 25 MG tablet Take 1 tablet by mouth Daily. 90 tablet 3   • tamsulosin (FLOMAX) 0.4 MG capsule 24 hr capsule Take 1 capsule by mouth Every Night.     • Testosterone 1.62 % gel apply 2 pumps TO shoulders ONCE DAILY     • TRESIBA FLEXTOUCH 200 UNIT/ML solution pen-injector Inject 65 Units as directed Daily.       No current facility-administered medications for this encounter.       Past Medical History:   Diagnosis Date   • CAD (coronary artery disease)    • COVID-19    • Diabetes mellitus ([facility])    • Hyperlipidemia    • Hypertension    • Kidney stones    • Renal injury    • Unstable angina (HCC)    • Ventricular ectopy    • VPC (ventricular premature complex)        Past Surgical History:   Procedure Laterality Date   • CARDIAC CATHETERIZATION     • CARDIAC CATHETERIZATION N/A 8/16/2017    Procedure: Coronary angiography;  Surgeon: Rell Deutsch MD;  Location: [facility] CATH INVASIVE LOCATION;  Service:    • CARDIAC CATHETERIZATION N/A 8/16/2017    Procedure: Stent QUIANA coronary;  Surgeon: Rell Deutsch MD;  Location: [facility] CATH INVASIVE LOCATION;  Service:    • CARDIAC CATHETERIZATION N/A 8/16/2017    Procedure: Left Heart Cath;  Surgeon: Rell Deutsch MD;  Location: [facility] CATH INVASIVE LOCATION;  Service:    • CARDIAC CATHETERIZATION N/A 8/16/2017    Procedure: Left ventriculography;  Surgeon: Rell Deutsch MD;  Location: [facility] CATH INVASIVE LOCATION;  Service:    • CARDIAC CATHETERIZATION N/A 7/25/2019    Procedure: Left Heart Cath;  Surgeon: Rell Deutsch MD;  Location: [facility] CATH INVASIVE LOCATION;  Service: Cardiology   • CARDIAC CATHETERIZATION N/A 7/25/2019    Procedure: Stent QUIANA coronary;  Surgeon: Rell Deutsch MD;  Location: [facility] CATH INVASIVE LOCATION;  Service: Cardiology   • CARDIAC CATHETERIZATION N/A 7/25/2019     Procedure: Left ventriculography;  Surgeon: Rell Deutsch MD;  Location:  [facility] CATH INVASIVE LOCATION;  Service: Cardiology   • CARDIAC CATHETERIZATION N/A 7/25/2019    Procedure: Coronary angiography;  Surgeon: Rell eDutsch MD;  Location:  [facility] CATH INVASIVE LOCATION;  Service: Cardiology   • CARDIAC CATHETERIZATION Right 12/22/2021    Procedure: Left Heart Cath;  Surgeon: Helen Oliva MD;  Location[facility];  Service: Cardiology;  Laterality: Right;   • CARDIAC CATHETERIZATION N/A 12/22/2021    Procedure: Coronary angiography;  Surgeon: Helen Oliva MD;  Location:  [facility] CATH INVASIVE LOCATION;  Service: Cardiology;  Laterality: N/A;   • CARDIAC CATHETERIZATION N/A 12/22/2021    Procedure: Left ventriculography;  Surgeon: Helen Oliva MD;  [facility];  Service: Cardiology;  Laterality: N/A;   • CARDIAC CATHETERIZATION N/A 12/22/2021    Procedure: Percutaneous Coronary Intervention;  Surgeon: Helen Oliva MD;  [facility];  Service: Cardiology;  Laterality: N/A;   • CARDIAC CATHETERIZATION N/A 12/22/2021    Procedure: Stent QUIANA coronary;  Surgeon: Helen Oliva MD;  [facility];  Service: Cardiology;  Laterality: N/A;   • CARDIAC CATHETERIZATION N/A 2/23/2023    Procedure: Left Heart Cath;  Surgeon: Rell Deutsch MD;  [facility];  Service: Cardiology;  Laterality: N/A;   • CARDIAC CATHETERIZATION N/A 2/23/2023    Procedure: Left ventriculography;  Surgeon: Rell Deutsch MD;  [facility];  Service: Cardiology;  Laterality: N/A;   • CARDIAC CATHETERIZATION N/A 2/23/2023    Procedure: Coronary angiography;  Surgeon: Rell Deutsch MD;  [facility];  Service: Cardiology;  Laterality: N/A;   • CORONARY ANGIOPLASTY WITH STENT PLACEMENT     • KIDNEY STONE SURGERY     • PARATHYROIDECTOMY         Physical  Exam  Vitals and nursing note reviewed.   Constitutional:       General: He is not in acute distress.     Appearance: He is well-developed. He is not ill-appearing.   HENT:      Head: Normocephalic and atraumatic.   Eyes:      Conjunctiva/sclera: Conjunctivae normal.      Pupils: Pupils are equal, round, and reactive to light.   Neck:      Vascular: No JVD.   Cardiovascular:      Rate and Rhythm: Normal rate and regular rhythm.      Heart sounds: Normal heart sounds. No murmur heard.    No friction rub. No gallop.   Pulmonary:      Effort: Pulmonary effort is normal. No respiratory distress.      Breath sounds: Normal breath sounds.   Abdominal:      General: Bowel sounds are normal. There is no distension.      Palpations: Abdomen is soft.   Musculoskeletal:         General: No swelling or deformity.   Skin:     General: Skin is warm and dry.      Capillary Refill: Capillary refill takes less than 2 seconds.   Neurological:      Mental Status: He is alert and oriented to person, place, and time. Mental status is at baseline.   Psychiatric:         Mood and Affect: Mood normal.         Behavior: Behavior normal.          Review of Systems   Constitutional: Positive for fatigue. Negative for unexpected weight change.   HENT: Negative for congestion and nosebleeds.    Eyes: Negative for photophobia and visual disturbance.   Respiratory: Negative for cough, chest tightness and shortness of breath.    Cardiovascular: Negative for chest pain, palpitations and leg swelling.   Gastrointestinal: Negative for abdominal distention and blood in stool.   Endocrine: Negative for polyphagia and polyuria.   Genitourinary: Positive for frequency. Negative for urgency.   Musculoskeletal: Negative for joint swelling and myalgias.   Skin: Negative for pallor and rash.   Neurological: Negative for dizziness, syncope, weakness, light-headedness, numbness and headaches.   Hematological: Does not bruise/bleed easily.  "  Psychiatric/Behavioral: Negative for confusion and sleep disturbance.        OBJECTIVE:  /68 (BP Location: Left arm, Patient Position: Sitting)   Pulse 75   Ht 182.9 cm (72.01\")   Wt 98.6 kg (217 lb 6.4 oz)   SpO2 98%   BMI 29.48 kg/m²      Body mass index is 29.48 kg/m².  Wt Readings from Last 1 Encounters:   03/30/23 98.6 kg (217 lb 6.4 oz)       Lab Review:  Renal Function: Estimated Creatinine Clearance: 75.2 mL/min (by C-G formula based on SCr of 1.08 mg/dL).    Lab Results   Component Value Date    PROBNP 68.5 03/02/2023       Results for orders placed during the hospital encounter of 03/14/23    Adult Transthoracic Echo Complete W/ Cont if Necessary Per Protocol    Interpretation Summary  •  Left ventricular systolic function is low normal. Calculated left ventricular EF = 45.6%  •  Left ventricular wall thickness is consistent with mild concentric hypertrophy.  •  Left ventricular diastolic function is consistent with (grade Ia w/high LAP) impaired relaxation.  •  Mild tricuspid valve regurgitation is present.  •  Estimated right ventricular systolic pressure from tricuspid regurgitation is normal (<35 mmHg).      Procedures      6 MINUTE WALK                      Cardiac Procedures:  1. 2/22/2023 Cardiac catheterization  FINDINGS:     LEFT VENTRICULOGRAPHY: The LV pressure was 100/10.  There was moderate LV dilatation there is severe mid anterior wall hypokinesis the overall EF is reduced at about 35%.  There was no mitral insufficiency or gradient across the aortic valve on pullback.     CORONARY ANGIOGRAPHY:  Left main: This is normal  Left anterior descending: The stent in the proximal LAD is widely patent and looks normal there really is no significant disease in the mid or distal LAD the diagonals are free of obstructive disease as are the septal perforators  Ramus intermedius:Not present  Circumflex: There is a very large OM1 with some 30% proximal disease there is some 20 to 30% disease " in the proximal circumflex just beyond OM1 and then distally the vessel looks good this is unchanged  RCA: Is a dominant vessel.  The stent in the proximal right with some diffuse 30% disease and it distally the vessel is normal     SUMMARY: Moderate to severely reduced global LV function probably an ischemic cardiomyopathy mild nonobstructive coronary disease all stents are patent     RECOMMENDATIONS: We will optimize guideline directed medical therapy have him get followed up in the heart failure clinic next week         Previously trialed diuretics  *      Previously trialed GDMT    Entresto  Spironolactone  Metoprolol succinate  Jardiance      ASSESSMENT:     Diagnosis Plan   1. Chronic HFrEF (heart failure with reduced ejection fraction) ([facility])  Basic Metabolic Panel      2. Ischemic cardiomyopathy        3. Coronary artery disease involving native coronary artery of native heart without angina pectoris        4. Other hyperlipidemia        5. Essential (primary) hypertension        6. Type 2 diabetes mellitus with other circulatory complication, without long-term current use of insulin ([facility])              PLAN OF CARE:  1.  HFrEF-NYHA class I.  Most recent ejection fraction 45%, up from 35%.  Currently on Entresto, metoprolol succinate, Jardiance, and spironolactone.    Remains euvolemic on exam.  His ejection fraction is now improved as above.  I would like to continue his current GDMT.  He will continue a strict low-sodium diet of 2000 mg daily or less, fluid restriction of 1500 mL daily, as well as remain adherent with daily weights.    Directions for when to call the clinic reviewed with the patient to include weight gain of 2 to 3 pounds in 24 hours, weight gain of 5 to 10 pounds within 7 days; worsening shortness of breath; worsening lower extremity edema or abdominal distention.    2.  CAD- complicated history as above.  Stable, denies angina.    3.  Hypertension- stable and controlled.    4.   Hyperlipidemia- now back on Repatha.    5.  DM2-- managed by PCP.        Continue current GDMT; follow-up in 3 months or sooner if needed        Advance Care Planning   Will address at subsequent visit      Thank you for allowing me to participate in the care of your patient,         Lisset GUTIERRES MATIAS Perez  [facility] HEART FAILURE  03/30/23  13:52 EST      **Jaime Disclaimer:**  Much of this encounter note is an electronic transcription/translation of spoken language to printed text. The electronic translation of spoken language may permit erroneous, or at times, nonsensical words or phrases to be inadvertently transcribed. Although I have reviewed the note for such errors, some may still exist.

## 2023-03-30 NOTE — ADDENDUM NOTE
Encounter addended by: April Hernandes MA on: 3/30/2023 2:32 PM   Actions taken: Charge Capture section accepted

## 2023-04-05 ENCOUNTER — OFFICE VISIT (OUTPATIENT)
Dept: CARDIOLOGY | Facility: CLINIC | Age: 73
End: 2023-04-05
Payer: MEDICARE

## 2023-04-05 VITALS
HEART RATE: 77 BPM | DIASTOLIC BLOOD PRESSURE: 90 MMHG | SYSTOLIC BLOOD PRESSURE: 140 MMHG | BODY MASS INDEX: 29.69 KG/M2 | WEIGHT: 219.2 LBS | HEIGHT: 72 IN

## 2023-04-05 DIAGNOSIS — I50.22 CHRONIC HFREF (HEART FAILURE WITH REDUCED EJECTION FRACTION): ICD-10-CM

## 2023-04-05 DIAGNOSIS — I25.10 CORONARY ARTERY DISEASE INVOLVING NATIVE CORONARY ARTERY OF NATIVE HEART WITHOUT ANGINA PECTORIS: Primary | ICD-10-CM

## 2023-04-05 DIAGNOSIS — I25.5 ISCHEMIC CARDIOMYOPATHY: ICD-10-CM

## 2023-04-05 PROCEDURE — 93000 ELECTROCARDIOGRAM COMPLETE: CPT | Performed by: INTERNAL MEDICINE

## 2023-04-05 PROCEDURE — 3077F SYST BP >= 140 MM HG: CPT | Performed by: INTERNAL MEDICINE

## 2023-04-05 PROCEDURE — 99214 OFFICE O/P EST MOD 30 MIN: CPT | Performed by: INTERNAL MEDICINE

## 2023-04-05 PROCEDURE — 3080F DIAST BP >= 90 MM HG: CPT | Performed by: INTERNAL MEDICINE

## 2023-04-05 NOTE — PROGRESS NOTES
Date of Office Visit: 23  Encounter Provider: Rell Deutsch MD  Place of Service: [facility] CARDIOLOGY  Patient Name: Alex Geiger  :1950  [id number]    Chief Complaint   Patient presents with   • Coronary Artery Disease   :     HPI: Alex Geiger is a 72 y.o. male  He had stents put in his mid-left anterior descending by me in .  At that time, he had 40% disease in his right coronary artery and 30% in his circumflex.  He has had prior drug-eluting stents to his right coronary artery and circumflex in .  He had normal left ventricular function.    In  he had recurrent angina and had developed a new lesion in his distal RCA and had drug-eluting stenting to the LAD his other coronaries look good at that time and his LV function was normal.  He had pretty severe COVID-19 infection and 2021.  In 2021 he had recurrent angina we recath him need restenosis LAD stent and had a 4.0x23 Xience placed in that the other arteries looked good.  In 2023 came back in symptoms very suggestive of angina we took him back to the Cath Lab did not see any high-grade obstructive epicardial disease but we did see that he had pretty severely reduced LV function his EF was 35%.  We really worked on guideline directed medical therapy and got him enrolled in the heart failure clinic and a follow-up echo in 2023 showed EF was 45%.  Now he is here for follow-up.    He is here for follow-up in general has been doing pretty well he says he gets a little bit lightheaded at times he has not had a lot of angina no PND no orthopnea no edema he is also got some what sounds like musculoskeletal problems in his upper neck.    Past Medical History:   Diagnosis Date   • CAD (coronary artery disease)    • COVID-19    • Diabetes mellitus    • Hyperlipidemia    • Hypertension    • Kidney stones    • Renal injury    • Unstable angina    • Ventricular ectopy    • VPC  (ventricular premature complex)        Past Surgical History:   Procedure Laterality Date   • CARDIAC CATHETERIZATION     • CARDIAC CATHETERIZATION N/A 8/16/2017    Procedure: Coronary angiography;  Surgeon: Rell Deutsch MD;  [facility];  Service:    • CARDIAC CATHETERIZATION N/A 8/16/2017    Procedure: Stent QUIANA coronary;  Surgeon: Rell Deutsch MD;  [facility];  Service:    • CARDIAC CATHETERIZATION N/A 8/16/2017    Procedure: Left Heart Cath;  Surgeon: Rell Deutsch MD;  [facility];  Service:    • CARDIAC CATHETERIZATION N/A 8/16/2017    Procedure: Left ventriculography;  Surgeon: Rell Deutsch MD;  Location: [facility] CATH INVASIVE LOCATION;  Service:    • CARDIAC CATHETERIZATION N/A 7/25/2019    Procedure: Left Heart Cath;  Surgeon: Rell Deutsch MD;  [facility];  Service: Cardiology   • CARDIAC CATHETERIZATION N/A 7/25/2019    Procedure: Stent QUIANA coronary;  Surgeon: Rell Deutsch MD;  Location: [facility] CATH INVASIVE LOCATION;  Service: Cardiology   • CARDIAC CATHETERIZATION N/A 7/25/2019    Procedure: Left ventriculography;  Surgeon: Rell Deutsch MD;  Location: [facility] CATH INVASIVE LOCATION;  Service: Cardiology   • CARDIAC CATHETERIZATION N/A 7/25/2019    Procedure: Coronary angiography;  Surgeon: Rell Deutsch MD;  Location: [facility] CATH INVASIVE LOCATION;  Service: Cardiology   • CARDIAC CATHETERIZATION Right 12/22/2021    Procedure: Left Heart Cath;  Surgeon: Helen Oliva MD;  Location: [facility] CATH INVASIVE LOCATION;  Service: Cardiology;  Laterality: Right;   • CARDIAC CATHETERIZATION N/A 12/22/2021    Procedure: Coronary angiography;  Surgeon: Helen Oliva MD;  Location: [facility] CATH INVASIVE LOCATION;  Service: Cardiology;  Laterality: N/A;   • CARDIAC CATHETERIZATION N/A 12/22/2021    Procedure: Left ventriculography;  Surgeon: Helen Oliva MD;  Location: [facility]  CATH INVASIVE LOCATION;  Service: Cardiology;  Laterality: N/A;   • CARDIAC CATHETERIZATION N/A 12/22/2021    Procedure: Percutaneous Coronary Intervention;  Surgeon: Helen Oliva MD;  [facility];  Service: Cardiology;  Laterality: N/A;   • CARDIAC CATHETERIZATION N/A 12/22/2021    Procedure: Stent QUIANA coronary;  Surgeon: Helen Oliva MD;  [facility];  Service: Cardiology;  Laterality: N/A;   • CARDIAC CATHETERIZATION N/A 2/23/2023    Procedure: Left Heart Cath;  Surgeon: Rell Deutsch MD;  Location: [facility] CATH INVASIVE LOCATION;  Service: Cardiology;  Laterality: N/A;   • CARDIAC CATHETERIZATION N/A 2/23/2023    Procedure: Left ventriculography;  Surgeon: Rell Deutsch MD;  Location: [facility] CATH INVASIVE LOCATION;  Service: Cardiology;  Laterality: N/A;   • CARDIAC CATHETERIZATION N/A 2/23/2023    Procedure: Coronary angiography;  Surgeon: Rell Deutsch MD;  Location: [facility] CATH INVASIVE LOCATION;  Service: Cardiology;  Laterality: N/A;   • CORONARY ANGIOPLASTY WITH STENT PLACEMENT     • KIDNEY STONE SURGERY     • PARATHYROIDECTOMY         Social History     Socioeconomic History   • Marital status:    Tobacco Use   • Smoking status: Former     Packs/day: 1.00     Years: 25.00     Pack years: 25.00     Types: Cigarettes   • Smokeless tobacco: Never   • Tobacco comments:     NO CAFFEINE USE   Vaping Use   • Vaping Use: Never used   Substance and Sexual Activity   • Alcohol use: Not Currently   • Drug use: Never   • Sexual activity: Defer       Family History   Problem Relation Age of Onset   • Diabetes Mother    • Alzheimer's disease Father    • Kidney disease Father    • Diabetes Father    • No Known Problems Maternal Grandmother    • No Known Problems Maternal Grandfather    • No Known Problems Paternal Grandmother    • Heart attack Paternal Grandfather    • Heart disease Paternal Grandfather    • Diabetes Paternal Grandfather         Review of Systems   Constitutional: Negative for decreased appetite, fever, malaise/fatigue and weight loss.   HENT: Negative for nosebleeds.    Eyes: Negative for double vision.   Cardiovascular: Negative for chest pain, claudication, cyanosis, dyspnea on exertion, irregular heartbeat, leg swelling, near-syncope, orthopnea, palpitations, paroxysmal nocturnal dyspnea and syncope.   Respiratory: Negative for cough, hemoptysis and shortness of breath.    Hematologic/Lymphatic: Negative for bleeding problem.   Skin: Negative for rash.   Musculoskeletal: Negative for falls and myalgias.   Gastrointestinal: Negative for hematochezia, jaundice, melena, nausea and vomiting.   Genitourinary: Negative for hematuria.   Neurological: Negative for dizziness and seizures.   Psychiatric/Behavioral: Negative for altered mental status and memory loss.       Allergies   Allergen Reactions   • Oxycodone-Acetaminophen Itching   • Percocet [Oxycodone-Acetaminophen]    • Shellfish-Derived Products Hives and Itching   • Statins    • Adhesive Tape Rash   • Latex Rash         Current Outpatient Medications:   •  cholecalciferol (VITAMIN D3) 25 MCG (1000 UT) tablet, Take 1 tablet by mouth Daily., Disp: , Rfl:   •  clopidogrel (PLAVIX) 75 MG tablet, TAKE (1) TABLET DAILY, Disp: 90 tablet, Rfl: 1  •  empagliflozin (Jardiance) 10 MG tablet tablet, Take 1 tablet by mouth Daily., Disp: 90 tablet, Rfl: 3  •  fexofenadine (ALLEGRA) 180 MG tablet, Take 1 tablet by mouth Daily., Disp: , Rfl:   •  finasteride (PROSCAR) 5 MG tablet, Take 1 tablet by mouth Daily., Disp: , Rfl:   •  flunisolide (NASALIDE) 25 MCG/ACT (0.025%) solution nasal spray, Inhale 2 sprays 2 (Two) Times a Day., Disp: , Rfl:   •  isosorbide mononitrate (IMDUR) 60 MG 24 hr tablet, Take 1.5 tablets by mouth Every Morning., Disp: 135 tablet, Rfl: 3  •  metFORMIN (GLUCOPHAGE) 500 MG tablet, Take 2 tablets by mouth 2 (Two) Times a Day With Meals., Disp: , Rfl:   •  metoprolol  "succinate XL (Toprol XL) 100 MG 24 hr tablet, Take 1 tablet by mouth 2 (Two) Times a Day., Disp: 60 tablet, Rfl: 1  •  nitroglycerin (NITROSTAT) 0.4 MG SL tablet, 1 under the tongue as needed for angina, may repeat q5mins for up three doses, Disp: 45 tablet, Rfl: 11  •  NON FORMULARY, Allergy shot weekly, Disp: , Rfl:   •  Repatha SureClick solution auto-injector SureClick injection, INJECT 1ML EVERY 2 WEEKS, Disp: , Rfl:   •  sacubitril-valsartan (ENTRESTO) 49-51 MG tablet, Take 1 tablet by mouth 2 (Two) Times a Day., Disp: 28 tablet, Rfl: 0  •  spironolactone (ALDACTONE) 25 MG tablet, Take 1 tablet by mouth Daily., Disp: 90 tablet, Rfl: 3  •  tamsulosin (FLOMAX) 0.4 MG capsule 24 hr capsule, Take 1 capsule by mouth Every Night., Disp: , Rfl:   •  Testosterone 1.62 % gel, apply 2 pumps TO shoulders ONCE DAILY, Disp: , Rfl:   •  TRESIBA FLEXTOUCH 200 UNIT/ML solution pen-injector, Inject 65 Units as directed Daily., Disp: , Rfl:       Objective:     Vitals:    04/05/23 1418   BP: 140/90   Pulse: 77   Weight: 99.4 kg (219 lb 3.2 oz)   Height: 182.9 cm (72\")     Body mass index is 29.73 kg/m².    Constitutional:       Appearance: Well-developed.   Eyes:      General: No scleral icterus.  HENT:      Head: Normocephalic.   Neck:      Thyroid: No thyromegaly.      Vascular: No JVD.      Lymphadenopathy: No cervical adenopathy.   Pulmonary:      Effort: Pulmonary effort is normal.      Breath sounds: Normal breath sounds. No wheezing. No rales.   Cardiovascular:      Normal rate. Regular rhythm.      No gallop.   Edema:     Peripheral edema absent.   Abdominal:      Palpations: Abdomen is soft.      Tenderness: There is no abdominal tenderness.   Musculoskeletal: Normal range of motion. Skin:     General: Skin is warm and dry.      Findings: No rash.   Neurological:      Mental Status: Alert and oriented to person, place, and time.           ECG 12 Lead    Date/Time: 4/5/2023 2:52 PM  Performed by: Rell Deutsch, " MD  Authorized by: Rell Deutsch MD   Comparison: compared with previous ECG   Similar to previous ECG  Rhythm: sinus rhythm  Other findings: non-specific ST-T wave changes    Clinical impression: abnormal EKG             Assessment:       Diagnosis Plan   1. Coronary artery disease involving native coronary artery of native heart without angina pectoris        2. Chronic HFrEF (heart failure with reduced ejection fraction)        3. Ischemic cardiomyopathy               Plan:       Well I think he is actually doing pretty well he is on a lot of medical therapy for his cardiomyopathy it is helped but that is coming at the trade off is probably some orthostasis at times I do not think he is got any room to move his blood pressure meds up any and I am going to just keep him where he is for right now but if he starts having more dizzy spells we might have to cut his Entresto back I will have him come back and see me in 6 months sooner if he has trouble    As always, it has been a pleasure to participate in your patient's care.      Sincerely,       Rell Deutsch MD

## 2023-05-04 DIAGNOSIS — I10 ESSENTIAL (PRIMARY) HYPERTENSION: Primary | ICD-10-CM

## 2023-05-05 RX ORDER — METOPROLOL SUCCINATE 100 MG/1
100 TABLET, EXTENDED RELEASE ORAL 2 TIMES DAILY
Qty: 60 TABLET | Refills: 1 | Status: SHIPPED | OUTPATIENT
Start: 2023-05-05

## 2023-05-05 NOTE — TELEPHONE ENCOUNTER
Last Office Visit: 03/30/2023  Labs: 03/30/2023  Next Follow-up appointment: 06/30/2023      Kale Egan DNP, MSN, RN  RN Clinical Coordiantor  [facility]

## 2023-05-23 RX ORDER — SACUBITRIL AND VALSARTAN 49; 51 MG/1; MG/1
TABLET, FILM COATED ORAL
Qty: 60 TABLET | Refills: 0 | Status: SHIPPED | OUTPATIENT
Start: 2023-05-23

## 2023-05-23 NOTE — TELEPHONE ENCOUNTER
Last Office Visit: 03/30/2023  Labs: 03/30/2023  Next Follow-up appointment: 06/30/2023      Reviewed by:    Kale Egan DNP, MSN, RN  RN Clinical Coordinator  [facility] Heart Failure [facility]

## 2023-06-12 RX ORDER — SACUBITRIL AND VALSARTAN 49; 51 MG/1; MG/1
TABLET, FILM COATED ORAL
Qty: 60 TABLET | Refills: 0 | Status: SHIPPED | OUTPATIENT
Start: 2023-06-12

## 2023-07-07 LAB
ANION GAP SERPL CALCULATED.3IONS-SCNC: 10 MMOL/L (ref 5–15)
BUN SERPL-MCNC: 16 MG/DL (ref 8–23)
BUN/CREAT SERPL: 19.3 (ref 7–25)
CALCIUM SPEC-SCNC: 10.2 MG/DL (ref 8.6–10.5)
CHLORIDE SERPL-SCNC: 107 MMOL/L (ref 98–107)
CO2 SERPL-SCNC: 25 MMOL/L (ref 22–29)
CREAT SERPL-MCNC: 0.83 MG/DL (ref 0.76–1.27)
EGFRCR SERPLBLD CKD-EPI 2021: 93 ML/MIN/1.73
GLUCOSE SERPL-MCNC: 165 MG/DL (ref 65–99)
POTASSIUM SERPL-SCNC: 4.6 MMOL/L (ref 3.5–5.2)
SODIUM SERPL-SCNC: 142 MMOL/L (ref 136–145)

## 2023-08-07 RX ORDER — SACUBITRIL AND VALSARTAN 49; 51 MG/1; MG/1
TABLET, FILM COATED ORAL
Qty: 60 TABLET | Refills: 0 | Status: SHIPPED | OUTPATIENT
Start: 2023-08-07

## 2023-09-05 DIAGNOSIS — I10 ESSENTIAL (PRIMARY) HYPERTENSION: ICD-10-CM

## 2023-09-05 RX ORDER — SACUBITRIL AND VALSARTAN 49; 51 MG/1; MG/1
TABLET, FILM COATED ORAL
Qty: 60 TABLET | Refills: 1 | Status: SHIPPED | OUTPATIENT
Start: 2023-09-05

## 2023-09-05 RX ORDER — METOPROLOL SUCCINATE 100 MG/1
100 TABLET, EXTENDED RELEASE ORAL 2 TIMES DAILY
Qty: 60 TABLET | Refills: 1 | Status: SHIPPED | OUTPATIENT
Start: 2023-09-05

## 2023-09-20 ENCOUNTER — OFFICE VISIT (OUTPATIENT)
Dept: CARDIOLOGY | Facility: CLINIC | Age: 73
End: 2023-09-20
Payer: MEDICARE

## 2023-09-20 VITALS
SYSTOLIC BLOOD PRESSURE: 124 MMHG | BODY MASS INDEX: 29.28 KG/M2 | WEIGHT: 216.2 LBS | HEART RATE: 96 BPM | DIASTOLIC BLOOD PRESSURE: 68 MMHG | OXYGEN SATURATION: 98 % | HEIGHT: 72 IN

## 2023-09-20 DIAGNOSIS — I25.5 ISCHEMIC CARDIOMYOPATHY: ICD-10-CM

## 2023-09-20 DIAGNOSIS — I25.10 CORONARY ARTERY DISEASE INVOLVING NATIVE CORONARY ARTERY OF NATIVE HEART WITHOUT ANGINA PECTORIS: Primary | ICD-10-CM

## 2023-09-20 DIAGNOSIS — I10 ESSENTIAL (PRIMARY) HYPERTENSION: ICD-10-CM

## 2023-09-20 PROCEDURE — 1160F RVW MEDS BY RX/DR IN RCRD: CPT | Performed by: NURSE PRACTITIONER

## 2023-09-20 PROCEDURE — 1159F MED LIST DOCD IN RCRD: CPT | Performed by: NURSE PRACTITIONER

## 2023-09-20 PROCEDURE — 99214 OFFICE O/P EST MOD 30 MIN: CPT | Performed by: NURSE PRACTITIONER

## 2023-09-20 PROCEDURE — 93000 ELECTROCARDIOGRAM COMPLETE: CPT | Performed by: NURSE PRACTITIONER

## 2023-09-20 PROCEDURE — 3078F DIAST BP <80 MM HG: CPT | Performed by: NURSE PRACTITIONER

## 2023-09-20 PROCEDURE — 3074F SYST BP LT 130 MM HG: CPT | Performed by: NURSE PRACTITIONER

## 2023-09-20 RX ORDER — SACUBITRIL AND VALSARTAN 24; 26 MG/1; MG/1
1 TABLET, FILM COATED ORAL 2 TIMES DAILY
Qty: 180 TABLET | Refills: 0 | Status: SHIPPED | OUTPATIENT
Start: 2023-09-20

## 2023-09-20 NOTE — PROGRESS NOTES
Date of Office Visit: 2023  Encounter Provider: MATIAS Mitchell  Place of Service: [facility] CARDIOLOGY  Patient Name: Alex Geiger  :1950    Chief Complaint   Patient presents with    Coronary Artery Disease   :     HPI: Alex Geiger is a 73 y.o. male patient of Dr. Deutsch's whom we have followed for the management of hypertension, hyperlipidemia, PVCs, and coronary artery disease.              In 2019, he presented with complaints consistent with angina.  He was referred for cardiac catheterization which revealed a 70 to 80% lesion in the distal RCA for which he underwent drug-eluting stent placement.  At that time,he had mild luminal irregularities in the proximal circumflex, a patent stent in the mid circumflex, a patent stent in the proximal LAD, and a normal left main.                In 2021, he presented with unstable angina and was referred for a cardiac catheterization.  This demonstrated an ulcerated 95% lesion in the proximal LAD for which he underwent drug-eluting stent placement.   In February, he developed recurrent symptoms and was taken for repeat cardiac catheterization.  This demonstrated mild nonobstructive disease with patent stents and moderate to severely reduced global LV function.  Medical therapy was optimized and he was referred to the heart failure clinic.  His last echocardiogram in March demonstrated an EF of 45%.  He was last seen in the office by Dr. Deutsch in April at which time he was doing pretty well.  He reported occasional lightheadedness which Dr. Deutsch felt could be related to orthostasis.  Ultimately, no changes were recommended.  He was advised to follow-up in 6 months.  He has been doing okay.  His biggest complaint today is profound fatigue.  This is also associated reportedly his blood pressure is occasionally in the 80s and 90s systolic.  He says all of this began shortly after increasing the dose  of Entresto.  He describes a feeling in the center of his chest almost like food is getting stuck.  Although this is mostly associated with eating, it will occasionally occur outside of eating.  Of note, it has not bothered him for a few weeks.  He denies any palpitations, edema, syncope, bleeding difficulties or melena.     Past Medical History:   Diagnosis Date    CAD (coronary artery disease)     COVID-19     Diabetes mellitus     Hyperlipidemia     Hypertension     Kidney stones     Renal injury     Unstable angina     Ventricular ectopy     VPC (ventricular premature complex)        Past Surgical History:   Procedure Laterality Date    CARDIAC CATHETERIZATION      CARDIAC CATHETERIZATION N/A 8/16/2017    Procedure: Coronary angiography;  Surgeon: Rell Deutsch MD;  Location: [facility] CATH INVASIVE LOCATION;  Service:     CARDIAC CATHETERIZATION N/A 8/16/2017    Procedure: Stent QUIANA coronary;  Surgeon: Rell Deutsch MD;  [facility];  Service:     CARDIAC CATHETERIZATION N/A 8/16/2017    Procedure: Left Heart Cath;  Surgeon: Rell Deutsch MD;  [facility];  Service:     CARDIAC CATHETERIZATION N/A 8/16/2017    Procedure: Left ventriculography;  Surgeon: Rell Deutsch MD;  [facility];  Service:     CARDIAC CATHETERIZATION N/A 7/25/2019    Procedure: Left Heart Cath;  Surgeon: Rell Deutsch MD;  [facility];  Service: Cardiology    CARDIAC CATHETERIZATION N/A 7/25/2019    Procedure: Stent QUIANA coronary;  Surgeon: Rell Deutsch MD;  [facility];  Service: Cardiology    CARDIAC CATHETERIZATION N/A 7/25/2019    Procedure: Left ventriculography;  Surgeon: Rell Deutsch MD;  [facility];  Service: Cardiology    CARDIAC CATHETERIZATION N/A 7/25/2019    Procedure: Coronary angiography;  Surgeon: Rell Deutsch MD;  Location: [facility] CATH  INVASIVE LOCATION;  Service: Cardiology    CARDIAC CATHETERIZATION Right 12/22/2021    Procedure: Left Heart Cath;  Surgeon: Helen Oliva MD;  Location:  [facility] CATH INVASIVE LOCATION;  Service: Cardiology;  Laterality: Right;    CARDIAC CATHETERIZATION N/A 12/22/2021    Procedure: Coronary angiography;  Surgeon: Helen Oliva MD;  Location:  [facility] CATH INVASIVE LOCATION;  Service: Cardiology;  Laterality: N/A;    CARDIAC CATHETERIZATION N/A 12/22/2021    Procedure: Left ventriculography;  Surgeon: Helen Oliva MD;  Location:  [facility] CATH INVASIVE LOCATION;  Service: Cardiology;  Laterality: N/A;    CARDIAC CATHETERIZATION N/A 12/22/2021    Procedure: Percutaneous Coronary Intervention;  Surgeon: Helen Oliva MD;  Location:  [facility] CATH INVASIVE LOCATION;  Service: Cardiology;  Laterality: N/A;    CARDIAC CATHETERIZATION N/A 12/22/2021    Procedure: Stent QUIANA coronary;  Surgeon: Helen Oliva MD;  [facility];  Service: Cardiology;  Laterality: N/A;    CARDIAC CATHETERIZATION N/A 2/23/2023    Procedure: Left Heart Cath;  Surgeon: Rell Deutsch MD;  Location:  [facility] CATH INVASIVE LOCATION;  Service: Cardiology;  Laterality: N/A;    CARDIAC CATHETERIZATION N/A 2/23/2023    Procedure: Left ventriculography;  Surgeon: Rell Deutsch MD;  Location:  [facility] CATH INVASIVE LOCATION;  Service: Cardiology;  Laterality: N/A;    CARDIAC CATHETERIZATION N/A 2/23/2023    Procedure: Coronary angiography;  Surgeon: Rell Deutsch MD;  [facility];  Service: Cardiology;  Laterality: N/A;    CORONARY ANGIOPLASTY WITH STENT PLACEMENT      KIDNEY STONE SURGERY      PARATHYROIDECTOMY         Social History     Socioeconomic History    Marital status:    Tobacco Use    Smoking status: Former     Packs/day: 1.00     Years: 25.00     Pack years: 25.00     Types: Cigarettes    Smokeless tobacco: Never    Tobacco comments:     NO CAFFEINE USE   Vaping Use    Vaping  Use: Never used   Substance and Sexual Activity    Alcohol use: Not Currently    Drug use: Never    Sexual activity: Defer       Family History   Problem Relation Age of Onset    Diabetes Mother     Alzheimer's disease Father     Kidney disease Father     Diabetes Father     No Known Problems Maternal Grandmother     No Known Problems Maternal Grandfather     No Known Problems Paternal Grandmother     Heart attack Paternal Grandfather     Heart disease Paternal Grandfather     Diabetes Paternal Grandfather        Review of Systems   Constitutional: Positive for malaise/fatigue.   Cardiovascular:  Positive for dyspnea on exertion. Negative for chest pain, leg swelling, orthopnea, paroxysmal nocturnal dyspnea and syncope.   Respiratory: Negative.     Hematologic/Lymphatic: Negative for bleeding problem.   Musculoskeletal:  Negative for falls.   Gastrointestinal:  Negative for melena.   Neurological:  Positive for dizziness. Negative for light-headedness.     Allergies   Allergen Reactions    Oxycodone-Acetaminophen Itching    Percocet [Oxycodone-Acetaminophen]     Shellfish-Derived Products Hives and Itching    Statins     Adhesive Tape Rash    Latex Rash         Current Outpatient Medications:     cholecalciferol (VITAMIN D3) 25 MCG (1000 UT) tablet, Take 1 tablet by mouth Daily., Disp: , Rfl:     clopidogrel (PLAVIX) 75 MG tablet, TAKE (1) TABLET DAILY, Disp: 90 tablet, Rfl: 1    empagliflozin (Jardiance) 10 MG tablet tablet, Take 1 tablet by mouth Daily., Disp: 90 tablet, Rfl: 3    fexofenadine (ALLEGRA) 180 MG tablet, Take 1 tablet by mouth Daily., Disp: , Rfl:     finasteride (PROSCAR) 5 MG tablet, Take 1 tablet by mouth Daily., Disp: , Rfl:     flunisolide (NASALIDE) 25 MCG/ACT (0.025%) solution nasal spray, Inhale 2 sprays 2 (Two) Times a Day., Disp: , Rfl:     isosorbide mononitrate (IMDUR) 60 MG 24 hr tablet, Take 1.5 tablets by mouth Every Morning., Disp: 135 tablet, Rfl: 3    metFORMIN (GLUCOPHAGE) 500 MG  "tablet, Take 2 tablets by mouth 2 (Two) Times a Day With Meals., Disp: , Rfl:     metoprolol succinate XL (TOPROL-XL) 100 MG 24 hr tablet, Take 1 tablet by mouth 2 (Two) Times a Day., Disp: 60 tablet, Rfl: 1    nitroglycerin (NITROSTAT) 0.4 MG SL tablet, 1 under the tongue as needed for angina, may repeat q5mins for up three doses, Disp: 45 tablet, Rfl: 11    NON FORMULARY, Allergy shot weekly, Disp: , Rfl:     Repatha SureClick solution auto-injector SureClick injection, INJECT 1ML EVERY 2 WEEKS, Disp: , Rfl:     spironolactone (ALDACTONE) 25 MG tablet, Take 1 tablet by mouth Daily., Disp: 90 tablet, Rfl: 3    tamsulosin (FLOMAX) 0.4 MG capsule 24 hr capsule, Take 1 capsule by mouth Every Night., Disp: , Rfl:     Testosterone 1.62 % gel, apply 2 pumps TO shoulders ONCE DAILY, Disp: , Rfl:     TRESIBA FLEXTOUCH 200 UNIT/ML solution pen-injector, Inject 65 Units as directed Daily., Disp: , Rfl:     sacubitril-valsartan (Entresto) 24-26 MG tablet, Take 1 tablet by mouth 2 (Two) Times a Day., Disp: 180 tablet, Rfl: 0      Objective:     Vitals:    09/20/23 1343   BP: 124/68   BP Location: Left arm   Patient Position: Sitting   Cuff Size: Adult   Pulse: 96   SpO2: 98%   Weight: 98.1 kg (216 lb 3.2 oz)   Height: 182.9 cm (72\")     Body mass index is 29.32 kg/m².    PHYSICAL EXAM:    Neck:      Vascular: No JVD.   Pulmonary:      Effort: Pulmonary effort is normal.      Breath sounds: Normal breath sounds.   Cardiovascular:      Normal rate. Regular rhythm.      Murmurs: There is no murmur.      No gallop.  No click. No rub.   Pulses:     Intact distal pulses.         ECG 12 Lead    Date/Time: 9/20/2023 2:01 PM  Performed by: Sigrid Ng APRN  Authorized by: Sigrid Ng APRN   Comparison: compared with previous ECG from 4/5/2023  Similar to previous ECG  Rhythm: sinus rhythm  Rate: normal  BPM: 96  Q waves: V1 and V3    T inversion: III and aVF          Assessment:       Diagnosis Plan   1. Coronary " artery disease involving native coronary artery of native heart without angina pectoris  ECG 12 Lead      2. Ischemic cardiomyopathy        3. Essential (primary) hypertension          Orders Placed This Encounter   Procedures    ECG 12 Lead     This order was created via procedure documentation     Order Specific Question:   Release to patient     Answer:   Routine Release [[id number]]          Plan:       1.  Coronary artery disease.  Cardiac catheterization from February demonstrated mild nonobstructive disease and patent stents.  I am not convinced the sensation he is describing is anginal.  It sounds almost like an esophageal issue.  He follows with Dr. Lea, and I recommended to give him a call.  I also encouraged him to follow-up with his primary care doctor.  Otherwise, continue clopidogrel and isosorbide.      2.  Ischemic cardiomyopathy.  EF 45%.  He is on GDMT including Toprol, Entresto, spironolactone, and Jardiance.  I honestly think he may be overmedicated.  I recommended de-escalating the dose of Entresto.  He is scheduled to follow-up with the heart failure clinic at the end of the month.  Ultimately he may not tolerate Entresto at all.      Overall, I think he is stable.  He will follow-up with Dr. Deutsch in 3 months.      As always, it has been a pleasure to participate in your patient's care.      Sincerely,         MATIAS Hannah

## 2023-09-29 ENCOUNTER — HOSPITAL ENCOUNTER (OUTPATIENT)
Dept: CARDIOLOGY | Facility: HOSPITAL | Age: 73
Discharge: HOME | End: 2023-09-29
Payer: MEDICARE

## 2023-09-29 ENCOUNTER — LAB (OUTPATIENT)
Dept: LAB | Facility: HOSPITAL | Age: 73
End: 2023-09-29
Payer: MEDICARE

## 2023-09-29 VITALS
WEIGHT: 218.4 LBS | SYSTOLIC BLOOD PRESSURE: 133 MMHG | OXYGEN SATURATION: 95 % | HEIGHT: 72 IN | BODY MASS INDEX: 29.58 KG/M2 | DIASTOLIC BLOOD PRESSURE: 73 MMHG | HEART RATE: 84 BPM

## 2023-09-29 DIAGNOSIS — R53.83 FATIGUE, UNSPECIFIED TYPE: ICD-10-CM

## 2023-09-29 DIAGNOSIS — I25.5 ISCHEMIC CARDIOMYOPATHY: ICD-10-CM

## 2023-09-29 DIAGNOSIS — E11.59 TYPE 2 DIABETES MELLITUS WITH OTHER CIRCULATORY COMPLICATION, WITHOUT LONG-TERM CURRENT USE OF INSULIN: ICD-10-CM

## 2023-09-29 DIAGNOSIS — E55.9 VITAMIN D DEFICIENCY, UNSPECIFIED: ICD-10-CM

## 2023-09-29 DIAGNOSIS — I50.22 CHRONIC HFREF (HEART FAILURE WITH REDUCED EJECTION FRACTION): ICD-10-CM

## 2023-09-29 DIAGNOSIS — I10 ESSENTIAL (PRIMARY) HYPERTENSION: ICD-10-CM

## 2023-09-29 DIAGNOSIS — R53.83 FATIGUE, UNSPECIFIED TYPE: Primary | ICD-10-CM

## 2023-09-29 DIAGNOSIS — E78.49 OTHER HYPERLIPIDEMIA: ICD-10-CM

## 2023-09-29 DIAGNOSIS — I25.10 CORONARY ARTERY DISEASE INVOLVING NATIVE CORONARY ARTERY OF NATIVE HEART WITHOUT ANGINA PECTORIS: ICD-10-CM

## 2023-09-29 DIAGNOSIS — R79.9 ABNORMAL FINDING OF BLOOD CHEMISTRY, UNSPECIFIED: ICD-10-CM

## 2023-09-29 LAB
25(OH)D3 SERPL-MCNC: 39.3 NG/ML (ref 30–100)
ALBUMIN SERPL-MCNC: 4.2 G/DL (ref 3.5–5.2)
ALBUMIN/GLOB SERPL: 2.1 G/DL
ALP SERPL-CCNC: 50 U/L (ref 39–117)
ALT SERPL W P-5'-P-CCNC: 17 U/L (ref 1–41)
ANION GAP SERPL CALCULATED.3IONS-SCNC: 14 MMOL/L (ref 5–15)
AST SERPL-CCNC: 13 U/L (ref 1–40)
BASOPHILS # BLD AUTO: 0.12 10*3/MM3 (ref 0–0.2)
BASOPHILS NFR BLD AUTO: 1.6 % (ref 0–1.5)
BILIRUB SERPL-MCNC: 0.3 MG/DL (ref 0–1.2)
BUN SERPL-MCNC: 14 MG/DL (ref 8–23)
BUN/CREAT SERPL: 14.3 (ref 7–25)
CALCIUM SPEC-SCNC: 10.2 MG/DL (ref 8.6–10.5)
CHLORIDE SERPL-SCNC: 103 MMOL/L (ref 98–107)
CO2 SERPL-SCNC: 21 MMOL/L (ref 22–29)
CREAT SERPL-MCNC: 0.98 MG/DL (ref 0.76–1.27)
DEPRECATED RDW RBC AUTO: 41.9 FL (ref 37–54)
EGFRCR SERPLBLD CKD-EPI 2021: 81.4 ML/MIN/1.73
EOSINOPHIL # BLD AUTO: 0.13 10*3/MM3 (ref 0–0.4)
EOSINOPHIL NFR BLD AUTO: 1.8 % (ref 0.3–6.2)
ERYTHROCYTE [DISTWIDTH] IN BLOOD BY AUTOMATED COUNT: 13 % (ref 12.3–15.4)
FERRITIN SERPL-MCNC: 100 NG/ML (ref 30–400)
FOLATE SERPL-MCNC: 18 NG/ML (ref 4.78–24.2)
GLOBULIN UR ELPH-MCNC: 2 GM/DL
GLUCOSE SERPL-MCNC: 346 MG/DL (ref 65–99)
HCT VFR BLD AUTO: 41.9 % (ref 37.5–51)
HGB BLD-MCNC: 13.8 G/DL (ref 13–17.7)
IMM GRANULOCYTES # BLD AUTO: 0.21 10*3/MM3 (ref 0–0.05)
IMM GRANULOCYTES NFR BLD AUTO: 2.9 % (ref 0–0.5)
IRON 24H UR-MRATE: 96 MCG/DL (ref 59–158)
IRON SATN MFR SERPL: 26 % (ref 20–50)
LYMPHOCYTES # BLD AUTO: 1.46 10*3/MM3 (ref 0.7–3.1)
LYMPHOCYTES NFR BLD AUTO: 20 % (ref 19.6–45.3)
MCH RBC QN AUTO: 29.6 PG (ref 26.6–33)
MCHC RBC AUTO-ENTMCNC: 32.9 G/DL (ref 31.5–35.7)
MCV RBC AUTO: 89.9 FL (ref 79–97)
MONOCYTES # BLD AUTO: 0.73 10*3/MM3 (ref 0.1–0.9)
MONOCYTES NFR BLD AUTO: 10 % (ref 5–12)
NEUTROPHILS NFR BLD AUTO: 4.66 10*3/MM3 (ref 1.7–7)
NEUTROPHILS NFR BLD AUTO: 63.7 % (ref 42.7–76)
NRBC BLD AUTO-RTO: 0.1 /100 WBC (ref 0–0.2)
NT-PROBNP SERPL-MCNC: 83.4 PG/ML (ref 0–900)
PLATELET # BLD AUTO: 232 10*3/MM3 (ref 140–450)
PMV BLD AUTO: 10.4 FL (ref 6–12)
POTASSIUM SERPL-SCNC: 4.9 MMOL/L (ref 3.5–5.2)
PROT SERPL-MCNC: 6.2 G/DL (ref 6–8.5)
RBC # BLD AUTO: 4.66 10*6/MM3 (ref 4.14–5.8)
SODIUM SERPL-SCNC: 138 MMOL/L (ref 136–145)
T3 SERPL-MCNC: 66.3 NG/DL (ref 80–200)
T4 FREE SERPL-MCNC: 1.14 NG/DL (ref 0.93–1.7)
TIBC SERPL-MCNC: 373 MCG/DL (ref 298–536)
TRANSFERRIN SERPL-MCNC: 250 MG/DL (ref 200–360)
TSH SERPL DL<=0.05 MIU/L-ACNC: 1.33 UIU/ML (ref 0.27–4.2)
VIT B12 BLD-MCNC: 375 PG/ML (ref 211–946)
WBC NRBC COR # BLD: 7.31 10*3/MM3 (ref 3.4–10.8)

## 2023-09-29 PROCEDURE — 82728 ASSAY OF FERRITIN: CPT | Performed by: NURSE PRACTITIONER

## 2023-09-29 PROCEDURE — 82746 ASSAY OF FOLIC ACID SERUM: CPT

## 2023-09-29 PROCEDURE — 36415 COLL VENOUS BLD VENIPUNCTURE: CPT

## 2023-09-29 PROCEDURE — 80053 COMPREHEN METABOLIC PANEL: CPT | Performed by: NURSE PRACTITIONER

## 2023-09-29 PROCEDURE — 82306 VITAMIN D 25 HYDROXY: CPT | Performed by: NURSE PRACTITIONER

## 2023-09-29 PROCEDURE — 82607 VITAMIN B-12: CPT | Performed by: NURSE PRACTITIONER

## 2023-09-29 PROCEDURE — 84439 ASSAY OF FREE THYROXINE: CPT | Performed by: NURSE PRACTITIONER

## 2023-09-29 PROCEDURE — 84443 ASSAY THYROID STIM HORMONE: CPT | Performed by: NURSE PRACTITIONER

## 2023-09-29 PROCEDURE — 83880 ASSAY OF NATRIURETIC PEPTIDE: CPT | Performed by: NURSE PRACTITIONER

## 2023-09-29 PROCEDURE — G0463 HOSPITAL OUTPT CLINIC VISIT: HCPCS

## 2023-09-29 PROCEDURE — 84480 ASSAY TRIIODOTHYRONINE (T3): CPT | Performed by: NURSE PRACTITIONER

## 2023-09-29 PROCEDURE — 85025 COMPLETE CBC W/AUTO DIFF WBC: CPT | Performed by: NURSE PRACTITIONER

## 2023-09-29 PROCEDURE — 83540 ASSAY OF IRON: CPT | Performed by: NURSE PRACTITIONER

## 2023-09-29 PROCEDURE — 84466 ASSAY OF TRANSFERRIN: CPT | Performed by: NURSE PRACTITIONER

## 2023-09-29 NOTE — LETTER
2023       No Recipients    Patient: Alex Geiger   YOB: 1950   Date of Visit: 2023     Dear Rell Deutsch MD:       Thank you for referring Alex Geiger to me for evaluation. Below are the relevant portions of my assessment and plan of care.    If you have questions, please do not hesitate to call me. I look forward to following Alex along with you.         Sincerely,        MATIAS Chakraborty        CC:   No Recipients    Lisset Perez APRN  [id number]  Signed          \[facility] [place] [facility]\"" HEART FAILURE      Patient Name: Alex Geiger  :1950  Age: 73 y.o.  Sex: male  Referring Provider: Rell Deutsch MD   Primary Cardiologist: Rell Deutsch MD  Encounter Provider:  MATIAS Chakraborty      Chief Complaint:   Chief Complaint   Patient presents with   • Congestive Heart Failure         Congestive Heart Failure  Associated symptoms include fatigue. Pertinent negatives include no chest pain, palpitations, shortness of breath or unexpected weight change.  This 73-year-old male, known to this provider, comes today for further evaluation regarding his chronic systolic heart failure.  Current diagnoses to include coronary artery disease, DM2, hyperlipidemia, hypertension, history of ventricular ectopy and kidney stones as well as history of COVID-19.  He began following with Dr. Deutsch in .  Records have been reviewed by me.    Cardiac catheterization performed in  led to intervention of left circumflex as well as RCA, LV function was noted to be normal at that time per chart review.    In  his LVEF was noted to be mildly reduced at 45%.    In 2017 it was noted that the patient was having chest pain.  Repeat cardiac catheterization revealed LVEF of 40%, normal left main, 90% mid LAD stenosis, 90% small diagonal stenosis not amenable to PCI, widely patent mid circumflex stent, 20% distal circumflex stenosis, widely patent proximal RCA  stent with 40 to 50% proximal to mid RCA stenosis-now status post QUIANA to mid LAD lesion.    In July 2019 he underwent repeat cardiac catheterization at which point normal left main was found, widely patent LAD stent, left circumflex stent normal, 10 to 20% restenosis in the mid RCA stent, 70 to 80% lesion of distal RCA at bifurcation which was treated with QUIANA placement.  In December 2021 he had recurrent angina which led to repeat cardiac catheterization again which revealed restenosis of his LAD stent so QUIANA was again placed.  February 22, 2023 he was seen in the office by Dr. Deutsch and was having chest pain that went up into his jaw and arms.  Repeat cardiac catheterization revealed normal left main, LAD with patent stent, left circumflex with 30% proximal disease and 20-30% disease in proximal circumflex, RCA with 30% diffuse disease proximally and a moderate to severely reduced global LVEF.    Metoprolol was increased to target dosing on 3/2/2023.  Entresto was increased to mid dosing 3/16/2023.    His most recent ejection fraction is now 45% per echocardiogram.  Labs checked recently that he brought in with him reveal a potassium level of 5.4.    Entrresto was reduced 9/2023 for hypotension.      He is now currently complaining of fatigue that is impacting nearly every aspect of his life.      The following portions of the patient's history were reviewed and updated as appropriate: allergies, current medications, past family history, past medical history, past social history, past surgical history and problem list.    Current Outpatient Medications   Medication Sig Dispense Refill   • cholecalciferol (VITAMIN D3) 25 MCG (1000 UT) tablet Take 1 tablet by mouth Daily.     • clopidogrel (PLAVIX) 75 MG tablet TAKE (1) TABLET DAILY 90 tablet 1   • empagliflozin (Jardiance) 10 MG tablet tablet Take 1 tablet by mouth Daily. 90 tablet 3   • fexofenadine (ALLEGRA) 180 MG tablet Take 1 tablet by mouth Daily.     •  finasteride (PROSCAR) 5 MG tablet Take 1 tablet by mouth Daily.     • flunisolide (NASALIDE) 25 MCG/ACT (0.025%) solution nasal spray Inhale 2 sprays 2 (Two) Times a Day.     • isosorbide mononitrate (IMDUR) 60 MG 24 hr tablet Take 1.5 tablets by mouth Every Morning. 135 tablet 3   • metFORMIN (GLUCOPHAGE) 500 MG tablet Take 2 tablets by mouth 2 (Two) Times a Day With Meals.     • metoprolol succinate XL (TOPROL-XL) 100 MG 24 hr tablet Take 1 tablet by mouth 2 (Two) Times a Day. 60 tablet 1   • nitroglycerin (NITROSTAT) 0.4 MG SL tablet 1 under the tongue as needed for angina, may repeat q5mins for up three doses 45 tablet 11   • NON FORMULARY Allergy shot weekly     • Repatha SureClick solution auto-injector SureClick injection INJECT 1ML EVERY 2 WEEKS     • sacubitril-valsartan (Entresto) 24-26 MG tablet Take 1 tablet by mouth 2 (Two) Times a Day. 180 tablet 0   • spironolactone (ALDACTONE) 25 MG tablet Take 1 tablet by mouth Daily. 90 tablet 3   • tamsulosin (FLOMAX) 0.4 MG capsule 24 hr capsule Take 1 capsule by mouth Every Night.     • Testosterone 1.62 % gel apply 2 pumps TO shoulders ONCE DAILY     • TRESIBA FLEXTOUCH 200 UNIT/ML solution pen-injector Inject 65 Units as directed Daily.       No current facility-administered medications for this encounter.       Past Medical History:   Diagnosis Date   • CAD (coronary artery disease)    • COVID-19    • Diabetes mellitus    • Hyperlipidemia    • Hypertension    • Kidney stones    • Renal injury    • Unstable angina    • Ventricular ectopy    • VPC (ventricular premature complex)        Past Surgical History:   Procedure Laterality Date   • CARDIAC CATHETERIZATION     • CARDIAC CATHETERIZATION N/A 8/16/2017    Procedure: Coronary angiography;  Surgeon: Rell Deutsch MD;  Location: [facility] CATH INVASIVE LOCATION;  Service:    • CARDIAC CATHETERIZATION N/A 8/16/2017    Procedure: Stent QUIANA coronary;  Surgeon: Rell Deutsch MD;  Location: [facility] CATH INVASIVE  LOCATION;  Service:    • CARDIAC CATHETERIZATION N/A 8/16/2017    Procedure: Left Heart Cath;  Surgeon: Rell Deutsch MD;  Location:  [facility] CATH INVASIVE LOCATION;  Service:    • CARDIAC CATHETERIZATION N/A 8/16/2017    Procedure: Left ventriculography;  Surgeon: Rell Deutsch MD;  Location:  [facility] CATH INVASIVE LOCATION;  Service:    • CARDIAC CATHETERIZATION N/A 7/25/2019    Procedure: Left Heart Cath;  Surgeon: Rell Deutsch MD;  Location[facility] CATH INVASIVE LOC[facility];  Service: Cardiology   • CARDIAC CATHETERIZATION N/A 7/25/2019    Procedure: Stent QUIANA coronary;  Surgeon: Rell Deutsch MD;  Location: [facility] CATH INVASIVE LOC[facility];  Service: Cardiology   • CARDIAC CATHETERIZATION N/A 7/25/2019    Procedure: Left ventriculography;  Surgeon: Rell Deutsch MD;  Location: [facility] CATH INVASIVE LOCATION;  Service: Cardiology   • CARDIAC CATHETERIZATION N/A 7/25/2019    Procedure: Coronary angiography;  Surgeon: Rell Deutsch MD;  Location: [facility] CATH INVASIVE LOCATION;  Service: Cardiology   • CARDIAC CATHETERIZATION Right 12/22/2021    Procedure: Left Heart Cath;  Surgeon: Helen Oliva MD;  Location: [facility] CATH INVASIVE LOC[facility];  Service: Cardiology;  Laterality: Right;   • CARDIAC CATHETERIZATION N/A 12/22/2021    Procedure: Coronary angiography;  Surgeon: Helen Oliva MD;  Location: [facility] CATH INVASIVE LOCATION;  Service: Cardiology;  Laterality: N/A;   • CARDIAC CATHETERIZATION N/A 12/22/2021    Procedure: Left ventriculography;  Surgeon: Helen Oliva MD;  Location: [facility] CATH INVASIVE LOCATION;  Service: Cardiology;  Laterality: N/A;   • CARDIAC CATHETERIZATION N/A 12/22/2021    Procedure: Percutaneous Coronary Intervention;  Surgeon: Helen Oliva MD;  Location: [facility] CATH INVASIVE LOCATION;  Service: Cardiology;  Laterality: N/A;   • CARDIAC CATHETERIZATION N/A 12/22/2021    Procedure: Stent QUIANA coronary;  Surgeon: Helen Oliva MD;  Location: [facility] CATH INVASIVE LOCATION;   Service: Cardiology;  Laterality: N/A;   • CARDIAC CATHETERIZATION N/A 2/23/2023    Procedure: Left Heart Cath;  Surgeon: Rell Deutsch MD;  Location: [facility] CATH INVASIVE LOCATION;  Service: Cardiology;  Laterality: N/A;   • CARDIAC CATHETERIZATION N/A 2/23/2023    Procedure: Left ventriculography;  Surgeon: Rell Deutsch MD;  [facility];  Service: Cardiology;  Laterality: N/A;   • CARDIAC CATHETERIZATION N/A 2/23/2023    Procedure: Coronary angiography;  Surgeon: Rell Deutsch MD;  Location: [facility] CATH INVASIVE LOCATION;  Service: Cardiology;  Laterality: N/A;   • CORONARY ANGIOPLASTY WITH STENT PLACEMENT     • KIDNEY STONE SURGERY     • PARATHYROIDECTOMY         Physical Exam  Vitals and nursing note reviewed.   Constitutional:       General: He is not in acute distress.     Appearance: He is well-developed and normal weight. He is not ill-appearing.   HENT:      Head: Normocephalic and atraumatic.   Eyes:      Conjunctiva/sclera: Conjunctivae normal.      Pupils: Pupils are equal, round, and reactive to light.   Neck:      Vascular: No JVD.   Cardiovascular:      Rate and Rhythm: Normal rate and regular rhythm.      Heart sounds: Normal heart sounds. No murmur heard.    No friction rub. No gallop.   Pulmonary:      Effort: Pulmonary effort is normal. No respiratory distress.      Breath sounds: Normal breath sounds.   Abdominal:      General: Bowel sounds are normal. There is no distension.      Palpations: Abdomen is soft.   Musculoskeletal:         General: No swelling or deformity.   Skin:     General: Skin is warm and dry.      Capillary Refill: Capillary refill takes less than 2 seconds.   Neurological:      Mental Status: He is alert and oriented to person, place, and time. Mental status is at baseline.   Psychiatric:         Mood and Affect: Mood normal.         Behavior: Behavior normal.        Review of Systems   Constitutional:  Positive for fatigue. Negative for  "unexpected weight change.   HENT:  Negative for congestion and nosebleeds.    Eyes:  Negative for photophobia and visual disturbance.   Respiratory:  Negative for cough, chest tightness and shortness of breath.    Cardiovascular:  Negative for chest pain, palpitations and leg swelling.   Gastrointestinal:  Negative for abdominal distention and blood in stool.   Endocrine: Negative for polyphagia and polyuria.   Genitourinary:  Positive for frequency. Negative for urgency.   Musculoskeletal:  Negative for joint swelling and myalgias.   Skin:  Negative for pallor and rash.   Neurological:  Negative for dizziness, syncope, weakness, light-headedness, numbness and headaches.   Hematological:  Does not bruise/bleed easily.   Psychiatric/Behavioral:  Negative for confusion and sleep disturbance.       OBJECTIVE:  /73 (BP Location: Left arm, Patient Position: Sitting)   Pulse 84   Ht 182.9 cm (72.01\")   Wt 99.1 kg (218 lb 6.4 oz)   SpO2 95%   BMI 29.61 kg/m²      Body mass index is 29.61 kg/m².  Wt Readings from Last 1 Encounters:   09/29/23 99.1 kg (218 lb 6.4 oz)       Lab Review:  Renal Function: CrCl cannot be calculated (Patient's most recent lab result is older than the maximum 30 days allowed.).    Lab Results   Component Value Date    PROBNP 68.5 03/02/2023       Results for orders placed during the hospital encounter of 03/14/23    Adult Transthoracic Echo Complete W/ Cont if Necessary Per Protocol    Interpretation Summary  •  Left ventricular systolic function is low normal. Calculated left ventricular EF = 45.6%  •  Left ventricular wall thickness is consistent with mild concentric hypertrophy.  •  Left ventricular diastolic function is consistent with (grade Ia w/high LAP) impaired relaxation.  •  Mild tricuspid valve regurgitation is present.  •  Estimated right ventricular systolic pressure from tricuspid regurgitation is normal (<35 mmHg).      Procedures      6 MINUTE WALK                  "     Cardiac Procedures:  1. 2/22/2023 Cardiac catheterization  FINDINGS:     LEFT VENTRICULOGRAPHY: The LV pressure was 100/10.  There was moderate LV dilatation there is severe mid anterior wall hypokinesis the overall EF is reduced at about 35%.  There was no mitral insufficiency or gradient across the aortic valve on pullback.     CORONARY ANGIOGRAPHY:  Left main: This is normal  Left anterior descending: The stent in the proximal LAD is widely patent and looks normal there really is no significant disease in the mid or distal LAD the diagonals are free of obstructive disease as are the septal perforators  Ramus intermedius:Not present  Circumflex: There is a very large OM1 with some 30% proximal disease there is some 20 to 30% disease in the proximal circumflex just beyond OM1 and then distally the vessel looks good this is unchanged  RCA: Is a dominant vessel.  The stent in the proximal right with some diffuse 30% disease and it distally the vessel is normal     SUMMARY: Moderate to severely reduced global LV function probably an ischemic cardiomyopathy mild nonobstructive coronary disease all stents are patent     RECOMMENDATIONS: We will optimize guideline directed medical therapy have him get followed up in the heart failure clinic next week         Previously trialed diuretics  *      Previously trialed GDMT    Entresto  Spironolactone  Metoprolol succinate  Jardiance      ASSESSMENT:     Diagnosis Plan   1. Fatigue, unspecified type  CBC & Differential    Comprehensive Metabolic Panel    BNP    Iron Profile    Ferritin    Vitamin B12    Folate    Vitamin D 25 Hydroxy    TSH    T4, Free    T3      2. Chronic HFrEF (heart failure with reduced ejection fraction)  CBC & Differential    Comprehensive Metabolic Panel    BNP      3. Abnormal finding of blood chemistry, unspecified  Iron Profile    Ferritin      4. Vitamin D deficiency, unspecified  Vitamin D 25 Hydroxy      5. Ischemic cardiomyopathy        6.  Coronary artery disease involving native coronary artery of native heart without angina pectoris        7. Other hyperlipidemia        8. Essential (primary) hypertension        9. Type 2 diabetes mellitus with other circulatory complication, without long-term current use of insulin              PLAN OF CARE:  1.  HFrEF-NYHA class I.  Most recent ejection fraction 45%, up from 35%.  Currently on Entresto, metoprolol succinate, Jardiance, and spironolactone.    He remains euvolemic on exam.  He does have tremendously worsening fatigue (see below).  He is to remain on the reduced dose of Entresto.  Labs today as below.  He is to continue following a low sodium diet of 2,000 mg daily or less, fluid restriction of 1,500 mL, and remain adherent with daily weights.     Directions for when to call the clinic reviewed with the patient to include weight gain of 2 to 3 pounds in 24 hours, weight gain of 5 to 10 pounds within 7 days; worsening shortness of breath; worsening lower extremity edema or abdominal distention.    2.  CAD- complicated history as above.  Stable, remains without angina.    3.  Hypertension-stable and controlled.    4.  Hyperlipidemia- now back on Repatha.    5.  DM2-- managed by PCP.    6.  Fatigue-history of Vit D and B deficiency noted.  No overt signs of volume overload on exam.  He is scheduled to see PCP in 1 month.  I will check labs as below and forward to PCP.  If any abnormality will have his appt with them moved up if possible.         CBC/CMP/BNP/IRON PROFILE/TSH/FREE T4/T3/VIT D/VIT B12 AND FOLATE        Advance Care Planning   Will address at subsequent visit      Thank you for allowing me to participate in the care of your patient,         MATIAS Chakraborty  hospitals HEART FAILURE  09/29/23  13:52 EST      **Jaime Disclaimer:**  Much of this encounter note is an electronic transcription/translation of spoken language to printed text. The electronic translation of spoken language  may permit erroneous, or at times, nonsensical words or phrases to be inadvertently transcribed. Although I have reviewed the note for such errors, some may still exist.

## 2023-09-29 NOTE — ADDENDUM NOTE
Encounter addended by: April Hernandes MA on: 9/29/2023 3:45 PM   Actions taken: Charge Capture section accepted

## 2023-09-29 NOTE — PROGRESS NOTES
[facility] HEART FAILURE      Patient Name: Alex Geiger  :1950  Age: 73 y.o.  Sex: male  Referring Provider: Rell Deutsch MD   Primary Cardiologist: Rell Deutsch MD  Encounter Provider:  MATIAS Chakraborty      Chief Complaint:   Chief Complaint   Patient presents with    Congestive Heart Failure         Congestive Heart Failure  Associated symptoms include fatigue. Pertinent negatives include no chest pain, palpitations, shortness of breath or unexpected weight change.  This 73-year-old male, known to this provider, comes today for further evaluation regarding his chronic systolic heart failure.  Current diagnoses to include coronary artery disease, DM2, hyperlipidemia, hypertension, history of ventricular ectopy and kidney stones as well as history of COVID-19.  He began following with Dr. Deutsch in .  Records have been reviewed by me.    Cardiac catheterization performed in  led to intervention of left circumflex as well as RCA, LV function was noted to be normal at that time per chart review.    In  his LVEF was noted to be mildly reduced at 45%.    In 2017 it was noted that the patient was having chest pain.  Repeat cardiac catheterization revealed LVEF of 40%, normal left main, 90% mid LAD stenosis, 90% small diagonal stenosis not amenable to PCI, widely patent mid circumflex stent, 20% distal circumflex stenosis, widely patent proximal RCA stent with 40 to 50% proximal to mid RCA stenosis-now status post QUIANA to mid LAD lesion.    In 2019 he underwent repeat cardiac catheterization at which point normal left main was found, widely patent LAD stent, left circumflex stent normal, 10 to 20% restenosis in the mid RCA stent, 70 to 80% lesion of distal RCA at bifurcation which was treated with QUIANA placement.  In 2021 he had recurrent angina which led to repeat cardiac catheterization again which revealed restenosis of his LAD stent so QUIANA was again  placed.  February 22, 2023 he was seen in the office by Dr. Deutsch and was having chest pain that went up into his jaw and arms.  Repeat cardiac catheterization revealed normal left main, LAD with patent stent, left circumflex with 30% proximal disease and 20-30% disease in proximal circumflex, RCA with 30% diffuse disease proximally and a moderate to severely reduced global LVEF.    Metoprolol was increased to target dosing on 3/2/2023.  Entresto was increased to mid dosing 3/16/2023.    His most recent ejection fraction is now 45% per echocardiogram.  Labs checked recently that he brought in with him reveal a potassium level of 5.4.    Entrresto was reduced 9/2023 for hypotension.      He is now currently complaining of fatigue that is impacting nearly every aspect of his life.      The following portions of the patient's history were reviewed and updated as appropriate: allergies, current medications, past family history, past medical history, past social history, past surgical history and problem list.    Current Outpatient Medications   Medication Sig Dispense Refill    cholecalciferol (VITAMIN D3) 25 MCG (1000 UT) tablet Take 1 tablet by mouth Daily.      clopidogrel (PLAVIX) 75 MG tablet TAKE (1) TABLET DAILY 90 tablet 1    empagliflozin (Jardiance) 10 MG tablet tablet Take 1 tablet by mouth Daily. 90 tablet 3    fexofenadine (ALLEGRA) 180 MG tablet Take 1 tablet by mouth Daily.      finasteride (PROSCAR) 5 MG tablet Take 1 tablet by mouth Daily.      flunisolide (NASALIDE) 25 MCG/ACT (0.025%) solution nasal spray Inhale 2 sprays 2 (Two) Times a Day.      isosorbide mononitrate (IMDUR) 60 MG 24 hr tablet Take 1.5 tablets by mouth Every Morning. 135 tablet 3    metFORMIN (GLUCOPHAGE) 500 MG tablet Take 2 tablets by mouth 2 (Two) Times a Day With Meals.      metoprolol succinate XL (TOPROL-XL) 100 MG 24 hr tablet Take 1 tablet by mouth 2 (Two) Times a Day. 60 tablet 1    nitroglycerin (NITROSTAT) 0.4 MG SL  tablet 1 under the tongue as needed for angina, may repeat q5mins for up three doses 45 tablet 11    NON FORMULARY Allergy shot weekly      Repatha SureClick solution auto-injector SureClick injection INJECT 1ML EVERY 2 WEEKS      sacubitril-valsartan (Entresto) 24-26 MG tablet Take 1 tablet by mouth 2 (Two) Times a Day. 180 tablet 0    spironolactone (ALDACTONE) 25 MG tablet Take 1 tablet by mouth Daily. 90 tablet 3    tamsulosin (FLOMAX) 0.4 MG capsule 24 hr capsule Take 1 capsule by mouth Every Night.      Testosterone 1.62 % gel apply 2 pumps TO shoulders ONCE DAILY      TRESIBA FLEXTOUCH 200 UNIT/ML solution pen-injector Inject 65 Units as directed Daily.       No current facility-administered medications for this encounter.       Past Medical History:   Diagnosis Date    CAD (coronary artery disease)     COVID-19     Diabetes mellitus     Hyperlipidemia     Hypertension     Kidney stones     Renal injury     Unstable angina     Ventricular ectopy     VPC (ventricular premature complex)        Past Surgical History:   Procedure Laterality Date    CARDIAC CATHETERIZATION      CARDIAC CATHETERIZATION N/A 8/16/2017    Procedure: Coronary angiography;  Surgeon: Rell Deutsch MD;  Location: [facility] INVASIVE LOCATION;  Service:     CARDIAC CATHETERIZATION N/A 8/16/2017    Procedure: Stent QUIANA coronary;  Surgeon: Rell Deutsch MD;  Location: [facility] CATH INVASIVE LOCATION;  Service:     CARDIAC CATHETERIZATION N/A 8/16/2017    Procedure: Left Heart Cath;  Surgeon: Rell Deutsch MD;  Location: [facility] CATH INVASIVE LOCATION;  Service:     CARDIAC CATHETERIZATION N/A 8/16/2017    Procedure: Left ventriculography;  Surgeon: Rell Deutsch MD;  Location: [facility] CATH INVASIVE LOCATION;  Service:     CARDIAC CATHETERIZATION N/A 7/25/2019    Procedure: Left Heart Cath;  Surgeon: Rell Deutsch MD;  Location: [facility] CATH INVASIVE LOCATION;  Service: Cardiology    CARDIAC CATHETERIZATION N/A 7/25/2019    Procedure:  Stent QUIANA coronary;  Surgeon: Rell Deutsch MD;  Location: [facility]ASIVE LOC[facility];  Service: Cardiology    CARDIAC CATHETERIZATION N/A 7/25/2019    Procedure: Left ventriculography;  Surgeon: Rell Deutsch MD;  Location: [facility] CATH INVASIVE LOCATION;  Service: Cardiology    CARDIAC CATHETERIZATION N/A 7/25/2019    Procedure: Coronary angiography;  Surgeon: Rell Deutsch MD;  Location: [facility] LOC[facility];  Service: Cardiology    CARDIAC CATHETERIZATION Right 12/22/2021    Procedure: Left Heart Cath;  Surgeon: Helen Oliva MD;  Location: [facility] LOC[facility];  Service: Cardiology;  Laterality: Right;    CARDIAC CATHETERIZATION N/A 12/22/2021    Procedure: Coronary angiography;  Surgeon: Helen Oliva MD;  Location: [facility] CATH INVASIVE LOCATION;  Service: Cardiology;  Laterality: N/A;    CARDIAC CATHETERIZATION N/A 12/22/2021    Procedure: Left ventriculography;  Surgeon: Helen Oliva MD;  Location: [facility] C[facility]IVE LOC[facility];  Service: Cardiology;  Laterality: N/A;    CARDIAC CATHETERIZATION N/A 12/22/2021    Procedure: Percutaneous Coronary Intervention;  Surgeon: Helen Oliva MD;  Location: [facility] LOC[facility];  Service: Cardiology;  Laterality: N/A;    CARDIAC CATHETERIZATION N/A 12/22/2021    Procedure: Stent QUIANA coronary;  Surgeon: Helen Oliva MD;  Location: [facility] LOC[facility];  Service: Cardiology;  Laterality: N/A;    CARDIAC CATHETERIZATION N/A 2/23/2023    Procedure: Left Heart Cath;  Surgeon: Rell Deutsch MD;  Location: [facility] LOC[facility];  Service: Cardiology;  Laterality: N/A;    CARDIAC CATHETERIZATION N/A 2/23/2023    Procedure: Left ventriculography;  Surgeon: Rell Deutsch MD;  Location: [facility] C[facility] LOC[facility];  Service: Cardiology;  Laterality: N/A;    CARDIAC CATHETERIZATION N/A 2/23/2023    Procedure: Coronary angiography;  Surgeon: Rell Deutsch MD;  Location: [facility] C[facility]VASIVE LOC[facility];  Service:  Cardiology;  Laterality: N/A;    CORONARY ANGIOPLASTY WITH STENT PLACEMENT      KIDNEY STONE SURGERY      PARATHYROIDECTOMY         Physical Exam  Vitals and nursing note reviewed.   Constitutional:       General: He is not in acute distress.     Appearance: He is well-developed and normal weight. He is not ill-appearing.   HENT:      Head: Normocephalic and atraumatic.   Eyes:      Conjunctiva/sclera: Conjunctivae normal.      Pupils: Pupils are equal, round, and reactive to light.   Neck:      Vascular: No JVD.   Cardiovascular:      Rate and Rhythm: Normal rate and regular rhythm.      Heart sounds: Normal heart sounds. No murmur heard.    No friction rub. No gallop.   Pulmonary:      Effort: Pulmonary effort is normal. No respiratory distress.      Breath sounds: Normal breath sounds.   Abdominal:      General: Bowel sounds are normal. There is no distension.      Palpations: Abdomen is soft.   Musculoskeletal:         General: No swelling or deformity.   Skin:     General: Skin is warm and dry.      Capillary Refill: Capillary refill takes less than 2 seconds.   Neurological:      Mental Status: He is alert and oriented to person, place, and time. Mental status is at baseline.   Psychiatric:         Mood and Affect: Mood normal.         Behavior: Behavior normal.        Review of Systems   Constitutional:  Positive for fatigue. Negative for unexpected weight change.   HENT:  Negative for congestion and nosebleeds.    Eyes:  Negative for photophobia and visual disturbance.   Respiratory:  Negative for cough, chest tightness and shortness of breath.    Cardiovascular:  Negative for chest pain, palpitations and leg swelling.   Gastrointestinal:  Negative for abdominal distention and blood in stool.   Endocrine: Negative for polyphagia and polyuria.   Genitourinary:  Positive for frequency. Negative for urgency.   Musculoskeletal:  Negative for joint swelling and myalgias.   Skin:  Negative for pallor and rash.  "  Neurological:  Negative for dizziness, syncope, weakness, light-headedness, numbness and headaches.   Hematological:  Does not bruise/bleed easily.   Psychiatric/Behavioral:  Negative for confusion and sleep disturbance.       OBJECTIVE:  /73 (BP Location: Left arm, Patient Position: Sitting)   Pulse 84   Ht 182.9 cm (72.01\")   Wt 99.1 kg (218 lb 6.4 oz)   SpO2 95%   BMI 29.61 kg/m²      Body mass index is 29.61 kg/m².  Wt Readings from Last 1 Encounters:   09/29/23 99.1 kg (218 lb 6.4 oz)       Lab Review:  Renal Function: CrCl cannot be calculated (Patient's most recent lab result is older than the maximum 30 days allowed.).    Lab Results   Component Value Date    PROBNP 68.5 03/02/2023       Results for orders placed during the hospital encounter of 03/14/23    Adult Transthoracic Echo Complete W/ Cont if Necessary Per Protocol    Interpretation Summary    Left ventricular systolic function is low normal. Calculated left ventricular EF = 45.6%    Left ventricular wall thickness is consistent with mild concentric hypertrophy.    Left ventricular diastolic function is consistent with (grade Ia w/high LAP) impaired relaxation.    Mild tricuspid valve regurgitation is present.    Estimated right ventricular systolic pressure from tricuspid regurgitation is normal (<35 mmHg).      Procedures      6 MINUTE WALK                      Cardiac Procedures:  1. 2/22/2023 Cardiac catheterization  FINDINGS:     LEFT VENTRICULOGRAPHY: The LV pressure was 100/10.  There was moderate LV dilatation there is severe mid anterior wall hypokinesis the overall EF is reduced at about 35%.  There was no mitral insufficiency or gradient across the aortic valve on pullback.     CORONARY ANGIOGRAPHY:  Left main: This is normal  Left anterior descending: The stent in the proximal LAD is widely patent and looks normal there really is no significant disease in the mid or distal LAD the diagonals are free of obstructive disease as " are the septal perforators  Ramus intermedius:Not present  Circumflex: There is a very large OM1 with some 30% proximal disease there is some 20 to 30% disease in the proximal circumflex just beyond OM1 and then distally the vessel looks good this is unchanged  RCA: Is a dominant vessel.  The stent in the proximal right with some diffuse 30% disease and it distally the vessel is normal     SUMMARY: Moderate to severely reduced global LV function probably an ischemic cardiomyopathy mild nonobstructive coronary disease all stents are patent     RECOMMENDATIONS: We will optimize guideline directed medical therapy have him get followed up in the heart failure clinic next week         Previously trialed diuretics  *      Previously trialed GDMT    Entresto  Spironolactone  Metoprolol succinate  Jardiance      ASSESSMENT:     Diagnosis Plan   1. Fatigue, unspecified type  CBC & Differential    Comprehensive Metabolic Panel    BNP    Iron Profile    Ferritin    Vitamin B12    Folate    Vitamin D 25 Hydroxy    TSH    T4, Free    T3      2. Chronic HFrEF (heart failure with reduced ejection fraction)  CBC & Differential    Comprehensive Metabolic Panel    BNP      3. Abnormal finding of blood chemistry, unspecified  Iron Profile    Ferritin      4. Vitamin D deficiency, unspecified  Vitamin D 25 Hydroxy      5. Ischemic cardiomyopathy        6. Coronary artery disease involving native coronary artery of native heart without angina pectoris        7. Other hyperlipidemia        8. Essential (primary) hypertension        9. Type 2 diabetes mellitus with other circulatory complication, without long-term current use of insulin              PLAN OF CARE:  1.  HFrEF-NYHA class I.  Most recent ejection fraction 45%, up from 35%.  Currently on Entresto, metoprolol succinate, Jardiance, and spironolactone.    He remains euvolemic on exam.  He does have tremendously worsening fatigue (see below).  He is to remain on the reduced dose  of Entresto.  Labs today as below.  He is to continue following a low sodium diet of 2,000 mg daily or less, fluid restriction of 1,500 mL, and remain adherent with daily weights.     Directions for when to call the clinic reviewed with the patient to include weight gain of 2 to 3 pounds in 24 hours, weight gain of 5 to 10 pounds within 7 days; worsening shortness of breath; worsening lower extremity edema or abdominal distention.    2.  CAD- complicated history as above.  Stable, remains without angina.    3.  Hypertension-stable and controlled.    4.  Hyperlipidemia- now back on Repatha.    5.  DM2-- managed by PCP.    6.  Fatigue-history of Vit D and B deficiency noted.  No overt signs of volume overload on exam.  He is scheduled to see PCP in 1 month.  I will check labs as below and forward to PCP.  If any abnormality will have his appt with them moved up if possible.         CBC/CMP/BNP/IRON PROFILE/TSH/FREE T4/T3/VIT D/VIT B12 AND FOLATE        Advance Care Planning   Will address at subsequent visit      Thank you for allowing me to participate in the care of your patient,         Lisset GUTIERRES MATIAS Perez  [facility] HEART FAILURE  09/29/23  13:52 EST      **Jaime Disclaimer:**  Much of this encounter note is an electronic transcription/translation of spoken language to printed text. The electronic translation of spoken language may permit erroneous, or at times, nonsensical words or phrases to be inadvertently transcribed. Although I have reviewed the note for such errors, some may still exist.

## 2023-10-02 ENCOUNTER — TELEPHONE (OUTPATIENT)
Dept: CARDIOLOGY | Facility: CLINIC | Age: 73
End: 2023-10-02
Payer: MEDICARE

## 2023-10-02 NOTE — TELEPHONE ENCOUNTER
Lab results reviewed with the patient.  I advised that his T3 is low, however, there is no obvious clue as to why he is been so fatigued.  I would like him to review this with his PCP and we will forward these results to Dr. Hudson.

## 2023-10-02 NOTE — TELEPHONE ENCOUNTER
----- Message from Ruth Mohan PharmD sent at 10/2/2023  8:31 AM EDT -----    ----- Message -----  From: Lisset Perez APRN  Sent: 9/29/2023   3:37 PM EDT  To: Renato Wall MD, #    April,     Please let the patient know that his T3 is a bit low.  His TSH is normal, as are his vitamin levels.  I see no obvious cause of his fatigue.  I will forward these to his PCP, and I would like him to discuss with him as well when he is seen next month.      Thanks,   Magalys  ----- Message -----  From: Lab, Background User  Sent: 9/29/2023   2:55 PM EDT  To: MATIAS Chakraborty

## 2023-11-06 DIAGNOSIS — I10 ESSENTIAL (PRIMARY) HYPERTENSION: ICD-10-CM

## 2023-11-06 RX ORDER — METOPROLOL SUCCINATE 100 MG/1
TABLET, EXTENDED RELEASE ORAL
Qty: 60 TABLET | Refills: 1 | Status: SHIPPED | OUTPATIENT
Start: 2023-11-06

## 2024-01-05 RX ORDER — NITROGLYCERIN 0.4 MG/1
TABLET SUBLINGUAL
Qty: 45 TABLET | Refills: 11 | Status: SHIPPED | OUTPATIENT
Start: 2024-01-05

## 2024-01-05 RX ORDER — SACUBITRIL AND VALSARTAN 24; 26 MG/1; MG/1
1 TABLET, FILM COATED ORAL 2 TIMES DAILY
Qty: 180 TABLET | Refills: 0 | Status: SHIPPED | OUTPATIENT
Start: 2024-01-05

## 2024-01-08 DIAGNOSIS — I10 ESSENTIAL (PRIMARY) HYPERTENSION: ICD-10-CM

## 2024-01-08 RX ORDER — METOPROLOL SUCCINATE 100 MG/1
100 TABLET, EXTENDED RELEASE ORAL 2 TIMES DAILY
Qty: 60 TABLET | Refills: 1 | Status: SHIPPED | OUTPATIENT
Start: 2024-01-08

## 2024-02-05 RX ORDER — EMPAGLIFLOZIN 10 MG/1
10 TABLET, FILM COATED ORAL DAILY
Qty: 90 TABLET | Refills: 3 | Status: SHIPPED | OUTPATIENT
Start: 2024-02-05

## 2024-02-06 RX ORDER — SPIRONOLACTONE 25 MG/1
25 TABLET ORAL DAILY
Qty: 90 TABLET | Refills: 3 | Status: SHIPPED | OUTPATIENT
Start: 2024-02-06

## 2024-02-07 ENCOUNTER — OFFICE VISIT (OUTPATIENT)
Dept: CARDIOLOGY | Facility: CLINIC | Age: 74
End: 2024-02-07
Payer: MEDICARE

## 2024-02-07 VITALS
DIASTOLIC BLOOD PRESSURE: 68 MMHG | HEIGHT: 72 IN | HEART RATE: 100 BPM | WEIGHT: 218.6 LBS | SYSTOLIC BLOOD PRESSURE: 108 MMHG | BODY MASS INDEX: 29.61 KG/M2

## 2024-02-07 DIAGNOSIS — E11.59 TYPE 2 DIABETES MELLITUS WITH OTHER CIRCULATORY COMPLICATION, WITHOUT LONG-TERM CURRENT USE OF INSULIN: ICD-10-CM

## 2024-02-07 DIAGNOSIS — I25.5 ISCHEMIC CARDIOMYOPATHY: ICD-10-CM

## 2024-02-07 DIAGNOSIS — I10 ESSENTIAL (PRIMARY) HYPERTENSION: ICD-10-CM

## 2024-02-07 DIAGNOSIS — I25.10 CORONARY ARTERY DISEASE INVOLVING NATIVE CORONARY ARTERY OF NATIVE HEART WITHOUT ANGINA PECTORIS: Primary | ICD-10-CM

## 2024-02-07 PROCEDURE — 93000 ELECTROCARDIOGRAM COMPLETE: CPT | Performed by: INTERNAL MEDICINE

## 2024-02-07 PROCEDURE — 3078F DIAST BP <80 MM HG: CPT | Performed by: INTERNAL MEDICINE

## 2024-02-07 PROCEDURE — 99214 OFFICE O/P EST MOD 30 MIN: CPT | Performed by: INTERNAL MEDICINE

## 2024-02-07 PROCEDURE — 3074F SYST BP LT 130 MM HG: CPT | Performed by: INTERNAL MEDICINE

## 2024-02-07 RX ORDER — LOSARTAN POTASSIUM 25 MG/1
12.5 TABLET ORAL DAILY
Qty: 45 TABLET | Refills: 3 | Status: SHIPPED | OUTPATIENT
Start: 2024-02-07

## 2024-02-07 NOTE — PROGRESS NOTES
Date of Office Visit: 24  Encounter Provider: Rell Deutsch MD  Place of Service: [facility]  Patient Name: Alex Geiger  :[id number]  [id number]    Chief Complaint   Patient presents with    Coronary Artery Disease   :     HPI: Alex Geiger is a 73 y.o. male  He had stents put in his mid-left anterior descending by me in .  At that time, he had 40% disease in his right coronary artery and 30% in his circumflex.  He has had prior drug-eluting stents to his right coronary artery and circumflex in .  He had normal left ventricular function.    In  he had recurrent angina and had developed a new lesion in his distal RCA and had drug-eluting stenting to the LAD his other coronaries look good at that time and his LV function was normal.  He had pretty severe COVID-19 infection and 2021.  In 2021 he had recurrent angina we recath him need restenosis LAD stent and had a 4.0x23 Xience placed in that the other arteries looked good.  In 2023 came back in symptoms very suggestive of angina we took him back to the Cath Lab did not see any high-grade obstructive epicardial disease but we did see that he had pretty severely reduced LV function his EF was 35%.  We really worked on guideline directed medical therapy and got him enrolled in the heart failure clinic and a follow-up echo in 2023 showed EF was 45%.      He is here for follow-up.  He has his chronic complaint of upper back and shoulder pain which she has had does not seem to be anginal.  He his breathing is stable but his blood pressure gets low when he gets fatigued with it sometimes at 70/40 he says they had cut back his Entresto but it still is dropping.  No edema no syncope he had the shingles shot yesterday and just does not feel very good today    Past Medical History:   Diagnosis Date    CAD (coronary artery disease)     COVID-19     Diabetes mellitus      Hyperlipidemia     Hypertension     Kidney stones     Renal injury     Unstable angina     Ventricular ectopy     VPC (ventricular premature complex)        Past Surgical History:   Procedure Laterality Date    CARDIAC CATHETERIZATION      CARDIAC CATHETERIZATION N/A 8/16/2017    Procedure: Coronary angiography;  Surgeon: Rell Deutsch MD;  Location: [facility] CATH INVASIVE LOCATION;  Service:     CARDIAC CATHETERIZATION N/A 8/16/2017    Procedure: Stent QUIANA coronary;  Surgeon: Rell Deutsch MD;  Location: [facility] CATH INVASIVE LOCATION;  Service:     CARDIAC CATHETERIZATION N/A 8/16/2017    Procedure: Left Heart Cath;  Surgeon: Rell Deutsch MD;  Location:  [facility] CATH INVASIVE LOCATION;  Service:     CARDIAC CATHETERIZATION N/A 8/16/2017    Procedure: Left ventriculography;  Surgeon: Rell Deutsch MD;  Location: [facility] CATH [facility]AS[facility] LOCATION;  Service:     CARDIAC CATHETERIZATION N/A 7/25/2019    Procedure: Left Heart Cath;  Surgeon: Rell Deutsch MD;  Location: [facility] CATH [facility] LOC[facility];  Service: Cardiology    CARDIAC CATHETERIZATION N/A 7/25/2019    Procedure: Stent QUIANA coronary;  Surgeon: Rell Deutsch MD;  Location: [facility] CATH INVASIVE LOCATION;  Service: Cardiology    CARDIAC CATHETERIZATION N/A 7/25/2019    Procedure: Left ventriculography;  Surgeon: Rell Deutsch MD;  Location: [facility] CATH INVASIVE LOCATION;  Service: Cardiology    CARDIAC CATHETERIZATION N/A 7/25/2019    Procedure: Coronary angiography;  Surgeon: Rell Deutsch MD;  Location: [facility] CATH INVASIVE LOCATION;  Service: Cardiology    CARDIAC CATHETERIZATION Right 12/22/2021    Procedure: Left Heart Cath;  Surgeon: Helen Oliva MD;  Location: [facility] CATH [facility]VAS[facility] LOC[facility];  Service: Cardiology;  Laterality: Right;    CARDIAC CATHETERIZATION N/A 12/22/2021    Procedure: Coronary angiography;  Surgeon: Helen Oliva MD;  Location: [facility] CATH [facility] LOC[facility];  Service: Cardiology;  Laterality: N/A;    CARDIAC  CATHETERIZATION N/A 12/22/2021    Procedure: Left ventriculography;  Surgeon: Helen Oliva MD;  Location:  [facility] C[facility] INVASIVE LOCATION;  Service: Cardiology;  Laterality: N/A;    CARDIAC CATHETERIZATION N/A 12/22/2021    Procedure: Percutaneous Coronary Intervention;  Surgeon: Helen Oliva MD;  Location:  [facility] C[facility] INVAS[facility] LOC[facility];  Service: Cardiology;  Laterality: N/A;    CARDIAC CATHETERIZATION N/A 12/22/2021    Procedure: Stent QUIANA coronary;  Surgeon: Helen Oliva MD;  Location:  [facility];  Service: Cardiology;  Laterality: N/A;    CARDIAC CATHETERIZATION N/A 2/23/2023    Procedure: Left Heart Cath;  Surgeon: Rell Deutsch MD;  Location:  [facility];  Service: Cardiology;  Laterality: N/A;    CARDIAC CATHETERIZATION N/A 2/23/2023    Procedure: Left ventriculography;  Surgeon: Rell Deutsch MD;  Location:  [facility];  Service: Cardiology;  Laterality: N/A;    CARDIAC CATHETERIZATION N/A 2/23/2023    Procedure: Coronary angiography;  Surgeon: Rell Deutsch MD;  Location:  [facility];  Service: Cardiology;  Laterality: N/A;    CORONARY ANGIOPLASTY WITH STENT PLACEMENT      KIDNEY STONE SURGERY      PARATHYROIDECTOMY         Social History     Socioeconomic History    Marital status:    Tobacco Use    Smoking status: Former     Packs/day: 1.00     Years: 25.00     Additional pack years: 0.00     Total pack years: 25.00     Types: Cigarettes    Smokeless tobacco: Never    Tobacco comments:     NO CAFFEINE USE   Vaping Use    Vaping Use: Never used   Substance and Sexual Activity    Alcohol use: Not Currently    Drug use: Never    Sexual activity: Defer       Family History   Problem Relation Age of Onset    Diabetes Mother     Alzheimer's disease Father     Kidney disease Father     Diabetes Father     No Known Problems Maternal Grandmother     No Known Problems Maternal Grandfather     No Known Problems Paternal  Grandmother     Heart attack Paternal Grandfather     Heart disease Paternal Grandfather     Diabetes Paternal Grandfather        Review of Systems   Constitutional: Negative for decreased appetite, fever, malaise/fatigue and weight loss.   HENT:  Negative for nosebleeds.    Eyes:  Negative for double vision.   Cardiovascular:  Negative for chest pain, claudication, cyanosis, dyspnea on exertion, irregular heartbeat, leg swelling, near-syncope, orthopnea, palpitations, paroxysmal nocturnal dyspnea and syncope.   Respiratory:  Negative for cough, hemoptysis and shortness of breath.    Hematologic/Lymphatic: Negative for bleeding problem.   Skin:  Negative for rash.   Musculoskeletal:  Negative for falls and myalgias.   Gastrointestinal:  Negative for hematochezia, jaundice, melena, nausea and vomiting.   Genitourinary:  Negative for hematuria.   Neurological:  Negative for dizziness and seizures.   Psychiatric/Behavioral:  Negative for altered mental status and memory loss.        Allergies   Allergen Reactions    Oxycodone-Acetaminophen Itching    Percocet [Oxycodone-Acetaminophen]     Shellfish-Derived Products Hives and Itching    Statins     Adhesive Tape Rash    Latex Rash         Current Outpatient Medications:     cholecalciferol (VITAMIN D3) 25 MCG (1000 UT) tablet, Take 1 tablet by mouth Daily., Disp: , Rfl:     clopidogrel (PLAVIX) 75 MG tablet, TAKE (1) TABLET DAILY, Disp: 90 tablet, Rfl: 1    Entresto 24-26 MG tablet, Take 1 tablet by mouth 2 (Two) Times a Day., Disp: 180 tablet, Rfl: 0    fexofenadine (ALLEGRA) 180 MG tablet, Take 1 tablet by mouth Daily., Disp: , Rfl:     finasteride (PROSCAR) 5 MG tablet, Take 1 tablet by mouth Daily., Disp: , Rfl:     flunisolide (NASALIDE) 25 MCG/ACT (0.025%) solution nasal spray, Inhale 2 sprays 2 (Two) Times a Day., Disp: , Rfl:     isosorbide mononitrate (IMDUR) 60 MG 24 hr tablet, Take 1.5 tablets by mouth Every Morning., Disp: 135 tablet, Rfl: 3    Jardiance 10  "MG tablet tablet, Take 1 tablet by mouth Daily., Disp: 90 tablet, Rfl: 3    metFORMIN (GLUCOPHAGE) 500 MG tablet, Take 2 tablets by mouth 2 (Two) Times a Day With Meals., Disp: , Rfl:     metoprolol succinate XL (TOPROL-XL) 100 MG 24 hr tablet, Take 1 tablet by mouth 2 (Two) Times a Day., Disp: 60 tablet, Rfl: 1    nitroglycerin (NITROSTAT) 0.4 MG SL tablet, PLACE 1 TABLET UNDER TONGUE EVERY 5 MINUTES X3 DOSES FOR CHEST PAIN. IF NO RELIEF CALL 911., Disp: 45 tablet, Rfl: 11    NON FORMULARY, Allergy shot weekly, Disp: , Rfl:     Repatha SureClick solution auto-injector SureClick injection, INJECT 1ML EVERY 2 WEEKS, Disp: , Rfl:     spironolactone (ALDACTONE) 25 MG tablet, Take 1 tablet by mouth Daily., Disp: 90 tablet, Rfl: 3    tamsulosin (FLOMAX) 0.4 MG capsule 24 hr capsule, Take 1 capsule by mouth Every Night., Disp: , Rfl:     Testosterone 1.62 % gel, apply 2 pumps TO shoulders ONCE DAILY, Disp: , Rfl:     TRESIBA FLEXTOUCH 200 UNIT/ML solution pen-injector, Inject 65 Units as directed Daily., Disp: , Rfl:       Objective:     Vitals:    02/07/24 1131   BP: 108/68   Pulse: 100   Weight: 99.2 kg (218 lb 9.6 oz)   Height: 182.9 cm (72\")     Body mass index is 29.65 kg/m².    Constitutional:       Appearance: Well-developed.   Eyes:      General: No scleral icterus.  HENT:      Head: Normocephalic.   Neck:      Thyroid: No thyromegaly.      Vascular: No JVD.      Lymphadenopathy: No cervical adenopathy.   Pulmonary:      Effort: Pulmonary effort is normal.      Breath sounds: Normal breath sounds. No wheezing. No rales.   Cardiovascular:      Normal rate. Regular rhythm.      No gallop.    Edema:     Peripheral edema absent.   Abdominal:      Palpations: Abdomen is soft.      Tenderness: There is no abdominal tenderness.   Musculoskeletal: Normal range of motion. Skin:     General: Skin is warm and dry.      Findings: No rash.   Neurological:      Mental Status: Alert and oriented to person, place, and time. "           ECG 12 Lead    Date/Time: 2/7/2024 12:09 PM  Performed by: Rell Deutsch MD    Authorized by: Rell Deutsch MD  Comparison: compared with previous ECG   Rhythm: sinus rhythm  Q waves: II, III and aVF      Clinical impression: abnormal EKG           Assessment:       Diagnosis Plan   1. Coronary artery disease involving native coronary artery of native heart without angina pectoris        2. Ischemic cardiomyopathy        3. Type 2 diabetes mellitus with other circulatory complication, without long-term current use of insulin               Plan:       Well in general I am pleased with how he is doing but I think were little overmedicated we cannot let him be hypotensive so I am going to have him stop the Entresto and I am going to start him on a low-dose of losartan on Friday and I will have him see Sigrid in 3 months and he will see me in 6 months he is going to go home and rest from the shingles shot    As always, it has been a pleasure to participate in your patient's care.      Sincerely,       Rell Deutsch MD

## 2024-03-05 RX ORDER — ISOSORBIDE MONONITRATE 60 MG/1
90 TABLET, EXTENDED RELEASE ORAL EVERY MORNING
Qty: 135 TABLET | Refills: 1 | Status: SHIPPED | OUTPATIENT
Start: 2024-03-05

## 2024-03-07 DIAGNOSIS — I10 ESSENTIAL (PRIMARY) HYPERTENSION: ICD-10-CM

## 2024-03-07 RX ORDER — METOPROLOL SUCCINATE 100 MG/1
100 TABLET, EXTENDED RELEASE ORAL 2 TIMES DAILY
Qty: 60 TABLET | Refills: 1 | Status: SHIPPED | OUTPATIENT
Start: 2024-03-07

## 2024-04-09 ENCOUNTER — TELEPHONE (OUTPATIENT)
Dept: CARDIOLOGY | Facility: HOSPITAL | Age: 74
End: 2024-04-09
Payer: MEDICARE

## 2024-04-09 ENCOUNTER — LAB (OUTPATIENT)
Dept: LAB | Facility: HOSPITAL | Age: 74
End: 2024-04-09
Payer: MEDICARE

## 2024-04-09 ENCOUNTER — HOSPITAL ENCOUNTER (OUTPATIENT)
Dept: CARDIOLOGY | Facility: HOSPITAL | Age: 74
Discharge: HOME | End: 2024-04-09
Payer: MEDICARE

## 2024-04-09 VITALS
WEIGHT: 213 LBS | OXYGEN SATURATION: 95 % | DIASTOLIC BLOOD PRESSURE: 73 MMHG | SYSTOLIC BLOOD PRESSURE: 143 MMHG | BODY MASS INDEX: 28.85 KG/M2 | HEIGHT: 72 IN | HEART RATE: 83 BPM

## 2024-04-09 DIAGNOSIS — I50.32 HEART FAILURE WITH IMPROVED EJECTION FRACTION (HFIMPEF): Primary | ICD-10-CM

## 2024-04-09 LAB
ANION GAP SERPL CALCULATED.3IONS-SCNC: 14.1 MMOL/L (ref 5–15)
BUN SERPL-MCNC: 12 MG/DL (ref 8–23)
BUN/CREAT SERPL: 11.1 (ref 7–25)
CALCIUM SPEC-SCNC: 10 MG/DL (ref 8.6–10.5)
CHLORIDE SERPL-SCNC: 105 MMOL/L (ref 98–107)
CO2 SERPL-SCNC: 21.9 MMOL/L (ref 22–29)
CREAT SERPL-MCNC: 1.08 MG/DL (ref 0.76–1.27)
EGFRCR SERPLBLD CKD-EPI 2021: 72.5 ML/MIN/1.73
GLUCOSE SERPL-MCNC: 378 MG/DL (ref 65–99)
NT-PROBNP SERPL-MCNC: 63.1 PG/ML (ref 0–900)
POTASSIUM SERPL-SCNC: 4.7 MMOL/L (ref 3.5–5.2)
SODIUM SERPL-SCNC: 141 MMOL/L (ref 136–145)

## 2024-04-09 PROCEDURE — 83880 ASSAY OF NATRIURETIC PEPTIDE: CPT | Performed by: INTERNAL MEDICINE

## 2024-04-09 PROCEDURE — 94726 PLETHYSMOGRAPHY LUNG VOLUMES: CPT | Performed by: INTERNAL MEDICINE

## 2024-04-09 PROCEDURE — 36415 COLL VENOUS BLD VENIPUNCTURE: CPT | Performed by: INTERNAL MEDICINE

## 2024-04-09 PROCEDURE — 80048 BASIC METABOLIC PNL TOTAL CA: CPT | Performed by: INTERNAL MEDICINE

## 2024-04-09 PROCEDURE — 99214 OFFICE O/P EST MOD 30 MIN: CPT | Performed by: INTERNAL MEDICINE

## 2024-04-09 RX ORDER — ISOSORBIDE MONONITRATE 60 MG/1
90 TABLET, EXTENDED RELEASE ORAL DAILY
COMMUNITY

## 2024-04-09 NOTE — LETTER
April 9, 2024       No Recipients    Patient: Alex Geiger   YOB: 1950   Date of Visit: 4/9/2024     Dear Rell Deutsch MD:       Thank you for referring Alex Geiger to me for evaluation. Below are the relevant portions of my assessment and plan of care.    If you have questions, please do not hesitate to call me. I look forward to following Alex along with you.         Sincerely,        Tavares Marcano MD PhD        CC:   No Recipients    Tavares Marcano MD PhD  04/09/24 1136  Signed  [facility] Pul[facility]  [facility]  Tavares Marcano M.D., Ph.D., [facility]       Rell Deutsch MD  [address] [id number]    Thank you for asking me to see Alex Geiger.     History of Present Illness    1. HFrEF-->HFimpEF    Subjective     Alex Geigeris a 73 y.o. male who presents today for HFimpEF.    This is the first time I have met the patient, though he has been followed in the [facility] in the past.  His last 2D TTE was in 2023 and showed that his LVEF was greater than 40%.  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  This was recently changed over to losartan.  Otherwise, he is on Toprol-XL, Jardiance, and spironolactone.  Medication compliant.  Denies adverse effects.  Stable exercise intolerance.    Review of Systems - Review of Systems   Cardiovascular:  Positive for dyspnea on exertion.   Respiratory:  Positive for shortness of breath.    All other systems reviewed and are negative.        All other systems were reviewed and were negative.    Patient Active Problem List   Diagnosis   • Type 2 diabetes mellitus with circulatory disorder, without long-term current use of insulin   • Essential (primary) hypertension   • HLD (hyperlipidemia)   • Ureteral stone with hydronephrosis   • Coronary artery disease involving native coronary artery of native heart without angina pectoris   • Chronic  HFrEF (heart failure with reduced ejection fraction)   • Ischemic cardiomyopathy   • Heart failure with improved ejection fraction (HFimpEF)       family history includes Alzheimer's disease in his father; Diabetes in his father, mother, and paternal grandfather; Heart attack in his paternal grandfather; Heart disease in his paternal grandfather; Kidney disease in his father; No Known Problems in his maternal grandfather, maternal grandmother, and paternal grandmother.     reports that he has quit smoking. His smoking use included cigarettes. He has a 25 pack-year smoking history. He has never used smokeless tobacco. He reports that he does not currently use alcohol. He reports that he does not use drugs.    Allergies   Allergen Reactions   • Oxycodone-Acetaminophen Itching   • Percocet [Oxycodone-Acetaminophen]    • Shellfish-Derived Products Hives and Itching   • Statins    • Adhesive Tape Rash   • Latex Rash       Social Determinants of Health     Tobacco Use: Medium Risk (4/9/2024)    Patient History    • Smoking Tobacco Use: Former    • Smokeless Tobacco Use: Never    • Passive Exposure: Not on file   Alcohol Use: Not on file   Financial Resource Strain: Low Risk  (4/19/2021)    Overall Financial Resource Strain (CARDIA)    • Difficulty of Paying Living Expenses: Not hard at all   Food Insecurity: No Food Insecurity (4/19/2021)    Hunger Vital Sign    • Worried About Running Out of Food in the Last Year: Never true    • Ran Out of Food in the Last Year: Never true   Transportation Needs: No Transportation Needs (4/19/2021)    PRAPARE - Transportation    • Lack of Transportation (Medical): No    • Lack of Transportation (Non-Medical): No   Physical Activity: Not on file   Stress: Not on file   Social Connections: Unknown (10/10/2023)    Family and Community Support    • Help with Day-to-Day Activities: Not on file    • Lonely or Isolated: Not on file   Interpersonal Safety: Unknown (3/4/2024)    Abuse Screen     • Unsafe at Home or Work/School: Not on file    • Feels Threatened by Someone?: Not on file    • Does Anyone Keep You from Contacting Others or Doint Things Outside the Home?: Not on file    • Physical Sign of Abuse Present: Not on file   Depression: Not on file   Housing Stability: Unknown (10/10/2023)    Housing Stability    • Current Living Arrangements: Not on file    • Potentially Unsafe Housing Conditions: Not on file   Utilities: Not on file   Health Literacy: Unknown (10/10/2023)    Education    • Help with school or training?: Not on file    • Preferred Language: Not on file   Employment: Unknown (10/10/2023)    Employment    • Do you want help finding or keeping work or a job?: Not on file   Disabilities: Unknown (10/10/2023)    Disabilities    • Concentrating, Remembering, or Making Decisions Difficulty: Not on file    • Doing Errands Independently Difficulty: Not on file        Physical Activity: Not on file          Current Outpatient Medications:   •  cholecalciferol (VITAMIN D3) 25 MCG (1000 UT) tablet, Take 1 tablet by mouth Daily., Disp: , Rfl:   •  clopidogrel (PLAVIX) 75 MG tablet, TAKE (1) TABLET DAILY, Disp: 90 tablet, Rfl: 1  •  fexofenadine (ALLEGRA) 180 MG tablet, Take 1 tablet by mouth Daily., Disp: , Rfl:   •  finasteride (PROSCAR) 5 MG tablet, Take 1 tablet by mouth Daily., Disp: , Rfl:   •  flunisolide (NASALIDE) 25 MCG/ACT (0.025%) solution nasal spray, Inhale 2 sprays 2 (Two) Times a Day., Disp: , Rfl:   •  isosorbide mononitrate (IMDUR) 60 MG 24 hr tablet, Take 1.5 tablets by mouth Daily., Disp: , Rfl:   •  Jardiance 10 MG tablet tablet, Take 1 tablet by mouth Daily., Disp: 90 tablet, Rfl: 3  •  losartan (COZAAR) 25 MG tablet, Take 0.5 tablets by mouth Daily., Disp: 45 tablet, Rfl: 3  •  metFORMIN (GLUCOPHAGE) 500 MG tablet, Take 2 tablets by mouth 2 (Two) Times a Day With Meals., Disp: , Rfl:   •  metoprolol succinate XL (TOPROL-XL) 100 MG 24 hr tablet, Take 1 tablet by mouth 2 (Two)  Times a Day., Disp: 60 tablet, Rfl: 1  •  nitroglycerin (NITROSTAT) 0.4 MG SL tablet, PLACE 1 TABLET UNDER TONGUE EVERY 5 MINUTES X3 DOSES FOR CHEST PAIN. IF NO RELIEF CALL 911., Disp: 45 tablet, Rfl: 11  •  NON FORMULARY, Allergy shot weekly, Disp: , Rfl:   •  Repatha SureClick solution auto-injector SureClick injection, INJECT 1ML EVERY 2 WEEKS, Disp: , Rfl:   •  spironolactone (ALDACTONE) 25 MG tablet, Take 1 tablet by mouth Daily., Disp: 90 tablet, Rfl: 3  •  tamsulosin (FLOMAX) 0.4 MG capsule 24 hr capsule, Take 1 capsule by mouth Every Night., Disp: , Rfl:   •  Testosterone 1.62 % gel, apply 2 pumps TO shoulders ONCE DAILY, Disp: , Rfl:   •  TRESIBA FLEXTOUCH 200 UNIT/ML solution pen-injector, Inject 65 Units as directed Daily., Disp: , Rfl:     Objective    Vital Sign Review:     Vitals:    04/09/24 1117   BP: 143/73   Pulse: 83   SpO2: 95%     PP:high  Pulse Ox: normal oxygenation on room air    Body mass index is 28.88 kg/m².      04/09/24  1117   Weight: 96.6 kg (213 lb)     Weight change:    Physical Exam:  Vitals reviewed.   Constitutional:       General: Not in acute distress.     Appearance: Healthy appearance. Not in distress. Obese. Not ill-appearing, toxic-appearing or diaphoretic.      Interventions: Not intubated.  Neck:      Vascular: No hepatojugular reflux, JVD or JVR. JVD normal with 6 cm of water.   Pulmonary:      Effort: Pulmonary effort is normal. No tachypnea, bradypnea, accessory muscle usage, prolonged expiration, respiratory distress or retractions. Not intubated.      Breath sounds: Normal breath sounds and air entry. No stridor, decreased air movement or transmitted upper airway sounds. No decreased breath sounds. No wheezing. No rhonchi. No rales.   Cardiovascular:      PMI at left midclavicular line. Normal rate. Regular rhythm. S1 with normal intensity. S2 with normal intensity.       Murmurs: There is no murmur.      No gallop.  No click. No rub.   Neurological:      General: No  focal deficit present.      Mental Status: Alert and oriented to person, place and time.   Psychiatric:         Behavior: Behavior is cooperative.          DATA REVIEWED:   EKG:      ---------------------------------------------------  TTE/CRYSTAL:    Results for orders placed during the hospital encounter of 03/14/23    Adult Transthoracic Echo Complete W/ Cont if Necessary Per Protocol    Interpretation Summary  •  Left ventricular systolic function is low normal. Calculated left ventricular EF = 45.6%  •  Left ventricular wall thickness is consistent with mild concentric hypertrophy.  •  Left ventricular diastolic function is consistent with (grade Ia w/high LAP) impaired relaxation.  •  Mild tricuspid valve regurgitation is present.  •  Estimated right ventricular systolic pressure from tricuspid regurgitation is normal (<35 mmHg).      My interpretation of the TTE is below.       -----------------------------------------------------  [facility]  I personally reviewed the cardiac catheterization.  Results for orders placed during the hospital encounter of 02/23/23    Cardiac Catheterization/Vascular Study    Conclusion  CARDIAC CATHETERIZATION REPORT    Procedure:Left heart catheterization    DATE OF PROCEDURE: 02/23/23    PROCEDURE PERFORMED BY: Rell Deutsch MD, [facility]    INDICATION FOR PROCEDURE: History of CAD with stenting recurrence of symptoms recently    DESCRIPTION OF PROCEDURE: After consent was obtained, access was gained in his right radial artery using a micropuncture technique. A 6-Telugu short sheath was placed without difficulty.  Intraarterial cocktail was given.  Left ventriculography was performed followed by selective injection of the left and right coronary arteries were performed using a JL-3.5 one catheter technique.  Patient tolerated the procedure well without early complication and EBL was minimal.  At the conclusion, the sheath was removed and hemostasis was obtained. The patient went to  the prep holding area in stable condition.    FINDINGS:    LEFT VENTRICULOGRAPHY: The LV pressure was 100/10.  There was moderate LV dilatation there is severe mid anterior wall hypokinesis the overall EF is reduced at about 35%.  There was no mitral insufficiency or gradient across the aortic valve on pullback.    CORONARY ANGIOGRAPHY:  Left main: This is normal  Left anterior descending: The stent in the proximal LAD is widely patent and looks normal there really is no significant disease in the mid or distal LAD the diagonals are free of obstructive disease as are the septal perforators  Ramus intermedius:Not present  Circumflex: There is a very large OM1 with some 30% proximal disease there is some 20 to 30% disease in the proximal circumflex just beyond OM1 and then distally the vessel looks good this is unchanged  RCA: Is a dominant vessel.  The stent in the proximal right with some diffuse 30% disease and it distally the vessel is normal    SUMMARY: Moderate to severely reduced global LV function probably an ischemic cardiomyopathy mild nonobstructive coronary disease all stents are patent    RECOMMENDATIONS: We will optimize guideline directed medical therapy have him get followed up in the heart failure clinic next week    Rell Deutsch MD  02/23/23  11:-----------------------------------------    --------------------------------------------------------------------------------------------------------------    Laboratory evaluations:    Lab Results   Component Value Date    GLUCOSE 346 (H) 09/29/2023    BUN 14 09/29/2023    CREATININE 0.98 09/29/2023    EGFRIFNONA 73 12/21/2021    EGFRIFAFRI >60 03/14/2022    BCR 14.3 09/29/2023    K 4.9 09/29/2023    CO2 21.0 (L) 09/29/2023    CALCIUM 10.2 09/29/2023    ALBUMIN 4.2 09/29/2023    AST 13 09/29/2023    ALT 17 09/29/2023     Lab Results   Component Value Date    WBC 7.31 09/29/2023    HGB 13.8 09/29/2023    HCT 41.9 09/29/2023    MCV 89.9 09/29/2023    PLT  "232 09/29/2023     Lab Results   Component Value Date    CHOL 240 (H) 03/02/2023    CHLPL 231 (H) 03/21/2014    TRIG 575 (H) 03/02/2023    HDL 35 (L) 03/02/2023     (H) 03/02/2023     Lab Results   Component Value Date    TSH 1.330 09/29/2023    E8GNCJB 66.3 (L) 09/29/2023     No results found for: \"CKTOTAL\", \"CKMB\", \"CKMBINDEX\", \"TROPONINI\", \"TROPONINT\"  Lab Results   Component Value Date    HGBA1C 9.10 (H) 03/02/2023     No results found for: \"DDIMER\"  Lab Results   Component Value Date    ALT 17 09/29/2023     Lab Results   Component Value Date    HGBA1C 9.10 (H) 03/02/2023    HGBA1C 7.50 (H) 03/17/2021     Lab Results   Component Value Date    CREATININE 0.98 09/29/2023     Lab Results   Component Value Date    IRON 96 09/29/2023    TIBC 373 09/29/2023    FERRITIN 100.00 09/29/2023     Lab Results   Component Value Date    INR 1.25 (H) 03/16/2021    PROTIME 15.5 (H) 03/16/2021            PAH RISK ASSESSMENT:    ReDS VOLUMETRIC ASSESSMENT:  ReDs Vest    Performed by: Tavares Marcano MD PhD  Authorized by: Tavares Marcano MD PhD    ReDS value:  32        Assessment & Plan     1. Heart failure with improved ejection fraction (HFimpEF)      NYHA stage C; functional class III.  Today, he is euvolemic and perfused.  Clinical trajectory was reviewed today and is stable.    - Update his 2D TTE.  I did inform him that if his LVEF is still greater than 40% he will no longer require annual echocardiograms  - Toprol-XL  - Jardiance with sick precautions  - Losartan  - Spironolactone with biannual surveillance  - Chem-7 and proBNP today.  Will call with results.        Return for 2D TTE Results.          This document has been electronically signed by Tavares Marcano MD PhD on April 9, 2024 11:36 EDT        Tavares Marcano M.D., Ph.D., [facility] Failure [facility] Pul[facility]pert[facility]  [facility] Medical Director for HF and PAH      "

## 2024-04-09 NOTE — PROGRESS NOTES
Heart Failure & Pulmonary Arterial Hypertension Clinic  [facility]  Tavares Marcano M.D., Ph.D., [facility]       Rell Deutsch MD  [address]  [facility] [address]  [place] [address]    Thank you for asking me to see Alex Geiger.     History of Present Illness    1. HFrEF-->HFimpEF    Subjective      Alex Geigeris a 73 y.o. male who presents today for HFimpEF.    This is the first time I have met the patient, though he has been followed in the HF in the past.  His last 2D TTE was in 2023 and showed that his LVEF was greater than 40%.  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  This was recently changed over to losartan.  Otherwise, he is on Toprol-XL, Jardiance, and spironolactone.  Medication compliant.  Denies adverse effects.  Stable exercise intolerance.    Review of Systems - Review of Systems   Cardiovascular:  Positive for dyspnea on exertion.   Respiratory:  Positive for shortness of breath.    All other systems reviewed and are negative.        All other systems were reviewed and were negative.    Patient Active Problem List   Diagnosis    Type 2 diabetes mellitus with circulatory disorder, without long-term current use of insulin    Essential (primary) hypertension    HLD (hyperlipidemia)    Ureteral stone with hydronephrosis    Coronary artery disease involving native coronary artery of native heart without angina pectoris    Chronic HFrEF (heart failure with reduced ejection fraction)    Ischemic cardiomyopathy    Heart failure with improved ejection fraction (HFimpEF)       family history includes Alzheimer's disease in his father; Diabetes in his father, mother, and paternal grandfather; Heart attack in his paternal grandfather; Heart disease in his paternal grandfather; Kidney disease in his father; No Known Problems in his maternal grandfather, maternal grandmother, and paternal grandmother.     reports that he has quit smoking. His smoking use included  cigarettes. He has a 25 pack-year smoking history. He has never used smokeless tobacco. He reports that he does not currently use alcohol. He reports that he does not use drugs.    Allergies   Allergen Reactions    Oxycodone-Acetaminophen Itching    Percocet [Oxycodone-Acetaminophen]     Shellfish-Derived Products Hives and Itching    Statins     Adhesive Tape Rash    Latex Rash       Social Determinants of Health     Tobacco Use: Medium Risk (4/9/2024)    Patient History     Smoking Tobacco Use: Former     Smokeless Tobacco Use: Never     Passive Exposure: Not on file   Alcohol Use: Not on file   Financial Resource Strain: Low Risk  (4/19/2021)    Overall Financial Resource Strain (CARDIA)     Difficulty of Paying Living Expenses: Not hard at all   Food Insecurity: No Food Insecurity (4/19/2021)    Hunger Vital Sign     Worried About Running Out of Food in the Last Year: Never true     Ran Out of Food in the Last Year: Never true   Transportation Needs: No Transportation Needs (4/19/2021)    PRAPARE - Transportation     Lack of Transportation (Medical): No     Lack of Transportation (Non-Medical): No   Physical Activity: Not on file   Stress: Not on file   Social Connections: Unknown (10/10/2023)    Family and Community Support     Help with Day-to-Day Activities: Not on file     Lonely or Isolated: Not on file   Interpersonal Safety: Unknown (3/4/2024)    Abuse Screen     Unsafe at Home or Work/School: Not on file     Feels Threatened by Someone?: Not on file     Does Anyone Keep You from Contacting Others or Doint Things Outside the Home?: Not on file     Physical Sign of Abuse Present: Not on file   Depression: Not on file   Housing Stability: Unknown (10/10/2023)    Housing Stability     Current Living Arrangements: Not on file     Potentially Unsafe Housing Conditions: Not on file   Utilities: Not on file   Health Literacy: Unknown (10/10/2023)    Education     Help with school or training?: Not on file      Preferred Language: Not on file   Employment: Unknown (10/10/2023)    Employment     Do you want help finding or keeping work or a job?: Not on file   Disabilities: Unknown (10/10/2023)    Disabilities     Concentrating, Remembering, or Making Decisions Difficulty: Not on file     Doing Errands Independently Difficulty: Not on file        Physical Activity: Not on file          Current Outpatient Medications:     cholecalciferol (VITAMIN D3) 25 MCG (1000 UT) tablet, Take 1 tablet by mouth Daily., Disp: , Rfl:     clopidogrel (PLAVIX) 75 MG tablet, TAKE (1) TABLET DAILY, Disp: 90 tablet, Rfl: 1    fexofenadine (ALLEGRA) 180 MG tablet, Take 1 tablet by mouth Daily., Disp: , Rfl:     finasteride (PROSCAR) 5 MG tablet, Take 1 tablet by mouth Daily., Disp: , Rfl:     flunisolide (NASALIDE) 25 MCG/ACT (0.025%) solution nasal spray, Inhale 2 sprays 2 (Two) Times a Day., Disp: , Rfl:     isosorbide mononitrate (IMDUR) 60 MG 24 hr tablet, Take 1.5 tablets by mouth Daily., Disp: , Rfl:     Jardiance 10 MG tablet tablet, Take 1 tablet by mouth Daily., Disp: 90 tablet, Rfl: 3    losartan (COZAAR) 25 MG tablet, Take 0.5 tablets by mouth Daily., Disp: 45 tablet, Rfl: 3    metFORMIN (GLUCOPHAGE) 500 MG tablet, Take 2 tablets by mouth 2 (Two) Times a Day With Meals., Disp: , Rfl:     metoprolol succinate XL (TOPROL-XL) 100 MG 24 hr tablet, Take 1 tablet by mouth 2 (Two) Times a Day., Disp: 60 tablet, Rfl: 1    nitroglycerin (NITROSTAT) 0.4 MG SL tablet, PLACE 1 TABLET UNDER TONGUE EVERY 5 MINUTES X3 DOSES FOR CHEST PAIN. IF NO RELIEF CALL 911., Disp: 45 tablet, Rfl: 11    NON FORMULARY, Allergy shot weekly, Disp: , Rfl:     Repatha SureClick solution auto-injector SureClick injection, INJECT 1ML EVERY 2 WEEKS, Disp: , Rfl:     spironolactone (ALDACTONE) 25 MG tablet, Take 1 tablet by mouth Daily., Disp: 90 tablet, Rfl: 3    tamsulosin (FLOMAX) 0.4 MG capsule 24 hr capsule, Take 1 capsule by mouth Every Night., Disp: , Rfl:      Testosterone 1.62 % gel, apply 2 pumps TO shoulders ONCE DAILY, Disp: , Rfl:     TRESIBA FLEXTOUCH 200 UNIT/ML solution pen-injector, Inject 65 Units as directed Daily., Disp: , Rfl:     Objective     Vital Sign Review:     Vitals:    04/09/24 1117   BP: 143/73   Pulse: 83   SpO2: 95%     PP:high  Pulse Ox: normal oxygenation on room air    Body mass index is 28.88 kg/m².      04/09/24  1117   Weight: 96.6 kg (213 lb)     Weight change:    Physical Exam:  Vitals reviewed.   Constitutional:       General: Not in acute distress.     Appearance: Healthy appearance. Not in distress. Obese. Not ill-appearing, toxic-appearing or diaphoretic.      Interventions: Not intubated.  Neck:      Vascular: No hepatojugular reflux, JVD or JVR. JVD normal with 6 cm of water.   Pulmonary:      Effort: Pulmonary effort is normal. No tachypnea, bradypnea, accessory muscle usage, prolonged expiration, respiratory distress or retractions. Not intubated.      Breath sounds: Normal breath sounds and air entry. No stridor, decreased air movement or transmitted upper airway sounds. No decreased breath sounds. No wheezing. No rhonchi. No rales.   Cardiovascular:      PMI at left midclavicular line. Normal rate. Regular rhythm. S1 with normal intensity. S2 with normal intensity.       Murmurs: There is no murmur.      No gallop.  No click. No rub.   Neurological:      General: No focal deficit present.      Mental Status: Alert and oriented to person, place and time.   Psychiatric:         Behavior: Behavior is cooperative.          DATA REVIEWED:   EKG:      ---------------------------------------------------  TTE/CRYSTAL:    Results for orders placed during the hospital encounter of 03/14/23    Adult Transthoracic Echo Complete W/ Cont if Necessary Per Protocol    Interpretation Summary    Left ventricular systolic function is low normal. Calculated left ventricular EF = 45.6%    Left ventricular wall thickness is consistent with mild concentric  hypertrophy.    Left ventricular diastolic function is consistent with (grade Ia w/high LAP) impaired relaxation.    Mild tricuspid valve regurgitation is present.    Estimated right ventricular systolic pressure from tricuspid regurgitation is normal (<35 mmHg).      My interpretation of the TTE is below.       -----------------------------------------------------  RHC/LHC  I personally reviewed the cardiac catheterization.  Results for orders placed during the hospital encounter of 02/23/23    Cardiac Catheterization/Vascular Study    Conclusion  CARDIAC CATHETERIZATION REPORT    Procedure:Left heart catheterization    DATE OF PROCEDURE: 02/23/23    PROCEDURE PERFORMED BY: Rell Deutsch MD, [facility]    INDICATION FOR PROCEDURE: History of CAD with stenting recurrence of symptoms recently    DESCRIPTION OF PROCEDURE: After consent was obtained, access was gained in his right radial artery using a micropuncture technique. A 6-Khmer short sheath was placed without difficulty.  Intraarterial cocktail was given.  Left ventriculography was performed followed by selective injection of the left and right coronary arteries were performed using a JL-3.5 one catheter technique.  Patient tolerated the procedure well without early complication and EBL was minimal.  At the conclusion, the sheath was removed and hemostasis was obtained. The patient went to the prep holding area in stable condition.    FINDINGS:    LEFT VENTRICULOGRAPHY: The LV pressure was 100/10.  There was moderate LV dilatation there is severe mid anterior wall hypokinesis the overall EF is reduced at about 35%.  There was no mitral insufficiency or gradient across the aortic valve on pullback.    CORONARY ANGIOGRAPHY:  Left main: This is normal  Left anterior descending: The stent in the proximal LAD is widely patent and looks normal there really is no significant disease in the mid or distal LAD the diagonals are free of obstructive disease as are the  "septal perforators  Ramus intermedius:Not present  Circumflex: There is a very large OM1 with some 30% proximal disease there is some 20 to 30% disease in the proximal circumflex just beyond OM1 and then distally the vessel looks good this is unchanged  RCA: Is a dominant vessel.  The stent in the proximal right with some diffuse 30% disease and it distally the vessel is normal    SUMMARY: Moderate to severely reduced global LV function probably an ischemic cardiomyopathy mild nonobstructive coronary disease all stents are patent    RECOMMENDATIONS: We will optimize guideline directed medical therapy have him get followed up in the heart failure clinic next week    Rell Deutsch MD  02/23/23  11:-----------------------------------------    --------------------------------------------------------------------------------------------------------------    Laboratory evaluations:    Lab Results   Component Value Date    GLUCOSE 346 (H) 09/29/2023    BUN 14 09/29/2023    CREATININE 0.98 09/29/2023    EGFRIFNONA 73 12/21/2021    EGFRIFAFRI >60 03/14/2022    BCR 14.3 09/29/2023    K 4.9 09/29/2023    CO2 21.0 (L) 09/29/2023    CALCIUM 10.2 09/29/2023    ALBUMIN 4.2 09/29/2023    AST 13 09/29/2023    ALT 17 09/29/2023     Lab Results   Component Value Date    WBC 7.31 09/29/2023    HGB 13.8 09/29/2023    HCT 41.9 09/29/2023    MCV 89.9 09/29/2023     09/29/2023     Lab Results   Component Value Date    CHOL 240 (H) 03/02/2023    CHLPL 231 (H) 03/21/2014    TRIG 575 (H) 03/02/2023    HDL 35 (L) 03/02/2023     (H) 03/02/2023     Lab Results   Component Value Date    TSH 1.330 09/29/2023    J9DUYNA 66.3 (L) 09/29/2023     No results found for: \"CKTOTAL\", \"CKMB\", \"CKMBINDEX\", \"TROPONINI\", \"TROPONINT\"  Lab Results   Component Value Date    HGBA1C 9.10 (H) 03/02/2023     No results found for: \"DDIMER\"  Lab Results   Component Value Date    ALT 17 09/29/2023     Lab Results   Component Value Date    HGBA1C 9.10 (H) " 03/02/2023    HGBA1C 7.50 (H) 03/17/2021     Lab Results   Component Value Date    CREATININE 0.98 09/29/2023     Lab Results   Component Value Date    IRON 96 09/29/2023    TIBC 373 09/29/2023    FERRITIN 100.00 09/29/2023     Lab Results   Component Value Date    INR 1.25 (H) 03/16/2021    PROTIME 15.5 (H) 03/16/2021            PAH RISK ASSESSMENT:    ReDS VOLUMETRIC ASSESSMENT:  ReDs Vest    Performed by: Tavares Marcano MD PhD  Authorized by: Tavares Marcano MD PhD    ReDS value:  32        Assessment & Plan      1. Heart failure with improved ejection fraction (HFimpEF)      NYHA stage C; functional class III.  Today, he is euvolemic and perfused.  Clinical trajectory was reviewed today and is stable.    - Update his 2D TTE.  I did inform him that if his LVEF is still greater than 40% he will no longer require annual echocardiograms  - Toprol-XL  - Jardiance with sick precautions  - Losartan  - Spironolactone with biannual surveillance  - Chem-7 and proBNP today.  Will call with results.        Return for 2D TTE Results.          This document has been electronically signed by Tavares Marcano MD PhD on April 9, 2024 11:36 EDT        Tavares Marcano M.D., Ph.D., [facility] Heart Failure and Pulmonary Hypertension Clinic  [facility] Medical Director for HF and PAH

## 2024-05-07 ENCOUNTER — OFFICE VISIT (OUTPATIENT)
Dept: CARDIOLOGY | Facility: CLINIC | Age: 74
End: 2024-05-07
Payer: MEDICARE

## 2024-05-07 VITALS
DIASTOLIC BLOOD PRESSURE: 78 MMHG | SYSTOLIC BLOOD PRESSURE: 132 MMHG | WEIGHT: 211 LBS | BODY MASS INDEX: 28.58 KG/M2 | HEIGHT: 72 IN | HEART RATE: 78 BPM

## 2024-05-07 DIAGNOSIS — I10 ESSENTIAL (PRIMARY) HYPERTENSION: ICD-10-CM

## 2024-05-07 DIAGNOSIS — I25.10 CORONARY ARTERY DISEASE INVOLVING NATIVE CORONARY ARTERY OF NATIVE HEART WITHOUT ANGINA PECTORIS: Primary | ICD-10-CM

## 2024-05-07 DIAGNOSIS — I25.5 ISCHEMIC CARDIOMYOPATHY: ICD-10-CM

## 2024-05-07 PROCEDURE — 3075F SYST BP GE 130 - 139MM HG: CPT | Performed by: NURSE PRACTITIONER

## 2024-05-07 PROCEDURE — 93000 ELECTROCARDIOGRAM COMPLETE: CPT | Performed by: NURSE PRACTITIONER

## 2024-05-07 PROCEDURE — 1160F RVW MEDS BY RX/DR IN RCRD: CPT | Performed by: NURSE PRACTITIONER

## 2024-05-07 PROCEDURE — 3078F DIAST BP <80 MM HG: CPT | Performed by: NURSE PRACTITIONER

## 2024-05-07 PROCEDURE — 99214 OFFICE O/P EST MOD 30 MIN: CPT | Performed by: NURSE PRACTITIONER

## 2024-05-07 PROCEDURE — 1159F MED LIST DOCD IN RCRD: CPT | Performed by: NURSE PRACTITIONER

## 2024-05-07 RX ORDER — METOPROLOL SUCCINATE 100 MG/1
100 TABLET, EXTENDED RELEASE ORAL 2 TIMES DAILY
Qty: 180 TABLET | Refills: 0 | Status: SHIPPED | OUTPATIENT
Start: 2024-05-07 | End: 2024-05-07

## 2024-05-07 RX ORDER — METOPROLOL SUCCINATE 100 MG/1
100 TABLET, EXTENDED RELEASE ORAL 2 TIMES DAILY
Qty: 180 TABLET | Refills: 0 | Status: SHIPPED | OUTPATIENT
Start: 2024-05-07

## 2024-08-12 ENCOUNTER — HOSPITAL ENCOUNTER (OUTPATIENT)
Dept: CARDIOLOGY | Facility: HOSPITAL | Age: 74
Discharge: HOME | End: 2024-08-12
Admitting: INTERNAL MEDICINE
Payer: MEDICARE

## 2024-08-12 VITALS
DIASTOLIC BLOOD PRESSURE: 76 MMHG | BODY MASS INDEX: 28.58 KG/M2 | HEART RATE: 70 BPM | SYSTOLIC BLOOD PRESSURE: 134 MMHG | HEIGHT: 72 IN | WEIGHT: 211 LBS

## 2024-08-12 DIAGNOSIS — I50.32 HEART FAILURE WITH IMPROVED EJECTION FRACTION (HFIMPEF): ICD-10-CM

## 2024-08-12 LAB
BH CV ECHO LEFT VENTRICLE GLOBAL LONGITUDINAL STRAIN: -17.1 %
BH CV ECHO MEAS - AO MAX PG: 4.1 MMHG
BH CV ECHO MEAS - AO MEAN PG: 2.09 MMHG
BH CV ECHO MEAS - AO V2 MAX: 101.6 CM/SEC
BH CV ECHO MEAS - AO V2 VTI: 17.4 CM
BH CV ECHO MEAS - EDV(MOD-SP2): 133 ML
BH CV ECHO MEAS - EDV(MOD-SP4): 186 ML
BH CV ECHO MEAS - EF(MOD-BP): 56.6 %
BH CV ECHO MEAS - EF(MOD-SP2): 56.4 %
BH CV ECHO MEAS - EF(MOD-SP4): 55.9 %
BH CV ECHO MEAS - ESV(MOD-SP2): 58 ML
BH CV ECHO MEAS - ESV(MOD-SP4): 82 ML
BH CV ECHO MEAS - LAT PEAK E' VEL: 8 CM/SEC
BH CV ECHO MEAS - LV DIASTOLIC VOL/BSA (35-75): 85.3 CM2
BH CV ECHO MEAS - LV MAX PG: 2.12 MMHG
BH CV ECHO MEAS - LV MEAN PG: 1.31 MMHG
BH CV ECHO MEAS - LV SYSTOLIC VOL/BSA (12-30): 37.6 CM2
BH CV ECHO MEAS - LV V1 MAX: 72.8 CM/SEC
BH CV ECHO MEAS - LV V1 VTI: 13 CM
BH CV ECHO MEAS - MED PEAK E' VEL: 7.8 CM/SEC
BH CV ECHO MEAS - MR MAX PG: 15.3 MMHG
BH CV ECHO MEAS - MR MAX VEL: 195.7 CM/SEC
BH CV ECHO MEAS - MV A DUR: 0.14 SEC
BH CV ECHO MEAS - MV A MAX VEL: 71.8 CM/SEC
BH CV ECHO MEAS - MV DEC SLOPE: 308.8 CM/SEC2
BH CV ECHO MEAS - MV DEC TIME: 0.15 SEC
BH CV ECHO MEAS - MV E MAX VEL: 44.8 CM/SEC
BH CV ECHO MEAS - MV E/A: 0.62
BH CV ECHO MEAS - MV MAX PG: 1.99 MMHG
BH CV ECHO MEAS - MV MEAN PG: 0.7 MMHG
BH CV ECHO MEAS - MV P1/2T: 40.8 MSEC
BH CV ECHO MEAS - MV V2 VTI: 15.5 CM
BH CV ECHO MEAS - MVA(P1/2T): 5.4 CM2
BH CV ECHO MEAS - RAP SYSTOLE: 3 MMHG
BH CV ECHO MEAS - RVSP: 15 MMHG
BH CV ECHO MEAS - SV(MOD-SP2): 75 ML
BH CV ECHO MEAS - SV(MOD-SP4): 104 ML
BH CV ECHO MEAS - SVI(MOD-SP2): 34.4 ML/M2
BH CV ECHO MEAS - SVI(MOD-SP4): 47.7 ML/M2
BH CV ECHO MEAS - TR MAX PG: 12.3 MMHG
BH CV ECHO MEAS - TR MAX VEL: 175.4 CM/SEC
BH CV ECHO MEASUREMENTS AVERAGE E/E' RATIO: 5.67
BH CV XLRA - TDI S': 12.5 CM/SEC
LEFT ATRIUM VOLUME INDEX: 18.2 ML/M2

## 2024-08-12 PROCEDURE — 93356 MYOCRD STRAIN IMG SPCKL TRCK: CPT | Performed by: INTERNAL MEDICINE

## 2024-08-12 PROCEDURE — 93321 DOPPLER ECHO F-UP/LMTD STD: CPT | Performed by: INTERNAL MEDICINE

## 2024-08-12 PROCEDURE — 93308 TTE F-UP OR LMTD: CPT | Performed by: INTERNAL MEDICINE

## 2024-08-12 PROCEDURE — 93325 DOPPLER ECHO COLOR FLOW MAPG: CPT

## 2024-08-12 PROCEDURE — 93308 TTE F-UP OR LMTD: CPT

## 2024-08-12 PROCEDURE — 93325 DOPPLER ECHO COLOR FLOW MAPG: CPT | Performed by: INTERNAL MEDICINE

## 2024-08-12 PROCEDURE — 25510000001 PERFLUTREN 6.52 MG/ML SUSPENSION 2 ML VIAL: Performed by: INTERNAL MEDICINE

## 2024-08-12 PROCEDURE — 93356 MYOCRD STRAIN IMG SPCKL TRCK: CPT

## 2024-08-12 PROCEDURE — 93321 DOPPLER ECHO F-UP/LMTD STD: CPT

## 2024-08-12 RX ADMIN — PERFLUTREN 1 ML: 6.52 INJECTION, SUSPENSION INTRAVENOUS at 12:55

## 2024-08-13 ENCOUNTER — HOSPITAL ENCOUNTER (OUTPATIENT)
Dept: CARDIOLOGY | Facility: HOSPITAL | Age: 74
Discharge: HOME | End: 2024-08-13
Payer: MEDICARE

## 2024-08-13 ENCOUNTER — LAB (OUTPATIENT)
Dept: LAB | Facility: HOSPITAL | Age: 74
End: 2024-08-13
Payer: MEDICARE

## 2024-08-13 VITALS
BODY MASS INDEX: 29.5 KG/M2 | SYSTOLIC BLOOD PRESSURE: 118 MMHG | HEIGHT: 72 IN | HEART RATE: 84 BPM | WEIGHT: 217.8 LBS | DIASTOLIC BLOOD PRESSURE: 73 MMHG | OXYGEN SATURATION: 95 %

## 2024-08-13 DIAGNOSIS — I50.32 HEART FAILURE WITH IMPROVED EJECTION FRACTION (HFIMPEF): Primary | ICD-10-CM

## 2024-08-13 PROBLEM — I50.22 CHRONIC HFREF (HEART FAILURE WITH REDUCED EJECTION FRACTION): Status: RESOLVED | Noted: 2023-03-02 | Resolved: 2024-08-13

## 2024-08-13 PROBLEM — I25.5 ISCHEMIC CARDIOMYOPATHY: Status: RESOLVED | Noted: 2023-03-02 | Resolved: 2024-08-13

## 2024-08-13 LAB
ALBUMIN SERPL-MCNC: 4.4 G/DL (ref 3.5–5.2)
ANION GAP SERPL CALCULATED.3IONS-SCNC: 15.1 MMOL/L (ref 5–15)
BUN SERPL-MCNC: 17 MG/DL (ref 8–23)
BUN/CREAT SERPL: 16.3 (ref 7–25)
CALCIUM SPEC-SCNC: 10 MG/DL (ref 8.6–10.5)
CHLORIDE SERPL-SCNC: 103 MMOL/L (ref 98–107)
CO2 SERPL-SCNC: 20.9 MMOL/L (ref 22–29)
CREAT SERPL-MCNC: 1.04 MG/DL (ref 0.76–1.27)
EGFRCR SERPLBLD CKD-EPI 2021: 75.3 ML/MIN/1.73
GLUCOSE SERPL-MCNC: 426 MG/DL (ref 65–99)
PHOSPHATE SERPL-MCNC: 3.1 MG/DL (ref 2.5–4.5)
POTASSIUM SERPL-SCNC: 4.5 MMOL/L (ref 3.5–5.2)
SODIUM SERPL-SCNC: 139 MMOL/L (ref 136–145)

## 2024-08-13 PROCEDURE — 94726 PLETHYSMOGRAPHY LUNG VOLUMES: CPT | Performed by: INTERNAL MEDICINE

## 2024-08-13 PROCEDURE — 80069 RENAL FUNCTION PANEL: CPT | Performed by: INTERNAL MEDICINE

## 2024-08-13 PROCEDURE — G0463 HOSPITAL OUTPT CLINIC VISIT: HCPCS

## 2024-08-13 PROCEDURE — 36415 COLL VENOUS BLD VENIPUNCTURE: CPT | Performed by: INTERNAL MEDICINE

## 2024-08-13 PROCEDURE — 99214 OFFICE O/P EST MOD 30 MIN: CPT | Performed by: INTERNAL MEDICINE

## 2024-08-13 RX ORDER — EMPAGLIFLOZIN 25 MG/1
1 TABLET, FILM COATED ORAL DAILY
COMMUNITY
Start: 2024-08-01

## 2024-08-13 RX ORDER — PREGABALIN 100 MG/1
1 CAPSULE ORAL EVERY 12 HOURS SCHEDULED
COMMUNITY
Start: 2024-08-08

## 2024-08-13 NOTE — ADDENDUM NOTE
Encounter addended by: April Hernandes MA on: 8/13/2024 11:50 AM   Actions taken: Charge Capture section accepted

## 2024-08-13 NOTE — PROGRESS NOTES
Heart Failure & Pulmonary Arterial Hypertension Clinic  [facility]  Tavares Marcano M.D., Ph.D., [facility]       Rell Deutsch MD  [address] [address]    Thank you for asking me to see Alex Geiger.     History of Present Illness    1. HFrEF-->HFimpEF    Subjective      Alex Geigeris a 74 y.o. male who presents today for HFimpEF.    The patient returns to the clinic today.  He recently had a 2D TTE.  He returns for results.  2D TTE shows preserved biventricular function.  He is currently using losartan, Toprol-XL, Jardiance, and spironolactone.  Medication compliant.  Denies adverse effects.  Stable exercise intolerance.        Review of Systems - Review of Systems   Cardiovascular:  Positive for dyspnea on exertion.   Respiratory:  Positive for shortness of breath.    All other systems reviewed and are negative.        All other systems were reviewed and were negative.    Patient Active Problem List   Diagnosis    Type 2 diabetes mellitus with circulatory disorder, without long-term current use of insulin    Essential (primary) hypertension    HLD (hyperlipidemia)    Ureteral stone with hydronephrosis    Coronary artery disease involving native coronary artery of native heart without angina pectoris    Heart failure with improved ejection fraction (HFimpEF)       family history includes Alzheimer's disease in his father; Diabetes in his father, mother, and paternal grandfather; Heart attack in his paternal grandfather; Heart disease in his paternal grandfather; Kidney disease in his father; No Known Problems in his maternal grandfather, maternal grandmother, and paternal grandmother.     reports that he has quit smoking. His smoking use included cigarettes. He has a 25 pack-year smoking history. He has never used smokeless tobacco. He reports that he does not currently use alcohol. He reports that he does not use drugs.    Allergies   Allergen Reactions     Oxycodone-Acetaminophen Itching    Percocet [Oxycodone-Acetaminophen]     Shellfish-Derived Products Hives and Itching    Statins     Adhesive Tape Rash    Latex Rash           Current Outpatient Medications:     cholecalciferol (VITAMIN D3) 25 MCG (1000 UT) tablet, Take 1 tablet by mouth Daily., Disp: , Rfl:     clopidogrel (PLAVIX) 75 MG tablet, TAKE (1) TABLET DAILY, Disp: 90 tablet, Rfl: 1    fexofenadine (ALLEGRA) 180 MG tablet, Take 1 tablet by mouth Daily., Disp: , Rfl:     finasteride (PROSCAR) 5 MG tablet, Take 1 tablet by mouth Daily., Disp: , Rfl:     flunisolide (NASALIDE) 25 MCG/ACT (0.025%) solution nasal spray, Inhale 2 sprays 2 (Two) Times a Day., Disp: , Rfl:     isosorbide mononitrate (IMDUR) 60 MG 24 hr tablet, Take 1.5 tablets by mouth Daily., Disp: , Rfl:     Jardiance 25 MG tablet tablet, Take 1 tablet by mouth Daily., Disp: , Rfl:     losartan (COZAAR) 25 MG tablet, Take 0.5 tablets by mouth Daily., Disp: 45 tablet, Rfl: 3    metFORMIN (GLUCOPHAGE) 500 MG tablet, Take 2 tablets by mouth 2 (Two) Times a Day With Meals., Disp: , Rfl:     metoprolol succinate XL (TOPROL-XL) 100 MG 24 hr tablet, Take 1 tablet by mouth 2 (Two) Times a Day., Disp: 180 tablet, Rfl: 0    nitroglycerin (NITROSTAT) 0.4 MG SL tablet, PLACE 1 TABLET UNDER TONGUE EVERY 5 MINUTES X3 DOSES FOR CHEST PAIN. IF NO RELIEF CALL 911., Disp: 45 tablet, Rfl: 11    NON FORMULARY, Allergy shot weekly, Disp: , Rfl:     pregabalin (LYRICA) 100 MG capsule, Take 1 capsule by mouth Every 12 (Twelve) Hours., Disp: , Rfl:     Repatha SureClick solution auto-injector SureClick injection, INJECT 1ML EVERY 2 WEEKS, Disp: , Rfl:     spironolactone (ALDACTONE) 25 MG tablet, Take 1 tablet by mouth Daily., Disp: 90 tablet, Rfl: 3    tamsulosin (FLOMAX) 0.4 MG capsule 24 hr capsule, Take 1 capsule by mouth Every Night., Disp: , Rfl:     Testosterone 1.62 % gel, apply 2 pumps TO shoulders ONCE DAILY, Disp: , Rfl:     TRESIBA FLEXTOUCH 200 UNIT/ML  solution pen-injector, Inject 65 Units as directed Daily., Disp: , Rfl:   No current facility-administered medications for this encounter.    Objective     Vital Sign Review:     Vitals:    08/13/24 1108   BP: 118/73   Pulse: 84   SpO2: 95%       PP:high  Pulse Ox: normal oxygenation on room air    Body mass index is 29.53 kg/m².      08/13/24  1108   Weight: 98.8 kg (217 lb 12.8 oz)       Weight change:    Physical Exam:  Vitals reviewed.   Constitutional:       General: Not in acute distress.     Appearance: Normal and healthy appearance. Not in distress. Not ill-appearing, toxic-appearing or diaphoretic.      Interventions: Not intubated.  Eyes:      Conjunctiva/sclera: Conjunctivae normal.      Pupils: Pupils are equal, round, and reactive to light.   Neck:      Vascular: No JVR. JVD normal with 6 cm of water.   Pulmonary:      Effort: Pulmonary effort is normal. No tachypnea, bradypnea, accessory muscle usage, prolonged expiration, respiratory distress or retractions. Not intubated.      Breath sounds: Normal breath sounds and air entry. No stridor, decreased air movement or transmitted upper airway sounds. No decreased breath sounds. No wheezing. No rhonchi. No rales.   Cardiovascular:      PMI at left midclavicular line. Normal rate. Regular rhythm. S1 with normal intensity. S2 with normal intensity.       Murmurs: There is no murmur.      No gallop.  No click. No rub.   Neurological:      General: No focal deficit present.      Mental Status: Alert and oriented to person, place and time.   Psychiatric:         Behavior: Behavior is cooperative.        DATA REVIEWED:     TTE/CRYSTAL:    Results for orders placed during the hospital encounter of 08/12/24    Adult Transthoracic Echo Limited W/ Cont if Necessary Per Protocol    Interpretation Summary    Left ventricular systolic function is low normal. Calculated left ventricular EF = 56.6%, visually estimated LVEF 50-55%.    Left ventricular diastolic function is  consistent with (grade I) impaired relaxation.    No significant valvular abnormalities.      My interpretation of the TTE is below.     LVEF is 56%.  -----------------------------------------------------  RHC/LHC  I personally reviewed the cardiac catheterization.  Results for orders placed during the hospital encounter of 02/23/23    Cardiac Catheterization/Vascular Study    Conclusion  CARDIAC CATHETERIZATION REPORT    Procedure:Left heart catheterization    DATE OF PROCEDURE: 02/23/23    PROCEDURE PERFORMED BY: Rell Deutsch MD, [facility]    INDICATION FOR PROCEDURE: History of CAD with stenting recurrence of symptoms recently    DESCRIPTION OF PROCEDURE: After consent was obtained, access was gained in his right radial artery using a micropuncture technique. A 6-Eritrean short sheath was placed without difficulty.  Intraarterial cocktail was given.  Left ventriculography was performed followed by selective injection of the left and right coronary arteries were performed using a JL-3.5 one catheter technique.  Patient tolerated the procedure well without early complication and EBL was minimal.  At the conclusion, the sheath was removed and hemostasis was obtained. The patient went to the [facility] area in stable condition.    FINDINGS:    LEFT VENTRICULOGRAPHY: The LV pressure was 100/10.  There was moderate LV dilatation there is severe mid anterior wall hypokinesis the overall EF is reduced at about 35%.  There was no mitral insufficiency or gradient across the aortic valve on pullback.    CORONARY ANGIOGRAPHY:  Left main: This is normal  Left anterior descending: The stent in the proximal LAD is widely patent and looks normal there really is no significant disease in the mid or distal LAD the diagonals are free of obstructive disease as are the septal perforators  Ramus intermedius:Not present  Circumflex: There is a very large OM1 with some 30% proximal disease there is some 20 to 30% disease in the  proximal circumflex just beyond OM1 and then distally the vessel looks good this is unchanged  RCA: Is a dominant vessel.  The stent in the proximal right with some diffuse 30% disease and it distally the vessel is normal    SUMMARY: Moderate to severely reduced global LV function probably an ischemic cardiomyopathy mild nonobstructive coronary disease all stents are patent    RECOMMENDATIONS: We will optimize guideline directed medical therapy have him get followed up in the heart failure clinic next week    Rell Deutsch MD  02/23/23  11:-----------------------------------------    --------------------------------------------------------------------------------------------------------------    Laboratory evaluations:  Lab Results   Component Value Date    PROBNP 63.1 04/09/2024     Lab Results   Component Value Date    GLUCOSE 378 (H) 04/09/2024    BUN 12 04/09/2024    CREATININE 1.08 04/09/2024    EGFRIFNONA 73 12/21/2021    EGFRIFAFRI >60 03/14/2022    BCR 11.1 04/09/2024    K 4.7 04/09/2024    CO2 21.9 (L) 04/09/2024    CALCIUM 10.0 04/09/2024    ALBUMIN 4.2 09/29/2023    AST 13 09/29/2023    ALT 17 09/29/2023     Lab Results   Component Value Date    HGBA1C 9.10 (H) 03/02/2023     Lab Results   Component Value Date    HGBA1C 9.10 (H) 03/02/2023    HGBA1C 7.50 (H) 03/17/2021     Lab Results   Component Value Date    CREATININE 1.08 04/09/2024     Lab Results   Component Value Date    IRON 96 09/29/2023    TIBC 373 09/29/2023    FERRITIN 100.00 09/29/2023     Lab Results   Component Value Date    WBC 7.31 09/29/2023    RBC 4.66 09/29/2023    HGB 13.8 09/29/2023    HCT 41.9 09/29/2023    MCV 89.9 09/29/2023    MCH 29.6 09/29/2023    MCHC 32.9 09/29/2023    RDW 13.0 09/29/2023    RDWSD 41.9 09/29/2023    MPV 10.4 09/29/2023     09/29/2023    NEUTRORELPCT 63.7 09/29/2023    LYMPHORELPCT 20.0 09/29/2023    MONORELPCT 10.0 09/29/2023    EOSRELPCT 1.8 09/29/2023    BASORELPCT 1.6 (H) 09/29/2023    NEUTROABS  "4.66 09/29/2023    LYMPHSABS 1.46 09/29/2023    MONOSABS 0.73 09/29/2023    EOSABS 0.13 09/29/2023    BASOSABS 0.12 09/29/2023    NRBC 0.1 09/29/2023      No results found for: \"TERRY\", \"PROTURINE\", \"[id number]\", \"ALBUMINU\", \"LABALPH1\", \"LABALPH2\", \"LABBETA\", \"GAMMAUR\", \"MSPIKEURINE\", \"IFRESULTU\", \"NOTE\"  No results found for: \"LABFREE\", \"FREEKAPPAL\", \"FREELAMBDA\", \"LABKAPP\"   Lab Results   Component Value Date    ALBUMIN 4.2 09/29/2023      No results found for: \"LABKAPP\"            PAH RISK ASSESSMENT:    ReDS VOLUMETRIC ASSESSMENT:    ReDs Vest    Performed by: Tavares Marcano MD PhD  Authorized by: Tavares Marcano MD PhD    ReDS value:  37  ReDS Value Description:  36-41 (orange) = Possible Hypervolemic Status      Assessment & Plan      1. Heart failure with improved ejection fraction (HFimpEF)      NYHA stage C; functional class III.  Today, he is euvolemic and perfused.  Clinical trajectory was reviewed today and is stable.  I went over his diagnosis of HFimpEF.  In the absence of clinical changes he no longer requires annual 2D TTE's.  It is currently recommended these be done every few years.    - Toprol XL  - Jardiance with sick precautions  - Losartan  - Spironolactone with biannual surveillance  -Chem-7 today.  Will call with results.        Return in about 6 months (around 2/13/2025).        This document has been electronically signed by Tavares Marcano MD PhD on August 13, 2024 11:15 EDT          Tavares Marcano M.D., Ph.D., [facility] Heart Failure and Pulmonary Hypertension Clinic  [facility] Medical Director for HF and PAH      "

## 2024-08-14 ENCOUNTER — TELEPHONE (OUTPATIENT)
Dept: CARDIOLOGY | Facility: HOSPITAL | Age: 74
End: 2024-08-14
Payer: MEDICARE

## 2024-10-04 RX ORDER — ISOSORBIDE MONONITRATE 60 MG/1
90 TABLET, EXTENDED RELEASE ORAL EVERY MORNING
Qty: 135 TABLET | Refills: 0 | Status: SHIPPED | OUTPATIENT
Start: 2024-10-04

## 2024-10-18 NOTE — TELEPHONE ENCOUNTER
----- Message from Tavares Marcano sent at 8/13/2024 12:50 PM EDT -----  Please let him know that his kidney function is stable today.  But, his blood glucose is severely elevated.  He needs to follow-up with his primary care.  
Called and informed patient of lab results per Dr Marcano.     Patient verbalized understanding  
Detail Level: Detailed
X Size Of Lesion In Cm (Optional): 0
Name Of The Referring Provider For Procedure: Dr. Hills
Incorporate Mauc In Note: Yes

## 2024-11-06 ENCOUNTER — TELEPHONE (OUTPATIENT)
Dept: CARDIOLOGY | Facility: HOSPITAL | Age: 74
End: 2024-11-06
Payer: MEDICARE

## 2024-11-06 DIAGNOSIS — I10 ESSENTIAL (PRIMARY) HYPERTENSION: ICD-10-CM

## 2024-11-06 RX ORDER — METOPROLOL SUCCINATE 100 MG/1
100 TABLET, EXTENDED RELEASE ORAL 2 TIMES DAILY
Qty: 180 TABLET | Refills: 0 | Status: SHIPPED | OUTPATIENT
Start: 2024-11-06

## 2024-11-19 ENCOUNTER — OFFICE VISIT (OUTPATIENT)
Dept: CARDIOLOGY | Facility: CLINIC | Age: 74
End: 2024-11-19
Payer: MEDICARE

## 2024-11-19 VITALS
WEIGHT: 216.4 LBS | HEIGHT: 72 IN | SYSTOLIC BLOOD PRESSURE: 130 MMHG | BODY MASS INDEX: 29.31 KG/M2 | HEART RATE: 74 BPM | DIASTOLIC BLOOD PRESSURE: 78 MMHG

## 2024-11-19 DIAGNOSIS — I10 ESSENTIAL (PRIMARY) HYPERTENSION: ICD-10-CM

## 2024-11-19 DIAGNOSIS — I25.10 CORONARY ARTERY DISEASE INVOLVING NATIVE CORONARY ARTERY OF NATIVE HEART WITHOUT ANGINA PECTORIS: Primary | ICD-10-CM

## 2024-11-19 DIAGNOSIS — E11.59 TYPE 2 DIABETES MELLITUS WITH OTHER CIRCULATORY COMPLICATION, WITHOUT LONG-TERM CURRENT USE OF INSULIN: ICD-10-CM

## 2024-11-19 DIAGNOSIS — E78.49 OTHER HYPERLIPIDEMIA: ICD-10-CM

## 2024-11-19 PROCEDURE — 3078F DIAST BP <80 MM HG: CPT | Performed by: INTERNAL MEDICINE

## 2024-11-19 PROCEDURE — 93000 ELECTROCARDIOGRAM COMPLETE: CPT | Performed by: INTERNAL MEDICINE

## 2024-11-19 PROCEDURE — 99214 OFFICE O/P EST MOD 30 MIN: CPT | Performed by: INTERNAL MEDICINE

## 2024-11-19 PROCEDURE — 3075F SYST BP GE 130 - 139MM HG: CPT | Performed by: INTERNAL MEDICINE

## 2024-11-19 NOTE — PROGRESS NOTES
Date of Office Visit: 24  Encounter Provider: Rell Deutsch MD  Place of Service: [facility] CARDIOLOGY  Patient Name: Alex Geiger  :1950  [id number]    Chief Complaint   Patient presents with    Coronary Artery Disease   :     HPI: Alex Geiger is a 74 y.o. male  He had stents put in his mid-left anterior descending by me in .  At that time, he had 40% disease in his right coronary artery and 30% in his circumflex.  He has had prior drug-eluting stents to his right coronary artery and circumflex in .  He had normal left ventricular function.    In  he had recurrent angina and had developed a new lesion in his distal RCA and had drug-eluting stenting to the LAD his other coronaries look good at that time and his LV function was normal.  He had pretty severe COVID-19 infection and 2021.  In 2021 he had recurrent angina we recath him need restenosis LAD stent and had a 4.0x23 Xience placed in that the other arteries looked good.  In 2023 came back in symptoms very suggestive of angina we took him back to the Cath Lab did not see any high-grade obstructive epicardial disease but we did see that he had pretty severely reduced LV function his EF was 35%.  We really worked on guideline directed medical therapy and got him enrolled in the heart failure clinic and a follow-up echo in 2023 showed EF was 45%.      He is here for follow-up.  He has his chronic complaint of upper back and shoulder pain.  He is here for follow-up today and in general I do not think is really much different he occasionally gets some discomfort in his chest starts on the right side it can happen at rest not really with activity might last up to 30 minutes been going on for several months has not really changed when he walks he does not necessarily get it he does not have PND orthopnea edema he is got some fatigue that is chronic his sugars are high he  has been eating candy and he thinks it is running around 200    Past Medical History:   Diagnosis Date    CAD (coronary artery disease)     COVID-19     Diabetes mellitus     Hyperlipidemia     Hypertension     Kidney stones     Renal injury     Unstable angina     Ventricular ectopy     VPC (ventricular premature complex)        Past Surgical History:   Procedure Laterality Date    CARDIAC CATHETERIZATION      CARDIAC CATHETERIZATION N/A 8/16/2017    Procedure: Coronary angiography;  Surgeon: Rell Deutsch MD;  [facility];  Service:     CARDIAC CATHETERIZATION N/A 8/16/2017    Procedure: Stent QUIANA coronary;  Surgeon: Rell Deutsch MD;  [facility];  Service:     CARDIAC CATHETERIZATION N/A 8/16/2017    Procedure: Left Heart Cath;  Surgeon: Rell Deutsch MD;  [facility];  Service:     CARDIAC CATHETERIZATION N/A 8/16/2017    Procedure: Left ventriculography;  Surgeon: Rell Deutsch MD;  [facility];  Service:     CARDIAC CATHETERIZATION N/A 7/25/2019    Procedure: Left Heart Cath;  Surgeon: Rell Deutsch MD;  [facility];  Service: Cardiology    CARDIAC CATHETERIZATION N/A 7/25/2019    Procedure: Stent QUIANA coronary;  Surgeon: Rell Deutsch MD;  [facility];  Service: Cardiology    CARDIAC CATHETERIZATION N/A 7/25/2019    Procedure: Left ventriculography;  Surgeon: Rell Deutsch MD;  [facility];  Service: Cardiology    CARDIAC CATHETERIZATION N/A 7/25/2019    Procedure: Coronary angiography;  Surgeon: Rell Deutsch MD;  Location: [facility] CATH INVASIVE LOCATION;  Service: Cardiology    CARDIAC CATHETERIZATION Right 12/22/2021    Procedure: Left Heart Cath;  Surgeon: Helen Oliva MD;  [facility];  Service: Cardiology;  Laterality: Right;    CARDIAC CATHETERIZATION N/A  12/22/2021    Procedure: Coronary angiography;  Surgeon: Helen Oliva MD;  Location:  [facility] CATH INVASIVE LOCATION;  Service: Cardiology;  Laterality: N/A;    CARDIAC CATHETERIZATION N/A 12/22/2021    Procedure: Left ventriculography;  Surgeon: Helen Oliva MD;  Location:  [facility] CATH INVASIVE LOCATION;  Service: Cardiology;  Laterality: N/A;    CARDIAC CATHETERIZATION N/A 12/22/2021    Procedure: Percutaneous Coronary Intervention;  Surgeon: Helen Oliva MD;  Location:  [facility] CATH INVASIVE LOCATION;  Service: Cardiology;  Laterality: N/A;    CARDIAC CATHETERIZATION N/A 12/22/2021    Procedure: Stent QUIANA coronary;  Surgeon: Helen Oliva MD;  Location:  [facility] CATH INVASIVE LOCATION;  Service: Cardiology;  Laterality: N/A;    CARDIAC CATHETERIZATION N/A 2/23/2023    Procedure: Left Heart Cath;  Surgeon: Rell Deutsch MD;  [facility];  Service: Cardiology;  Laterality: N/A;    CARDIAC CATHETERIZATION N/A 2/23/2023    Procedure: Left ventriculography;  Surgeon: Rell Deutsch MD;  Location:  [facility] CATH INVASIVE LOCATION;  Service: Cardiology;  Laterality: N/A;    CARDIAC CATHETERIZATION N/A 2/23/2023    Procedure: Coronary angiography;  Surgeon: Rell Deutsch MD;  [facility];  Service: Cardiology;  Laterality: N/A;    CORONARY ANGIOPLASTY WITH STENT PLACEMENT      KIDNEY STONE SURGERY      PARATHYROIDECTOMY         Social History     Socioeconomic History    Marital status:    Tobacco Use    Smoking status: Former     Current packs/day: 1.00     Average packs/day: 1 pack/day for 25.0 years (25.0 ttl pk-yrs)     Types: Cigarettes    Smokeless tobacco: Never    Tobacco comments:     NO CAFFEINE USE   Vaping Use    Vaping status: Never Used   Substance and Sexual Activity    Alcohol use: Not Currently    Drug use: Never    Sexual activity: Defer       Family History   Problem Relation Age of Onset    Diabetes Mother     Alzheimer's disease  Father     Kidney disease Father     Diabetes Father     No Known Problems Maternal Grandmother     No Known Problems Maternal Grandfather     No Known Problems Paternal Grandmother     Heart attack Paternal Grandfather     Heart disease Paternal Grandfather     Diabetes Paternal Grandfather        Review of Systems   Constitutional: Negative for decreased appetite, fever, malaise/fatigue and weight loss.   HENT:  Negative for nosebleeds.    Eyes:  Negative for double vision.   Cardiovascular:  Negative for chest pain, claudication, cyanosis, dyspnea on exertion, irregular heartbeat, leg swelling, near-syncope, orthopnea, palpitations, paroxysmal nocturnal dyspnea and syncope.   Respiratory:  Negative for cough, hemoptysis and shortness of breath.    Hematologic/Lymphatic: Negative for bleeding problem.   Skin:  Negative for rash.   Musculoskeletal:  Negative for falls and myalgias.   Gastrointestinal:  Negative for hematochezia, jaundice, melena, nausea and vomiting.   Genitourinary:  Negative for hematuria.   Neurological:  Negative for dizziness and seizures.   Psychiatric/Behavioral:  Negative for altered mental status and memory loss.        Allergies   Allergen Reactions    Oxycodone-Acetaminophen Itching    Percocet [Oxycodone-Acetaminophen]     Shellfish-Derived Products Hives and Itching    Statins     Adhesive Tape Rash    Latex Rash         Current Outpatient Medications:     clopidogrel (PLAVIX) 75 MG tablet, TAKE (1) TABLET DAILY, Disp: 90 tablet, Rfl: 1    fexofenadine (ALLEGRA) 180 MG tablet, Take 1 tablet by mouth Daily., Disp: , Rfl:     finasteride (PROSCAR) 5 MG tablet, Take 1 tablet by mouth Daily., Disp: , Rfl:     flunisolide (NASALIDE) 25 MCG/ACT (0.025%) solution nasal spray, Inhale 2 sprays 2 (Two) Times a Day., Disp: , Rfl:     isosorbide mononitrate (IMDUR) 60 MG 24 hr tablet, Take 1.5 tablets by mouth Every Morning., Disp: 135 tablet, Rfl: 0    Jardiance 25 MG tablet tablet, Take 1 tablet  "by mouth Daily., Disp: , Rfl:     losartan (COZAAR) 25 MG tablet, Take 0.5 tablets by mouth Daily., Disp: 45 tablet, Rfl: 3    metFORMIN (GLUCOPHAGE) 500 MG tablet, Take 2 tablets by mouth 2 (Two) Times a Day With Meals., Disp: , Rfl:     metoprolol succinate XL (TOPROL-XL) 100 MG 24 hr tablet, Take 1 tablet by mouth 2 (Two) Times a Day., Disp: 180 tablet, Rfl: 0    nitroglycerin (NITROSTAT) 0.4 MG SL tablet, PLACE 1 TABLET UNDER TONGUE EVERY 5 MINUTES X3 DOSES FOR CHEST PAIN. IF NO RELIEF CALL 911., Disp: 45 tablet, Rfl: 11    NON FORMULARY, Allergy shot weekly, Disp: , Rfl:     pregabalin (LYRICA) 50 MG capsule, Take 1 capsule by mouth 2 (Two) Times a Day., Disp: , Rfl:     Repatha SureClick solution auto-injector SureClick injection, INJECT 1ML EVERY 2 WEEKS, Disp: , Rfl:     spironolactone (ALDACTONE) 25 MG tablet, Take 1 tablet by mouth Daily., Disp: 90 tablet, Rfl: 3    tamsulosin (FLOMAX) 0.4 MG capsule 24 hr capsule, Take 1 capsule by mouth Every Night., Disp: , Rfl:     TRESIBA FLEXTOUCH 200 UNIT/ML solution pen-injector, Inject 65 Units as directed Daily., Disp: , Rfl:     cholecalciferol (VITAMIN D3) 25 MCG (1000 UT) tablet, Take 1 tablet by mouth Daily. (Patient not taking: Reported on 11/19/2024), Disp: , Rfl:     Testosterone 1.62 % gel, apply 2 pumps TO shoulders ONCE DAILY (Patient not taking: Reported on 11/19/2024), Disp: , Rfl:       Objective:     Vitals:    11/19/24 1504   BP: 130/78   Pulse: 74   Weight: 98.2 kg (216 lb 6.4 oz)   Height: 182.9 cm (72\")     Body mass index is 29.35 kg/m².    Constitutional:       Appearance: Well-developed.   Eyes:      General: No scleral icterus.  HENT:      Head: Normocephalic.   Neck:      Thyroid: No thyromegaly.      Vascular: No JVD.      Lymphadenopathy: No cervical adenopathy.   Pulmonary:      Effort: Pulmonary effort is normal.      Breath sounds: Normal breath sounds. No wheezing. No rales.   Cardiovascular:      Normal rate. Regular rhythm.      No " gallop.    Edema:     Peripheral edema absent.   Abdominal:      Palpations: Abdomen is soft.      Tenderness: There is no abdominal tenderness.   Musculoskeletal: Normal range of motion. Skin:     General: Skin is warm and dry.      Findings: No rash.   Neurological:      Mental Status: Alert and oriented to person, place, and time.           ECG 12 Lead    Date/Time: 11/19/2024 3:34 PM  Performed by: Rell Deutsch MD    Authorized by: Rell Deutsch MD  Comparison: compared with previous ECG   Similar to previous ECG  Rhythm: sinus rhythm  Q waves: II, III, aVF, V1 and V2      Clinical impression: abnormal EKG           Assessment:       Diagnosis Plan   1. Coronary artery disease involving native coronary artery of native heart without angina pectoris        2. Essential (primary) hypertension        3. Other hyperlipidemia        4. Type 2 diabetes mellitus with other circulatory complication, without long-term current use of insulin                 Plan:       I feel like he is pretty stable and at this point I am not can make any change into his regimen I am going to just have him see us in 6 months I have encouraged him to try and start exercising a little bit he is got to get his sugars better.    As always, it has been a pleasure to participate in your patient's care.      Sincerely,       Rell Deutsch MD

## 2025-01-02 RX ORDER — ISOSORBIDE MONONITRATE 60 MG/1
90 TABLET, EXTENDED RELEASE ORAL EVERY MORNING
Qty: 135 TABLET | Refills: 0 | Status: SHIPPED | OUTPATIENT
Start: 2025-01-02

## 2025-02-03 RX ORDER — LOSARTAN POTASSIUM 25 MG/1
12.5 TABLET ORAL DAILY
Qty: 45 TABLET | Refills: 0 | Status: SHIPPED | OUTPATIENT
Start: 2025-02-03

## 2025-02-03 RX ORDER — SPIRONOLACTONE 25 MG/1
25 TABLET ORAL DAILY
Qty: 90 TABLET | Refills: 0 | Status: SHIPPED | OUTPATIENT
Start: 2025-02-03

## 2025-02-04 DIAGNOSIS — I10 ESSENTIAL (PRIMARY) HYPERTENSION: ICD-10-CM

## 2025-02-04 RX ORDER — METOPROLOL SUCCINATE 100 MG/1
100 TABLET, EXTENDED RELEASE ORAL 2 TIMES DAILY
Qty: 180 TABLET | Refills: 0 | Status: SHIPPED | OUTPATIENT
Start: 2025-02-04

## 2025-03-24 ENCOUNTER — HOSPITAL ENCOUNTER (OUTPATIENT)
Dept: CARDIOLOGY | Facility: HOSPITAL | Age: 75
Discharge: HOME | End: 2025-03-24
Payer: MEDICARE

## 2025-03-24 ENCOUNTER — LAB (OUTPATIENT)
Dept: LAB | Facility: HOSPITAL | Age: 75
End: 2025-03-24
Payer: MEDICARE

## 2025-03-24 VITALS
HEIGHT: 72 IN | HEART RATE: 89 BPM | DIASTOLIC BLOOD PRESSURE: 77 MMHG | OXYGEN SATURATION: 99 % | SYSTOLIC BLOOD PRESSURE: 124 MMHG | BODY MASS INDEX: 28.17 KG/M2 | WEIGHT: 208 LBS

## 2025-03-24 DIAGNOSIS — I50.32 HEART FAILURE WITH IMPROVED EJECTION FRACTION (HFIMPEF): Primary | ICD-10-CM

## 2025-03-24 LAB
ANION GAP SERPL CALCULATED.3IONS-SCNC: 13 MMOL/L (ref 5–15)
BUN SERPL-MCNC: 16 MG/DL (ref 8–23)
BUN/CREAT SERPL: 15.4 (ref 7–25)
CALCIUM SPEC-SCNC: 10.3 MG/DL (ref 8.6–10.5)
CHLORIDE SERPL-SCNC: 100 MMOL/L (ref 98–107)
CO2 SERPL-SCNC: 23 MMOL/L (ref 22–29)
CREAT SERPL-MCNC: 1.04 MG/DL (ref 0.76–1.27)
EGFRCR SERPLBLD CKD-EPI 2021: 75.3 ML/MIN/1.73
GLUCOSE SERPL-MCNC: 375 MG/DL (ref 65–99)
NT-PROBNP SERPL-MCNC: 74.2 PG/ML (ref 0–900)
POTASSIUM SERPL-SCNC: 4.4 MMOL/L (ref 3.5–5.2)
SODIUM SERPL-SCNC: 136 MMOL/L (ref 136–145)

## 2025-03-24 PROCEDURE — 36415 COLL VENOUS BLD VENIPUNCTURE: CPT | Performed by: INTERNAL MEDICINE

## 2025-03-24 PROCEDURE — 94726 PLETHYSMOGRAPHY LUNG VOLUMES: CPT | Performed by: INTERNAL MEDICINE

## 2025-03-24 PROCEDURE — 80048 BASIC METABOLIC PNL TOTAL CA: CPT | Performed by: INTERNAL MEDICINE

## 2025-03-24 PROCEDURE — 83880 ASSAY OF NATRIURETIC PEPTIDE: CPT | Performed by: INTERNAL MEDICINE

## 2025-03-24 PROCEDURE — 99214 OFFICE O/P EST MOD 30 MIN: CPT | Performed by: INTERNAL MEDICINE

## 2025-03-24 PROCEDURE — G0463 HOSPITAL OUTPT CLINIC VISIT: HCPCS

## 2025-03-24 NOTE — LETTER
March 24, 2025     Real Hudson MD  [address]  [facility] [address]    Patient: Alex Geiger   YOB: 1950   Date of Visit: 3/24/2025     Dear Real Hudson MD:       Thank you for referring Alex Geiger to me for evaluation. Below are the relevant portions of my assessment and plan of care.    If you have questions, please do not hesitate to call me. I look forward to following Alex along with you.         Sincerely,        Tavares Marcano MD PhD        CC: MD Jeet Warren, Tavares Weinstein MD PhD  03/24/25 1052  Signed  Heart Failure & Pulmonary Arterial Hypertension Clinic  [facility]  Tavares Marcano M.D., Ph.D., [facility]       Rell Deutsch MD  [address]  [facility] [address]  [place] [id number]    Thank you for asking me to see Alex Geiger.     History of Present Illness    1. HFrEF-->HFimpEF    Subjective      Alex Geigeris a 74 y.o. male who presents today for HFimpEF.    Patient returns to the clinic today.  He currently uses losartan, Toprol-XL, Jardiance, and spironolactone.  Medication compliant.  Denies adverse effects.  Stable exercise intolerance.  No orthopnea, PND, lower extremity edema, ascites, early abdominal satiety.    Review of Systems - Review of Systems   Cardiovascular:  Positive for dyspnea on exertion.   Respiratory:  Positive for shortness of breath.    All other systems reviewed and are negative.        All other systems were reviewed and were negative.    Patient Active Problem List   Diagnosis   • Type 2 diabetes mellitus with circulatory disorder, without long-term current use of insulin   • Essential (primary) hypertension   • HLD (hyperlipidemia)   • Ureteral stone with hydronephrosis   • Coronary artery disease involving native coronary artery of native heart without angina pectoris   • Heart failure with improved ejection fraction (HFimpEF)       family history includes Alzheimer's disease in his father; Diabetes  in his father, mother, and paternal grandfather; Heart attack in his paternal grandfather; Heart disease in his paternal grandfather; Kidney disease in his father; No Known Problems in his maternal grandfather, maternal grandmother, and paternal grandmother.     reports that he has quit smoking. His smoking use included cigarettes. He has a 25 pack-year smoking history. He has never used smokeless tobacco. He reports that he does not currently use alcohol. He reports that he does not use drugs.    Allergies   Allergen Reactions   • Oxycodone-Acetaminophen Itching   • Percocet [Oxycodone-Acetaminophen]    • Shellfish-Derived Products Hives and Itching   • Statins    • Adhesive Tape Rash   • Latex Rash           Current Outpatient Medications:   •  cholecalciferol (VITAMIN D3) 25 MCG (1000 UT) tablet, Take 1 tablet by mouth Daily., Disp: , Rfl:   •  clopidogrel (PLAVIX) 75 MG tablet, TAKE (1) TABLET DAILY, Disp: 90 tablet, Rfl: 1  •  fexofenadine (ALLEGRA) 180 MG tablet, Take 1 tablet by mouth Daily., Disp: , Rfl:   •  finasteride (PROSCAR) 5 MG tablet, Take 1 tablet by mouth Daily., Disp: , Rfl:   •  flunisolide (NASALIDE) 25 MCG/ACT (0.025%) solution nasal spray, Inhale 2 sprays 2 (Two) Times a Day., Disp: , Rfl:   •  isosorbide mononitrate (IMDUR) 60 MG 24 hr tablet, Take 1.5 tablets by mouth Every Morning., Disp: 135 tablet, Rfl: 0  •  Jardiance 25 MG tablet tablet, Take 1 tablet by mouth Daily., Disp: , Rfl:   •  losartan (COZAAR) 25 MG tablet, Take 0.5 tablets by mouth Daily., Disp: 45 tablet, Rfl: 0  •  metFORMIN (GLUCOPHAGE) 500 MG tablet, Take 2 tablets by mouth 2 (Two) Times a Day With Meals., Disp: , Rfl:   •  metoprolol succinate XL (TOPROL-XL) 100 MG 24 hr tablet, Take 1 tablet by mouth 2 (Two) Times a Day., Disp: 180 tablet, Rfl: 0  •  nitroglycerin (NITROSTAT) 0.4 MG SL tablet, PLACE 1 TABLET UNDER TONGUE EVERY 5 MINUTES X3 DOSES FOR CHEST PAIN. IF NO RELIEF CALL 911., Disp: 45 tablet, Rfl: 11  •  NON  FORMULARY, Allergy shot weekly, Disp: , Rfl:   •  pregabalin (LYRICA) 50 MG capsule, Take 1 capsule by mouth 2 (Two) Times a Day., Disp: , Rfl:   •  Repatha SureClick solution auto-injector SureClick injection, INJECT 1ML EVERY 2 WEEKS, Disp: , Rfl:   •  spironolactone (ALDACTONE) 25 MG tablet, Take 1 tablet by mouth Daily, Disp: 90 tablet, Rfl: 0  •  tamsulosin (FLOMAX) 0.4 MG capsule 24 hr capsule, Take 1 capsule by mouth Every Night., Disp: , Rfl:   •  Testosterone 1.62 % gel, , Disp: , Rfl:   •  TRESIBA FLEXTOUCH 200 UNIT/ML solution pen-injector, Inject 65 Units as directed Daily., Disp: , Rfl:     Objective     Vital Sign Review:     Vitals:    03/24/25 1042   BP: 124/77   Pulse: 89   SpO2: 99%         PP:high  Pulse Ox: normal oxygenation on room air    Body mass index is 28.2 kg/m².      03/24/25  1042   Weight: 94.3 kg (208 lb)         Weight change:    Physical Exam:  Vitals reviewed.   Constitutional:       General: Not in acute distress.     Appearance: Normal and healthy appearance. Not in distress. Not ill-appearing, toxic-appearing or diaphoretic.      Interventions: Not intubated.  Eyes:      Conjunctiva/sclera: Conjunctivae normal.      Pupils: Pupils are equal, round, and reactive to light.   Neck:      Vascular: No JVR. JVD normal with 6 cm of water.   Pulmonary:      Effort: Pulmonary effort is normal. No tachypnea, bradypnea, accessory muscle usage, prolonged expiration, respiratory distress or retractions. Not intubated.      Breath sounds: Normal breath sounds and air entry. No stridor, decreased air movement or transmitted upper airway sounds. No decreased breath sounds. No wheezing. No rhonchi. No rales.   Cardiovascular:      PMI at left midclavicular line. Normal rate. Regular rhythm. S1 with normal intensity. S2 with normal intensity.       Murmurs: There is no murmur.      No gallop.  No click. No rub.   Neurological:      General: No focal deficit present.      Mental Status: Alert and  oriented to person, place and time.   Psychiatric:         Behavior: Behavior is cooperative.        DATA REVIEWED:     TTE/CRYSTAL:    Results for orders placed during the hospital encounter of 08/12/24    Adult Transthoracic Echo Limited W/ Cont if Necessary Per Protocol    Interpretation Summary  •  Left ventricular systolic function is low normal. Calculated left ventricular EF = 56.6%, visually estimated LVEF 50-55%.  •  Left ventricular diastolic function is consistent with (grade I) impaired relaxation.  •  No significant valvular abnormalities.      My interpretation of the TTE is below.     LVEF is 56%  -----------------------------------------------------  RHC/LHC  I personally reviewed the cardiac catheterization.  Results for orders placed during the hospital encounter of 02/23/23    Cardiac Catheterization/Vascular Study    Conclusion  CARDIAC CATHETERIZATION REPORT    Procedure:Left heart catheterization    DATE OF PROCEDURE: 02/23/23    PROCEDURE PERFORMED BY: Rell Deutsch MD, [facility]    INDICATION FOR PROCEDURE: History of CAD with stenting recurrence of symptoms recently    DESCRIPTION OF PROCEDURE: After consent was obtained, access was gained in his right radial artery using a micropuncture technique. A 6-Micronesian short sheath was placed without difficulty.  Intraarterial cocktail was given.  Left ventriculography was performed followed by selective injection of the left and right coronary arteries were performed using a JL-3.5 one catheter technique.  Patient tolerated the procedure well without early complication and EBL was minimal.  At the conclusion, the sheath was removed and hemostasis was obtained. The patient went to the prep holding area in stable condition.    FINDINGS:    LEFT VENTRICULOGRAPHY: The LV pressure was 100/10.  There was moderate LV dilatation there is severe mid anterior wall hypokinesis the overall EF is reduced at about 35%.  There was no mitral insufficiency or gradient  across the aortic valve on pullback.    CORONARY ANGIOGRAPHY:  Left main: This is normal  Left anterior descending: The stent in the proximal LAD is widely patent and looks normal there really is no significant disease in the mid or distal LAD the diagonals are free of obstructive disease as are the septal perforators  Ramus intermedius:Not present  Circumflex: There is a very large OM1 with some 30% proximal disease there is some 20 to 30% disease in the proximal circumflex just beyond OM1 and then distally the vessel looks good this is unchanged  RCA: Is a dominant vessel.  The stent in the proximal right with some diffuse 30% disease and it distally the vessel is normal    SUMMARY: Moderate to severely reduced global LV function probably an ischemic cardiomyopathy mild nonobstructive coronary disease all stents are patent    RECOMMENDATIONS: We will optimize guideline directed medical therapy have him get followed up in the heart failure clinic next week    Rell Deutsch MD  02/23/23  11:-----------------------------------------    --------------------------------------------------------------------------------------------------------------    Laboratory evaluations:  Lab Results   Component Value Date    PROBNP 63.1 04/09/2024     Lab Results   Component Value Date    GLUCOSE 426 (C) 08/13/2024    BUN 17 08/13/2024    CREATININE 1.04 08/13/2024    EGFRIFNONA 73 12/21/2021    EGFRIFAFRI >60 03/14/2022    BCR 16.3 08/13/2024    K 4.5 08/13/2024    CO2 20.9 (L) 08/13/2024    CALCIUM 10.0 08/13/2024    ALBUMIN 4.4 08/13/2024    AST 13 09/29/2023    ALT 17 09/29/2023     Lab Results   Component Value Date    HGBA1C 9.10 (H) 03/02/2023     Lab Results   Component Value Date    HGBA1C 9.10 (H) 03/02/2023    HGBA1C 7.50 (H) 03/17/2021     Lab Results   Component Value Date    CREATININE 1.04 08/13/2024     Lab Results   Component Value Date    IRON 96 09/29/2023    TIBC 373 09/29/2023    FERRITIN 100.00 09/29/2023  "    Lab Results   Component Value Date    WBC 7.31 09/29/2023    RBC 4.66 09/29/2023    HGB 13.8 09/29/2023    HCT 41.9 09/29/2023    MCV 89.9 09/29/2023    MCH 29.6 09/29/2023    MCHC 32.9 09/29/2023    RDW 13.0 09/29/2023    RDWSD 41.9 09/29/2023    MPV 10.4 09/29/2023     09/29/2023    NEUTRORELPCT 63.7 09/29/2023    LYMPHORELPCT 20.0 09/29/2023    MONORELPCT 10.0 09/29/2023    EOSRELPCT 1.8 09/29/2023    BASORELPCT 1.6 (H) 09/29/2023    NEUTROABS 4.66 09/29/2023    LYMPHSABS 1.46 09/29/2023    MONOSABS 0.73 09/29/2023    EOSABS 0.13 09/29/2023    BASOSABS 0.12 09/29/2023    NRBC 0.1 09/29/2023      No results found for: \"TERRY\", \"PROTURINE\", \"[id number]\", \"ALBUMINU\", \"LABALPH1\", \"LABALPH2\", \"LABBETA\", \"GAMMAUR\", \"MSPIKEURINE\", \"IFRESULTU\", \"NOTE\"  No results found for: \"LABFREE\", \"FREEKAPPAL\", \"FREELAMBDA\", \"LABKAPP\"   Lab Results   Component Value Date    ALBUMIN 4.4 08/13/2024      No results found for: \"LABKAPP\"            PAH RISK ASSESSMENT:    ReDS VOLUMETRIC ASSESSMENT:    ReDs Vest    Performed by: Tavares Marcano MD PhD  Authorized by: Tavares Marcano MD PhD    ReDS value:  30  ReDS Value Description:  25-35 (green) = Optimal Volume Status        Assessment & Plan      1. Heart failure with improved ejection fraction (HFimpEF)      NYHA stage C; functional class III.  Today, he is euvolemic and perfused.  Clinical trajectory was reviewed today and is stable.   - Toprol-XL  - Jardiance with sick precautions  - Losartan  - Spironolactone with bi-annual surveillance          Return in about 6 months (around 9/24/2025).        This document has been electronically signed by Tavares Marcano MD PhD on March 24, 2025 10:55 EDT        Tavares Marcano M.D., Ph.D., [facility] Heart Failure and Pulmonary Hypertension Clinic  [facility] Medical Director for HF and PAH      "

## 2025-03-24 NOTE — ADDENDUM NOTE
Encounter addended by: April Hernandes MA on: 3/24/2025 11:16 AM   Actions taken: Charge Capture section accepted

## 2025-03-24 NOTE — PROGRESS NOTES
Heart Failure & Pulmonary Arterial Hypertension Clinic  [facility]  Tavares Marcano M.D., Ph.D., [facility]       Rell Deutsch MD  [address] [address]    Thank you for asking me to see Alex Geiger.     History of Present Illness    1. HFrEF-->HFimpEF    Subjective      Alex Geigeris a 74 y.o. male who presents today for HFimpEF.    Patient returns to the clinic today.  He currently uses losartan, Toprol-XL, Jardiance, and spironolactone.  Medication compliant.  Denies adverse effects.  Stable exercise intolerance.  No orthopnea, PND, lower extremity edema, ascites, early abdominal satiety.    Review of Systems - Review of Systems   Cardiovascular:  Positive for dyspnea on exertion.   Respiratory:  Positive for shortness of breath.    All other systems reviewed and are negative.        All other systems were reviewed and were negative.    Patient Active Problem List   Diagnosis    Type 2 diabetes mellitus with circulatory disorder, without long-term current use of insulin    Essential (primary) hypertension    HLD (hyperlipidemia)    Ureteral stone with hydronephrosis    Coronary artery disease involving native coronary artery of native heart without angina pectoris    Heart failure with improved ejection fraction (HFimpEF)       family history includes Alzheimer's disease in his father; Diabetes in his father, mother, and paternal grandfather; Heart attack in his paternal grandfather; Heart disease in his paternal grandfather; Kidney disease in his father; No Known Problems in his maternal grandfather, maternal grandmother, and paternal grandmother.     reports that he has quit smoking. His smoking use included cigarettes. He has a 25 pack-year smoking history. He has never used smokeless tobacco. He reports that he does not currently use alcohol. He reports that he does not use drugs.    Allergies   Allergen Reactions    Oxycodone-Acetaminophen Itching     Percocet [Oxycodone-Acetaminophen]     Shellfish-Derived Products Hives and Itching    Statins     Adhesive Tape Rash    Latex Rash           Current Outpatient Medications:     cholecalciferol (VITAMIN D3) 25 MCG (1000 UT) tablet, Take 1 tablet by mouth Daily., Disp: , Rfl:     clopidogrel (PLAVIX) 75 MG tablet, TAKE (1) TABLET DAILY, Disp: 90 tablet, Rfl: 1    fexofenadine (ALLEGRA) 180 MG tablet, Take 1 tablet by mouth Daily., Disp: , Rfl:     finasteride (PROSCAR) 5 MG tablet, Take 1 tablet by mouth Daily., Disp: , Rfl:     flunisolide (NASALIDE) 25 MCG/ACT (0.025%) solution nasal spray, Inhale 2 sprays 2 (Two) Times a Day., Disp: , Rfl:     isosorbide mononitrate (IMDUR) 60 MG 24 hr tablet, Take 1.5 tablets by mouth Every Morning., Disp: 135 tablet, Rfl: 0    Jardiance 25 MG tablet tablet, Take 1 tablet by mouth Daily., Disp: , Rfl:     losartan (COZAAR) 25 MG tablet, Take 0.5 tablets by mouth Daily., Disp: 45 tablet, Rfl: 0    metFORMIN (GLUCOPHAGE) 500 MG tablet, Take 2 tablets by mouth 2 (Two) Times a Day With Meals., Disp: , Rfl:     metoprolol succinate XL (TOPROL-XL) 100 MG 24 hr tablet, Take 1 tablet by mouth 2 (Two) Times a Day., Disp: 180 tablet, Rfl: 0    nitroglycerin (NITROSTAT) 0.4 MG SL tablet, PLACE 1 TABLET UNDER TONGUE EVERY 5 MINUTES X3 DOSES FOR CHEST PAIN. IF NO RELIEF CALL 911., Disp: 45 tablet, Rfl: 11    NON FORMULARY, Allergy shot weekly, Disp: , Rfl:     pregabalin (LYRICA) 50 MG capsule, Take 1 capsule by mouth 2 (Two) Times a Day., Disp: , Rfl:     Repatha SureClick solution auto-injector SureClick injection, INJECT 1ML EVERY 2 WEEKS, Disp: , Rfl:     spironolactone (ALDACTONE) 25 MG tablet, Take 1 tablet by mouth Daily, Disp: 90 tablet, Rfl: 0    tamsulosin (FLOMAX) 0.4 MG capsule 24 hr capsule, Take 1 capsule by mouth Every Night., Disp: , Rfl:     Testosterone 1.62 % gel, , Disp: , Rfl:     TRESIBA FLEXTOUCH 200 UNIT/ML solution pen-injector, Inject 65 Units as directed Daily.,  Disp: , Rfl:     Objective     Vital Sign Review:     Vitals:    03/24/25 1042   BP: 124/77   Pulse: 89   SpO2: 99%         PP:high  Pulse Ox: normal oxygenation on room air    Body mass index is 28.2 kg/m².      03/24/25  1042   Weight: 94.3 kg (208 lb)         Weight change:    Physical Exam:  Vitals reviewed.   Constitutional:       General: Not in acute distress.     Appearance: Normal and healthy appearance. Not in distress. Not ill-appearing, toxic-appearing or diaphoretic.      Interventions: Not intubated.  Eyes:      Conjunctiva/sclera: Conjunctivae normal.      Pupils: Pupils are equal, round, and reactive to light.   Neck:      Vascular: No JVR. JVD normal with 6 cm of water.   Pulmonary:      Effort: Pulmonary effort is normal. No tachypnea, bradypnea, accessory muscle usage, prolonged expiration, respiratory distress or retractions. Not intubated.      Breath sounds: Normal breath sounds and air entry. No stridor, decreased air movement or transmitted upper airway sounds. No decreased breath sounds. No wheezing. No rhonchi. No rales.   Cardiovascular:      PMI at left midclavicular line. Normal rate. Regular rhythm. S1 with normal intensity. S2 with normal intensity.       Murmurs: There is no murmur.      No gallop.  No click. No rub.   Neurological:      General: No focal deficit present.      Mental Status: Alert and oriented to person, place and time.   Psychiatric:         Behavior: Behavior is cooperative.        DATA REVIEWED:     TTE/CRYSTAL:    Results for orders placed during the hospital encounter of 08/12/24    Adult Transthoracic Echo Limited W/ Cont if Necessary Per Protocol    Interpretation Summary    Left ventricular systolic function is low normal. Calculated left ventricular EF = 56.6%, visually estimated LVEF 50-55%.    Left ventricular diastolic function is consistent with (grade I) impaired relaxation.    No significant valvular abnormalities.      My interpretation of the TTE is  below.     LVEF is 56%  -----------------------------------------------------  RHC/LHC  I personally reviewed the cardiac catheterization.  Results for orders placed during the hospital encounter of 02/23/23    Cardiac Catheterization/Vascular Study    Conclusion  CARDIAC CATHETERIZATION REPORT    Procedure:Left heart catheterization    DATE OF PROCEDURE: 02/23/23    PROCEDURE PERFORMED BY: Rell Deutsch MD, [facility]    INDICATION FOR PROCEDURE: History of CAD with stenting recurrence of symptoms recently    DESCRIPTION OF PROCEDURE: After consent was obtained, access was gained in his right radial artery using a micropuncture technique. A 6-Russian short sheath was placed without difficulty.  Intraarterial cocktail was given.  Left ventriculography was performed followed by selective injection of the left and right coronary arteries were performed using a JL-3.5 one catheter technique.  Patient tolerated the procedure well without early complication and EBL was minimal.  At the conclusion, the sheath was removed and hemostasis was obtained. The patient went to the [facility] area in stable condition.    FINDINGS:    LEFT VENTRICULOGRAPHY: The LV pressure was 100/10.  There was moderate LV dilatation there is severe mid anterior wall hypokinesis the overall EF is reduced at about 35%.  There was no mitral insufficiency or gradient across the aortic valve on pullback.    CORONARY ANGIOGRAPHY:  Left main: This is normal  Left anterior descending: The stent in the proximal LAD is widely patent and looks normal there really is no significant disease in the mid or distal LAD the diagonals are free of obstructive disease as are the septal perforators  Ramus intermedius:Not present  Circumflex: There is a very large OM1 with some 30% proximal disease there is some 20 to 30% disease in the proximal circumflex just beyond OM1 and then distally the vessel looks good this is unchanged  RCA: Is a dominant vessel.  The stent  in the proximal right with some diffuse 30% disease and it distally the vessel is normal    SUMMARY: Moderate to severely reduced global LV function probably an ischemic cardiomyopathy mild nonobstructive coronary disease all stents are patent    RECOMMENDATIONS: We will optimize guideline directed medical therapy have him get followed up in the heart failure clinic next week    Rell Deutsch MD  02/23/23  11:-----------------------------------------    --------------------------------------------------------------------------------------------------------------    Laboratory evaluations:  Lab Results   Component Value Date    PROBNP 63.1 04/09/2024     Lab Results   Component Value Date    GLUCOSE 426 (C) 08/13/2024    BUN 17 08/13/2024    CREATININE 1.04 08/13/2024    EGFRIFNONA 73 12/21/2021    EGFRIFAFRI >60 03/14/2022    BCR 16.3 08/13/2024    K 4.5 08/13/2024    CO2 20.9 (L) 08/13/2024    CALCIUM 10.0 08/13/2024    ALBUMIN 4.4 08/13/2024    AST 13 09/29/2023    ALT 17 09/29/2023     Lab Results   Component Value Date    HGBA1C 9.10 (H) 03/02/2023     Lab Results   Component Value Date    HGBA1C 9.10 (H) 03/02/2023    HGBA1C 7.50 (H) 03/17/2021     Lab Results   Component Value Date    CREATININE 1.04 08/13/2024     Lab Results   Component Value Date    IRON 96 09/29/2023    TIBC 373 09/29/2023    FERRITIN 100.00 09/29/2023     Lab Results   Component Value Date    WBC 7.31 09/29/2023    RBC 4.66 09/29/2023    HGB 13.8 09/29/2023    HCT 41.9 09/29/2023    MCV 89.9 09/29/2023    MCH 29.6 09/29/2023    MCHC 32.9 09/29/2023    RDW 13.0 09/29/2023    RDWSD 41.9 09/29/2023    MPV 10.4 09/29/2023     09/29/2023    NEUTRORELPCT 63.7 09/29/2023    LYMPHORELPCT 20.0 09/29/2023    MONORELPCT 10.0 09/29/2023    EOSRELPCT 1.8 09/29/2023    BASORELPCT 1.6 (H) 09/29/2023    NEUTROABS 4.66 09/29/2023    LYMPHSABS 1.46 09/29/2023    MONOSABS 0.73 09/29/2023    EOSABS 0.13 09/29/2023    BASOSABS 0.12 09/29/2023    NRBC  "0.1 09/29/2023      No results found for: \"TERRY\", \"PROTURINE\", \"[id number]\", \"ALBUMINU\", \"LABALPH1\", \"LABALPH2\", \"LABBETA\", \"GAMMAUR\", \"MSPIKEURINE\", \"IFRESULTU\", \"NOTE\"  No results found for: \"LABFREE\", \"FREEKAPPAL\", \"FREELAMBDA\", \"LABKAPP\"   Lab Results   Component Value Date    ALBUMIN 4.4 08/13/2024      No results found for: \"LABKAPP\"            PAH RISK ASSESSMENT:    ReDS VOLUMETRIC ASSESSMENT:    ReDs Vest    Performed by: Tavares Marcano MD PhD  Authorized by: Tavares Marcano MD PhD    ReDS value:  30  ReDS Value Description:  25-35 (green) = Optimal Volume Status        Assessment & Plan      1. Heart failure with improved ejection fraction (HFimpEF)      NYHA stage C; functional class III.  Today, he is euvolemic and perfused.  Clinical trajectory was reviewed today and is stable.   - Toprol-XL  - Jardiance with sick precautions  - Losartan  - Spironolactone with bi-annual surveillance          Return in about 6 months (around 9/24/2025).        This document has been electronically signed by Tavares Marcano MD PhD on March 24, 2025 10:55 EDT        Tavares Marcano M.D., Ph.D., [facility] Heart Failure and Pulmonary Hypertension Clinic  [facility] Medical Director for HF and PAH      "

## 2025-03-25 ENCOUNTER — TELEPHONE (OUTPATIENT)
Dept: CARDIOLOGY | Facility: HOSPITAL | Age: 75
End: 2025-03-25
Payer: MEDICARE

## 2025-04-03 RX ORDER — ISOSORBIDE MONONITRATE 60 MG/1
90 TABLET, EXTENDED RELEASE ORAL EVERY MORNING
Qty: 135 TABLET | Refills: 0 | Status: SHIPPED | OUTPATIENT
Start: 2025-04-03

## 2025-04-18 NOTE — H&P
History and physical    Primary care physician  Dr. Hudson    Chief complaint  Shortness of breath  Productive cough  Fever chills  Body aches    History of present illness  70-year-old white male with history of coronary artery disease hypertension hyperlipidemia and diabetes mellitus presented to [facility] emergency room with shortness of breath body aches fever chills and nonproductive cough for last 1 week.  Patient was diagnosed with Covid about a week ago.  Patient denies any chest pain abdominal pain nausea vomiting diarrhea.  Patient work-up in ER revealed COVID-19 pneumonia with acute hypoxic respiratory failure admit for management.    PAST MEDICAL HISTORY   • CAD     • Gastroesophageal reflux disease    • Diabetes mellitus (CMS/[facility])    • Hyperlipidemia    • Hypertension    • Renal injury    • Unstable angina (CMS/[facility])    • Ventricular ectopy    • VPC (ventricular premature complex)      PAST SURGICAL HISTORY               Procedure Laterality Date   • CARDIAC CATHETERIZATION     • CARDIAC CATHETERIZATION N/A 8/16/2017    Procedure: Coronary angiography; Surgeon: Rell Deutsch MD; Location: [facility] INVASIVE LOCATION; Service:    • CARDIAC CATHETERIZATION N/A 8/16/2017    Procedure: Stent QUIANA coronary; Surgeon: Rell Deutsch MD; Location: [facility] INVASIVE LOCATION; Service:    • CARDIAC CATHETERIZATION N/A 8/16/2017    Procedure: Left Heart Cath; Surgeon: Rell Deutsch MD; Location: [facility] CATH INVASIVE LOCATION; Service:    • CARDIAC CATHETERIZATION N/A 8/16/2017    Procedure: Left ventriculography; Surgeon: Rell Deutsch MD; Location: [facility] INVASIVE LOCATION; Service:    • CARDIAC CATHETERIZATION N/A 7/25/2019    Procedure: Left Heart Cath; Surgeon: Rell Deutsch MD; Location: [facility] INVASIVE LOCATION; Service: Cardiology   • CARDIAC CATHETERIZATION N/A 7/25/2019    Procedure: Stent QUIANA coronary; Surgeon: Rell Deutsch MD; Location: [facility] INVASIVE LOCATION;  "Service: Cardiology   • CARDIAC CATHETERIZATION N/A 7/25/2019    Procedure: Left ventriculography; Surgeon: Rell Deutsch MD; Location:  [facility] CATH INVASIVE LOCATION; Service: Cardiology   • CARDIAC CATHETERIZATION N/A 7/25/2019    Procedure: Coronary angiography; Surgeon: Rell Deutsch MD; Location:  [facility] CATH INVASIVE LOCATION; Service: Cardiology   • CORONARY ANGIOPLASTY WITH STENT PLACEMENT     • KIDNEY STONE SURGERY     • PARATHYROIDECTOMY       FAMILY HISTORY            Problem Relation Age of Onset   • Diabetes Mother    • Alzheimer's disease Father    • Kidney disease Father    • Diabetes Father    • No Known Problems Maternal Grandmother    • No Known Problems Maternal Grandfather    • No Known Problems Paternal Grandmother    • Heart attack Paternal Grandfather    • Heart disease Paternal Grandfather    • Diabetes Paternal Grandfather      SOCIAL HISTORY               Socioeconomic History   • Marital status:      Spouse name: Not on file   • Number of children: Not on file   • Years of education: Not on file   • Highest education level: Not on file   Tobacco Use   • Smoking status: Former Smoker     Packs/day: 1.00     Years: 25.00     Pack years: 25.00     Types: Cigarettes   • Smokeless tobacco: Never Used   • Tobacco comment: NO CAFFEINE USE   Vaping Use   • Vaping Use: Never used   Substance and Sexual Activity   • Alcohol use: Yes     Alcohol/week: 1.0 standard drinks     Types: 1 Shots of liquor per week     Comment: 2 drinks a year   • Drug use: No   • Sexual activity: Defer     ALLERGIES   Oxycodone-acetaminophen, Percocet [oxycodone-acetaminophen], Shellfish-derived products, Statins, Adhesive tape, and Latex   Home medications reviewed    REVIEW OF SYSTEMS   All systems reviewed and negative except for those discussed in HPI.     PHYSICAL EXAM   Blood pressure 141/71, pulse 75, temperature 99.7 °F (37.6 °C), temperature source Oral, resp. rate 14, height 182.9 cm (72\"), weight 96.1 " kg (211 lb 13.8 oz), SpO2 (!) 89 %.    GENERAL: not distressed, appears ill but not toxic   HENT: nares patent   EYES: no scleral icterus   NECK: no ROM limitations   CV: regular rhythm, regular rate, no murmur, no rubs and no gallops   RESPIRATORY: Mild increased work of breathing, breath sounds are clear to auscultate bilaterally patient is able to speak in full sentences   ABDOMEN: soft, rounded, no focal tenderness bowel sounds positive  MUSCULOSKELETAL: no deformity   NEURO: alert, moves all extremities, follows commands   SKIN: warm, dry     LAB RESULTS   Lab Results (last 24 hours)     Procedure Component Value Units Date/Time    Hepatic Function Panel [[id number]]  (Abnormal) Collected: 03/16/21 1215    Specimen: Blood Updated: 03/16/21 1302     Total Protein 6.6 g/dL      Albumin 3.20 g/dL      ALT (SGPT) 14 U/L      AST (SGOT) 23 U/L      Alkaline Phosphatase 48 U/L      Total Bilirubin 0.4 mg/dL      Bilirubin, Direct <0.2 mg/dL      Bilirubin, Indirect --     Comment: Unable to calculate       Creatinine, Serum [[id number]]  (Normal) Collected: 03/16/21 1215    Specimen: Blood Updated: 03/16/21 1302     Creatinine 0.96 mg/dL      eGFR Non African Amer 77 mL/min/1.73     Narrative:      GFR Normal >60  Chronic Kidney Disease <60  Kidney Failure <15      Protime-INR [[id number]]  (Abnormal) Collected: 03/16/21 1215    Specimen: Blood Updated: 03/16/21 1245     Protime 15.5 Seconds      INR 1.25    POC Glucose Once [[id number]]  (Abnormal) Collected: 03/16/21 1138    Specimen: Blood Updated: 03/16/21 1152     Glucose 294 mg/dL     Basic Metabolic Panel [[id number]]  (Abnormal) Collected: 03/16/21 0811    Specimen: Blood from Arm, Right Updated: 03/16/21 0958     Glucose 236 mg/dL      BUN 24 mg/dL      Creatinine 0.87 mg/dL      Sodium 137 mmol/L      Potassium 4.6 mmol/L      Chloride 103 mmol/L      CO2 22.7 mmol/L      Calcium 9.0 mg/dL      eGFR Non African Amer 87 mL/min/1.73      BUN/Creatinine Ratio 27.6      Anion Gap 11.3 mmol/L     Narrative:      GFR Normal >60  Chronic Kidney Disease <60  Kidney Failure <15      CBC (No Diff) [[id number]]  (Abnormal) Collected: 03/16/21 0811    Specimen: Blood from Arm, Right Updated: 03/16/21 0929     WBC 10.37 10*3/mm3      RBC 4.08 10*6/mm3      Hemoglobin 12.3 g/dL      Hematocrit 34.7 %      MCV 85.0 fL      MCH 30.1 pg      MCHC 35.4 g/dL      RDW 13.0 %      RDW-SD 39.6 fl      MPV 11.1 fL      Platelets 216 10*3/mm3     POC Glucose Once [[id number]]  (Abnormal) Collected: 03/16/21 0634    Specimen: Blood Updated: 03/16/21 0636     Glucose 256 mg/dL     POC Glucose Once [[id number]]  (Abnormal) Collected: 03/15/21 2110    Specimen: Blood Updated: 03/15/21 2130     Glucose 252 mg/dL     Timed Lactic Acid, Reflex [[id number]]  (Normal) Collected: 03/15/21 1903    Specimen: Blood Updated: 03/15/21 1932     Lactate 1.3 mmol/L     Respiratory Panel PCR w/COVID-19(SARS-CoV-2) [facility] In-House, NP Swab in UTM/VTM, 3-4 HR TAT - Swab, Nasopharynx [[id number]]  (Abnormal) Collected: 03/15/21 1547    Specimen: Swab from Nasopharynx Updated: 03/15/21 1749     ADENOVIRUS, PCR Not Detected     Coronavirus 229E Not Detected     Coronavirus HKU1 Not Detected     Coronavirus NL63 Not Detected     Coronavirus OC43 Not Detected     COVID19 Detected     Human Metapneumovirus Not Detected     Human Rhinovirus/Enterovirus Not Detected     Influenza A PCR Not Detected     Influenza B PCR Not Detected     Parainfluenza Virus 1 Not Detected     Parainfluenza Virus 2 Not Detected     Parainfluenza Virus 3 Not Detected     Parainfluenza Virus 4 Not Detected     RSV, PCR Not Detected     Bordetella pertussis pcr Not Detected     Bordetella parapertussis PCR Not Detected     Chlamydophila pneumoniae PCR Not Detected     Mycoplasma pneumo by PCR Not Detected    Narrative:      Fact sheet for providers:  https://docs.[employer]/wp-content/uploads/LZP6691-5863-DN8.1-EUA-Provider-Fact-Sheet-3.pdf    Fact sheet for patients: https://docs.[employer]/wp-content/uploads/LMM5085-4413-GN9.1-EUA-Patient-Fact-Sheet-1.pdf    Test performed by PCR.           Study Result    Narrative & Impression   ONE VIEW PORTABLE CHEST     HISTORY: Shortness of breath and cough. Possible Covid 19 pneumonia.     FINDINGS: The lungs are moderately expanded with some vague haziness  extending into the periphery of the left lung and at the right base  highly suspicious for early changes of Covid 19 pneumonia and continued  follow-up evaluation is recommended. The heart is slightly enlarged.          Current Facility-Administered Medications:   •  ascorbic acid (VITAMIN C) tablet 500 mg, 500 mg, Oral, Daily, Kevin Hayes MD  •  cetirizine (zyrTEC) tablet 10 mg, 10 mg, Oral, Daily, Kevin Hayes MD  •  cholecalciferol (VITAMIN D3) tablet 1,000 Units, 1,000 Units, Oral, Daily, Kevin Hayes MD  •  clopidogrel (PLAVIX) tablet 75 mg, 75 mg, Oral, Daily, Kevin Hayes MD  •  dexamethasone sodium phosphate injection 6 mg, 6 mg, Intravenous, BID, Kevin Hayes MD, 6 mg at 03/16/21 0840  •  doxycycline (MONODOX) capsule 100 mg, 100 mg, Oral, Q12H, Roberto Jensen MD  •  enoxaparin (LOVENOX) syringe 40 mg, 40 mg, Subcutaneous, Q24H, Kevin Hayes MD, 40 mg at 03/15/21 2108  •  famotidine (PEPCID) tablet 20 mg, 20 mg, Oral, BID, Kevin Hayes MD  •  finasteride (PROSCAR) tablet 5 mg, 5 mg, Oral, Daily, Kevin Hayes MD  •  fluticasone (FLONASE) 50 MCG/ACT nasal spray 1 spray, 1 spray, Each Nare, Daily, Kevin Hayes MD  •  glucagon (human recombinant) (GLUCAGEN DIAGNOSTIC) injection 1 mg, 1 mg, Subcutaneous, Q15 Min PRN, Kevin Hayes MD  •  insulin glargine (LANTUS, SEMGLEE) injection 48 Units, 48 Units, Subcutaneous, Q24H, Kevin Hayes MD  •  insulin lispro (ADMELOG) injection 0-7 Units, 0-7 Units, Subcutaneous, 4x Daily With Meals & Nightly,  Honey Hayes MD, 4 Units at 03/16/21 0840  •  insulin lispro (ADMELOG) injection 10 Units, 10 Units, Subcutaneous, TID With Meals, Honey Hayes MD  •  losartan (COZAAR) tablet 100 mg, 100 mg, Oral, Q24H, Honey Hayes MD  •  metoprolol tartrate (LOPRESSOR) tablet 50 mg, 50 mg, Oral, Q12H, Honey Hayes MD  •  ondansetron (ZOFRAN) injection 4 mg, 4 mg, Intravenous, Q6H PRN, Honey Hayes MD  •  Pharmacy Consult - Remdesivir, , Does not apply, Continuous PRN, Roberto Jensen MD  •  remdesivir 200 mg in sodium chloride 0.9 % 290 mL IVPB (powder vial), 200 mg, Intravenous, Q24H **FOLLOWED BY** [START ON 3/17/2021] remdesivir 100 mg in sodium chloride 0.9 % 270 mL IVPB (powder vial), 100 mg, Intravenous, Q24H, Roberto Jensen MD  •  tamsulosin (FLOMAX) 24 hr capsule 0.4 mg, 0.4 mg, Oral, Nightly, Honey Hayes MD  •  zinc sulfate (ZINCATE) capsule 220 mg, 220 mg, Oral, Daily, Honey Hayes MD     ASSESSMENT  COVID-19 pneumonia  Acute hypoxic aspiratory failure  Diabetes mellitus  Hypertension  Hyperlipidemia  Coronary artery disease  BPH  Gastroesophageal reflux disease    PLAN  Admit  Supplement oxygen nebulizer  Decadron  Remdesivir  Empiric antibiotics per infectious disease  Adjust home medications  Stress ulcer DVT prophylaxis  Pulmonary to follow patient   Supportive care  Patient is full code  Discussed with nursing staff  Follow closely further recommendation according to hospital course    HONEY HAYES MD         No

## 2025-05-09 RX ORDER — SPIRONOLACTONE 25 MG/1
25 TABLET ORAL DAILY
Qty: 90 TABLET | Refills: 3 | Status: SHIPPED | OUTPATIENT
Start: 2025-05-09

## 2025-05-09 RX ORDER — LOSARTAN POTASSIUM 25 MG/1
12.5 TABLET ORAL DAILY
Qty: 45 TABLET | Refills: 3 | Status: SHIPPED | OUTPATIENT
Start: 2025-05-09

## 2025-05-14 DIAGNOSIS — I10 ESSENTIAL (PRIMARY) HYPERTENSION: ICD-10-CM

## 2025-05-14 RX ORDER — METOPROLOL SUCCINATE 100 MG/1
100 TABLET, EXTENDED RELEASE ORAL 2 TIMES DAILY
Qty: 180 TABLET | Refills: 0 | Status: SHIPPED | OUTPATIENT
Start: 2025-05-14

## 2025-05-20 ENCOUNTER — OFFICE VISIT (OUTPATIENT)
Age: 75
End: 2025-05-20
Payer: MEDICARE

## 2025-05-20 VITALS
HEIGHT: 72 IN | DIASTOLIC BLOOD PRESSURE: 74 MMHG | WEIGHT: 208 LBS | HEART RATE: 77 BPM | BODY MASS INDEX: 28.17 KG/M2 | SYSTOLIC BLOOD PRESSURE: 124 MMHG

## 2025-05-20 DIAGNOSIS — E78.49 OTHER HYPERLIPIDEMIA: ICD-10-CM

## 2025-05-20 DIAGNOSIS — I10 ESSENTIAL (PRIMARY) HYPERTENSION: ICD-10-CM

## 2025-05-20 DIAGNOSIS — I25.10 CORONARY ARTERY DISEASE INVOLVING NATIVE CORONARY ARTERY OF NATIVE HEART WITHOUT ANGINA PECTORIS: Primary | ICD-10-CM

## 2025-05-20 PROCEDURE — 1159F MED LIST DOCD IN RCRD: CPT | Performed by: NURSE PRACTITIONER

## 2025-05-20 PROCEDURE — 93000 ELECTROCARDIOGRAM COMPLETE: CPT | Performed by: NURSE PRACTITIONER

## 2025-05-20 PROCEDURE — 3078F DIAST BP <80 MM HG: CPT | Performed by: NURSE PRACTITIONER

## 2025-05-20 PROCEDURE — 1160F RVW MEDS BY RX/DR IN RCRD: CPT | Performed by: NURSE PRACTITIONER

## 2025-05-20 PROCEDURE — 3074F SYST BP LT 130 MM HG: CPT | Performed by: NURSE PRACTITIONER

## 2025-05-20 PROCEDURE — 99214 OFFICE O/P EST MOD 30 MIN: CPT | Performed by: NURSE PRACTITIONER

## 2025-05-20 NOTE — PROGRESS NOTES
Date of Office Visit: 2025  Encounter Provider: MATIAS Mitchell  Place of Service: [facility] CARDIOLOGY  Patient Name: Alex Geiger  :1950    Chief Complaint   Patient presents with    Coronary artery disease involving native coronary artery of   :     HPI: Alex Geiger is a 74 y.o. male patient of Dr. Deutsch's with hypertension, hyperlipidemia, PVCs, and coronary artery disease.     In , he underwent multivessel PCI of the mid distal circumflex and RCA.     In , he underwent drug-eluting stenting of the mid LAD.     In , he underwent stenting of the distal RCA.     In , he presented with unstable angina and underwent drug-eluting stenting of the proximal LAD.     In 2023, he developed recurrent symptoms and was taken for repeat cardiac catheterization.  This demonstrated mild nonobstructive disease with patent stents and moderate to severely reduced global LV function.  Medical therapy was optimized and he was referred to the heart failure clinic.    Echocardiogram from 2024 demonstrated normal LV function with an EF of 50 to 55%.    He was last seen in the office by Dr. Deutsch in 2024 at which time he was doing well.  He reported chronic upper back and shoulder pain as well as occasional discomfort in his right chest.  Overall Dr. Deutsch felt he was stable.  No changes were made to his regimen, and he was advised to follow-up in 6 months.    Pain in the right chest which takes his breath away. Increased in frequency. 2-3 a week. Not exertional. Lasts 10 minutes. Teeth hurting. Says symptoms feel eerily similar to previous anginal equivalent. Fatigue.     Still reporting an intermittent discomfort in the right side of his chest.  However, he does feel it is happening more frequently.  Reportedly it occurs about 2 or 3 times a week and lasts for 10 minutes each time.  It is not exertional and typically occurs at  rest.  In addition, he has been experiencing pain in his teeth.  He says the symptoms are nearly similar to his previous symptoms before he needed stents he denies any shortness of breath, palpitations, edema, dizziness, syncope, bleeding difficulties or melena.  He does report fatigue.    Past Medical History:   Diagnosis Date    CAD (coronary artery disease)     COVID-19     Diabetes mellitus     Hyperlipidemia     Hypertension     Kidney stones     Renal injury     Unstable angina     Ventricular ectopy     VPC (ventricular premature complex)        Past Surgical History:   Procedure Laterality Date    CARDIAC CATHETERIZATION      CARDIAC CATHETERIZATION N/A 8/16/2017    Procedure: Coronary angiography;  Surgeon: Rell Deutsch MD;  Location: [facility] CATH INVASIVE LOCATION;  Service:     CARDIAC CATHETERIZATION N/A 8/16/2017    Procedure: Stent QUIANA coronary;  Surgeon: Rell Deutsch MD;  Location: [facility] CATH INVASIVE LOCATION;  Service:     CARDIAC CATHETERIZATION N/A 8/16/2017    Procedure: Left Heart Cath;  Surgeon: Rell Deutsch MD;  Location: [facility] CATH INVASIVE LOCATION;  Service:     CARDIAC CATHETERIZATION N/A 8/16/2017    Procedure: Left ventriculography;  Surgeon: Rell Deutsch MD;  Location: [facility] CATH INVASIVE LOCATION;  Service:     CARDIAC CATHETERIZATION N/A 7/25/2019    Procedure: Left Heart Cath;  Surgeon: Rell Deutsch MD;  Location: [facility] CATH INVASIVE LOCATION;  Service: Cardiology    CARDIAC CATHETERIZATION N/A 7/25/2019    Procedure: Stent QUIANA coronary;  Surgeon: Rell Deutsch MD;  Location: [facility] CATH INVASIVE LOCATION;  Service: Cardiology    CARDIAC CATHETERIZATION N/A 7/25/2019    Procedure: Left ventriculography;  Surgeon: Rell Deutsch MD;  Location: [facility] INVASIVE LOCATION;  Service: Cardiology    CARDIAC CATHETERIZATION N/A 7/25/2019    Procedure: Coronary angiography;  Surgeon: Rell Deutsch MD;  Location: [facility] INVASIVE LOCATION;  Service:  Cardiology    CARDIAC CATHETERIZATION Right 12/22/2021    Procedure: Left Heart Cath;  Surgeon: Helen Oliva MD;  Location:  [facility] CATH INVASIVE LOCATION;  Service: Cardiology;  Laterality: Right;    CARDIAC CATHETERIZATION N/A 12/22/2021    Procedure: Coronary angiography;  Surgeon: Helen Oliva MD;  Location:  [facility] CATH INVASIVE [facility];  Service: Cardiology;  Laterality: N/A;    CARDIAC CATHETERIZATION N/A 12/22/2021    Procedure: Left ventriculography;  Surgeon: Helen Oliva MD;  Location:  [facility] CATH INVASIVE LOC[facility];  Service: Cardiology;  Laterality: N/A;    CARDIAC CATHETERIZATION N/A 12/22/2021    Procedure: Percutaneous Coronary Intervention;  Surgeon: Helen Oliva MD;  Location:  [facility] CATH INVAS[facility];  Service: Cardiology;  Laterality: N/A;    CARDIAC CATHETERIZATION N/A 12/22/2021    Procedure: Stent QUIANA coronary;  Surgeon: Helen Oliva MD;  Location:  [facility] C[facility] INVAS[facility] LOC[facility];  Service: Cardiology;  Laterality: N/A;    CARDIAC CATHETERIZATION N/A 2/23/2023    Procedure: Left Heart Cath;  Surgeon: Rell Deutsch MD;  Location:  [facility] C[facility] INV[facility];  Service: Cardiology;  Laterality: N/A;    CARDIAC CATHETERIZATION N/A 2/23/2023    Procedure: Left ventriculography;  Surgeon: Rell Deutsch MD;  Location:  [facility] C[facility];  Service: Cardiology;  Laterality: N/A;    CARDIAC CATHETERIZATION N/A 2/23/2023    Procedure: Coronary angiography;  Surgeon: Rell Deutsch MD;  Location:  [facility] C[facility] INVASIVE LOC[facility];  Service: Cardiology;  Laterality: N/A;    CORONARY ANGIOPLASTY WITH STENT PLACEMENT      KIDNEY STONE SURGERY      PARATHYROIDECTOMY         Social History     Socioeconomic History    Marital status:    Tobacco Use    Smoking status: Former     Current packs/day: 1.00     Average packs/day: 1 pack/day for 25.0 years (25.0 ttl pk-yrs)     Types: Cigarettes    Smokeless tobacco: Never    Tobacco comments:     NO CAFFEINE USE   Vaping Use     Vaping status: Never Used   Substance and Sexual Activity    Alcohol use: Not Currently    Drug use: Never    Sexual activity: Defer       Family History   Problem Relation Age of Onset    Diabetes Mother     Alzheimer's disease Father     Kidney disease Father     Diabetes Father     No Known Problems Maternal Grandmother     No Known Problems Maternal Grandfather     No Known Problems Paternal Grandmother     Heart attack Paternal Grandfather     Heart disease Paternal Grandfather     Diabetes Paternal Grandfather        Review of Systems   Constitutional: Positive for malaise/fatigue.   Cardiovascular:  Positive for chest pain. Negative for dyspnea on exertion, leg swelling, orthopnea, paroxysmal nocturnal dyspnea and syncope.   Respiratory: Negative.     Hematologic/Lymphatic: Negative for bleeding problem.   Musculoskeletal:  Negative for falls.   Gastrointestinal:  Negative for melena.   Neurological:  Negative for dizziness and light-headedness.       Allergies   Allergen Reactions    Oxycodone-Acetaminophen Itching    Percocet [Oxycodone-Acetaminophen]     Shellfish-Derived Products Hives and Itching    Statins     Adhesive Tape Rash    Latex Rash         Current Outpatient Medications:     cholecalciferol (VITAMIN D3) 25 MCG (1000 UT) tablet, Take 1 tablet by mouth Daily., Disp: , Rfl:     clopidogrel (PLAVIX) 75 MG tablet, TAKE (1) TABLET DAILY, Disp: 90 tablet, Rfl: 1    fexofenadine (ALLEGRA) 180 MG tablet, Take 1 tablet by mouth Daily., Disp: , Rfl:     finasteride (PROSCAR) 5 MG tablet, Take 1 tablet by mouth Daily., Disp: , Rfl:     flunisolide (NASALIDE) 25 MCG/ACT (0.025%) solution nasal spray, Inhale 2 sprays 2 (Two) Times a Day., Disp: , Rfl:     isosorbide mononitrate (IMDUR) 60 MG 24 hr tablet, Take 1.5 tablets by mouth Every Morning., Disp: 135 tablet, Rfl: 0    Jardiance 25 MG tablet tablet, Take 1 tablet by mouth Daily., Disp: , Rfl:     losartan (COZAAR) 25 MG tablet, Take 0.5 tablets by  "mouth Daily., Disp: 45 tablet, Rfl: 3    metFORMIN (GLUCOPHAGE) 500 MG tablet, Take 2 tablets by mouth 2 (Two) Times a Day With Meals., Disp: , Rfl:     metoprolol succinate XL (TOPROL-XL) 100 MG 24 hr tablet, Take 1 tablet by mouth 2 (Two) Times a Day., Disp: 180 tablet, Rfl: 0    nitroglycerin (NITROSTAT) 0.4 MG SL tablet, PLACE 1 TABLET UNDER TONGUE EVERY 5 MINUTES X3 DOSES FOR CHEST PAIN. IF NO RELIEF CALL 911., Disp: 45 tablet, Rfl: 11    NON FORMULARY, Allergy shot weekly, Disp: , Rfl:     pregabalin (LYRICA) 50 MG capsule, Take 1 capsule by mouth 2 (Two) Times a Day., Disp: , Rfl:     Repatha SureClick solution auto-injector SureClick injection, INJECT 1ML EVERY 2 WEEKS, Disp: , Rfl:     spironolactone (ALDACTONE) 25 MG tablet, Take 1 tablet by mouth Daily, Disp: 90 tablet, Rfl: 3    tamsulosin (FLOMAX) 0.4 MG capsule 24 hr capsule, Take 1 capsule by mouth Every Night., Disp: , Rfl:     Testosterone 1.62 % gel, , Disp: , Rfl:     TRESIBA FLEXTOUCH 200 UNIT/ML solution pen-injector, Inject 65 Units as directed Daily., Disp: , Rfl:       Objective:     Vitals:    05/20/25 1221   BP: 124/74   Pulse: 77   Weight: 94.3 kg (208 lb)   Height: 182.9 cm (72.01\")     Body mass index is 28.2 kg/m².    PHYSICAL EXAM:    Neck:      Vascular: No JVD.   Pulmonary:      Effort: Pulmonary effort is normal.      Breath sounds: Normal breath sounds.   Cardiovascular:      Normal rate. Regular rhythm.      Murmurs: There is no murmur.      No gallop.  No click. No rub.   Pulses:     Intact distal pulses.           ECG 12 Lead    Date/Time: 5/20/2025 12:34 PM  Performed by: Sigrid Ng APRN    Authorized by: Sigrid Ng APRN  Comparison: compared with previous ECG from 11/19/2024  Rhythm: sinus rhythm  Rate: normal  BPM: 77  Q waves: V1, V2 and V3              Assessment:       Diagnosis Plan   1. Coronary artery disease involving native coronary artery of native heart without angina pectoris  ECG 12 Lead    " "  2. Essential (primary) hypertension        3. Other hyperlipidemia          Orders Placed This Encounter   Procedures    ECG 12 Lead     This order was created via procedure documentation     Release to patient:   Routine Release [[id number]]          Plan:       1.  Coronary artery disease. Reports of chest pain overall sound atypical.  The teeth pain is a little concerning, especially sine this was his previous anginal equivalent.         2.  Hypertension.  His blood pressure is stable.  Continue current regimen including losartan, Toprol, and spironolactone.      3.  Hyperlipidemia.  He is statin intolerant and maintained on Repatha.  He is due for repeat lipid panel.      Symptoms discussed with Dr. Deutsch.  He recommends proceeding with a stress test. The patient was a little resistant to this idea.  He thinks stress tests are \"worthless.\" Ultimately, he agreed to proceed.  Further recommendations will be made pending these results      As always, it has been a pleasure to participate in your patient's care.      Sincerely,         MATIAS Hannah  "

## 2025-05-22 ENCOUNTER — TELEPHONE (OUTPATIENT)
Dept: CARDIOLOGY | Age: 75
End: 2025-05-22
Payer: MEDICARE

## 2025-05-27 ENCOUNTER — HOSPITAL ENCOUNTER (OUTPATIENT)
Dept: CARDIOLOGY | Facility: HOSPITAL | Age: 75
Discharge: HOME | End: 2025-05-27
Admitting: NURSE PRACTITIONER
Payer: MEDICARE

## 2025-05-27 VITALS — WEIGHT: 207.89 LBS | BODY MASS INDEX: 28.16 KG/M2 | HEIGHT: 72 IN

## 2025-05-27 DIAGNOSIS — I25.10 CORONARY ARTERY DISEASE INVOLVING NATIVE CORONARY ARTERY OF NATIVE HEART WITHOUT ANGINA PECTORIS: ICD-10-CM

## 2025-05-27 LAB
BH CV NUCLEAR PRIOR STUDY: 2
BH CV REST NUCLEAR ISOTOPE DOSE: 10.7 MCI
BH CV STRESS BP STAGE 1: NORMAL
BH CV STRESS COMMENTS STAGE 1: NORMAL
BH CV STRESS DOSE REGADENOSON STAGE 1: 0.4
BH CV STRESS DURATION MIN STAGE 1: 0
BH CV STRESS DURATION SEC STAGE 1: 10
BH CV STRESS HR STAGE 1: 105
BH CV STRESS PROTOCOL 1: NORMAL
BH CV STRESS RECOVERY BP: NORMAL MMHG
BH CV STRESS RECOVERY HR: 90 BPM
BH CV STRESS STAGE 1: 1
MAXIMAL PREDICTED HEART RATE: 146 BPM
PERCENT MAX PREDICTED HR: 71.92 %
SPECT HRT GATED+EF W RNC IV: 47 %
STRESS BASELINE BP: NORMAL MMHG
STRESS BASELINE HR: 75 BPM
STRESS PERCENT HR: 85 %
STRESS POST EXERCISE DUR MIN: 0 MIN
STRESS POST EXERCISE DUR SEC: 10 SEC
STRESS POST PEAK BP: NORMAL MMHG
STRESS POST PEAK HR: 105 BPM
STRESS TARGET HR: 124 BPM

## 2025-05-27 PROCEDURE — 78452 HT MUSCLE IMAGE SPECT MULT: CPT | Performed by: INTERNAL MEDICINE

## 2025-05-27 PROCEDURE — 78452 HT MUSCLE IMAGE SPECT MULT: CPT

## 2025-05-27 PROCEDURE — 34310000005 TECHNETIUM TETROFOSMIN KIT: Performed by: NURSE PRACTITIONER

## 2025-05-27 PROCEDURE — 25010000002 REGADENOSON 0.4 MG/5ML SOLUTION: Performed by: NURSE PRACTITIONER

## 2025-05-27 PROCEDURE — A9502 TC99M TETROFOSMIN: HCPCS | Performed by: NURSE PRACTITIONER

## 2025-05-27 PROCEDURE — 93018 CV STRESS TEST I&R ONLY: CPT | Performed by: INTERNAL MEDICINE

## 2025-05-27 PROCEDURE — 93016 CV STRESS TEST SUPVJ ONLY: CPT | Performed by: INTERNAL MEDICINE

## 2025-05-27 PROCEDURE — 93017 CV STRESS TEST TRACING ONLY: CPT

## 2025-05-27 RX ORDER — REGADENOSON 0.08 MG/ML
0.4 INJECTION, SOLUTION INTRAVENOUS
Status: COMPLETED | OUTPATIENT
Start: 2025-05-27 | End: 2025-05-27

## 2025-05-27 RX ADMIN — TETROFOSMIN 1 DOSE: 1.38 INJECTION, POWDER, LYOPHILIZED, FOR SOLUTION INTRAVENOUS at 12:01

## 2025-05-27 RX ADMIN — REGADENOSON 0.4 MG: 0.08 INJECTION, SOLUTION INTRAVENOUS at 12:49

## 2025-05-27 RX ADMIN — TETROFOSMIN 1 DOSE: 1.38 INJECTION, POWDER, LYOPHILIZED, FOR SOLUTION INTRAVENOUS at 12:49

## 2025-07-07 RX ORDER — ISOSORBIDE MONONITRATE 60 MG/1
90 TABLET, EXTENDED RELEASE ORAL EVERY MORNING
Qty: 135 TABLET | Refills: 3 | Status: SHIPPED | OUTPATIENT
Start: 2025-07-07

## 2025-08-14 DIAGNOSIS — I10 ESSENTIAL (PRIMARY) HYPERTENSION: ICD-10-CM

## 2025-08-14 RX ORDER — METOPROLOL SUCCINATE 100 MG/1
100 TABLET, EXTENDED RELEASE ORAL 2 TIMES DAILY
Qty: 180 TABLET | Refills: 0 | Status: SHIPPED | OUTPATIENT
Start: 2025-08-14

## (undated) DEVICE — BND PRESS RADL COMFRT 14IN STRL

## (undated) DEVICE — NC TREK CORONARY DILATATION CATHETER 2.75 MM X 15 MM / RAPID-EXCHANGE: Brand: NC TREK

## (undated) DEVICE — CATH DIAG IMPULSE FL3.5 5F 100CM

## (undated) DEVICE — CATH VENT MIV RADL PIG ST TIP 4F 110CM

## (undated) DEVICE — KT MANIFLD CARDIAC

## (undated) DEVICE — PK CATH CARD 40

## (undated) DEVICE — GW EMR FIX EXCHG J STD .035 3MM 260CM

## (undated) DEVICE — DEV INDEFLATOR P/N 580289

## (undated) DEVICE — GLIDESHEATH SLENDER STAINLESS STEEL KIT: Brand: GLIDESHEATH SLENDER

## (undated) DEVICE — CATH DIAG IMPULSE FR4 5F 100CM

## (undated) DEVICE — HI-TORQUE WHISPER MS GUIDE WIRE .014 STRAIGHT TIP 3.0 CM X 190 CM: Brand: HI-TORQUE WHISPER

## (undated) DEVICE — GLIDESHEATH BASIC HYDROPHILIC COATED INTRODUCER SHEATH: Brand: GLIDESHEATH

## (undated) DEVICE — DEV INDEFLATOR

## (undated) DEVICE — GW HITORQUE/BAL MID/WT J W/HCOAT .014 3X190CM

## (undated) DEVICE — GUIDELINER CATHETERS ARE INTENDED TO BE USED IN CONJUNCTION WITH GUIDE CATHETERS TO ACCESS DISCRETE REGIONS OF THE CORONARY AND/OR PERIPHERAL VASCULATURE, AND TO FACILITATE PLACEMENT OF INTERVENTIONAL DEVICES.: Brand: GUIDELINER® V3 CATHETER

## (undated) DEVICE — RUNTHROUGH NS EXTRA FLOPPY PTCA GUIDEWIRE: Brand: RUNTHROUGH

## (undated) DEVICE — TR BAND RADIAL ARTERY COMPRESSION DEVICE: Brand: TR BAND

## (undated) DEVICE — GUIDE CATHETER: Brand: MACH1™

## (undated) DEVICE — 6F .070 JR 4 100CM: Brand: CORDIS

## (undated) DEVICE — NC TREK CORONARY DILATATION CATHETER 3.5 MM X 15 MM / RAPID-EXCHANGE: Brand: NC TREK

## (undated) DEVICE — BALN PRESS WEDGE 5F 110CM

## (undated) DEVICE — NC TREK CORONARY DILATATION CATHETER 4.0 MM X 20 MM / RAPID-EXCHANGE: Brand: NC TREK

## (undated) DEVICE — TREK CORONARY DILATATION CATHETER 4.0 MM X 15 MM / RAPID-EXCHANGE: Brand: TREK

## (undated) DEVICE — XIENCE SKYPOINT™ EVEROLIMUS ELUTING CORONARY STENT SYSTEM 4.00 MM X 18 MM / RAPID-EXCHANGE
Type: IMPLANTABLE DEVICE | Status: NON-FUNCTIONAL
Brand: XIENCE SKYPOINT™

## (undated) DEVICE — 6F .070 XB LAD 3.5 100CM: Brand: VISTA BRITE TIP

## (undated) DEVICE — CATH VENT MIV RADL PIG ST TIP 5F 110CM